# Patient Record
Sex: MALE | Race: WHITE | Employment: OTHER | URBAN - METROPOLITAN AREA
[De-identification: names, ages, dates, MRNs, and addresses within clinical notes are randomized per-mention and may not be internally consistent; named-entity substitution may affect disease eponyms.]

---

## 2017-01-06 ENCOUNTER — LAB ENCOUNTER (OUTPATIENT)
Dept: LAB | Age: 72
End: 2017-01-06
Attending: FAMILY MEDICINE
Payer: MEDICARE

## 2017-01-06 DIAGNOSIS — E78.00 PURE HYPERCHOLESTEROLEMIA: ICD-10-CM

## 2017-01-06 DIAGNOSIS — Z96.651 STATUS POST RIGHT KNEE REPLACEMENT: ICD-10-CM

## 2017-01-06 DIAGNOSIS — Z12.5 SCREENING FOR PROSTATE CANCER: ICD-10-CM

## 2017-01-06 DIAGNOSIS — I10 ESSENTIAL HYPERTENSION WITH GOAL BLOOD PRESSURE LESS THAN 130/85: ICD-10-CM

## 2017-01-06 LAB
ALBUMIN SERPL-MCNC: 4.1 G/DL (ref 3.5–4.8)
ALP LIVER SERPL-CCNC: 51 U/L (ref 45–117)
ALT SERPL-CCNC: 27 U/L (ref 17–63)
AST SERPL-CCNC: 13 U/L (ref 15–41)
BASOPHILS # BLD AUTO: 0.09 X10(3) UL (ref 0–0.1)
BASOPHILS NFR BLD AUTO: 0.9 %
BILIRUB SERPL-MCNC: 0.8 MG/DL (ref 0.1–2)
BUN BLD-MCNC: 28 MG/DL (ref 8–20)
CALCIUM BLD-MCNC: 9.5 MG/DL (ref 8.3–10.3)
CHLORIDE: 109 MMOL/L (ref 101–111)
CHOLEST SMN-MCNC: 139 MG/DL (ref ?–200)
CO2: 24 MMOL/L (ref 22–32)
CREAT BLD-MCNC: 1.08 MG/DL (ref 0.7–1.3)
EOSINOPHIL # BLD AUTO: 0.12 X10(3) UL (ref 0–0.3)
EOSINOPHIL NFR BLD AUTO: 1.2 %
ERYTHROCYTE [DISTWIDTH] IN BLOOD BY AUTOMATED COUNT: 14.5 % (ref 11.5–16)
GLUCOSE BLD-MCNC: 86 MG/DL (ref 70–99)
HCT VFR BLD AUTO: 49.9 % (ref 37–53)
HDLC SERPL-MCNC: 68 MG/DL (ref 45–?)
HDLC SERPL: 2.04 {RATIO} (ref ?–4.97)
HGB BLD-MCNC: 16.5 G/DL (ref 13–17)
IMMATURE GRANULOCYTE COUNT: 0.06 X10(3) UL (ref 0–1)
IMMATURE GRANULOCYTE RATIO %: 0.6 %
LDLC SERPL CALC-MCNC: 39 MG/DL (ref ?–130)
LYMPHOCYTES # BLD AUTO: 3.14 X10(3) UL (ref 0.9–4)
LYMPHOCYTES NFR BLD AUTO: 31.8 %
M PROTEIN MFR SERPL ELPH: 6.9 G/DL (ref 6.1–8.3)
MCH RBC QN AUTO: 33.6 PG (ref 27–33.2)
MCHC RBC AUTO-ENTMCNC: 33.1 G/DL (ref 31–37)
MCV RBC AUTO: 101.6 FL (ref 80–99)
MONOCYTES # BLD AUTO: 0.96 X10(3) UL (ref 0.1–0.6)
MONOCYTES NFR BLD AUTO: 9.7 %
NEUTROPHIL ABS PRELIM: 5.5 X10 (3) UL (ref 1.3–6.7)
NEUTROPHILS # BLD AUTO: 5.5 X10(3) UL (ref 1.3–6.7)
NEUTROPHILS NFR BLD AUTO: 55.8 %
NONHDLC SERPL-MCNC: 71 MG/DL (ref ?–130)
PLATELET # BLD AUTO: 292 10(3)UL (ref 150–450)
POTASSIUM SERPL-SCNC: 4 MMOL/L (ref 3.6–5.1)
PSA SERPL-MCNC: 1.65 NG/ML (ref 0.01–4)
RBC # BLD AUTO: 4.91 X10(6)UL (ref 3.8–5.8)
RED CELL DISTRIBUTION WIDTH-SD: 53.9 FL (ref 35.1–46.3)
SODIUM SERPL-SCNC: 142 MMOL/L (ref 136–144)
TRIGLYCERIDES: 160 MG/DL (ref ?–150)
VLDL: 32 MG/DL (ref 5–40)
WBC # BLD AUTO: 9.9 X10(3) UL (ref 4–13)

## 2017-01-06 PROCEDURE — 85025 COMPLETE CBC W/AUTO DIFF WBC: CPT

## 2017-01-06 PROCEDURE — 80053 COMPREHEN METABOLIC PANEL: CPT

## 2017-01-06 PROCEDURE — 84153 ASSAY OF PSA TOTAL: CPT

## 2017-01-06 PROCEDURE — 36415 COLL VENOUS BLD VENIPUNCTURE: CPT

## 2017-01-06 PROCEDURE — 80061 LIPID PANEL: CPT

## 2017-01-07 ENCOUNTER — PRIOR ORIGINAL RECORDS (OUTPATIENT)
Dept: OTHER | Age: 72
End: 2017-01-07

## 2017-01-07 ENCOUNTER — APPOINTMENT (OUTPATIENT)
Dept: CT IMAGING | Facility: HOSPITAL | Age: 72
End: 2017-01-07
Payer: MEDICARE

## 2017-01-07 ENCOUNTER — HOSPITAL ENCOUNTER (EMERGENCY)
Facility: HOSPITAL | Age: 72
Discharge: HOME | End: 2017-01-07
Attending: EMERGENCY MEDICINE
Payer: MEDICARE

## 2017-01-07 VITALS
HEIGHT: 69 IN | TEMPERATURE: 99 F | HEART RATE: 81 BPM | SYSTOLIC BLOOD PRESSURE: 142 MMHG | RESPIRATION RATE: 20 BRPM | OXYGEN SATURATION: 98 % | WEIGHT: 220 LBS | BODY MASS INDEX: 32.58 KG/M2 | DIASTOLIC BLOOD PRESSURE: 74 MMHG

## 2017-01-07 DIAGNOSIS — N23 RENAL COLIC: ICD-10-CM

## 2017-01-07 DIAGNOSIS — N20.1 URETEROLITHIASIS: Primary | ICD-10-CM

## 2017-01-07 LAB
ALBUMIN SERPL-MCNC: 4.3 G/DL (ref 3.5–4.8)
ALP LIVER SERPL-CCNC: 50 U/L (ref 45–117)
ALT SERPL-CCNC: 27 U/L (ref 17–63)
AST SERPL-CCNC: 19 U/L (ref 15–41)
BASOPHILS # BLD AUTO: 0.09 X10(3) UL (ref 0–0.1)
BASOPHILS NFR BLD AUTO: 0.7 %
BILIRUB SERPL-MCNC: 0.9 MG/DL (ref 0.1–2)
BUN BLD-MCNC: 27 MG/DL (ref 8–20)
CALCIUM BLD-MCNC: 9.9 MG/DL (ref 8.3–10.3)
CHLORIDE: 106 MMOL/L (ref 101–111)
CO2: 20 MMOL/L (ref 22–32)
CREAT BLD-MCNC: 1.29 MG/DL (ref 0.7–1.3)
EOSINOPHIL # BLD AUTO: 0.11 X10(3) UL (ref 0–0.3)
EOSINOPHIL NFR BLD AUTO: 0.8 %
ERYTHROCYTE [DISTWIDTH] IN BLOOD BY AUTOMATED COUNT: 13.9 % (ref 11.5–16)
GLUCOSE BLD-MCNC: 134 MG/DL (ref 70–99)
GLUCOSE UR STRIP.AUTO-MCNC: NEGATIVE MG/DL
HCT VFR BLD AUTO: 49.7 % (ref 37–53)
HGB BLD-MCNC: 17.1 G/DL (ref 13–17)
IMMATURE GRANULOCYTE COUNT: 0.08 X10(3) UL (ref 0–1)
IMMATURE GRANULOCYTE RATIO %: 0.6 %
KETONES UR STRIP.AUTO-MCNC: 15 MG/DL
LEUKOCYTE ESTERASE UR QL STRIP.AUTO: NEGATIVE
LIPASE: 136 U/L (ref 73–393)
LYMPHOCYTES # BLD AUTO: 3.08 X10(3) UL (ref 0.9–4)
LYMPHOCYTES NFR BLD AUTO: 22.6 %
M PROTEIN MFR SERPL ELPH: 7.1 G/DL (ref 6.1–8.3)
MCH RBC QN AUTO: 33.6 PG (ref 27–33.2)
MCHC RBC AUTO-ENTMCNC: 34.4 G/DL (ref 31–37)
MCV RBC AUTO: 97.6 FL (ref 80–99)
MONOCYTES # BLD AUTO: 1.51 X10(3) UL (ref 0.1–0.6)
MONOCYTES NFR BLD AUTO: 11.1 %
NEUTROPHIL ABS PRELIM: 8.75 X10 (3) UL (ref 1.3–6.7)
NEUTROPHILS # BLD AUTO: 8.75 X10(3) UL (ref 1.3–6.7)
NEUTROPHILS NFR BLD AUTO: 64.2 %
NITRITE UR QL STRIP.AUTO: POSITIVE
PH UR STRIP.AUTO: 6 [PH] (ref 4.5–8)
PLATELET # BLD AUTO: 284 10(3)UL (ref 150–450)
POTASSIUM SERPL-SCNC: 3.8 MMOL/L (ref 3.6–5.1)
PROT UR STRIP.AUTO-MCNC: >=300 MG/DL
RBC # BLD AUTO: 5.09 X10(6)UL (ref 3.8–5.8)
RBC #/AREA URNS AUTO: >10 /HPF
RED CELL DISTRIBUTION WIDTH-SD: 50.2 FL (ref 35.1–46.3)
SODIUM SERPL-SCNC: 137 MMOL/L (ref 136–144)
SP GR UR STRIP.AUTO: >=1.03 (ref 1–1.03)
UROBILINOGEN UR STRIP.AUTO-MCNC: 1 MG/DL
WBC # BLD AUTO: 13.6 X10(3) UL (ref 4–13)

## 2017-01-07 PROCEDURE — 74176 CT ABD & PELVIS W/O CONTRAST: CPT

## 2017-01-07 PROCEDURE — 87086 URINE CULTURE/COLONY COUNT: CPT

## 2017-01-07 PROCEDURE — 80053 COMPREHEN METABOLIC PANEL: CPT

## 2017-01-07 PROCEDURE — 96374 THER/PROPH/DIAG INJ IV PUSH: CPT

## 2017-01-07 PROCEDURE — 99285 EMERGENCY DEPT VISIT HI MDM: CPT

## 2017-01-07 PROCEDURE — 83690 ASSAY OF LIPASE: CPT

## 2017-01-07 PROCEDURE — 96375 TX/PRO/DX INJ NEW DRUG ADDON: CPT

## 2017-01-07 PROCEDURE — 81001 URINALYSIS AUTO W/SCOPE: CPT

## 2017-01-07 PROCEDURE — 85025 COMPLETE CBC W/AUTO DIFF WBC: CPT

## 2017-01-07 RX ORDER — ONDANSETRON 2 MG/ML
INJECTION INTRAMUSCULAR; INTRAVENOUS
Status: COMPLETED
Start: 2017-01-07 | End: 2017-01-07

## 2017-01-07 RX ORDER — ONDANSETRON 2 MG/ML
4 INJECTION INTRAMUSCULAR; INTRAVENOUS ONCE
Status: COMPLETED | OUTPATIENT
Start: 2017-01-07 | End: 2017-01-07

## 2017-01-07 RX ORDER — HYDROMORPHONE HYDROCHLORIDE 1 MG/ML
1 INJECTION, SOLUTION INTRAMUSCULAR; INTRAVENOUS; SUBCUTANEOUS ONCE
Status: COMPLETED | OUTPATIENT
Start: 2017-01-07 | End: 2017-01-07

## 2017-01-07 RX ORDER — SULFAMETHOXAZOLE AND TRIMETHOPRIM 800; 160 MG/1; MG/1
1 TABLET ORAL 2 TIMES DAILY
Qty: 6 TABLET | Refills: 0 | Status: SHIPPED | OUTPATIENT
Start: 2017-01-07 | End: 2017-01-10

## 2017-01-07 RX ORDER — KETOROLAC TROMETHAMINE 10 MG/1
10 TABLET, FILM COATED ORAL EVERY 6 HOURS PRN
Qty: 12 TABLET | Refills: 0 | Status: SHIPPED | OUTPATIENT
Start: 2017-01-07 | End: 2017-01-10

## 2017-01-07 RX ORDER — HYDROCODONE BITARTRATE AND ACETAMINOPHEN 5; 325 MG/1; MG/1
1 TABLET ORAL EVERY 4 HOURS PRN
Qty: 20 TABLET | Refills: 0 | Status: SHIPPED | OUTPATIENT
Start: 2017-01-07 | End: 2017-01-17

## 2017-01-07 RX ORDER — KETOROLAC TROMETHAMINE 30 MG/ML
15 INJECTION, SOLUTION INTRAMUSCULAR; INTRAVENOUS ONCE
Status: COMPLETED | OUTPATIENT
Start: 2017-01-07 | End: 2017-01-07

## 2017-01-07 RX ORDER — HYDROMORPHONE HYDROCHLORIDE 1 MG/ML
INJECTION, SOLUTION INTRAMUSCULAR; INTRAVENOUS; SUBCUTANEOUS
Status: COMPLETED
Start: 2017-01-07 | End: 2017-01-07

## 2017-01-07 RX ORDER — ONDANSETRON 4 MG/1
4 TABLET, ORALLY DISINTEGRATING ORAL EVERY 4 HOURS PRN
Qty: 10 TABLET | Refills: 0 | Status: SHIPPED | OUTPATIENT
Start: 2017-01-07 | End: 2017-01-14

## 2017-01-07 RX ORDER — TAMSULOSIN HYDROCHLORIDE 0.4 MG/1
0.4 CAPSULE ORAL EVERY EVENING
Qty: 5 CAPSULE | Refills: 0 | Status: SHIPPED | OUTPATIENT
Start: 2017-01-07 | End: 2017-01-14

## 2017-01-07 NOTE — ED PROVIDER NOTES
Patient Seen in: [facility] Emergency Department    History   Patient presents with:  Abdomen/Flank Pain (GI/)    Stated Complaint: kidney stone    HPI    Patient is a pleasant 62-year-old male, presenting for evaluation of right flank pain.   Disc Take 1 tablet by mouth 2 (two) times daily. valsartan (DIOVAN) 320 MG Oral Tab,  Take 320 mg by mouth daily.    amoxicillin 500 MG Oral Cap,  TAKE 4 CAPSULES BY MOUTH 1 HOUR BEFORE DENTIST APPOINTMENT   HYDROcodone-acetaminophen (NORCO) 5-325 MG Oral Tab, 9\")  Wt 99.791 kg  BMI 32.47 kg/m2  SpO2 98%    Physical Exam    Vital signs are reviewed noted as documented in the nursing chart. GENERAL: Patient is awake and alert, moving about on the cart in obvious discomfort.    HEENT: Head is without evidence of Normal   CBC WITH DIFFERENTIAL WITH PLATELET    Narrative: The following orders were created for panel order CBC WITH DIFFERENTIAL WITH PLATELET.   Procedure                               Abnormality         Status                     --------- Urinary bladder is decompressed. LUNG BASES:    Atelectasis      1/7/2017  CONCLUSION:  2 mm obstructing calculus distal right ureter just proximal to the right UVJ with mild right hydronephrosis and perinephric stranding about the right kidney.   Addition incident.     Disposition and Plan     Clinical Impression:  Ureterolithiasis  (primary encounter diagnosis)  Renal colic    Disposition:  Discharge    Follow-up:  Jose Woodall MD  [address] [place] [id number] [phone]    Schedule

## 2017-01-09 DIAGNOSIS — N20.0 KIDNEY STONE: Primary | ICD-10-CM

## 2017-01-09 DIAGNOSIS — E78.00 PURE HYPERCHOLESTEROLEMIA: Primary | ICD-10-CM

## 2017-01-09 DIAGNOSIS — I10 ESSENTIAL HYPERTENSION: ICD-10-CM

## 2017-01-13 ENCOUNTER — APPOINTMENT (OUTPATIENT)
Dept: GENERAL RADIOLOGY | Facility: HOSPITAL | Age: 72
End: 2017-01-13
Attending: EMERGENCY MEDICINE
Payer: MEDICARE

## 2017-01-13 ENCOUNTER — HOSPITAL ENCOUNTER (EMERGENCY)
Facility: HOSPITAL | Age: 72
Discharge: HOME | End: 2017-01-13
Attending: EMERGENCY MEDICINE
Payer: MEDICARE

## 2017-01-13 VITALS
DIASTOLIC BLOOD PRESSURE: 81 MMHG | SYSTOLIC BLOOD PRESSURE: 134 MMHG | TEMPERATURE: 98 F | RESPIRATION RATE: 16 BRPM | HEART RATE: 93 BPM | OXYGEN SATURATION: 95 %

## 2017-01-13 DIAGNOSIS — N23 RENAL COLIC: Primary | ICD-10-CM

## 2017-01-13 LAB
BILIRUB UR QL STRIP.AUTO: NEGATIVE
CLARITY UR REFRACT.AUTO: CLEAR
COLOR UR AUTO: YELLOW
GLUCOSE UR STRIP.AUTO-MCNC: NEGATIVE MG/DL
HYALINE CASTS #/AREA URNS AUTO: PRESENT /LPF
KETONES UR STRIP.AUTO-MCNC: NEGATIVE MG/DL
LEUKOCYTE ESTERASE UR QL STRIP.AUTO: NEGATIVE
NITRITE UR QL STRIP.AUTO: NEGATIVE
PH UR STRIP.AUTO: 5 [PH] (ref 4.5–8)
PROT UR STRIP.AUTO-MCNC: NEGATIVE MG/DL
RBC #/AREA URNS AUTO: >10 /HPF
SP GR UR STRIP.AUTO: 1.02 (ref 1–1.03)
UROBILINOGEN UR STRIP.AUTO-MCNC: <2 MG/DL

## 2017-01-13 PROCEDURE — 99284 EMERGENCY DEPT VISIT MOD MDM: CPT

## 2017-01-13 PROCEDURE — 74000 XR ABDOMEN (KUB) (1 AP VIEW)  (CPT=74000): CPT

## 2017-01-13 PROCEDURE — 81001 URINALYSIS AUTO W/SCOPE: CPT | Performed by: EMERGENCY MEDICINE

## 2017-01-13 PROCEDURE — 96372 THER/PROPH/DIAG INJ SC/IM: CPT

## 2017-01-13 RX ORDER — KETOROLAC TROMETHAMINE 10 MG/1
10 TABLET, FILM COATED ORAL EVERY 6 HOURS PRN
Qty: 30 TABLET | Refills: 0 | Status: SHIPPED | OUTPATIENT
Start: 2017-01-13 | End: 2017-01-20

## 2017-01-13 RX ORDER — HYDROMORPHONE HYDROCHLORIDE 1 MG/ML
1 INJECTION, SOLUTION INTRAMUSCULAR; INTRAVENOUS; SUBCUTANEOUS ONCE
Status: DISCONTINUED | OUTPATIENT
Start: 2017-01-13 | End: 2017-01-13

## 2017-01-13 RX ORDER — HYDROMORPHONE HYDROCHLORIDE 1 MG/ML
0.5 INJECTION, SOLUTION INTRAMUSCULAR; INTRAVENOUS; SUBCUTANEOUS ONCE
Status: COMPLETED | OUTPATIENT
Start: 2017-01-13 | End: 2017-01-13

## 2017-01-13 RX ORDER — HYDROCODONE BITARTRATE AND ACETAMINOPHEN 5; 325 MG/1; MG/1
1-2 TABLET ORAL EVERY 4 HOURS PRN
Qty: 20 TABLET | Refills: 0 | Status: SHIPPED | OUTPATIENT
Start: 2017-01-13 | End: 2017-01-20

## 2017-01-13 RX ORDER — TAMSULOSIN HYDROCHLORIDE 0.4 MG/1
0.4 CAPSULE ORAL DAILY
Qty: 7 CAPSULE | Refills: 0 | Status: SHIPPED | OUTPATIENT
Start: 2017-01-13 | End: 2017-01-20

## 2017-01-13 RX ORDER — ONDANSETRON 4 MG/1
4 TABLET, ORALLY DISINTEGRATING ORAL ONCE
Status: COMPLETED | OUTPATIENT
Start: 2017-01-13 | End: 2017-01-13

## 2017-01-13 RX ORDER — ONDANSETRON 2 MG/ML
4 INJECTION INTRAMUSCULAR; INTRAVENOUS ONCE
Status: DISCONTINUED | OUTPATIENT
Start: 2017-01-13 | End: 2017-01-13

## 2017-01-13 RX ORDER — SODIUM CHLORIDE 9 MG/ML
INJECTION, SOLUTION INTRAVENOUS CONTINUOUS
Status: DISCONTINUED | OUTPATIENT
Start: 2017-01-13 | End: 2017-01-13

## 2017-01-13 RX ORDER — KETOROLAC TROMETHAMINE 30 MG/ML
60 INJECTION, SOLUTION INTRAMUSCULAR; INTRAVENOUS ONCE
Status: COMPLETED | OUTPATIENT
Start: 2017-01-13 | End: 2017-01-13

## 2017-01-13 RX ORDER — ONDANSETRON 4 MG/1
4 TABLET, ORALLY DISINTEGRATING ORAL EVERY 4 HOURS PRN
Qty: 10 TABLET | Refills: 0 | Status: SHIPPED | OUTPATIENT
Start: 2017-01-13 | End: 2017-01-20

## 2017-01-13 NOTE — ED INITIAL ASSESSMENT (HPI)
Was diagnosed with kidney stones this past Saturday. Said he passed the stone. Last night patient started to develop severe right flank pain radiating to the right lower abdomen. Denies fevers. Complaint of mild nausea.

## 2017-01-13 NOTE — ED PROVIDER NOTES
Patient Seen in: [facility] Emergency Department    History   Patient presents with:  Abdomen/Flank Pain (GI/)    Stated Complaint: right flank pain since last night- recent kidney stone    HPI    72-year-old male with a history of kidney stones, 1 capsule (0.4 mg total) by mouth daily. Ketorolac Tromethamine 10 MG Oral Tab,  Take 1 tablet (10 mg total) by mouth every 6 (six) hours as needed for Pain.    ondansetron 4 MG Oral Tablet Dispersible,  Take 1 tablet (4 mg total) by mouth every 4 (four) as otherwise stated in HPI.     Physical Exam       ED Triage Vitals   BP 01/13/17 1617 159/94 mmHg   Pulse 01/13/17 1617 98   Resp 01/13/17 1617 16   Temp 01/13/17 1617 98.4 °F (36.9 °C)   Temp src 01/13/17 1617 Temporal   SpO2 01/13/17 1617 94 %   O2 [place] CT scanner workstation to localize potential stones in the cranio-caudal plane. Dose reduction techniques were used.  Dose information is transmitted to the ACR ([address] [place] [address] of Radiology) Marissa Isbell 35 ([address]) which includes the Dose stone typically would be around 90%. However the fact that it is 5 mm there is a decrease chance compared to his prior kidney stone which was only 2 mm.     Ct Abdomen+pelvis Kidneystone 2d Rndr(no Iv,no Oral)(cpt=74176)    1/7/2017  PROCEDURE:  CT ABDOMEN nephrolithiasis or hydronephrosis. Colonic diverticulosis.     Dictated by: Augustina Fleischer, MD on 1/07/2017 at 15:20     Approved by: Augustina Fleischer, MD            Xr Abdomen (kub) (1 Ap View)  (cpt=74000)    1/13/2017  PROCEDURE:  XR ABDOMEN (KUB) (1 AP VIEW) Refills: 0  Comments: Ketorolac was not administered in hospital.     !! - Potential duplicate medications found. Please discuss with provider.

## 2017-01-14 NOTE — ED NOTES
Asked for an IM shot of Toradol. Erika Me it has worked well in the past for him. Dr. Beba Lal made aware.

## 2017-01-19 ENCOUNTER — TELEPHONE (OUTPATIENT)
Dept: FAMILY MEDICINE CLINIC | Facility: CLINIC | Age: 72
End: 2017-01-19

## 2017-01-19 DIAGNOSIS — N20.0 KIDNEY STONE: Primary | ICD-10-CM

## 2017-01-21 ENCOUNTER — PATIENT MESSAGE (OUTPATIENT)
Dept: FAMILY MEDICINE CLINIC | Facility: CLINIC | Age: 72
End: 2017-01-21

## 2017-01-23 PROBLEM — N20.0 URIC ACID KIDNEY STONE: Status: ACTIVE | Noted: 2017-01-23

## 2017-01-23 PROBLEM — N40.1 BENIGN NON-NODULAR PROSTATIC HYPERPLASIA WITH LOWER URINARY TRACT SYMPTOMS: Status: ACTIVE | Noted: 2017-01-23

## 2017-01-23 NOTE — TELEPHONE ENCOUNTER
From: Ira Roblero  To: Blake Acosta MD  Sent: 1/21/2017 1:39 PM CST  Subject: Other    I realize you have a message about the results of the renal calculi analysis , assuming diet and such .  If you want to just email me here that would be fine

## 2017-01-31 ENCOUNTER — PRIOR ORIGINAL RECORDS (OUTPATIENT)
Dept: OTHER | Age: 72
End: 2017-01-31

## 2017-02-01 ENCOUNTER — APPOINTMENT (OUTPATIENT)
Dept: LAB | Age: 72
End: 2017-02-01
Attending: FAMILY MEDICINE
Payer: MEDICARE

## 2017-02-01 DIAGNOSIS — N20.0 KIDNEY STONE: ICD-10-CM

## 2017-02-01 LAB
ALBUMIN SERPL-MCNC: 3.9 G/DL (ref 3.5–4.8)
ALP LIVER SERPL-CCNC: 55 U/L (ref 45–117)
ALT SERPL-CCNC: 27 U/L (ref 17–63)
AST SERPL-CCNC: 14 U/L (ref 15–41)
BILIRUB SERPL-MCNC: 0.9 MG/DL (ref 0.1–2)
BUN BLD-MCNC: 30 MG/DL (ref 8–20)
CALCIUM BLD-MCNC: 9.6 MG/DL (ref 8.3–10.3)
CHLORIDE: 104 MMOL/L (ref 101–111)
CO2: 31 MMOL/L (ref 22–32)
CREAT BLD-MCNC: 1.28 MG/DL (ref 0.7–1.3)
GLUCOSE BLD-MCNC: 95 MG/DL (ref 70–99)
M PROTEIN MFR SERPL ELPH: 7.2 G/DL (ref 6.1–8.3)
POTASSIUM SERPL-SCNC: 4.1 MMOL/L (ref 3.6–5.1)
SODIUM SERPL-SCNC: 141 MMOL/L (ref 136–144)
URIC ACID: 6.7 MG/DL (ref 2.4–8.7)

## 2017-02-01 PROCEDURE — 36415 COLL VENOUS BLD VENIPUNCTURE: CPT

## 2017-02-01 PROCEDURE — 84550 ASSAY OF BLOOD/URIC ACID: CPT

## 2017-02-01 PROCEDURE — 80053 COMPREHEN METABOLIC PANEL: CPT

## 2017-02-02 DIAGNOSIS — N20.0 KIDNEY STONES: Primary | ICD-10-CM

## 2017-02-02 RX ORDER — ALLOPURINOL 100 MG/1
100 TABLET ORAL DAILY
Qty: 30 TABLET | Refills: 1 | Status: SHIPPED | OUTPATIENT
Start: 2017-02-02 | End: 2017-03-30

## 2017-02-03 LAB
ALBUMIN: 4.3 G/DL
ALKALINE PHOSPHATATE(ALK PHOS): 50 IU/L
BILIRUBIN TOTAL: 0.9 MG/DL
BUN: 27 MG/DL
CALCIUM: 9.9 MG/DL
CHLORIDE: 106 MEQ/L
CREATININE, SERUM: 1.29 MG/DL
GLUCOSE: 134 MG/DL
HEMATOCRIT: 49.7 %
HEMOGLOBIN: 17.1 G/DL
PLATELETS: 284 K/UL
POTASSIUM, SERUM: 3.8 MEQ/L
PROTEIN, TOTAL: 7.1 G/DL
RED BLOOD COUNT: 5.09 X 10-6/U
SGOT (AST): 19 IU/L
SGPT (ALT): 27 IU/L
SODIUM: 137 MEQ/L
WHITE BLOOD COUNT: 13.6 X 10-3/U

## 2017-03-08 ENCOUNTER — TELEPHONE (OUTPATIENT)
Dept: FAMILY MEDICINE CLINIC | Facility: CLINIC | Age: 72
End: 2017-03-08

## 2017-03-30 RX ORDER — ALLOPURINOL 100 MG/1
TABLET ORAL
Qty: 30 TABLET | Refills: 1 | Status: SHIPPED | OUTPATIENT
Start: 2017-03-30 | End: 2017-06-03

## 2017-06-05 RX ORDER — ALLOPURINOL 100 MG/1
TABLET ORAL
Qty: 90 TABLET | Refills: 0 | Status: SHIPPED | OUTPATIENT
Start: 2017-06-05 | End: 2017-09-01

## 2017-07-24 PROBLEM — R35.1 NOCTURIA: Status: ACTIVE | Noted: 2017-07-24

## 2017-08-24 ENCOUNTER — OFFICE VISIT (OUTPATIENT)
Dept: FAMILY MEDICINE CLINIC | Facility: CLINIC | Age: 72
End: 2017-08-24

## 2017-08-24 VITALS
RESPIRATION RATE: 16 BRPM | HEART RATE: 88 BPM | BODY MASS INDEX: 36.22 KG/M2 | DIASTOLIC BLOOD PRESSURE: 78 MMHG | HEIGHT: 68 IN | SYSTOLIC BLOOD PRESSURE: 132 MMHG | WEIGHT: 239 LBS | TEMPERATURE: 98 F

## 2017-08-24 DIAGNOSIS — E04.2 MULTINODULAR GOITER: ICD-10-CM

## 2017-08-24 DIAGNOSIS — E78.00 PURE HYPERCHOLESTEROLEMIA: ICD-10-CM

## 2017-08-24 DIAGNOSIS — G89.29 CHRONIC PAIN OF LEFT KNEE: ICD-10-CM

## 2017-08-24 DIAGNOSIS — Z00.00 ENCOUNTER FOR ANNUAL HEALTH EXAMINATION: Primary | ICD-10-CM

## 2017-08-24 DIAGNOSIS — M25.562 CHRONIC PAIN OF LEFT KNEE: ICD-10-CM

## 2017-08-24 DIAGNOSIS — K21.9 GASTROESOPHAGEAL REFLUX DISEASE WITHOUT ESOPHAGITIS: ICD-10-CM

## 2017-08-24 DIAGNOSIS — I10 ESSENTIAL HYPERTENSION: ICD-10-CM

## 2017-08-24 PROCEDURE — 99214 OFFICE O/P EST MOD 30 MIN: CPT | Performed by: FAMILY MEDICINE

## 2017-08-24 PROCEDURE — G0439 PPPS, SUBSEQ VISIT: HCPCS | Performed by: FAMILY MEDICINE

## 2017-08-24 RX ORDER — ACETAMINOPHEN 325 MG/1
325 TABLET ORAL EVERY 6 HOURS PRN
COMMUNITY
End: 2019-10-02

## 2017-08-24 NOTE — PATIENT INSTRUCTIONS
Steve Ellis's SCREENING SCHEDULE   Tests on this list are recommended by your physician but may not be covered, or covered at this frequency, by your insurer. Please check with your insurance carrier before scheduling to verify coverage.     PREVENT Abdominal aortic aneurysm screening (once between ages 73-68)  No results found for this or any previous visit.  Limited to patients who meet one of the following criteria:   • Men who are 73-68 years old and have smoked more than 100 cigarettes in their received Factor VIII or IX concentrates   Clients of institutions for the mentally retarded   Persons who live in the same house as a HepB virus carrier   Homosexual men   Illicit injectable drug abusers     Tetanus Toxoid- Only covered with a cut with met

## 2017-08-24 NOTE — PROGRESS NOTES
HPI:   Mtathew Albrecht is a 81year old male who presents for a Medicare Subsequent Annual Wellness visit (Pt already had Initial Annual Wellness). Patient is here for his Medicare annual wellness visit. We discussed his weight.  Discussed his left 1-2 tablets by mouth every 4 (four) hours as needed. Rosuvastatin Calcium 20 MG Oral Tab Take 20 mg by mouth daily. Psyllium 0.52 G Oral Cap Take 1 capsule by mouth daily. ezetimibe 10 MG Oral Tab Take 10 mg by mouth daily.    Esomeprazole Magnesium ( heartburn  : 2 per night nocturia, no complaint of urinary incontinence  MUSCULOSKELETAL: denies back pain  NEURO: denies headaches  PSYCHE: denies depression or anxiety  HEMATOLOGIC: denies hx of anemia  ENDOCRINE: denies thyroid history  ALL/ASTHMA: de METABOLIC PANEL (14); Future  - LIPID PANEL; Future  - OFFICE/OUTPT VISIT,EST,LEVL IV    3. Essential hypertension  Continue valsartan 320 mg daily  Continue hydrochlorothiazide 12.5 mg daily  - CBC WITH DIFFERENTIAL WITH PLATELET;  Future  - OFFICE/OUTPT V immunizations at another office such as Influenza, Hepatitis B, Tetanus, or Pneumococcal?: No     Functional Ability     Bathing or Showering: Able without help    Toileting: Able without help    Dressing: Able without help    Eating: Able without help it?: Correct    Recall \"Ball\": Correct    Recall \"Flag\": Correct    Recall \"Tree\": Correct       This section provided for quick review of chart, separate sheet to patient  [address] Internal Lab or Procedure Ext DISEASE MONITORING Internal Lab or Procedure External Lab or Procedure   Annual Monitoring of Persistent     Medications (ACE/ARB, digoxin diuretics, anticonvulsants.)    Potassium  Annually Potassium (mmol/L)   Date Value   02/01/2017 4.1     POTASSIUM (m

## 2017-08-27 PROBLEM — M25.562 CHRONIC PAIN OF LEFT KNEE: Status: ACTIVE | Noted: 2017-08-27

## 2017-08-27 PROBLEM — R35.1 NOCTURIA: Status: RESOLVED | Noted: 2017-07-24 | Resolved: 2017-08-27

## 2017-08-27 PROBLEM — N20.0 URIC ACID KIDNEY STONE: Status: RESOLVED | Noted: 2017-01-23 | Resolved: 2017-08-27

## 2017-08-27 PROBLEM — G89.29 CHRONIC PAIN OF LEFT KNEE: Status: ACTIVE | Noted: 2017-08-27

## 2017-09-01 RX ORDER — ALLOPURINOL 100 MG/1
TABLET ORAL
Qty: 90 TABLET | Refills: 0 | Status: SHIPPED | OUTPATIENT
Start: 2017-09-01 | End: 2017-12-01

## 2017-09-06 ENCOUNTER — LAB ENCOUNTER (OUTPATIENT)
Dept: LAB | Age: 72
End: 2017-09-06
Attending: FAMILY MEDICINE
Payer: MEDICARE

## 2017-09-06 DIAGNOSIS — I10 ESSENTIAL HYPERTENSION: ICD-10-CM

## 2017-09-06 DIAGNOSIS — E04.2 MULTINODULAR GOITER: Primary | ICD-10-CM

## 2017-09-06 DIAGNOSIS — E04.2 MULTINODULAR GOITER: ICD-10-CM

## 2017-09-06 DIAGNOSIS — E78.00 PURE HYPERCHOLESTEROLEMIA: ICD-10-CM

## 2017-09-06 LAB
ALBUMIN SERPL-MCNC: 4.1 G/DL (ref 3.5–4.8)
ALP LIVER SERPL-CCNC: 53 U/L (ref 45–117)
ALT SERPL-CCNC: 26 U/L (ref 17–63)
AST SERPL-CCNC: 14 U/L (ref 15–41)
BASOPHILS # BLD AUTO: 0.08 X10(3) UL (ref 0–0.1)
BASOPHILS NFR BLD AUTO: 0.8 %
BILIRUB SERPL-MCNC: 0.7 MG/DL (ref 0.1–2)
BUN BLD-MCNC: 26 MG/DL (ref 8–20)
CALCIUM BLD-MCNC: 9.5 MG/DL (ref 8.3–10.3)
CHLORIDE: 105 MMOL/L (ref 101–111)
CHOLEST SMN-MCNC: 144 MG/DL (ref ?–200)
CO2: 26 MMOL/L (ref 22–32)
CREAT BLD-MCNC: 1.39 MG/DL (ref 0.7–1.3)
EOSINOPHIL # BLD AUTO: 0.08 X10(3) UL (ref 0–0.3)
EOSINOPHIL NFR BLD AUTO: 0.8 %
ERYTHROCYTE [DISTWIDTH] IN BLOOD BY AUTOMATED COUNT: 13.2 % (ref 11.5–16)
FREE T4: 0.7 NG/DL (ref 0.9–1.8)
GLUCOSE BLD-MCNC: 92 MG/DL (ref 70–99)
HCT VFR BLD AUTO: 49.5 % (ref 37–53)
HDLC SERPL-MCNC: 59 MG/DL (ref 45–?)
HDLC SERPL: 2.44 {RATIO} (ref ?–4.97)
HGB BLD-MCNC: 16.3 G/DL (ref 13–17)
IMMATURE GRANULOCYTE COUNT: 0.11 X10(3) UL (ref 0–1)
IMMATURE GRANULOCYTE RATIO %: 1 %
LDLC SERPL CALC-MCNC: 42 MG/DL (ref ?–130)
LDLC SERPL-MCNC: 43 MG/DL (ref 5–40)
LYMPHOCYTES # BLD AUTO: 1.99 X10(3) UL (ref 0.9–4)
LYMPHOCYTES NFR BLD AUTO: 18.7 %
M PROTEIN MFR SERPL ELPH: 7.2 G/DL (ref 6.1–8.3)
MCH RBC QN AUTO: 33.2 PG (ref 27–33.2)
MCHC RBC AUTO-ENTMCNC: 32.9 G/DL (ref 31–37)
MCV RBC AUTO: 100.8 FL (ref 80–99)
MONOCYTES # BLD AUTO: 0.93 X10(3) UL (ref 0.1–0.6)
MONOCYTES NFR BLD AUTO: 8.7 %
NEUTROPHIL ABS PRELIM: 7.44 X10 (3) UL (ref 1.3–6.7)
NEUTROPHILS # BLD AUTO: 7.44 X10(3) UL (ref 1.3–6.7)
NEUTROPHILS NFR BLD AUTO: 70 %
NONHDLC SERPL-MCNC: 85 MG/DL (ref ?–130)
PLATELET # BLD AUTO: 312 10(3)UL (ref 150–450)
POTASSIUM SERPL-SCNC: 4.1 MMOL/L (ref 3.6–5.1)
RBC # BLD AUTO: 4.91 X10(6)UL (ref 3.8–5.8)
RED CELL DISTRIBUTION WIDTH-SD: 49.1 FL (ref 35.1–46.3)
SODIUM SERPL-SCNC: 141 MMOL/L (ref 136–144)
T3 SERPL-MCNC: 62 NG/DL (ref 60–181)
TRIGLYCERIDES: 215 MG/DL (ref ?–150)
TSI SER-ACNC: 2.25 MIU/ML (ref 0.35–5.5)
WBC # BLD AUTO: 10.6 X10(3) UL (ref 4–13)

## 2017-09-06 PROCEDURE — 80053 COMPREHEN METABOLIC PANEL: CPT

## 2017-09-06 PROCEDURE — 80061 LIPID PANEL: CPT

## 2017-09-06 PROCEDURE — 84480 ASSAY TRIIODOTHYRONINE (T3): CPT

## 2017-09-06 PROCEDURE — 36415 COLL VENOUS BLD VENIPUNCTURE: CPT

## 2017-09-06 PROCEDURE — 84443 ASSAY THYROID STIM HORMONE: CPT

## 2017-09-06 PROCEDURE — 85025 COMPLETE CBC W/AUTO DIFF WBC: CPT

## 2017-09-06 PROCEDURE — 84439 ASSAY OF FREE THYROXINE: CPT

## 2017-09-07 ENCOUNTER — TELEPHONE (OUTPATIENT)
Dept: FAMILY MEDICINE CLINIC | Facility: CLINIC | Age: 72
End: 2017-09-07

## 2017-09-07 DIAGNOSIS — N40.1 BENIGN NON-NODULAR PROSTATIC HYPERPLASIA WITH LOWER URINARY TRACT SYMPTOMS: ICD-10-CM

## 2017-09-07 DIAGNOSIS — R79.89 ELEVATED SERUM CREATININE: Primary | ICD-10-CM

## 2017-09-07 DIAGNOSIS — I10 ESSENTIAL HYPERTENSION: ICD-10-CM

## 2017-09-07 DIAGNOSIS — E78.00 PURE HYPERCHOLESTEROLEMIA: ICD-10-CM

## 2017-09-08 ENCOUNTER — HOSPITAL ENCOUNTER (OUTPATIENT)
Dept: ULTRASOUND IMAGING | Facility: HOSPITAL | Age: 72
Discharge: HOME | End: 2017-09-08
Attending: FAMILY MEDICINE
Payer: MEDICARE

## 2017-09-08 DIAGNOSIS — E04.2 MULTINODULAR GOITER: ICD-10-CM

## 2017-09-08 PROCEDURE — 76536 US EXAM OF HEAD AND NECK: CPT | Performed by: FAMILY MEDICINE

## 2017-12-01 RX ORDER — ALLOPURINOL 100 MG/1
TABLET ORAL
Qty: 90 TABLET | Refills: 0 | Status: SHIPPED | OUTPATIENT
Start: 2017-12-01 | End: 2018-02-25

## 2018-01-16 ENCOUNTER — PRIOR ORIGINAL RECORDS (OUTPATIENT)
Dept: OTHER | Age: 73
End: 2018-01-16

## 2018-02-27 RX ORDER — ALLOPURINOL 100 MG/1
TABLET ORAL
Qty: 90 TABLET | Refills: 0 | Status: SHIPPED | OUTPATIENT
Start: 2018-02-27 | End: 2018-05-26

## 2018-02-27 NOTE — TELEPHONE ENCOUNTER
Please call pt. He was due for a follow up appt with Dr. Linda Lo this month for HTN, Hypercholesterolemia. 30 minutes.

## 2018-03-05 ENCOUNTER — HOSPITAL ENCOUNTER (OUTPATIENT)
Age: 73
Discharge: HOME | End: 2018-03-05
Attending: FAMILY MEDICINE
Payer: MEDICARE

## 2018-03-05 ENCOUNTER — TELEPHONE (OUTPATIENT)
Dept: FAMILY MEDICINE CLINIC | Facility: CLINIC | Age: 73
End: 2018-03-05

## 2018-03-05 VITALS
OXYGEN SATURATION: 96 % | HEIGHT: 69 IN | SYSTOLIC BLOOD PRESSURE: 128 MMHG | BODY MASS INDEX: 35.55 KG/M2 | HEART RATE: 102 BPM | RESPIRATION RATE: 18 BRPM | DIASTOLIC BLOOD PRESSURE: 80 MMHG | WEIGHT: 240 LBS | TEMPERATURE: 98 F

## 2018-03-05 DIAGNOSIS — R10.32 ABDOMINAL PAIN, CHRONIC, LEFT LOWER QUADRANT: Primary | ICD-10-CM

## 2018-03-05 DIAGNOSIS — G89.29 ABDOMINAL PAIN, CHRONIC, LEFT LOWER QUADRANT: Primary | ICD-10-CM

## 2018-03-05 LAB
#LYMPHOCYTE IC: 1.6 X10ˆ3/UL (ref 0.9–3.2)
#MXD IC: 1.3 X10ˆ3/UL (ref 0.1–1)
#NEUTROPHIL IC: 6 X10ˆ3/UL (ref 1.3–6.7)
CREAT SERPL-MCNC: 1.1 MG/DL (ref 0.7–1.3)
GLUCOSE BLD-MCNC: 94 MG/DL (ref 70–99)
HCT IC: 48.9 % (ref 37–54)
HGB IC: 16.9 G/DL (ref 12.6–17.4)
ISTAT BLOOD GAS TCO2: 23 MMOL/L (ref 22–32)
ISTAT BUN: 20 MG/DL (ref 8–20)
ISTAT CHLORIDE: 107 MMOL/L (ref 101–111)
ISTAT HEMATOCRIT: 49 % (ref 37–53)
ISTAT IONIZED CALCIUM: 1.3 MMOL/L (ref 1.12–1.32)
ISTAT POTASSIUM: 4.4 MMOL/L (ref 3.6–5.1)
ISTAT SODIUM: 142 MMOL/L (ref 136–144)
LYMPHOCYTES NFR BLD AUTO: 18.2 %
MCH IC: 33.3 PG (ref 27–33.2)
MCHC IC: 34.6 G/DL (ref 31–37)
MCV IC: 96.3 FL (ref 80–99)
MIXED CELL %: 14.6 %
NEUTROPHILS NFR BLD AUTO: 67.2 %
PLT IC: 325 X10ˆ3/UL (ref 150–450)
POCT BLOOD URINE: NEGATIVE
POCT GLUCOSE URINE: NEGATIVE MG/DL
POCT LEUKOCYTE ESTERASE URINE: NEGATIVE
POCT NITRITE URINE: NEGATIVE
POCT PH URINE: 5 (ref 5–8)
POCT PROTEIN URINE: 30 MG/DL
POCT SPECIFIC GRAVITY URINE: 1.02
POCT URINE CLARITY: CLEAR
POCT UROBILINOGEN URINE: 0.2 MG/DL
RBC IC: 5.08 X10ˆ6/UL (ref 3.8–5.8)
WBC IC: 8.9 X10ˆ3/UL (ref 4–13)

## 2018-03-05 PROCEDURE — 80047 BASIC METABLC PNL IONIZED CA: CPT

## 2018-03-05 PROCEDURE — 96360 HYDRATION IV INFUSION INIT: CPT

## 2018-03-05 PROCEDURE — 99203 OFFICE O/P NEW LOW 30 MIN: CPT

## 2018-03-05 PROCEDURE — 85025 COMPLETE CBC W/AUTO DIFF WBC: CPT | Performed by: FAMILY MEDICINE

## 2018-03-05 PROCEDURE — 81002 URINALYSIS NONAUTO W/O SCOPE: CPT | Performed by: FAMILY MEDICINE

## 2018-03-05 PROCEDURE — 99214 OFFICE O/P EST MOD 30 MIN: CPT

## 2018-03-05 NOTE — ED INITIAL ASSESSMENT (HPI)
Pt presents to the immediate care due to intermittent abdominal pain for the last couple of years worse since January. Pt states in the past he has had intermittent abdominal pain related to NSAIDs.  Over the last couple of years he has been having the pain

## 2018-03-05 NOTE — TELEPHONE ENCOUNTER
Pt calling thinking possible diverticulitis- symptoms are lower lt ab pain sharp at time, cramping and tender to touch, not about to eat and drink well this is over a few months. In [place] saw the [place] doc they did labs and white count elevated.  He had a knee r

## 2018-03-05 NOTE — TELEPHONE ENCOUNTER
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 But this has gradually been getting worse until last few days.  Now occurringn with no NSAID use and LLQ abd pain was more sharp and consta

## 2018-03-08 ENCOUNTER — APPOINTMENT (OUTPATIENT)
Dept: LAB | Age: 73
End: 2018-03-08
Attending: FAMILY MEDICINE
Payer: MEDICARE

## 2018-03-08 DIAGNOSIS — R79.89 ELEVATED SERUM CREATININE: ICD-10-CM

## 2018-03-08 DIAGNOSIS — I10 ESSENTIAL HYPERTENSION: ICD-10-CM

## 2018-03-08 DIAGNOSIS — E78.00 PURE HYPERCHOLESTEROLEMIA: ICD-10-CM

## 2018-03-08 DIAGNOSIS — N40.1 BENIGN NON-NODULAR PROSTATIC HYPERPLASIA WITH LOWER URINARY TRACT SYMPTOMS: ICD-10-CM

## 2018-03-08 LAB
ALBUMIN SERPL-MCNC: 3.9 G/DL (ref 3.5–4.8)
ALP LIVER SERPL-CCNC: 54 U/L (ref 45–117)
ALT SERPL-CCNC: 22 U/L (ref 17–63)
AST SERPL-CCNC: 19 U/L (ref 15–41)
BILIRUB SERPL-MCNC: 1 MG/DL (ref 0.1–2)
BUN BLD-MCNC: 17 MG/DL (ref 8–20)
CALCIUM BLD-MCNC: 9.8 MG/DL (ref 8.3–10.3)
CHLORIDE: 107 MMOL/L (ref 101–111)
CHOLEST SMN-MCNC: 113 MG/DL (ref ?–200)
CO2: 23 MMOL/L (ref 22–32)
COMPLEXED PSA SERPL-MCNC: 6.92 NG/ML (ref 0.01–4)
CREAT BLD-MCNC: 1.26 MG/DL (ref 0.7–1.3)
GLUCOSE BLD-MCNC: 90 MG/DL (ref 70–99)
HDLC SERPL-MCNC: 47 MG/DL (ref 45–?)
HDLC SERPL: 2.4 {RATIO} (ref ?–4.97)
LDLC SERPL CALC-MCNC: 14 MG/DL (ref ?–130)
M PROTEIN MFR SERPL ELPH: 6.8 G/DL (ref 6.1–8.3)
NONHDLC SERPL-MCNC: 66 MG/DL (ref ?–130)
POTASSIUM SERPL-SCNC: 4.3 MMOL/L (ref 3.6–5.1)
SODIUM SERPL-SCNC: 139 MMOL/L (ref 136–144)
TRIGL SERPL-MCNC: 258 MG/DL (ref ?–150)
VLDLC SERPL CALC-MCNC: 52 MG/DL (ref 5–40)

## 2018-03-08 PROCEDURE — 80053 COMPREHEN METABOLIC PANEL: CPT

## 2018-03-08 PROCEDURE — 80061 LIPID PANEL: CPT

## 2018-03-08 PROCEDURE — 36415 COLL VENOUS BLD VENIPUNCTURE: CPT

## 2018-03-13 ENCOUNTER — OFFICE VISIT (OUTPATIENT)
Dept: FAMILY MEDICINE CLINIC | Facility: CLINIC | Age: 73
End: 2018-03-13

## 2018-03-13 ENCOUNTER — LAB ENCOUNTER (OUTPATIENT)
Dept: LAB | Age: 73
End: 2018-03-13
Attending: FAMILY MEDICINE
Payer: MEDICARE

## 2018-03-13 VITALS
SYSTOLIC BLOOD PRESSURE: 118 MMHG | BODY MASS INDEX: 35 KG/M2 | RESPIRATION RATE: 16 BRPM | TEMPERATURE: 98 F | DIASTOLIC BLOOD PRESSURE: 76 MMHG | WEIGHT: 235 LBS | HEART RATE: 80 BPM

## 2018-03-13 DIAGNOSIS — R10.32 LEFT LOWER QUADRANT PAIN: ICD-10-CM

## 2018-03-13 DIAGNOSIS — I10 ESSENTIAL HYPERTENSION: Primary | ICD-10-CM

## 2018-03-13 DIAGNOSIS — E78.00 PURE HYPERCHOLESTEROLEMIA: ICD-10-CM

## 2018-03-13 DIAGNOSIS — R97.20 ELEVATED PSA: ICD-10-CM

## 2018-03-13 LAB
BASOPHILS # BLD AUTO: 0.08 X10(3) UL (ref 0–0.1)
BASOPHILS NFR BLD AUTO: 0.6 %
BILIRUB UR QL STRIP.AUTO: NEGATIVE
CLARITY UR REFRACT.AUTO: CLEAR
COLOR UR AUTO: YELLOW
EOSINOPHIL # BLD AUTO: 0.05 X10(3) UL (ref 0–0.3)
EOSINOPHIL NFR BLD AUTO: 0.4 %
ERYTHROCYTE [DISTWIDTH] IN BLOOD BY AUTOMATED COUNT: 13.8 % (ref 11.5–16)
GLUCOSE UR STRIP.AUTO-MCNC: NEGATIVE MG/DL
HCT VFR BLD AUTO: 51.8 % (ref 37–53)
HGB BLD-MCNC: 16.7 G/DL (ref 13–17)
HYALINE CASTS #/AREA URNS AUTO: PRESENT /LPF
IMMATURE GRANULOCYTE COUNT: 0.1 X10(3) UL (ref 0–1)
IMMATURE GRANULOCYTE RATIO %: 0.8 %
KETONES UR STRIP.AUTO-MCNC: NEGATIVE MG/DL
LYMPHOCYTES # BLD AUTO: 1.72 X10(3) UL (ref 0.9–4)
LYMPHOCYTES NFR BLD AUTO: 13.2 %
MCH RBC QN AUTO: 32.4 PG (ref 27–33.2)
MCHC RBC AUTO-ENTMCNC: 32.2 G/DL (ref 31–37)
MCV RBC AUTO: 100.4 FL (ref 80–99)
MONOCYTES # BLD AUTO: 1.02 X10(3) UL (ref 0.1–1)
MONOCYTES NFR BLD AUTO: 7.8 %
NEUTROPHIL ABS PRELIM: 10.09 X10 (3) UL (ref 1.3–6.7)
NEUTROPHILS # BLD AUTO: 10.09 X10(3) UL (ref 1.3–6.7)
NEUTROPHILS NFR BLD AUTO: 77.2 %
NITRITE UR QL STRIP.AUTO: NEGATIVE
PH UR STRIP.AUTO: 5 [PH] (ref 4.5–8)
PLATELET # BLD AUTO: 363 10(3)UL (ref 150–450)
PROT UR STRIP.AUTO-MCNC: NEGATIVE MG/DL
RBC # BLD AUTO: 5.16 X10(6)UL (ref 3.8–5.8)
RBC UR QL AUTO: NEGATIVE
RED CELL DISTRIBUTION WIDTH-SD: 51.2 FL (ref 35.1–46.3)
SP GR UR STRIP.AUTO: 1.05 (ref 1–1.03)
UROBILINOGEN UR STRIP.AUTO-MCNC: <2 MG/DL
WBC # BLD AUTO: 13.1 X10(3) UL (ref 4–13)

## 2018-03-13 PROCEDURE — 85025 COMPLETE CBC W/AUTO DIFF WBC: CPT

## 2018-03-13 PROCEDURE — 81001 URINALYSIS AUTO W/SCOPE: CPT

## 2018-03-13 PROCEDURE — 99214 OFFICE O/P EST MOD 30 MIN: CPT | Performed by: FAMILY MEDICINE

## 2018-03-13 PROCEDURE — 87086 URINE CULTURE/COLONY COUNT: CPT

## 2018-03-13 NOTE — PROGRESS NOTES
Sergio Cook is a 81year old male. HPI:   Patient presents for recheck of his hypertension. Pt has been taking medications as instructed, no medication side effects, home BP monitoring in the range of 949'H systolic and 47-41'M diastolic.  The pat 12/10/2011 19   ----------  ALT (U/L)   Date Value   07/05/2014 43   06/24/2014 44   08/06/2013 34   11/17/2012 17   12/10/2011 17   ----------  Alt (U/L)   Date Value   03/08/2018 22   09/06/2017 26   02/01/2017 27   ----------      Current Outpatient P SURGICAL HISTORY      Comment: cardiac cath   07/24/2017: OTHER SURGICAL HISTORY      Comment: flow us Dr Hermilo Carranza: TONSILLECTOMY      Comment: w/adenoidectomy   Social History:    Smoking status: Former Smoker

## 2018-03-14 DIAGNOSIS — N41.9 PROSTATITIS, UNSPECIFIED PROSTATITIS TYPE: Primary | ICD-10-CM

## 2018-03-14 RX ORDER — SULFAMETHOXAZOLE AND TRIMETHOPRIM 800; 160 MG/1; MG/1
1 TABLET ORAL 2 TIMES DAILY
Qty: 28 TABLET | Refills: 0 | Status: SHIPPED | OUTPATIENT
Start: 2018-03-14 | End: 2018-08-30 | Stop reason: ALTCHOICE

## 2018-04-04 ENCOUNTER — LAB ENCOUNTER (OUTPATIENT)
Dept: LAB | Age: 73
End: 2018-04-04
Attending: Other
Payer: MEDICARE

## 2018-04-04 DIAGNOSIS — R79.9 ABNORMAL BLOOD FINDINGS: Primary | ICD-10-CM

## 2018-04-04 DIAGNOSIS — R79.9 ABNORMAL BLOOD FINDINGS: ICD-10-CM

## 2018-04-04 DIAGNOSIS — N41.9 PROSTATITIS, UNSPECIFIED PROSTATITIS TYPE: ICD-10-CM

## 2018-04-04 PROCEDURE — 81003 URINALYSIS AUTO W/O SCOPE: CPT

## 2018-04-04 PROCEDURE — 85025 COMPLETE CBC W/AUTO DIFF WBC: CPT

## 2018-04-04 NOTE — PROGRESS NOTES
Received call from [facility] stating that pt is currently there and stated that he was to get a CBC done but there is no order. Looking at ov notes from 3/13/18 cbc was supposed to be entered to be drawn. Order entered.   Kassy voiced unders

## 2018-04-30 ENCOUNTER — APPOINTMENT (OUTPATIENT)
Dept: LAB | Age: 73
End: 2018-04-30
Attending: FAMILY MEDICINE
Payer: MEDICARE

## 2018-04-30 DIAGNOSIS — N41.9 PROSTATITIS, UNSPECIFIED PROSTATITIS TYPE: ICD-10-CM

## 2018-04-30 PROCEDURE — 84153 ASSAY OF PSA TOTAL: CPT

## 2018-05-29 RX ORDER — ALLOPURINOL 100 MG/1
TABLET ORAL
Qty: 90 TABLET | Refills: 0 | Status: SHIPPED | OUTPATIENT
Start: 2018-05-29 | End: 2018-08-28

## 2018-08-28 RX ORDER — ALLOPURINOL 100 MG/1
TABLET ORAL
Qty: 90 TABLET | Refills: 0 | Status: SHIPPED | OUTPATIENT
Start: 2018-08-28 | End: 2018-10-10

## 2018-08-30 ENCOUNTER — OFFICE VISIT (OUTPATIENT)
Dept: FAMILY MEDICINE CLINIC | Facility: CLINIC | Age: 73
End: 2018-08-30
Payer: MEDICARE

## 2018-08-30 VITALS
RESPIRATION RATE: 16 BRPM | TEMPERATURE: 97 F | HEIGHT: 68 IN | HEART RATE: 72 BPM | WEIGHT: 240 LBS | BODY MASS INDEX: 36.37 KG/M2 | DIASTOLIC BLOOD PRESSURE: 78 MMHG | SYSTOLIC BLOOD PRESSURE: 138 MMHG

## 2018-08-30 DIAGNOSIS — N52.8 OTHER MALE ERECTILE DYSFUNCTION: ICD-10-CM

## 2018-08-30 DIAGNOSIS — I10 ESSENTIAL HYPERTENSION: ICD-10-CM

## 2018-08-30 DIAGNOSIS — Z00.00 ENCOUNTER FOR ANNUAL HEALTH EXAMINATION: Primary | ICD-10-CM

## 2018-08-30 DIAGNOSIS — Z11.59 NEED FOR HEPATITIS C SCREENING TEST: ICD-10-CM

## 2018-08-30 DIAGNOSIS — E78.00 PURE HYPERCHOLESTEROLEMIA: ICD-10-CM

## 2018-08-30 DIAGNOSIS — K21.9 GASTROESOPHAGEAL REFLUX DISEASE WITHOUT ESOPHAGITIS: ICD-10-CM

## 2018-08-30 PROCEDURE — G0439 PPPS, SUBSEQ VISIT: HCPCS | Performed by: FAMILY MEDICINE

## 2018-08-30 PROCEDURE — 99213 OFFICE O/P EST LOW 20 MIN: CPT | Performed by: FAMILY MEDICINE

## 2018-08-30 RX ORDER — TADALAFIL 10 MG/1
10 TABLET ORAL
Qty: 12 TABLET | Refills: 5 | Status: SHIPPED | OUTPATIENT
Start: 2018-08-30 | End: 2019-07-09

## 2018-08-30 RX ORDER — LATANOPROST 50 UG/ML
1 SOLUTION/ DROPS OPHTHALMIC NIGHTLY
Refills: 10 | COMMUNITY
Start: 2018-08-17 | End: 2020-03-09

## 2018-09-05 NOTE — PROGRESS NOTES
HPI:   Marchelle Eisenmenger is a 72year old male who presents for a Medicare Subsequent Annual Wellness visit (Pt already had Initial Annual Wellness). Patient is here for his subsequent Medicare annual wellness visit. We discussed his weight.   Still w mg total) by mouth daily as needed for Erectile Dysfunction. ALLOPURINOL 100 MG Oral Tab TAKE 1 TABLET BY MOUTH DAILY. LATANOPROST OP Apply to eye.   acetaminophen 325 MG Oral Tab Take 325 mg by mouth every 6 (six) hours as needed for Pain.    HYDROcodo GENERAL: feels well otherwise  SKIN: denies any unusual skin lesions  EYES: denies blurred vision or double vision  HEENT: denies nasal congestion, sinus pain or ST  LUNGS: denies shortness of breath with exertion  CARDIOVASCULAR: denies chest pain on ex presents for a Medicare Assessment. PLAN SUMMARY:   Diagnoses and all orders for this visit:    1. Encounter for annual health examination  See above    2.  Essential hypertension  Continue valsartan 320 mg 1 tablet daily  Continue hydrochlorothiazide 1 describe your current health state?: Fair    How do you maintain positive mental well-being?: Social Interaction    If you are a male age 38-65 or a female age 47-67, do you take aspirin?: No    Have you had any immunizations at another office such as Infl month is it?: Correct    What year is it?: Correct    Recall \"Ball\": Correct    Recall \"Flag\": Correct    Recall \"Tree\": Correct       This section provided for quick review of chart, separate sheet to patient  PREVENTATIVE SERVICES  INDICATIONS AND visit.         [facility] Internal Lab or Procedure External Lab or Procedure   Annual Monitoring of Persistent     Medications (ACE/ARB, digoxin diuretics, anticonvulsants.)    Potassium  Annually Potassium (mmol/L)   Date Value   03/08/2

## 2018-09-05 NOTE — PATIENT INSTRUCTIONS
Steve Ellis's SCREENING SCHEDULE   Tests on this list are recommended by your physician but may not be covered, or covered at this frequency, by your insurer. Please check with your insurance carrier before scheduling to verify coverage.     PREVENT Abdominal aortic aneurysm screening (once between ages 73-68)  No results found for this or any previous visit.  Limited to patients who meet one of the following criteria:   • Men who are 73-68 years old and have smoked more than 100 cigarettes in their Factor VIII or IX concentrates   Clients of institutions for the mentally retarded   Persons who live in the same house as a HepB virus carrier   Homosexual men   Illicit injectable drug abusers     Tetanus Toxoid- Only covered with a cut with metal- TD an

## 2018-10-10 ENCOUNTER — PATIENT MESSAGE (OUTPATIENT)
Dept: FAMILY MEDICINE CLINIC | Facility: CLINIC | Age: 73
End: 2018-10-10

## 2018-10-11 RX ORDER — ALLOPURINOL 100 MG/1
100 TABLET ORAL
Qty: 90 TABLET | Refills: 0 | Status: SHIPPED | OUTPATIENT
Start: 2018-10-11 | End: 2019-11-04

## 2018-10-11 NOTE — TELEPHONE ENCOUNTER
From: Sierra Gonzalez  To: Don Burton MD  Sent: 10/10/2018 11:57 AM CDT  Subject: Other    I missed my  for a lab Dr. Pilo Ruiz had requested . Could you renew that lab for me ? Just email me back here if it's possible .  I also had two vacc

## 2018-11-05 ENCOUNTER — APPOINTMENT (OUTPATIENT)
Dept: LAB | Age: 73
End: 2018-11-05
Attending: FAMILY MEDICINE
Payer: MEDICARE

## 2018-11-05 ENCOUNTER — PRIOR ORIGINAL RECORDS (OUTPATIENT)
Dept: OTHER | Age: 73
End: 2018-11-05

## 2018-11-05 DIAGNOSIS — I10 ESSENTIAL HYPERTENSION: ICD-10-CM

## 2018-11-05 DIAGNOSIS — Z11.59 NEED FOR HEPATITIS C SCREENING TEST: ICD-10-CM

## 2018-11-05 DIAGNOSIS — E78.00 PURE HYPERCHOLESTEROLEMIA: ICD-10-CM

## 2018-11-05 PROCEDURE — 80053 COMPREHEN METABOLIC PANEL: CPT

## 2018-11-05 PROCEDURE — 86803 HEPATITIS C AB TEST: CPT

## 2018-11-05 PROCEDURE — 80061 LIPID PANEL: CPT

## 2018-11-20 ENCOUNTER — HOSPITAL ENCOUNTER (OUTPATIENT)
Age: 73
Discharge: HOME | DRG: 203 | End: 2018-11-20
Attending: FAMILY MEDICINE
Payer: MEDICARE

## 2018-11-20 ENCOUNTER — APPOINTMENT (OUTPATIENT)
Dept: GENERAL RADIOLOGY | Age: 73
DRG: 203 | End: 2018-11-20
Attending: FAMILY MEDICINE
Payer: MEDICARE

## 2018-11-20 VITALS
DIASTOLIC BLOOD PRESSURE: 84 MMHG | SYSTOLIC BLOOD PRESSURE: 139 MMHG | TEMPERATURE: 98 F | WEIGHT: 240 LBS | OXYGEN SATURATION: 96 % | RESPIRATION RATE: 20 BRPM | BODY MASS INDEX: 35.55 KG/M2 | HEIGHT: 69 IN | HEART RATE: 111 BPM

## 2018-11-20 DIAGNOSIS — J20.9 ACUTE BRONCHITIS, UNSPECIFIED ORGANISM: Primary | ICD-10-CM

## 2018-11-20 PROCEDURE — 94640 AIRWAY INHALATION TREATMENT: CPT

## 2018-11-20 PROCEDURE — 99214 OFFICE O/P EST MOD 30 MIN: CPT

## 2018-11-20 PROCEDURE — 71046 X-RAY EXAM CHEST 2 VIEWS: CPT | Performed by: FAMILY MEDICINE

## 2018-11-20 RX ORDER — AZITHROMYCIN 250 MG/1
TABLET, FILM COATED ORAL
Qty: 1 PACKAGE | Refills: 0 | Status: SHIPPED | OUTPATIENT
Start: 2018-11-20 | End: 2018-11-29 | Stop reason: ALTCHOICE

## 2018-11-20 RX ORDER — CODEINE PHOSPHATE AND GUAIFENESIN 10; 100 MG/5ML; MG/5ML
5 SOLUTION ORAL EVERY 6 HOURS PRN
Qty: 100 ML | Refills: 0 | Status: SHIPPED | OUTPATIENT
Start: 2018-11-20 | End: 2018-11-29 | Stop reason: ALTCHOICE

## 2018-11-20 RX ORDER — IPRATROPIUM BROMIDE AND ALBUTEROL SULFATE 2.5; .5 MG/3ML; MG/3ML
3 SOLUTION RESPIRATORY (INHALATION) ONCE
Status: COMPLETED | OUTPATIENT
Start: 2018-11-20 | End: 2018-11-20

## 2018-11-20 RX ORDER — ALBUTEROL SULFATE 2.5 MG/3ML
5 SOLUTION RESPIRATORY (INHALATION) ONCE
Status: COMPLETED | OUTPATIENT
Start: 2018-11-20 | End: 2018-11-20

## 2018-11-20 NOTE — ED INITIAL ASSESSMENT (HPI)
Pt c/o cough and wheezing that started on Wednesday, Pt went to clinic on Friday and was prescribed prednisone and an inhaler. Pt states he as been using the inhaler quite frequently almost every hour on Saturday.  Pt states he feels like he is getting wor

## 2018-11-20 NOTE — ED PROVIDER NOTES
Patient Seen in: [facility]    History   Patient presents with:  Cough/URI    Stated Complaint: uri x1 week    HPI    81-year-old with a history of hypertension, hyperlipidemia, and EtOH dependence presents to the immediate Years:  2.00        Pack years: 1        Types: Cigarettes        Quit date: 1969        Years since quittin.9      Smokeless tobacco: Never Used    Alcohol use: No      Comment: \"recovering alcoholic since \"    Drug use: No      Review of Patient was seen 3 days after symptoms started and diagnosed with viral bronchitis. Symptoms did not improve after treatment with albuterol inhaler and steroids.   Secondary to his age, duration of symptoms, and exam/vitals; will start patient on azithrom

## 2018-11-21 ENCOUNTER — PATIENT MESSAGE (OUTPATIENT)
Dept: FAMILY MEDICINE CLINIC | Facility: CLINIC | Age: 73
End: 2018-11-21

## 2018-11-22 ENCOUNTER — APPOINTMENT (OUTPATIENT)
Dept: GENERAL RADIOLOGY | Facility: HOSPITAL | Age: 73
DRG: 203 | End: 2018-11-22
Attending: STUDENT IN AN ORGANIZED HEALTH CARE EDUCATION/TRAINING PROGRAM
Payer: MEDICARE

## 2018-11-22 ENCOUNTER — HOSPITAL ENCOUNTER (INPATIENT)
Facility: HOSPITAL | Age: 73
LOS: 1 days | Discharge: HOME | DRG: 203 | End: 2018-11-23
Attending: STUDENT IN AN ORGANIZED HEALTH CARE EDUCATION/TRAINING PROGRAM | Admitting: HOSPITALIST
Payer: MEDICARE

## 2018-11-22 DIAGNOSIS — J20.9 ACUTE BRONCHITIS, UNSPECIFIED ORGANISM: Primary | ICD-10-CM

## 2018-11-22 DIAGNOSIS — I10 HYPERTENSION, UNSPECIFIED TYPE: ICD-10-CM

## 2018-11-22 DIAGNOSIS — D72.829 LEUKOCYTOSIS, UNSPECIFIED TYPE: ICD-10-CM

## 2018-11-22 PROCEDURE — 71045 X-RAY EXAM CHEST 1 VIEW: CPT | Performed by: STUDENT IN AN ORGANIZED HEALTH CARE EDUCATION/TRAINING PROGRAM

## 2018-11-22 RX ORDER — IPRATROPIUM BROMIDE AND ALBUTEROL SULFATE 2.5; .5 MG/3ML; MG/3ML
3 SOLUTION RESPIRATORY (INHALATION)
Status: DISCONTINUED | OUTPATIENT
Start: 2018-11-22 | End: 2018-11-22

## 2018-11-22 RX ORDER — HEPARIN SODIUM 5000 [USP'U]/ML
5000 INJECTION, SOLUTION INTRAVENOUS; SUBCUTANEOUS EVERY 8 HOURS SCHEDULED
Status: DISCONTINUED | OUTPATIENT
Start: 2018-11-22 | End: 2018-11-23

## 2018-11-22 RX ORDER — ALBUTEROL SULFATE 2.5 MG/3ML
2.5 SOLUTION RESPIRATORY (INHALATION) EVERY 2 HOUR PRN
Status: DISCONTINUED | OUTPATIENT
Start: 2018-11-22 | End: 2018-11-23

## 2018-11-22 RX ORDER — MORPHINE SULFATE 4 MG/ML
INJECTION, SOLUTION INTRAMUSCULAR; INTRAVENOUS
Status: COMPLETED
Start: 2018-11-22 | End: 2018-11-22

## 2018-11-22 RX ORDER — PANTOPRAZOLE SODIUM 20 MG/1
20 TABLET, DELAYED RELEASE ORAL
Status: DISCONTINUED | OUTPATIENT
Start: 2018-11-22 | End: 2018-11-23

## 2018-11-22 RX ORDER — METHYLPREDNISOLONE SODIUM SUCCINATE 125 MG/2ML
60 INJECTION, POWDER, LYOPHILIZED, FOR SOLUTION INTRAMUSCULAR; INTRAVENOUS EVERY 8 HOURS
Status: DISCONTINUED | OUTPATIENT
Start: 2018-11-22 | End: 2018-11-23

## 2018-11-22 RX ORDER — CODEINE PHOSPHATE AND GUAIFENESIN 10; 100 MG/5ML; MG/5ML
5 SOLUTION ORAL EVERY 6 HOURS PRN
Status: DISCONTINUED | OUTPATIENT
Start: 2018-11-22 | End: 2018-11-23

## 2018-11-22 RX ORDER — SODIUM CHLORIDE 9 MG/ML
INJECTION, SOLUTION INTRAVENOUS CONTINUOUS
Status: DISCONTINUED | OUTPATIENT
Start: 2018-11-22 | End: 2018-11-22

## 2018-11-22 RX ORDER — ONDANSETRON 2 MG/ML
4 INJECTION INTRAMUSCULAR; INTRAVENOUS EVERY 6 HOURS PRN
Status: DISCONTINUED | OUTPATIENT
Start: 2018-11-22 | End: 2018-11-23

## 2018-11-22 RX ORDER — ONDANSETRON 2 MG/ML
INJECTION INTRAMUSCULAR; INTRAVENOUS
Status: COMPLETED
Start: 2018-11-22 | End: 2018-11-22

## 2018-11-22 RX ORDER — IPRATROPIUM BROMIDE AND ALBUTEROL SULFATE 2.5; .5 MG/3ML; MG/3ML
3 SOLUTION RESPIRATORY (INHALATION) ONCE
Status: COMPLETED | OUTPATIENT
Start: 2018-11-22 | End: 2018-11-22

## 2018-11-22 RX ORDER — HYDROCHLOROTHIAZIDE 12.5 MG/1
12.5 CAPSULE, GELATIN COATED ORAL DAILY
Status: DISCONTINUED | OUTPATIENT
Start: 2018-11-22 | End: 2018-11-23

## 2018-11-22 RX ORDER — METOCLOPRAMIDE HYDROCHLORIDE 5 MG/ML
10 INJECTION INTRAMUSCULAR; INTRAVENOUS EVERY 8 HOURS PRN
Status: DISCONTINUED | OUTPATIENT
Start: 2018-11-22 | End: 2018-11-23

## 2018-11-22 RX ORDER — AZITHROMYCIN 250 MG/1
250 TABLET, FILM COATED ORAL DAILY
Status: COMPLETED | OUTPATIENT
Start: 2018-11-22 | End: 2018-11-23

## 2018-11-22 RX ORDER — ACETAMINOPHEN 325 MG/1
325 TABLET ORAL EVERY 6 HOURS PRN
Status: DISCONTINUED | OUTPATIENT
Start: 2018-11-22 | End: 2018-11-23

## 2018-11-22 RX ORDER — LATANOPROST 50 UG/ML
1 SOLUTION/ DROPS OPHTHALMIC NIGHTLY
Status: DISCONTINUED | OUTPATIENT
Start: 2018-11-22 | End: 2018-11-23

## 2018-11-22 RX ORDER — HYDRALAZINE HYDROCHLORIDE 20 MG/ML
5 INJECTION INTRAMUSCULAR; INTRAVENOUS EVERY 6 HOURS PRN
Status: DISCONTINUED | OUTPATIENT
Start: 2018-11-22 | End: 2018-11-23

## 2018-11-22 RX ORDER — HYDROCODONE BITARTRATE AND ACETAMINOPHEN 5; 325 MG/1; MG/1
1-2 TABLET ORAL EVERY 4 HOURS PRN
Status: DISCONTINUED | OUTPATIENT
Start: 2018-11-22 | End: 2018-11-23

## 2018-11-22 RX ORDER — ALLOPURINOL 100 MG/1
100 TABLET ORAL
Status: DISCONTINUED | OUTPATIENT
Start: 2018-11-22 | End: 2018-11-23

## 2018-11-22 RX ORDER — VALSARTAN 320 MG/1
320 TABLET ORAL DAILY
Status: DISCONTINUED | OUTPATIENT
Start: 2018-11-22 | End: 2018-11-23

## 2018-11-22 RX ORDER — ACETAMINOPHEN 325 MG/1
650 TABLET ORAL EVERY 6 HOURS PRN
Status: DISCONTINUED | OUTPATIENT
Start: 2018-11-22 | End: 2018-11-23

## 2018-11-22 RX ORDER — METHYLPREDNISOLONE SODIUM SUCCINATE 125 MG/2ML
125 INJECTION, POWDER, LYOPHILIZED, FOR SOLUTION INTRAMUSCULAR; INTRAVENOUS ONCE
Status: COMPLETED | OUTPATIENT
Start: 2018-11-22 | End: 2018-11-22

## 2018-11-22 RX ORDER — IPRATROPIUM BROMIDE AND ALBUTEROL SULFATE 2.5; .5 MG/3ML; MG/3ML
3 SOLUTION RESPIRATORY (INHALATION)
Status: DISCONTINUED | OUTPATIENT
Start: 2018-11-22 | End: 2018-11-23

## 2018-11-22 NOTE — PLAN OF CARE
Patient/Family Goals    • Patient/Family Long Term Goal Progressing    • Patient/Family Short Term Goal Progressing        RESPIRATORY - ADULT    • Achieves optimal ventilation and oxygenation Progressing        Pt is alert and oriented.   O2 is Currently W

## 2018-11-22 NOTE — ED NOTES
Received smart ER from pt. Q: How are you feeling today as compared to when you were seen in th ?  A: Worse, 1. Was admitted. Checked pts chart and pt is still in the hospital in Rm [facility]A.

## 2018-11-22 NOTE — PROGRESS NOTES
Patient seen and examined     Please see Dr. Strong Apo and P from this morning    S_ NAD  Chest- diffuse wheezing  CVS- RRR    Imp  metapneumovirus  Bronchitis    Plan  continue supportive care  Nebs  AXEL Hayden M.D.   [facility]

## 2018-11-22 NOTE — ED PROVIDER NOTES
Patient Seen in: [facility] Emergency Department    History   Patient presents with:  Dyspnea LORENA SOB (respiratory)    Stated Complaint: LORENA    HPI    Vision is a 81-year-old gentleman who presents emergency department complaining of gradually worse 2.00        Pack years: 1        Types: Cigarettes        Quit date: 1969        Years since quittin.9      Smokeless tobacco: Never Used    Alcohol use: No      Comment: \"recovering alcoholic since \"    Drug use: No      Review of Systems 18.0 (*)     BUN 42 (*)     Creatinine 1.41 (*)     BUN/CREA Ratio 29.8 (*)     GFR, Non- 50 (*)     GFR, -American 58 (*)     A/G Ratio 0.8 (*)     All other components within normal limits   PRO BETA NATRIURETIC PEPTIDE - Abnormal; dose of steroids. He was monitored closely while in the ER. Is no signs of pneumonia on chest x-ray. Patient admitted the hospital for further evaluation and treatment.     Case discussed in detail with the admitting physician, who agreed with the emerge

## 2018-11-22 NOTE — H&P
AIME HOSPITALIST  History and Physical     Ira Roblero Patient Status:  Inpatient    3/31/1945 MRN [id number]   [facility] 5NW-A Attending Kina Fischer MD   Hosp Day # 0 PCP Candace Corrigan MD     Chief Complaint: SOB    His Psychiatric Mother 79        Alzheimers   • Other (CVA[Other]) Mother 80   • Other (leukemia [Other]) Paternal Grandfather    • Cancer Maternal Grandfather         Prostate?    • Thyroid disease Daughter        Allergies:   Augmentin [Amoclan]         Comme of systems was completed. Pertinent positives and negatives noted in the HPI.     Physical Exam:    BP (!) 176/94 (BP Location: Left arm)   Pulse 113   Temp 97.7 °F (36.5 °C) (Oral)   Resp 20   Ht 5' 9\" (1.753 m)   Wt 237 lb 9.6 oz (107.8 kg)   SpO2 95% MD  43/53/1930          **Certification      PHYSICIAN Certification of Need for Inpatient Hospitalization - Initial Certification    Patient will require inpatient services that will reasonably be expected to span two midnight's based on the clinical docu

## 2018-11-22 NOTE — ED INITIAL ASSESSMENT (HPI)
Pt arrives with shortness of breath over the last few days, worse tonight. Patient reported he was at the immediate care on Monday and prescribed a z pack.

## 2018-11-23 VITALS
RESPIRATION RATE: 18 BRPM | SYSTOLIC BLOOD PRESSURE: 145 MMHG | HEART RATE: 108 BPM | WEIGHT: 235.5 LBS | DIASTOLIC BLOOD PRESSURE: 76 MMHG | OXYGEN SATURATION: 95 % | BODY MASS INDEX: 34.88 KG/M2 | TEMPERATURE: 98 F | HEIGHT: 69 IN

## 2018-11-23 PROCEDURE — 99239 HOSP IP/OBS DSCHRG MGMT >30: CPT | Performed by: HOSPITALIST

## 2018-11-23 RX ORDER — PREDNISONE 10 MG/1
TABLET ORAL
Qty: 15 TABLET | Refills: 0 | Status: SHIPPED | OUTPATIENT
Start: 2018-11-23 | End: 2018-11-29 | Stop reason: ALTCHOICE

## 2018-11-23 RX ORDER — HYDROCODONE BITARTRATE AND ACETAMINOPHEN 5; 325 MG/1; MG/1
1-2 TABLET ORAL EVERY 4 HOURS PRN
Qty: 20 TABLET | Refills: 0 | Status: SHIPPED | OUTPATIENT
Start: 2018-11-23 | End: 2018-11-29

## 2018-11-23 NOTE — TELEPHONE ENCOUNTER
From: Juan A Failing  To: Sanjay Caputo MD  Sent: 11/21/2018 10:52 AM CST  Subject: Visit Follow-up Question    Hi , out there . ..  I have had a terrible bout of viral bronchitis and had two visits to [facility] , just started on antibiotics yest

## 2018-11-23 NOTE — PROGRESS NOTES
Patient seen and examined    S- no complaints    General- NAD  Chest- CTAB  CVS- RRR  Abdo- soft, NT, bS+    Plan  Ok to [employer] home    Timmy Simpson M.D.   [facility]

## 2018-11-23 NOTE — PROGRESS NOTES
NURSING DISCHARGE NOTE    Discharged Home via Wheelchair. Accompanied by Family member  Belongings Taken by patient/family. Pt given discharge information and follow-up care , pt verbalized understanding.  Pt states he has no questions or concerns a

## 2018-11-23 NOTE — CM/SW NOTE
11/23/18 1400   CM/SW Screening   Referral Source    Information Source Chart review;Nursing rounds   Patient's Mental Status Alert;Oriented   Patient's [address]   Patient lives with Spouse   Patient Status Prior to Admission

## 2018-11-23 NOTE — TELEPHONE ENCOUNTER
Called to pt to schedule appointment. Pt stated that he was admitted yesterday. Pt voiced concern about wife who is immuno compromised. Advised that she could call to schedule and we could get her in on Monday if needed.   Pt voiced understanding and gabbi

## 2018-11-23 NOTE — CM/SW NOTE
Care Management/BPCI:    Met with patient at bedside to explain the BPCI/Medicare program. Patient agreed with phone f/u for 3 months from 0 [facility] after discharge from [facility]. Patient was enrolled under .  BPCI/Medicare Letter and [place]

## 2018-11-24 NOTE — DISCHARGE SUMMARY
[facility] HOSPITALIST  DISCHARGE SUMMARY     Raul Gonzales Patient Status:  Inpatient    3/31/1945 MRN [id number]   [facility] 5NW-A Attending No att. providers found   Hosp Day # 1 PCP Jean-Paul Moreno MD     Date of Admission: 20 Instructions Prescription details   predniSONE 10 MG Tabs  Commonly known as:  DELTASONE      Take 60 mg for D1, 40 mg D2, 20 mg D3 and 10 mg D4 then stop   Quantity:  15 tablet  Refills:  0        CHANGE how you take these medications      Instructio 0     LATANOPROST OP      Apply to eye. Refills:  0     latanoprost 0.005 % Soln  Commonly known as:  XALATAN       Refills:  10     Psyllium 0.52 g Caps      Take 1 capsule by mouth daily.    Refills:  0     Rosuvastatin Calcium 20 MG Tabs  Commonly know

## 2018-11-26 ENCOUNTER — PATIENT OUTREACH (OUTPATIENT)
Dept: CASE MANAGEMENT | Age: 73
End: 2018-11-26

## 2018-11-26 DIAGNOSIS — Z02.9 ENCOUNTERS FOR ADMINISTRATIVE PURPOSE: ICD-10-CM

## 2018-11-26 NOTE — PROGRESS NOTES
Initial Post Discharge Follow Up   Discharge Date: 11/23/18  Contact Date: 11/26/2018    Consent Verification:  Assessment Completed With: Patient  HIPAA Verified?   Yes    Discharge Dx:     Acute bronchitis  Lazaro pneumo viral infection  Dehydration cough. Disp: 100 mL Rfl: 0   allopurinol 100 MG Oral Tab Take 1 tablet (100 mg total) by mouth once daily.  Disp: 90 tablet Rfl: 0   latanoprost 0.005 % Ophthalmic Solution  Disp:  Rfl: 10   Tadalafil (CIALIS) 10 MG Oral Tab Take 1 tablet (10 mg total) by m that you are home, are there any needs or concerns you need addressed before your next visit with your PCP?  (DME, meds, disease concerns, Etc): No     Follow up appointments:           PCP TCM/HFU appointment: scheduled at D/C within 7-14 days  yes     [place]

## 2018-11-27 NOTE — ED NOTES
+blood culture gram+ cocci in clusters preliminary. Per Dr Montaño Blank may call pt for follow up in morning and wait for sensitivity.

## 2018-11-29 ENCOUNTER — LAB ENCOUNTER (OUTPATIENT)
Dept: LAB | Facility: HOSPITAL | Age: 73
End: 2018-11-29
Attending: FAMILY MEDICINE
Payer: MEDICARE

## 2018-11-29 ENCOUNTER — OFFICE VISIT (OUTPATIENT)
Dept: FAMILY MEDICINE CLINIC | Facility: CLINIC | Age: 73
End: 2018-11-29
Payer: MEDICARE

## 2018-11-29 ENCOUNTER — HOSPITAL ENCOUNTER (OUTPATIENT)
Dept: CT IMAGING | Facility: HOSPITAL | Age: 73
Discharge: HOME | End: 2018-11-29
Attending: FAMILY MEDICINE
Payer: MEDICARE

## 2018-11-29 VITALS
SYSTOLIC BLOOD PRESSURE: 84 MMHG | BODY MASS INDEX: 34.61 KG/M2 | RESPIRATION RATE: 18 BRPM | DIASTOLIC BLOOD PRESSURE: 52 MMHG | OXYGEN SATURATION: 94 % | WEIGHT: 231 LBS | HEART RATE: 111 BPM | HEIGHT: 68.5 IN | TEMPERATURE: 98 F

## 2018-11-29 DIAGNOSIS — S30.1XXA CONTUSION OF ABDOMINAL WALL, INITIAL ENCOUNTER: ICD-10-CM

## 2018-11-29 DIAGNOSIS — R93.89 ABNORMAL CT SCAN: ICD-10-CM

## 2018-11-29 DIAGNOSIS — R00.0 TACHYCARDIA: Primary | ICD-10-CM

## 2018-11-29 DIAGNOSIS — J06.9 VIRAL URI: ICD-10-CM

## 2018-11-29 DIAGNOSIS — R00.0 TACHYCARDIA: ICD-10-CM

## 2018-11-29 DIAGNOSIS — R06.00 EXERTIONAL DYSPNEA: ICD-10-CM

## 2018-11-29 PROCEDURE — 80053 COMPREHEN METABOLIC PANEL: CPT

## 2018-11-29 PROCEDURE — 71275 CT ANGIOGRAPHY CHEST: CPT | Performed by: FAMILY MEDICINE

## 2018-11-29 PROCEDURE — 36415 COLL VENOUS BLD VENIPUNCTURE: CPT

## 2018-11-29 PROCEDURE — 85610 PROTHROMBIN TIME: CPT

## 2018-11-29 PROCEDURE — 85027 COMPLETE CBC AUTOMATED: CPT

## 2018-11-29 PROCEDURE — 83690 ASSAY OF LIPASE: CPT

## 2018-11-29 PROCEDURE — 85007 BL SMEAR W/DIFF WBC COUNT: CPT

## 2018-11-29 PROCEDURE — 93000 ELECTROCARDIOGRAM COMPLETE: CPT | Performed by: FAMILY MEDICINE

## 2018-11-29 PROCEDURE — 99214 OFFICE O/P EST MOD 30 MIN: CPT | Performed by: FAMILY MEDICINE

## 2018-11-29 PROCEDURE — 85730 THROMBOPLASTIN TIME PARTIAL: CPT

## 2018-11-29 RX ORDER — METHYLPREDNISOLONE 4 MG/1
TABLET ORAL
Qty: 1 PACKAGE | Refills: 0 | Status: SHIPPED | OUTPATIENT
Start: 2018-11-29 | End: 2019-07-09 | Stop reason: ALTCHOICE

## 2018-11-29 NOTE — PROGRESS NOTES
Niya Gilmore is a 72year old male. HPI:   Patient is a 77-year-old male originally here for a TCM follow-up after his recent hospitalization. However patient stated that he has been very dyspneic and lightheaded.   He states he believes he has not b History:  Social History    Tobacco Use      Smoking status: Former Smoker        Packs/day: 0.50        Years: 2.00        Pack years: 1        Types: Cigarettes        Quit date: 1969        Years since quittin.9      Smokeless tobacco: Never Us

## 2018-11-30 ENCOUNTER — TELEPHONE (OUTPATIENT)
Dept: FAMILY MEDICINE CLINIC | Facility: CLINIC | Age: 73
End: 2018-11-30

## 2018-11-30 DIAGNOSIS — R05.8 RECURRENT PRODUCTIVE COUGH: Primary | ICD-10-CM

## 2018-11-30 NOTE — TELEPHONE ENCOUNTER
Call to pt-sts went home after chest CTA yesterday w same symptoms reported at appt yesterday-\"drank a quart of pedialyte and a lot of water and felt significantly better.   Today no lightheadedness or dyspnea, mentions same ecchymotic flank area noticed y

## 2018-11-30 NOTE — TELEPHONE ENCOUNTER
Call from radiology with stat CTA chest.  No PE but some signs of inflammatory response involving the pancreas. Called patient with results - no symptoms of pancreatitis. Patient is former nurse anesthetist.  Discussed with Dr. Jacqueline Irwin.   He would like

## 2018-12-03 ENCOUNTER — OFFICE VISIT (OUTPATIENT)
Dept: FAMILY MEDICINE CLINIC | Facility: CLINIC | Age: 73
End: 2018-12-03
Payer: MEDICARE

## 2018-12-03 VITALS
RESPIRATION RATE: 12 BRPM | WEIGHT: 231 LBS | BODY MASS INDEX: 34.61 KG/M2 | HEART RATE: 104 BPM | DIASTOLIC BLOOD PRESSURE: 88 MMHG | TEMPERATURE: 97 F | SYSTOLIC BLOOD PRESSURE: 158 MMHG | HEIGHT: 68.5 IN

## 2018-12-03 DIAGNOSIS — Z09 FOLLOW-UP FOR RESOLVED CONDITION: ICD-10-CM

## 2018-12-03 DIAGNOSIS — J20.8 ACUTE BRONCHITIS DUE TO OTHER SPECIFIED ORGANISMS: Primary | ICD-10-CM

## 2018-12-03 PROCEDURE — 99213 OFFICE O/P EST LOW 20 MIN: CPT | Performed by: FAMILY MEDICINE

## 2018-12-03 NOTE — PROGRESS NOTES
Abran Hashimoto is a 72year old male. HPI:   Patient is a 53-year-old male originally here for follow-up of his recent URI. He states he still feels tired but overall is much better.   This weekend he was able to get fluids in which she believes made a alcohol   • Unspecified essential hypertension    • Visual impairment     glasses      Social History:  Social History    Tobacco Use      Smoking status: Former Smoker        Packs/day: 0.50        Years: 2.00        Pack years: 1        Types: Cigarettes

## 2018-12-12 ENCOUNTER — TELEPHONE (OUTPATIENT)
Dept: FAMILY MEDICINE CLINIC | Facility: CLINIC | Age: 73
End: 2018-12-12

## 2018-12-12 DIAGNOSIS — I10 HYPERTENSION, UNSPECIFIED TYPE: Primary | ICD-10-CM

## 2018-12-12 DIAGNOSIS — R93.89 ABNORMAL FINDING ON CT SCAN: Primary | ICD-10-CM

## 2018-12-12 DIAGNOSIS — D72.829 LEUKOCYTOSIS, UNSPECIFIED TYPE: ICD-10-CM

## 2018-12-12 NOTE — TELEPHONE ENCOUNTER
Okay to order ultrasound of the abdomen with attention to the pancreas. CTA of the chest showed some questionable pancreatic inflammation.

## 2018-12-12 NOTE — TELEPHONE ENCOUNTER
Order placed. Pt aware. He then said there were not labs in system and he thought you had wanted labs on him too? He apologizes for not mentioning in original call. He is OK with this waiting for you to be back in ofc.

## 2018-12-12 NOTE — TELEPHONE ENCOUNTER
Pt calling stating that  had wanted an ultrasound of the pancreas and pt called to schedule and there was no order put in. Please advise.

## 2018-12-14 ENCOUNTER — LAB ENCOUNTER (OUTPATIENT)
Dept: LAB | Age: 73
End: 2018-12-14
Attending: FAMILY MEDICINE
Payer: MEDICARE

## 2018-12-14 DIAGNOSIS — I10 HYPERTENSION, UNSPECIFIED TYPE: ICD-10-CM

## 2018-12-14 DIAGNOSIS — D72.829 LEUKOCYTOSIS, UNSPECIFIED TYPE: ICD-10-CM

## 2018-12-14 PROCEDURE — 85025 COMPLETE CBC W/AUTO DIFF WBC: CPT

## 2018-12-14 PROCEDURE — 80053 COMPREHEN METABOLIC PANEL: CPT

## 2018-12-26 ENCOUNTER — HOSPITAL ENCOUNTER (OUTPATIENT)
Dept: ULTRASOUND IMAGING | Facility: HOSPITAL | Age: 73
Discharge: HOME | End: 2018-12-26
Attending: FAMILY MEDICINE
Payer: MEDICARE

## 2018-12-26 DIAGNOSIS — R93.89 ABNORMAL FINDING ON CT SCAN: ICD-10-CM

## 2018-12-26 PROCEDURE — 76700 US EXAM ABDOM COMPLETE: CPT | Performed by: FAMILY MEDICINE

## 2018-12-27 DIAGNOSIS — R93.89 ABNORMAL FINDINGS ON DIAGNOSTIC IMAGING OF OTHER SPECIFIED BODY STRUCTURES: Primary | ICD-10-CM

## 2019-01-15 ENCOUNTER — MYAURORA ACCOUNT LINK (OUTPATIENT)
Dept: OTHER | Age: 74
End: 2019-01-15

## 2019-01-15 ENCOUNTER — PRIOR ORIGINAL RECORDS (OUTPATIENT)
Dept: OTHER | Age: 74
End: 2019-01-15

## 2019-01-22 LAB
ALBUMIN: 4.1 G/DL
ALKALINE PHOSPHATATE(ALK PHOS): 50 IU/L
BILIRUBIN TOTAL: 1 MG/DL
BUN: 29 MG/DL
CALCIUM: 9.7 MG/DL
CHLORIDE: 105 MEQ/L
CHOLESTEROL, TOTAL: 162 MG/DL
CREATININE, SERUM: 1.25 MG/DL
GLOBULIN: 3.3 G/DL
GLUCOSE: 95 MG/DL
HDL CHOLESTEROL: 61 MG/DL
LDL CHOLESTEROL: 38 MG/DL
POTASSIUM, SERUM: 4.3 MEQ/L
PROTEIN, TOTAL: 7.4 G/DL
SGOT (AST): 21 IU/L
SGPT (ALT): 33 IU/L
SODIUM: 139 MEQ/L
TRIGLYCERIDES: 317 MG/DL

## 2019-01-28 ENCOUNTER — HOSPITAL ENCOUNTER (OUTPATIENT)
Dept: CT IMAGING | Facility: HOSPITAL | Age: 74
Discharge: HOME | End: 2019-01-28
Attending: FAMILY MEDICINE
Payer: MEDICARE

## 2019-01-28 ENCOUNTER — TELEPHONE (OUTPATIENT)
Dept: FAMILY MEDICINE CLINIC | Facility: CLINIC | Age: 74
End: 2019-01-28

## 2019-01-28 DIAGNOSIS — R93.89 ABNORMAL FINDINGS ON DIAGNOSTIC IMAGING OF OTHER SPECIFIED BODY STRUCTURES: ICD-10-CM

## 2019-01-28 LAB — CREAT SERPL-MCNC: 1.1 MG/DL (ref 0.7–1.3)

## 2019-01-28 PROCEDURE — 82565 ASSAY OF CREATININE: CPT

## 2019-01-28 PROCEDURE — 74178 CT ABD&PLV WO CNTR FLWD CNTR: CPT | Performed by: FAMILY MEDICINE

## 2019-01-28 NOTE — TELEPHONE ENCOUNTER
Ethan Breath from [facility]  calling regarding pt's CT order. Per dx and reason for exam, radiologist recommends to do the CT as pancreatic protocol.  I provided verbal OK if this is what radiology recommends as pt is there now for the exam.

## 2019-02-28 VITALS
HEIGHT: 69 IN | HEART RATE: 96 BPM | DIASTOLIC BLOOD PRESSURE: 84 MMHG | SYSTOLIC BLOOD PRESSURE: 116 MMHG | WEIGHT: 240 LBS | BODY MASS INDEX: 35.55 KG/M2

## 2019-02-28 VITALS
SYSTOLIC BLOOD PRESSURE: 120 MMHG | HEIGHT: 69 IN | HEART RATE: 96 BPM | DIASTOLIC BLOOD PRESSURE: 70 MMHG | BODY MASS INDEX: 34.36 KG/M2 | WEIGHT: 232 LBS

## 2019-03-01 VITALS
DIASTOLIC BLOOD PRESSURE: 74 MMHG | WEIGHT: 225 LBS | HEART RATE: 108 BPM | HEIGHT: 69 IN | BODY MASS INDEX: 33.33 KG/M2 | SYSTOLIC BLOOD PRESSURE: 118 MMHG

## 2019-04-02 ENCOUNTER — PATIENT MESSAGE (OUTPATIENT)
Dept: FAMILY MEDICINE CLINIC | Facility: CLINIC | Age: 74
End: 2019-04-02

## 2019-04-03 NOTE — TELEPHONE ENCOUNTER
I spoke with patient, his Blood pressure was elevated ranging 180/100-160/90, in the past it has been around 120/80, he was prescribed Diovan 320 mg and hydrochlorothiazide 12.5 mg daily, however he increased his dose of Hydrochlorothiazide to 25 mg daily,

## 2019-04-03 NOTE — TELEPHONE ENCOUNTER
Have him stop by our office for a nurse blood pressure check. Have him bring his machine with him so we can check it against our readings.

## 2019-04-04 ENCOUNTER — NURSE ONLY (OUTPATIENT)
Dept: FAMILY MEDICINE CLINIC | Facility: CLINIC | Age: 74
End: 2019-04-04
Payer: MEDICARE

## 2019-04-04 VITALS — DIASTOLIC BLOOD PRESSURE: 75 MMHG | SYSTOLIC BLOOD PRESSURE: 120 MMHG | HEART RATE: 100 BPM

## 2019-04-04 DIAGNOSIS — I10 HYPERTENSION, UNSPECIFIED TYPE: Primary | ICD-10-CM

## 2019-04-04 PROCEDURE — 99211 OFF/OP EST MAY X REQ PHY/QHP: CPT | Performed by: FAMILY MEDICINE

## 2019-04-04 NOTE — PROGRESS NOTES
Patient here for Blood Pressure check, his home monitor has high readings  Taking Diovan 320 mg daily  Hydrochlorothiazide 12.5 mg daily   BP-136/80, P-100  Repeat BP-120/74  Patient BP monitor-117/74  medicat  Patient states he replaced the batteries in h

## 2019-04-22 RX ORDER — EZETIMIBE 10 MG/1
TABLET ORAL
COMMUNITY
Start: 2019-02-14 | End: 2019-11-15 | Stop reason: SDUPTHER

## 2019-04-22 RX ORDER — ROSUVASTATIN CALCIUM 20 MG/1
TABLET, COATED ORAL
COMMUNITY
Start: 2016-02-24

## 2019-04-22 RX ORDER — HYDROCHLOROTHIAZIDE 12.5 MG/1
CAPSULE, GELATIN COATED ORAL
COMMUNITY

## 2019-04-22 RX ORDER — VALSARTAN 320 MG/1
TABLET ORAL
COMMUNITY

## 2019-04-22 RX ORDER — HYDROCODONE BITARTRATE AND ACETAMINOPHEN 5; 325 MG/1; MG/1
TABLET ORAL
COMMUNITY

## 2019-05-02 NOTE — TELEPHONE ENCOUNTER
Pt needs appt before any refills. Please have them call to schedule. Thanks          Return in about 6 months (around 2/28/2019) for Hypertension, Hypercholesterolemia. Gout.

## 2019-05-06 RX ORDER — ALLOPURINOL 100 MG/1
TABLET ORAL
Qty: 90 TABLET | Refills: 0 | Status: SHIPPED | OUTPATIENT
Start: 2019-05-06 | End: 2019-07-09

## 2019-07-09 ENCOUNTER — OFFICE VISIT (OUTPATIENT)
Dept: FAMILY MEDICINE CLINIC | Facility: CLINIC | Age: 74
End: 2019-07-09
Payer: MEDICARE

## 2019-07-09 VITALS
HEIGHT: 68.5 IN | TEMPERATURE: 97 F | WEIGHT: 233 LBS | RESPIRATION RATE: 18 BRPM | DIASTOLIC BLOOD PRESSURE: 84 MMHG | SYSTOLIC BLOOD PRESSURE: 138 MMHG | HEART RATE: 100 BPM | BODY MASS INDEX: 34.91 KG/M2

## 2019-07-09 DIAGNOSIS — F10.11 HISTORY OF ALCOHOL ABUSE: ICD-10-CM

## 2019-07-09 DIAGNOSIS — R06.00 EXERTIONAL DYSPNEA: ICD-10-CM

## 2019-07-09 DIAGNOSIS — I10 BENIGN ESSENTIAL HYPERTENSION: Primary | ICD-10-CM

## 2019-07-09 DIAGNOSIS — E78.00 PURE HYPERCHOLESTEROLEMIA: ICD-10-CM

## 2019-07-09 DIAGNOSIS — K21.9 GASTROESOPHAGEAL REFLUX DISEASE WITHOUT ESOPHAGITIS: ICD-10-CM

## 2019-07-09 DIAGNOSIS — M10.9 GOUTY ARTHROPATHY: ICD-10-CM

## 2019-07-09 PROCEDURE — 99214 OFFICE O/P EST MOD 30 MIN: CPT | Performed by: FAMILY MEDICINE

## 2019-07-09 RX ORDER — PREDNISONE 1 MG/1
7.5 TABLET ORAL
COMMUNITY
End: 2019-11-25

## 2019-07-09 NOTE — PROGRESS NOTES
Ivette Weiss is a 69year old male. HPI:   Patient presents for recheck of his hypertension. Pt has been taking medications as instructed, no medication side effects, home BP monitoring in the range of 109'V systolic and 12-91'O diastolic.  The pat 12/10/2011 17         Current Outpatient Medications:  predniSONE 5 MG Oral Tab Take 7.5 mg by mouth. Disp:  Rfl:    allopurinol 100 MG Oral Tab Take 1 tablet (100 mg total) by mouth once daily.  Disp: 90 tablet Rfl: 0   latanoprost 0.005 % Ophthalmic Sol History:    Social History    Tobacco Use      Smoking status: Former Smoker        Packs/day: 0.50        Years: 2.00        Pack years: 1        Types: Cigarettes        Quit date: 1969        Years since quittin.5      Smokeless tobacco: Never stop all alcohol intake.   He was encouraged to go to [place] [address] meetings    Patient will also obtain previously ordered CMP, lipids and PSA in the near future    See in 6 months

## 2019-07-25 ENCOUNTER — TELEPHONE (OUTPATIENT)
Dept: FAMILY MEDICINE CLINIC | Facility: CLINIC | Age: 74
End: 2019-07-25

## 2019-08-02 ENCOUNTER — HOSPITAL ENCOUNTER (OUTPATIENT)
Dept: CV DIAGNOSTICS | Facility: HOSPITAL | Age: 74
Discharge: HOME | End: 2019-08-02
Attending: FAMILY MEDICINE
Payer: MEDICARE

## 2019-08-02 DIAGNOSIS — R06.00 EXERTIONAL DYSPNEA: ICD-10-CM

## 2019-08-02 PROCEDURE — 93017 CV STRESS TEST TRACING ONLY: CPT | Performed by: FAMILY MEDICINE

## 2019-08-02 PROCEDURE — 93350 STRESS TTE ONLY: CPT | Performed by: FAMILY MEDICINE

## 2019-08-02 PROCEDURE — 93018 CV STRESS TEST I&R ONLY: CPT | Performed by: FAMILY MEDICINE

## 2019-08-05 RX ORDER — ALLOPURINOL 100 MG/1
TABLET ORAL
Qty: 90 TABLET | Refills: 0 | Status: SHIPPED | OUTPATIENT
Start: 2019-08-05 | End: 2019-09-03

## 2019-08-06 ENCOUNTER — LAB ENCOUNTER (OUTPATIENT)
Dept: LAB | Age: 74
End: 2019-08-06
Attending: FAMILY MEDICINE
Payer: MEDICARE

## 2019-08-06 DIAGNOSIS — M10.9 GOUTY ARTHROPATHY: ICD-10-CM

## 2019-08-06 DIAGNOSIS — N41.9 PROSTATITIS, UNSPECIFIED PROSTATITIS TYPE: ICD-10-CM

## 2019-08-06 DIAGNOSIS — I10 BENIGN ESSENTIAL HYPERTENSION: ICD-10-CM

## 2019-08-06 DIAGNOSIS — D75.89 MACROCYTOSIS: ICD-10-CM

## 2019-08-06 DIAGNOSIS — K21.9 GASTROESOPHAGEAL REFLUX DISEASE WITHOUT ESOPHAGITIS: ICD-10-CM

## 2019-08-06 DIAGNOSIS — D75.89 MACROCYTOSIS: Primary | ICD-10-CM

## 2019-08-06 DIAGNOSIS — R06.00 EXERTIONAL DYSPNEA: ICD-10-CM

## 2019-08-06 DIAGNOSIS — E78.00 PURE HYPERCHOLESTEROLEMIA: ICD-10-CM

## 2019-08-06 DIAGNOSIS — I10 HYPERTENSION, UNSPECIFIED TYPE: ICD-10-CM

## 2019-08-06 LAB
ALBUMIN SERPL-MCNC: 3.9 G/DL (ref 3.4–5)
ALBUMIN/GLOB SERPL: 1.4 {RATIO} (ref 1–2)
ALP LIVER SERPL-CCNC: 46 U/L (ref 45–117)
ALT SERPL-CCNC: 25 U/L (ref 16–61)
ANION GAP SERPL CALC-SCNC: 9 MMOL/L (ref 0–18)
AST SERPL-CCNC: 24 U/L (ref 15–37)
BASOPHILS # BLD AUTO: 0.05 X10(3) UL (ref 0–0.2)
BASOPHILS NFR BLD AUTO: 0.5 %
BILIRUB SERPL-MCNC: 1.2 MG/DL (ref 0.1–2)
BUN BLD-MCNC: 25 MG/DL (ref 7–18)
BUN/CREAT SERPL: 21.6 (ref 10–20)
CALCIUM BLD-MCNC: 9.1 MG/DL (ref 8.5–10.1)
CHLORIDE SERPL-SCNC: 109 MMOL/L (ref 98–112)
CHOLEST SMN-MCNC: 157 MG/DL (ref ?–200)
CO2 SERPL-SCNC: 22 MMOL/L (ref 21–32)
COMPLEXED PSA SERPL-MCNC: 2.05 NG/ML (ref ?–4)
CREAT BLD-MCNC: 1.16 MG/DL (ref 0.7–1.3)
DEPRECATED RDW RBC AUTO: 55 FL (ref 35.1–46.3)
EOSINOPHIL # BLD AUTO: 0.01 X10(3) UL (ref 0–0.7)
EOSINOPHIL NFR BLD AUTO: 0.1 %
ERYTHROCYTE [DISTWIDTH] IN BLOOD BY AUTOMATED COUNT: 13.4 % (ref 11–15)
GLOBULIN PLAS-MCNC: 2.7 G/DL (ref 2.8–4.4)
GLUCOSE BLD-MCNC: 102 MG/DL (ref 70–99)
HCT VFR BLD AUTO: 48.5 % (ref 39–53)
HDLC SERPL-MCNC: 73 MG/DL (ref 40–59)
HGB BLD-MCNC: 16.3 G/DL (ref 13–17.5)
IMM GRANULOCYTES # BLD AUTO: 0.08 X10(3) UL (ref 0–1)
IMM GRANULOCYTES NFR BLD: 0.8 %
LDLC SERPL CALC-MCNC: 31 MG/DL (ref ?–100)
LYMPHOCYTES # BLD AUTO: 0.9 X10(3) UL (ref 1–4)
LYMPHOCYTES NFR BLD AUTO: 9.1 %
M PROTEIN MFR SERPL ELPH: 6.6 G/DL (ref 6.4–8.2)
MCH RBC QN AUTO: 36.8 PG (ref 26–34)
MCHC RBC AUTO-ENTMCNC: 33.6 G/DL (ref 31–37)
MCV RBC AUTO: 109.5 FL (ref 80–100)
MONOCYTES # BLD AUTO: 0.79 X10(3) UL (ref 0.1–1)
MONOCYTES NFR BLD AUTO: 8 %
NEUTROPHILS # BLD AUTO: 8.04 X10 (3) UL (ref 1.5–7.7)
NEUTROPHILS # BLD AUTO: 8.04 X10(3) UL (ref 1.5–7.7)
NEUTROPHILS NFR BLD AUTO: 81.5 %
NONHDLC SERPL-MCNC: 84 MG/DL (ref ?–130)
OSMOLALITY SERPL CALC.SUM OF ELEC: 295 MOSM/KG (ref 275–295)
PLATELET # BLD AUTO: 257 10(3)UL (ref 150–450)
POTASSIUM SERPL-SCNC: 4.6 MMOL/L (ref 3.5–5.1)
RBC # BLD AUTO: 4.43 X10(6)UL (ref 3.8–5.8)
SODIUM SERPL-SCNC: 140 MMOL/L (ref 136–145)
TRIGL SERPL-MCNC: 267 MG/DL (ref 30–149)
VIT B12 SERPL-MCNC: 238 PG/ML (ref 193–986)
VLDLC SERPL CALC-MCNC: 53 MG/DL (ref 0–30)
WBC # BLD AUTO: 9.9 X10(3) UL (ref 4–11)

## 2019-08-06 PROCEDURE — 80053 COMPREHEN METABOLIC PANEL: CPT

## 2019-08-06 PROCEDURE — 36415 COLL VENOUS BLD VENIPUNCTURE: CPT

## 2019-08-06 PROCEDURE — 85025 COMPLETE CBC W/AUTO DIFF WBC: CPT

## 2019-08-06 PROCEDURE — 80061 LIPID PANEL: CPT

## 2019-08-06 PROCEDURE — 82607 VITAMIN B-12: CPT

## 2019-09-03 ENCOUNTER — OFFICE VISIT (OUTPATIENT)
Dept: FAMILY MEDICINE CLINIC | Facility: CLINIC | Age: 74
End: 2019-09-03
Payer: MEDICARE

## 2019-09-03 VITALS
BODY MASS INDEX: 35.06 KG/M2 | SYSTOLIC BLOOD PRESSURE: 146 MMHG | WEIGHT: 234 LBS | HEART RATE: 100 BPM | HEIGHT: 68.5 IN | RESPIRATION RATE: 18 BRPM | TEMPERATURE: 96 F | DIASTOLIC BLOOD PRESSURE: 80 MMHG

## 2019-09-03 DIAGNOSIS — Z00.00 ENCOUNTER FOR ANNUAL HEALTH EXAMINATION: Primary | ICD-10-CM

## 2019-09-03 DIAGNOSIS — E78.00 PURE HYPERCHOLESTEROLEMIA: ICD-10-CM

## 2019-09-03 DIAGNOSIS — K21.9 GASTROESOPHAGEAL REFLUX DISEASE WITHOUT ESOPHAGITIS: ICD-10-CM

## 2019-09-03 DIAGNOSIS — M10.9 GOUTY ARTHROPATHY: ICD-10-CM

## 2019-09-03 DIAGNOSIS — I10 BENIGN ESSENTIAL HYPERTENSION: ICD-10-CM

## 2019-09-03 PROCEDURE — 99213 OFFICE O/P EST LOW 20 MIN: CPT | Performed by: FAMILY MEDICINE

## 2019-09-03 PROCEDURE — G0439 PPPS, SUBSEQ VISIT: HCPCS | Performed by: FAMILY MEDICINE

## 2019-09-03 NOTE — PATIENT INSTRUCTIONS
Steve Ellis's SCREENING SCHEDULE   Tests on this list are recommended by your physician but may not be covered, or covered at this frequency, by your insurer. Please check with your insurance carrier before scheduling to verify coverage.     PREVENT aortic aneurysm screening (once between ages 73-68)  No results found for this or any previous visit.  Limited to patients who meet one of the following criteria:   • Men who are 73-68 years old and have smoked more than 100 cigarettes in their lifetime   • Hemophiliacs who received Factor VIII or IX concentrates   Clients of institutions for the mentally retarded   Persons who live in the same house as a HepB virus carrier   Homosexual men   Illicit injectable drug abusers     Tetanus Toxoid- Only covered

## 2019-09-03 NOTE — PROGRESS NOTES
HPI:   Fe Herrera is a 69year old male who presents for a Medicare Subsequent Annual Wellness visit (Pt already had Initial Annual Wellness). Patient is here for his subsequent Medicare annual wellness visit. We again discussed his weight.   Gio Garcia Outpatient Medications Marked as Taking for the 9/3/19 encounter (Office Visit) with Blake Acosta MD:  predniSONE 5 MG Oral Tab Take 7.5 mg by mouth. allopurinol 100 MG Oral Tab Take 1 tablet (100 mg total) by mouth once daily.    latanoprost 0 smoking history. He has never used smokeless tobacco. He reports that he drinks alcohol. He reports that he does not use drugs.      REVIEW OF SYSTEMS:   GENERAL: feels well otherwise  SKIN: denies any unusual skin lesions  EYES: denies blurred vision or do 08/25/2014, 01/04/2016, 04/04/2016   • Gel-one, Intra-articular 04/02/2015   • Influenza 12/09/2011       ASSESSMENT AND OTHER RELEVANT CHRONIC CONDITIONS:   Tony Arizmendi is a 69year old male who presents for a Medicare Assessment.      PLAN SUMMARY: maintain positive mental well-being?: Social Interaction;Puzzles; Visiting Friends    If you are a male age 38-65 or a female age 47-67, do you take aspirin?: No          Functional Ability     Bathing or Showering: Able without help    Toileting: Able with HbgA1C   Annually No results found for: A1C    No flowsheet data found.     Fasting Blood Sugar (FSB)Annually Glucose (mg/dL)   Date Value   08/06/2019 102 (H)     GLUCOSE (mg/dL)   Date Value   07/05/2014 100 (H)   11/17/2012 101 (H)          Cardiovascula found.    Creatinine  Annually CREATININE (mg/dL)   Date Value   07/05/2014 1.21 (H)   11/17/2012 1.05     Creatinine (mg/dL)   Date Value   08/06/2019 1.16    No flowsheet data found.     Drug Serum Conc  Annually No results found for: DIGOXIN, DIG, VALP N

## 2019-09-17 ENCOUNTER — HOSPITAL ENCOUNTER (EMERGENCY)
Facility: HOSPITAL | Age: 74
Discharge: HOME | End: 2019-09-17
Attending: EMERGENCY MEDICINE
Payer: MEDICARE

## 2019-09-17 ENCOUNTER — APPOINTMENT (OUTPATIENT)
Dept: GENERAL RADIOLOGY | Facility: HOSPITAL | Age: 74
End: 2019-09-17
Attending: EMERGENCY MEDICINE
Payer: MEDICARE

## 2019-09-17 ENCOUNTER — APPOINTMENT (OUTPATIENT)
Dept: CT IMAGING | Facility: HOSPITAL | Age: 74
End: 2019-09-17
Attending: EMERGENCY MEDICINE
Payer: MEDICARE

## 2019-09-17 VITALS
OXYGEN SATURATION: 95 % | RESPIRATION RATE: 18 BRPM | WEIGHT: 233.94 LBS | HEART RATE: 94 BPM | TEMPERATURE: 97 F | DIASTOLIC BLOOD PRESSURE: 78 MMHG | HEIGHT: 69 IN | BODY MASS INDEX: 34.65 KG/M2 | SYSTOLIC BLOOD PRESSURE: 122 MMHG

## 2019-09-17 DIAGNOSIS — M54.9 BACK PAIN WITHOUT RADIATION: ICD-10-CM

## 2019-09-17 DIAGNOSIS — K86.2 PANCREAS CYST: ICD-10-CM

## 2019-09-17 DIAGNOSIS — R74.8 ELEVATED LIPASE: Primary | ICD-10-CM

## 2019-09-17 LAB
ALBUMIN SERPL-MCNC: 3.8 G/DL (ref 3.4–5)
ALBUMIN/GLOB SERPL: 1.3 {RATIO} (ref 1–2)
ALP LIVER SERPL-CCNC: 47 U/L (ref 45–117)
ALT SERPL-CCNC: 37 U/L (ref 16–61)
ANION GAP SERPL CALC-SCNC: 11 MMOL/L (ref 0–18)
AST SERPL-CCNC: 31 U/L (ref 15–37)
ATRIAL RATE: 100 BPM
BASOPHILS # BLD AUTO: 0.08 X10(3) UL (ref 0–0.2)
BASOPHILS NFR BLD AUTO: 0.6 %
BILIRUB SERPL-MCNC: 0.9 MG/DL (ref 0.1–2)
BILIRUB UR QL STRIP.AUTO: NEGATIVE
BUN BLD-MCNC: 31 MG/DL (ref 7–18)
BUN/CREAT SERPL: 25.4 (ref 10–20)
CALCIUM BLD-MCNC: 9.5 MG/DL (ref 8.5–10.1)
CHLORIDE SERPL-SCNC: 108 MMOL/L (ref 98–112)
CLARITY UR REFRACT.AUTO: CLEAR
CO2 SERPL-SCNC: 19 MMOL/L (ref 21–32)
COLOR UR AUTO: YELLOW
CREAT BLD-MCNC: 1.22 MG/DL (ref 0.7–1.3)
DEPRECATED RDW RBC AUTO: 58.4 FL (ref 35.1–46.3)
EOSINOPHIL # BLD AUTO: 0.07 X10(3) UL (ref 0–0.7)
EOSINOPHIL NFR BLD AUTO: 0.5 %
ERYTHROCYTE [DISTWIDTH] IN BLOOD BY AUTOMATED COUNT: 14.2 % (ref 11–15)
GLOBULIN PLAS-MCNC: 2.9 G/DL (ref 2.8–4.4)
GLUCOSE BLD-MCNC: 91 MG/DL (ref 70–99)
GLUCOSE UR STRIP.AUTO-MCNC: NEGATIVE MG/DL
HCT VFR BLD AUTO: 48.4 % (ref 39–53)
HGB BLD-MCNC: 16.2 G/DL (ref 13–17.5)
IMM GRANULOCYTES # BLD AUTO: 0.16 X10(3) UL (ref 0–1)
IMM GRANULOCYTES NFR BLD: 1.1 %
LEUKOCYTE ESTERASE UR QL STRIP.AUTO: NEGATIVE
LIPASE SERPL-CCNC: 676 U/L (ref 73–393)
LYMPHOCYTES # BLD AUTO: 1.71 X10(3) UL (ref 1–4)
LYMPHOCYTES NFR BLD AUTO: 12.2 %
M PROTEIN MFR SERPL ELPH: 6.7 G/DL (ref 6.4–8.2)
MCH RBC QN AUTO: 36.8 PG (ref 26–34)
MCHC RBC AUTO-ENTMCNC: 33.5 G/DL (ref 31–37)
MCV RBC AUTO: 110 FL (ref 80–100)
MONOCYTES # BLD AUTO: 1.09 X10(3) UL (ref 0.1–1)
MONOCYTES NFR BLD AUTO: 7.8 %
NEUTROPHILS # BLD AUTO: 10.92 X10 (3) UL (ref 1.5–7.7)
NEUTROPHILS # BLD AUTO: 10.92 X10(3) UL (ref 1.5–7.7)
NEUTROPHILS NFR BLD AUTO: 77.8 %
NITRITE UR QL STRIP.AUTO: NEGATIVE
OSMOLALITY SERPL CALC.SUM OF ELEC: 292 MOSM/KG (ref 275–295)
P AXIS: 58 DEGREES
P-R INTERVAL: 150 MS
PH UR STRIP.AUTO: 5 [PH] (ref 4.5–8)
PLATELET # BLD AUTO: 236 10(3)UL (ref 150–450)
POTASSIUM SERPL-SCNC: 3.9 MMOL/L (ref 3.5–5.1)
PROT UR STRIP.AUTO-MCNC: NEGATIVE MG/DL
Q-T INTERVAL: 346 MS
QRS DURATION: 72 MS
QTC CALCULATION (BEZET): 446 MS
R AXIS: 32 DEGREES
RBC # BLD AUTO: 4.4 X10(6)UL (ref 3.8–5.8)
RBC UR QL AUTO: NEGATIVE
SODIUM SERPL-SCNC: 138 MMOL/L (ref 136–145)
SP GR UR STRIP.AUTO: 1.02 (ref 1–1.03)
T AXIS: 26 DEGREES
UROBILINOGEN UR STRIP.AUTO-MCNC: <2 MG/DL
VENTRICULAR RATE: 100 BPM
WBC # BLD AUTO: 14 X10(3) UL (ref 4–11)

## 2019-09-17 PROCEDURE — 99285 EMERGENCY DEPT VISIT HI MDM: CPT | Performed by: EMERGENCY MEDICINE

## 2019-09-17 PROCEDURE — 93005 ELECTROCARDIOGRAM TRACING: CPT

## 2019-09-17 PROCEDURE — 93010 ELECTROCARDIOGRAM REPORT: CPT | Performed by: EMERGENCY MEDICINE

## 2019-09-17 PROCEDURE — 96376 TX/PRO/DX INJ SAME DRUG ADON: CPT | Performed by: EMERGENCY MEDICINE

## 2019-09-17 PROCEDURE — 85025 COMPLETE CBC W/AUTO DIFF WBC: CPT | Performed by: EMERGENCY MEDICINE

## 2019-09-17 PROCEDURE — 83690 ASSAY OF LIPASE: CPT | Performed by: EMERGENCY MEDICINE

## 2019-09-17 PROCEDURE — 74177 CT ABD & PELVIS W/CONTRAST: CPT | Performed by: EMERGENCY MEDICINE

## 2019-09-17 PROCEDURE — 96375 TX/PRO/DX INJ NEW DRUG ADDON: CPT | Performed by: EMERGENCY MEDICINE

## 2019-09-17 PROCEDURE — 71046 X-RAY EXAM CHEST 2 VIEWS: CPT | Performed by: EMERGENCY MEDICINE

## 2019-09-17 PROCEDURE — 96361 HYDRATE IV INFUSION ADD-ON: CPT | Performed by: EMERGENCY MEDICINE

## 2019-09-17 PROCEDURE — 80053 COMPREHEN METABOLIC PANEL: CPT | Performed by: EMERGENCY MEDICINE

## 2019-09-17 PROCEDURE — 96374 THER/PROPH/DIAG INJ IV PUSH: CPT | Performed by: EMERGENCY MEDICINE

## 2019-09-17 RX ORDER — HYDROMORPHONE HYDROCHLORIDE 1 MG/ML
0.5 INJECTION, SOLUTION INTRAMUSCULAR; INTRAVENOUS; SUBCUTANEOUS ONCE
Status: COMPLETED | OUTPATIENT
Start: 2019-09-17 | End: 2019-09-17

## 2019-09-17 RX ORDER — SODIUM CHLORIDE 9 MG/ML
INJECTION, SOLUTION INTRAVENOUS CONTINUOUS
Status: DISCONTINUED | OUTPATIENT
Start: 2019-09-17 | End: 2019-09-17

## 2019-09-17 RX ORDER — ONDANSETRON 2 MG/ML
4 INJECTION INTRAMUSCULAR; INTRAVENOUS ONCE
Status: COMPLETED | OUTPATIENT
Start: 2019-09-17 | End: 2019-09-17

## 2019-09-17 RX ORDER — KETOROLAC TROMETHAMINE 30 MG/ML
15 INJECTION, SOLUTION INTRAMUSCULAR; INTRAVENOUS ONCE
Status: COMPLETED | OUTPATIENT
Start: 2019-09-17 | End: 2019-09-17

## 2019-09-17 RX ORDER — ONDANSETRON 2 MG/ML
4 INJECTION INTRAMUSCULAR; INTRAVENOUS ONCE
Status: DISCONTINUED | OUTPATIENT
Start: 2019-09-17 | End: 2019-09-17

## 2019-09-17 NOTE — ED PROVIDER NOTES
Patient Seen in: [facility] Emergency Department    History   Patient presents with:  Abdomen/Flank Pain (GI/)    Stated Complaint: Abd and shoulder pain    HPI    72-year-old male presents emergency room with multiple complaints.   Patient states Past Surgical History:   Procedure Laterality Date   • ADENOIDECTOMY     • BIOPSY OF THYROID,KARMENUT  08/20/10    NO CYTOLOGIC EVIDENCE OF PAPILLARY CARCINOMA   • COLONOSCOPY     • KNEE REPLACEMENT SURGERY     • KNEE TOTAL REPLACEMENT Right 5/13/2016 is the same pain he has been experiencing  HEART: Regular rate and rhythm, no murmurs. LUNGS: Clear to auscultation bilaterally. No Rales, no rhonchi, no wheezing, no stridor.   ABDOMEN: Soft, nondistended, mild suprapubic and left lower quadrant tender, Rhythm  Reading: Normal sinus rhythm, no acute ST or T wave ab normality                  MDM   Patient IV established, blood work obtained. Patient did receive pain medication, and reevaluation states his upper back pain has resolved.   CT was performed f diagnosis)  Pancreas cyst  Back pain without radiation    Disposition:  Discharge  9/17/2019 12:49 pm    Follow-up:  Alejandra Chapin MD  [facility] [phone]    In 2 days      Becky Nogueira

## 2019-09-17 NOTE — ED INITIAL ASSESSMENT (HPI)
Pt aox4. Pt presents to ed from home accompanied by wife. Pt c/o left shoulder pain x 3 weeks. Pt c/o sagrario since thanksgiving 2018. Pt c/o left abd cuello that started last noc.  Pt had recent stress test.

## 2019-09-20 ENCOUNTER — OFFICE VISIT (OUTPATIENT)
Dept: FAMILY MEDICINE CLINIC | Facility: CLINIC | Age: 74
End: 2019-09-20
Payer: MEDICARE

## 2019-09-20 VITALS
RESPIRATION RATE: 20 BRPM | TEMPERATURE: 97 F | SYSTOLIC BLOOD PRESSURE: 118 MMHG | WEIGHT: 227 LBS | HEIGHT: 68.5 IN | BODY MASS INDEX: 34.01 KG/M2 | DIASTOLIC BLOOD PRESSURE: 72 MMHG | HEART RATE: 84 BPM

## 2019-09-20 DIAGNOSIS — M54.12 LEFT CERVICAL RADICULOPATHY: Primary | ICD-10-CM

## 2019-09-20 PROCEDURE — 99213 OFFICE O/P EST LOW 20 MIN: CPT | Performed by: FAMILY MEDICINE

## 2019-09-20 RX ORDER — TRAMADOL HYDROCHLORIDE 50 MG/1
50 TABLET ORAL EVERY 6 HOURS PRN
Qty: 20 TABLET | Refills: 0 | Status: SHIPPED | OUTPATIENT
Start: 2019-09-20 | End: 2019-11-25

## 2019-09-20 RX ORDER — PREDNISONE 10 MG/1
TABLET ORAL
Qty: 22 TABLET | Refills: 1 | Status: ON HOLD | OUTPATIENT
Start: 2019-09-20 | End: 2020-01-03

## 2019-09-20 RX ORDER — ORPHENADRINE CITRATE 100 MG/1
100 TABLET, EXTENDED RELEASE ORAL 2 TIMES DAILY
Qty: 10 TABLET | Refills: 1 | Status: SHIPPED | OUTPATIENT
Start: 2019-09-20 | End: 2019-11-25

## 2019-09-20 NOTE — PROGRESS NOTES
Kendra Blankenship is a 69year old male. HPI:   Patient is a 60-year-old male who was seen in the emergency department on 9/17/2019 with a history of approximately 10 days of left shoulder pain.   He describes the pain as being in the middle of his left tr Other and unspecified hyperlipidemia    • Unspecified drug dependence, unspecified 01/01/1987    addicted to drugs and alcohol   • Unspecified essential hypertension    • Visual impairment     glasses      Social History:  Social History    Tobacco Use

## 2019-09-30 ENCOUNTER — PATIENT MESSAGE (OUTPATIENT)
Dept: FAMILY MEDICINE CLINIC | Facility: CLINIC | Age: 74
End: 2019-09-30

## 2019-09-30 DIAGNOSIS — M54.12 LEFT CERVICAL RADICULOPATHY: Primary | ICD-10-CM

## 2019-10-02 RX ORDER — HYDROCODONE BITARTRATE AND ACETAMINOPHEN 5; 325 MG/1; MG/1
1-2 TABLET ORAL EVERY 6 HOURS PRN
Qty: 20 TABLET | Refills: 0 | Status: ON HOLD | OUTPATIENT
Start: 2019-10-02 | End: 2020-01-03

## 2019-10-02 NOTE — PROGRESS NOTES
Unfortunately opiate dependence like alcoholism is not a problem that disappears after maintaining sobriety. We always have to be aware of it as a potential problem.   Why do not we try a small amount of Norco.  The patient should pursue physical therapy a

## 2019-10-02 NOTE — TELEPHONE ENCOUNTER
Cydney Staples, LPN 28/9/0522 5:55 AM CDT        ----- Message -----  From: Sergio Cook  Sent: 9/30/2019 2:48 PM CDT  To: [facility] Clinical Staff  Subject: Prescription Question     I am just finishing the second series of prednisone ;  Although it ha

## 2019-10-02 NOTE — TELEPHONE ENCOUNTER
Outgoing call to patient, information and recommendations discussed with patient, patient voiced understanidng.   Patient reports, he started feeling better on Sunday, and doesn't want to have Surgery on his back, patient advised to have MRI of spine as ord

## 2019-10-09 ENCOUNTER — HOSPITAL ENCOUNTER (OUTPATIENT)
Dept: MRI IMAGING | Facility: HOSPITAL | Age: 74
Discharge: HOME | End: 2019-10-09
Attending: FAMILY MEDICINE
Payer: MEDICARE

## 2019-10-09 DIAGNOSIS — M54.12 LEFT CERVICAL RADICULOPATHY: ICD-10-CM

## 2019-10-09 PROCEDURE — 72141 MRI NECK SPINE W/O DYE: CPT | Performed by: FAMILY MEDICINE

## 2019-11-04 RX ORDER — ALLOPURINOL 100 MG/1
TABLET ORAL
Qty: 90 TABLET | Refills: 0 | Status: SHIPPED | OUTPATIENT
Start: 2019-11-04 | End: 2019-11-25

## 2019-11-15 RX ORDER — EZETIMIBE 10 MG/1
TABLET ORAL
Qty: 90 TABLET | Refills: 0 | Status: SHIPPED | OUTPATIENT
Start: 2019-11-15

## 2019-11-25 ENCOUNTER — APPOINTMENT (OUTPATIENT)
Dept: GENERAL RADIOLOGY | Facility: HOSPITAL | Age: 74
DRG: 438 | End: 2019-11-25
Payer: MEDICARE

## 2019-11-25 ENCOUNTER — HOSPITAL ENCOUNTER (INPATIENT)
Facility: HOSPITAL | Age: 74
LOS: 40 days | Discharge: NURSING, REHAB OR LONG-TERM CARE | DRG: 438 | End: 2020-01-04
Attending: EMERGENCY MEDICINE | Admitting: HOSPITALIST
Payer: MEDICARE

## 2019-11-25 ENCOUNTER — APPOINTMENT (OUTPATIENT)
Dept: CT IMAGING | Facility: HOSPITAL | Age: 74
DRG: 438 | End: 2019-11-25
Attending: EMERGENCY MEDICINE
Payer: MEDICARE

## 2019-11-25 DIAGNOSIS — D72.829 LEUKOCYTOSIS, UNSPECIFIED TYPE: ICD-10-CM

## 2019-11-25 DIAGNOSIS — I47.1 SVT (SUPRAVENTRICULAR TACHYCARDIA) (HCC): ICD-10-CM

## 2019-11-25 DIAGNOSIS — K85.21 ALCOHOL-INDUCED ACUTE PANCREATITIS WITH UNINFECTED NECROSIS: ICD-10-CM

## 2019-11-25 DIAGNOSIS — K85.90 ACUTE PANCREATITIS, UNSPECIFIED COMPLICATION STATUS, UNSPECIFIED PANCREATITIS TYPE: Primary | ICD-10-CM

## 2019-11-25 DIAGNOSIS — R63.30 FEEDING DIFFICULTIES: ICD-10-CM

## 2019-11-25 PROBLEM — I47.10 SVT (SUPRAVENTRICULAR TACHYCARDIA) (HCC): Status: ACTIVE | Noted: 2019-11-25

## 2019-11-25 PROBLEM — R79.89 AZOTEMIA: Status: ACTIVE | Noted: 2019-11-25

## 2019-11-25 PROBLEM — I47.10 SVT (SUPRAVENTRICULAR TACHYCARDIA): Status: ACTIVE | Noted: 2019-11-25

## 2019-11-25 PROBLEM — R73.9 HYPERGLYCEMIA: Status: ACTIVE | Noted: 2019-11-25

## 2019-11-25 PROBLEM — E87.6 HYPOKALEMIA: Status: ACTIVE | Noted: 2019-11-25

## 2019-11-25 PROBLEM — E87.20 METABOLIC ACIDOSIS: Status: ACTIVE | Noted: 2019-11-25

## 2019-11-25 PROBLEM — E87.2 METABOLIC ACIDOSIS: Status: ACTIVE | Noted: 2019-11-25

## 2019-11-25 LAB
ALBUMIN SERPL-MCNC: 3.3 G/DL (ref 3.4–5)
ALBUMIN/GLOB SERPL: 0.9 {RATIO} (ref 1–2)
ALP LIVER SERPL-CCNC: 63 U/L (ref 45–117)
ALT SERPL-CCNC: 23 U/L (ref 16–61)
ANION GAP SERPL CALC-SCNC: 16 MMOL/L (ref 0–18)
AST SERPL-CCNC: 13 U/L (ref 15–37)
ATRIAL RATE: 104 BPM
ATRIAL RATE: 166 BPM
BASOPHILS # BLD AUTO: 0.07 X10(3) UL (ref 0–0.2)
BASOPHILS NFR BLD AUTO: 0.3 %
BILIRUB SERPL-MCNC: 0.6 MG/DL (ref 0.1–2)
BUN BLD-MCNC: 32 MG/DL (ref 7–18)
BUN/CREAT SERPL: 28.3 (ref 10–20)
CALCIUM BLD-MCNC: 9.9 MG/DL (ref 8.5–10.1)
CHLORIDE SERPL-SCNC: 110 MMOL/L (ref 98–112)
CO2 SERPL-SCNC: 14 MMOL/L (ref 21–32)
CREAT BLD-MCNC: 1.13 MG/DL (ref 0.7–1.3)
D-DIMER: 0.9 UG/ML FEU (ref ?–0.72)
DEPRECATED RDW RBC AUTO: 60.3 FL (ref 35.1–46.3)
EOSINOPHIL # BLD AUTO: 0 X10(3) UL (ref 0–0.7)
EOSINOPHIL NFR BLD AUTO: 0 %
ERYTHROCYTE [DISTWIDTH] IN BLOOD BY AUTOMATED COUNT: 15.2 % (ref 11–15)
ETHANOL SERPL-MCNC: <3 MG/DL (ref ?–3)
GLOBULIN PLAS-MCNC: 3.6 G/DL (ref 2.8–4.4)
GLUCOSE BLD-MCNC: 120 MG/DL (ref 70–99)
HCT VFR BLD AUTO: 45.5 % (ref 39–53)
HGB BLD-MCNC: 15.8 G/DL (ref 13–17.5)
IMM GRANULOCYTES # BLD AUTO: 0.5 X10(3) UL (ref 0–1)
IMM GRANULOCYTES NFR BLD: 1.8 %
LACTATE SERPL-SCNC: 2.3 MMOL/L (ref 0.4–2)
LACTATE SERPL-SCNC: 2.8 MMOL/L (ref 0.4–2)
LACTATE SERPL-SCNC: 3.6 MMOL/L (ref 0.4–2)
LIPASE SERPL-CCNC: 4151 U/L (ref 73–393)
LYMPHOCYTES # BLD AUTO: 0.77 X10(3) UL (ref 1–4)
LYMPHOCYTES NFR BLD AUTO: 2.8 %
M PROTEIN MFR SERPL ELPH: 6.9 G/DL (ref 6.4–8.2)
MCH RBC QN AUTO: 36.7 PG (ref 26–34)
MCHC RBC AUTO-ENTMCNC: 34.7 G/DL (ref 31–37)
MCV RBC AUTO: 105.6 FL (ref 80–100)
MONOCYTES # BLD AUTO: 1.99 X10(3) UL (ref 0.1–1)
MONOCYTES NFR BLD AUTO: 7.1 %
NEUTROPHILS # BLD AUTO: 24.53 X10 (3) UL (ref 1.5–7.7)
NEUTROPHILS # BLD AUTO: 24.53 X10(3) UL (ref 1.5–7.7)
NEUTROPHILS NFR BLD AUTO: 88 %
OSMOLALITY SERPL CALC.SUM OF ELEC: 298 MOSM/KG (ref 275–295)
P AXIS: 44 DEGREES
P-R INTERVAL: 144 MS
PLATELET # BLD AUTO: 426 10(3)UL (ref 150–450)
POTASSIUM SERPL-SCNC: 2.9 MMOL/L (ref 3.5–5.1)
Q-T INTERVAL: 282 MS
Q-T INTERVAL: 334 MS
QRS DURATION: 70 MS
QRS DURATION: 72 MS
QTC CALCULATION (BEZET): 439 MS
QTC CALCULATION (BEZET): 467 MS
R AXIS: 22 DEGREES
R AXIS: 31 DEGREES
RBC # BLD AUTO: 4.31 X10(6)UL (ref 3.8–5.8)
SODIUM SERPL-SCNC: 140 MMOL/L (ref 136–145)
T AXIS: 220 DEGREES
T AXIS: 55 DEGREES
TRIGL SERPL-MCNC: 213 MG/DL (ref 30–149)
TROPONIN I SERPL-MCNC: <0.045 NG/ML (ref ?–0.04)
TSI SER-ACNC: 1.85 MIU/ML (ref 0.36–3.74)
VENTRICULAR RATE: 104 BPM
VENTRICULAR RATE: 165 BPM
WBC # BLD AUTO: 27.9 X10(3) UL (ref 4–11)

## 2019-11-25 PROCEDURE — 99223 1ST HOSP IP/OBS HIGH 75: CPT | Performed by: SURGERY

## 2019-11-25 PROCEDURE — 99223 1ST HOSP IP/OBS HIGH 75: CPT | Performed by: HOSPITALIST

## 2019-11-25 PROCEDURE — 99222 1ST HOSP IP/OBS MODERATE 55: CPT | Performed by: INTERNAL MEDICINE

## 2019-11-25 PROCEDURE — 71275 CT ANGIOGRAPHY CHEST: CPT | Performed by: EMERGENCY MEDICINE

## 2019-11-25 PROCEDURE — 74177 CT ABD & PELVIS W/CONTRAST: CPT | Performed by: EMERGENCY MEDICINE

## 2019-11-25 PROCEDURE — 71045 X-RAY EXAM CHEST 1 VIEW: CPT | Performed by: EMERGENCY MEDICINE

## 2019-11-25 RX ORDER — METOCLOPRAMIDE HYDROCHLORIDE 5 MG/ML
10 INJECTION INTRAMUSCULAR; INTRAVENOUS EVERY 8 HOURS PRN
Status: DISCONTINUED | OUTPATIENT
Start: 2019-11-25 | End: 2019-11-26

## 2019-11-25 RX ORDER — HYDROMORPHONE HYDROCHLORIDE 1 MG/ML
0.4 INJECTION, SOLUTION INTRAMUSCULAR; INTRAVENOUS; SUBCUTANEOUS EVERY 2 HOUR PRN
Status: DISCONTINUED | OUTPATIENT
Start: 2019-11-25 | End: 2019-12-02

## 2019-11-25 RX ORDER — ALLOPURINOL 100 MG/1
100 TABLET ORAL DAILY
Status: ON HOLD | COMMUNITY
End: 2020-01-03

## 2019-11-25 RX ORDER — HYDROCODONE BITARTRATE AND ACETAMINOPHEN 5; 325 MG/1; MG/1
1-2 TABLET ORAL EVERY 6 HOURS PRN
Status: DISCONTINUED | OUTPATIENT
Start: 2019-11-25 | End: 2019-11-25 | Stop reason: SDUPTHER

## 2019-11-25 RX ORDER — ONDANSETRON 2 MG/ML
4 INJECTION INTRAMUSCULAR; INTRAVENOUS EVERY 6 HOURS PRN
Status: DISCONTINUED | OUTPATIENT
Start: 2019-11-25 | End: 2020-01-04

## 2019-11-25 RX ORDER — HYDROMORPHONE HYDROCHLORIDE 1 MG/ML
1 INJECTION, SOLUTION INTRAMUSCULAR; INTRAVENOUS; SUBCUTANEOUS ONCE
Status: COMPLETED | OUTPATIENT
Start: 2019-11-25 | End: 2019-11-25

## 2019-11-25 RX ORDER — ALLOPURINOL 100 MG/1
100 TABLET ORAL DAILY
Status: DISCONTINUED | OUTPATIENT
Start: 2019-11-25 | End: 2019-11-26

## 2019-11-25 RX ORDER — HYDROMORPHONE HYDROCHLORIDE 1 MG/ML
1.2 INJECTION, SOLUTION INTRAMUSCULAR; INTRAVENOUS; SUBCUTANEOUS EVERY 2 HOUR PRN
Status: DISCONTINUED | OUTPATIENT
Start: 2019-11-25 | End: 2019-12-02

## 2019-11-25 RX ORDER — LIDOCAINE HYDROCHLORIDE 20 MG/ML
10 SOLUTION OROPHARYNGEAL ONCE
Status: COMPLETED | OUTPATIENT
Start: 2019-11-25 | End: 2019-11-25

## 2019-11-25 RX ORDER — HYDROCODONE BITARTRATE AND ACETAMINOPHEN 5; 325 MG/1; MG/1
1 TABLET ORAL EVERY 6 HOURS PRN
Status: DISCONTINUED | OUTPATIENT
Start: 2019-11-25 | End: 2019-12-02

## 2019-11-25 RX ORDER — HYDROCHLOROTHIAZIDE 12.5 MG/1
12.5 CAPSULE, GELATIN COATED ORAL DAILY
Status: DISCONTINUED | OUTPATIENT
Start: 2019-11-25 | End: 2019-11-26

## 2019-11-25 RX ORDER — HYDROCODONE BITARTRATE AND ACETAMINOPHEN 5; 325 MG/1; MG/1
2 TABLET ORAL EVERY 6 HOURS PRN
Status: DISCONTINUED | OUTPATIENT
Start: 2019-11-25 | End: 2019-11-30

## 2019-11-25 RX ORDER — ENOXAPARIN SODIUM 100 MG/ML
40 INJECTION SUBCUTANEOUS EVERY 24 HOURS
Status: DISCONTINUED | OUTPATIENT
Start: 2019-11-25 | End: 2019-12-04

## 2019-11-25 RX ORDER — METOPROLOL TARTRATE 5 MG/5ML
5 INJECTION INTRAVENOUS ONCE
Status: COMPLETED | OUTPATIENT
Start: 2019-11-25 | End: 2019-11-25

## 2019-11-25 RX ORDER — HYDROMORPHONE HYDROCHLORIDE 1 MG/ML
0.8 INJECTION, SOLUTION INTRAMUSCULAR; INTRAVENOUS; SUBCUTANEOUS EVERY 2 HOUR PRN
Status: DISCONTINUED | OUTPATIENT
Start: 2019-11-25 | End: 2019-12-02

## 2019-11-25 RX ORDER — SODIUM CHLORIDE 9 MG/ML
INJECTION, SOLUTION INTRAVENOUS CONTINUOUS
Status: DISCONTINUED | OUTPATIENT
Start: 2019-11-25 | End: 2019-11-25

## 2019-11-25 RX ORDER — HYDROMORPHONE HYDROCHLORIDE 1 MG/ML
0.5 INJECTION, SOLUTION INTRAMUSCULAR; INTRAVENOUS; SUBCUTANEOUS EVERY 30 MIN PRN
Status: DISPENSED | OUTPATIENT
Start: 2019-11-25 | End: 2019-11-25

## 2019-11-25 RX ORDER — POTASSIUM CHLORIDE 14.9 MG/ML
20 INJECTION INTRAVENOUS ONCE
Status: DISCONTINUED | OUTPATIENT
Start: 2019-11-25 | End: 2019-11-25

## 2019-11-25 RX ORDER — ONDANSETRON 2 MG/ML
4 INJECTION INTRAMUSCULAR; INTRAVENOUS EVERY 4 HOURS PRN
Status: DISCONTINUED | OUTPATIENT
Start: 2019-11-25 | End: 2019-11-25

## 2019-11-25 RX ORDER — PANTOPRAZOLE SODIUM 20 MG/1
20 TABLET, DELAYED RELEASE ORAL
Status: DISCONTINUED | OUTPATIENT
Start: 2019-11-26 | End: 2019-11-25

## 2019-11-25 RX ORDER — HYDROMORPHONE HYDROCHLORIDE 1 MG/ML
INJECTION, SOLUTION INTRAMUSCULAR; INTRAVENOUS; SUBCUTANEOUS
Status: DISCONTINUED
Start: 2019-11-25 | End: 2019-11-26

## 2019-11-25 RX ORDER — ONDANSETRON 2 MG/ML
4 INJECTION INTRAMUSCULAR; INTRAVENOUS EVERY 6 HOURS PRN
Status: DISCONTINUED | OUTPATIENT
Start: 2019-11-25 | End: 2019-11-25

## 2019-11-25 RX ORDER — ONDANSETRON 2 MG/ML
INJECTION INTRAMUSCULAR; INTRAVENOUS
Status: COMPLETED
Start: 2019-11-25 | End: 2019-11-25

## 2019-11-25 RX ORDER — EZETIMIBE 10 MG/1
10 TABLET ORAL DAILY
Status: DISCONTINUED | OUTPATIENT
Start: 2019-11-25 | End: 2019-12-07

## 2019-11-25 RX ORDER — HYDROMORPHONE HYDROCHLORIDE 1 MG/ML
0.5 INJECTION, SOLUTION INTRAMUSCULAR; INTRAVENOUS; SUBCUTANEOUS ONCE
Status: COMPLETED | OUTPATIENT
Start: 2019-11-25 | End: 2019-11-25

## 2019-11-25 RX ORDER — VALSARTAN 320 MG/1
320 TABLET ORAL DAILY
Status: DISCONTINUED | OUTPATIENT
Start: 2019-11-25 | End: 2019-11-27

## 2019-11-25 RX ORDER — ONDANSETRON 2 MG/ML
4 INJECTION INTRAMUSCULAR; INTRAVENOUS ONCE
Status: COMPLETED | OUTPATIENT
Start: 2019-11-25 | End: 2019-11-25

## 2019-11-25 RX ORDER — MAGNESIUM HYDROXIDE/ALUMINUM HYDROXICE/SIMETHICONE 120; 1200; 1200 MG/30ML; MG/30ML; MG/30ML
30 SUSPENSION ORAL ONCE
Status: COMPLETED | OUTPATIENT
Start: 2019-11-25 | End: 2019-11-25

## 2019-11-25 RX ORDER — SODIUM CHLORIDE, SODIUM LACTATE, POTASSIUM CHLORIDE, CALCIUM CHLORIDE 600; 310; 30; 20 MG/100ML; MG/100ML; MG/100ML; MG/100ML
INJECTION, SOLUTION INTRAVENOUS CONTINUOUS
Status: DISCONTINUED | OUTPATIENT
Start: 2019-11-25 | End: 2019-11-27

## 2019-11-25 RX ORDER — POTASSIUM CHLORIDE 20 MEQ/1
40 TABLET, EXTENDED RELEASE ORAL ONCE
Status: DISCONTINUED | OUTPATIENT
Start: 2019-11-25 | End: 2019-11-29

## 2019-11-25 RX ORDER — LATANOPROST 50 UG/ML
1 SOLUTION/ DROPS OPHTHALMIC NIGHTLY
Status: DISCONTINUED | OUTPATIENT
Start: 2019-11-25 | End: 2020-01-04

## 2019-11-25 NOTE — ED PROVIDER NOTES
CT abdomen and pelvis: No PE. Cholelithiasis and a distended gallbladder. Peripancreatic inflammatory changes consistent with acute pancreatitis. No significant intrahepatic or common bile duct distention.     CONCLUSION:  No evidence of pulmonary emboli

## 2019-11-25 NOTE — CONSULTS
[facility]  Report of Consultation    Sierrahuma Gonzalez Patient Status:  Emergency    3/31/1945 MRN [id number]   Location 6[address] [facility] Attending Lida Springer MD   Hosp Day # 0 PCP Megan Ng MD     Reason for Cons • Unspecified essential hypertension    • Visual impairment     glasses     Past Surgical History:   Procedure Laterality Date   • ADENOIDECTOMY     • BIOPSY OF THYROID,PERCUT  08/20/10    NO CYTOLOGIC EVIDENCE OF PAPILLARY CARCINOMA   • COLONOSCOPY Negative for abnormal sleep patterns, increased activity, polydipsia and polyphagia  ENMT:  Negative for ear drainage, hearing loss and nasal drainage  Eyes:  Negative for eye discharge and vision loss  Gastrointestinal: Positive for abdominal pain, nausea Recent Labs   Lab 11/25/19  1154   *   BUN 32*   CREATSERUM 1.13   GFRAA 75   GFRNAA 65   CA 9.9   ALB 3.3*      K 2.9*      CO2 14.0*   ALKPHO 63   AST 13*   ALT 23   BILT 0.6   TP 6.9     Lipase:  4151    No results for input(s): the pancreatic head and uncinate process and trace free fluid inferior to the distal pancreatic body and tail. Stable low-attenuation   structure arising from versus abutting the body of the pancreas measuring 3.8 x 2.1 cm, previously 3.8 x 2.2 cm.    SPLE disease)     Status post total right knee replacement     Other male erectile dysfunction     Multinodular goiter     Benign non-nodular prostatic hyperplasia with lower urinary tract symptoms     Chronic pain of left knee     Acute bronchitis     Acute br counseling/coordination of care:  45 Minutes    Total time spent with patient:  [facility] 11/25/2019  4:39 PM    ADDENDUM:     Patient was seen and examined. He has been having epigastric and left upper quadrant abdominal pain for 2 days.

## 2019-11-25 NOTE — CONSULTS
[facility]                       Gastroenterology Consultation-[facility] Gastroenterology    Sami Car Patient Status:  Emergency    3/31/1945 MRN [id number]   Location 6[address] [facility] Attending Rubina Ruiz Unspecified essential hypertension    • Visual impairment     glasses       PSHx:   Past Surgical History:   Procedure Laterality Date   • ADENOIDECTOMY     • BIOPSY OF THYROID,PERCUT  08/20/10    NO CYTOLOGIC EVIDENCE OF PAPILLARY CARCINOMA   • COLONOSCOP history of ulcer disease, or inflammatory bowel disease    ROS:  In addition to the pertinent positives described above:             Infectious Disease:  No chronic infections or recent fevers prior to the acute illness            Cardiovascular: + HTN, dys Date    WBC 27.9 11/25/2019    HGB 15.8 11/25/2019    HCT 45.5 11/25/2019    .0 11/25/2019    CREATSERUM 1.13 11/25/2019    BUN 32 11/25/2019     11/25/2019    K 2.9 11/25/2019     11/25/2019    CO2 14.0 11/25/2019     11/25/2019 historian and endorses intermittent NSAID use, but unable to quatify how much and how frequently. Pt prior etoh abuser, but states no etoh in the past 1 month. Lipase 4151; etoh level < 3; CT abd pending; LFTs unremarkable. TTP in epigastrium.  No new medic

## 2019-11-25 NOTE — CONSULTS
[facility] Pharmacy Note: Antimicrobial Weight Based Dose Adjustment for: piperacillin/tazobactam (Luellen Comp)    Pau Islas is a 69year old male who has been prescribed piperacillin/tazobactam (ZOSYN) 3750 mg once.     Estimated Creatinine Clearance: 59.1 mL/m

## 2019-11-25 NOTE — ED PROVIDER NOTES
Patient Seen in: [facility] Emergency Department      History   Patient presents with:  Chest Pain Angina (cardiovascular)    Stated Complaint: chest pain    HPI    79-year-old male complaining of abdominal pain the patient describes he has had inte 2.00        Pack years: 1        Types: Cigarettes        Quit date: 1969        Years since quittin.9      Smokeless tobacco: Never Used    Alcohol use: Yes      Comment: 2-3 drinks at night     Drug use:  No             Review of Systems    Posit All other components within normal limits    Narrative:     FEU = Fibrinogen Equivalent Units.     D-Dimer results of less than 0.5 ug/mL (FEU) have been shown to contribute to the exclusion of venous thromboembolism with a negative predictive value of appr Ella(cpt=71275/31521)    Result Date: 11/25/2019  CONCLUSION:  No evidence of pulmonary embolism. Peripancreatic inflammatory changes with adjacent free fluid consistent with acute pancreatitis.   Stable peripancreatic low-attenuation mass arising from vers

## 2019-11-25 NOTE — ED INITIAL ASSESSMENT (HPI)
Patient to ED c/o epigastric / chest pain that started yesterday.  + nausea, SOB and dizziness as well.

## 2019-11-25 NOTE — H&P
[facility]  History and Physical     Kuldeep Foxana Patient Status:  Emergency    3/31/1945 MRN [id number]   Location 656 [facility] Attending Al Tinsley MD   Hosp Day # 0 PCP Garrett Bernal MD     Chief Compl reports that he does not use drugs.     Family History:   Family History   Problem Relation Age of Onset   • Other (asthma[Other]) Father    • Lipids Mother    • Psychiatric Mother 79        Alzheimers   • Other (CVA[Other]) Mother 80   • Other (leukemia [O by mouth daily. , Disp: , Rfl:         Review of Systems:   A comprehensive 14 point review of systems was completed. Pertinent positives and negatives noted in the HPI.     Physical Exam:    /79   Pulse 81   Temp 98 °F (36.7 °C)   Resp 21   Ht 5' 9 be 28  -Cholelithiasis and distended gallbladder  -sx eval and abx        Quality:  · DVT Prophylaxis: scd  · CODE status: full  · Rangel: no    Plan of care discussed with patient and ED physician    Sindhu Bruno MD  11/25/2019

## 2019-11-25 NOTE — CONSULTS
Cardiology Consult Note     PRIMARY CARDIOLOGIST: TRAE/SANDRA      CONSULT FOR: SVT, HTN EPIGASTRIC PAIN. HISTORY: 75 Y/O MALE WITH KNOWN HTN AND SVT/PALP HX . HAS HAD STRESS ECHO THAT WAS NORMAL ABOUT 6 WEEKS AGO. NOW ADMITTED WITH PANCREATITIS.   Alyssia Gordon

## 2019-11-26 ENCOUNTER — APPOINTMENT (OUTPATIENT)
Dept: GENERAL RADIOLOGY | Facility: HOSPITAL | Age: 74
DRG: 438 | End: 2019-11-26
Attending: HOSPITALIST
Payer: MEDICARE

## 2019-11-26 LAB
ALBUMIN SERPL-MCNC: 2.3 G/DL (ref 3.4–5)
ALBUMIN SERPL-MCNC: 2.4 G/DL (ref 3.4–5)
ALBUMIN/GLOB SERPL: 0.7 {RATIO} (ref 1–2)
ALBUMIN/GLOB SERPL: 0.8 {RATIO} (ref 1–2)
ALLENS TEST: POSITIVE
ALP LIVER SERPL-CCNC: 56 U/L (ref 45–117)
ALP LIVER SERPL-CCNC: 59 U/L (ref 45–117)
ALT SERPL-CCNC: 19 U/L (ref 16–61)
ALT SERPL-CCNC: 19 U/L (ref 16–61)
AMMONIA PLAS-MCNC: 52 UMOL/L (ref 11–32)
AMPHET UR QL SCN: NEGATIVE
AMYLASE SERPL-CCNC: 189 U/L (ref 25–115)
ANION GAP SERPL CALC-SCNC: 11 MMOL/L (ref 0–18)
ANION GAP SERPL CALC-SCNC: 8 MMOL/L (ref 0–18)
APTT PPP: 37.7 SECONDS (ref 25.4–36.1)
ARTERIAL BLD GAS O2 SATURATION: 94 % (ref 92–100)
ARTERIAL BLOOD GAS BASE EXCESS: -7.1 MMOL/L (ref ?–2)
ARTERIAL BLOOD GAS HCO3: 16 MEQ/L (ref 22–26)
ARTERIAL BLOOD GAS PCO2: 27 MM HG (ref 35–45)
ARTERIAL BLOOD GAS PH: 7.39 (ref 7.35–7.45)
ARTERIAL BLOOD GAS PO2: 77 MM HG (ref 80–105)
AST SERPL-CCNC: 24 U/L (ref 15–37)
AST SERPL-CCNC: 36 U/L (ref 15–37)
BARBITURATES UR QL SCN: NEGATIVE
BASOPHILS # BLD: 0 X10(3) UL (ref 0–0.2)
BASOPHILS # BLD: 0 X10(3) UL (ref 0–0.2)
BASOPHILS NFR BLD: 0 %
BASOPHILS NFR BLD: 0 %
BENZODIAZ UR QL SCN: NEGATIVE
BILIRUB SERPL-MCNC: 0.7 MG/DL (ref 0.1–2)
BILIRUB SERPL-MCNC: 0.9 MG/DL (ref 0.1–2)
BUN BLD-MCNC: 19 MG/DL (ref 7–18)
BUN BLD-MCNC: 23 MG/DL (ref 7–18)
BUN/CREAT SERPL: 18.4 (ref 10–20)
BUN/CREAT SERPL: 20.9 (ref 10–20)
CALCIUM BLD-MCNC: 8.7 MG/DL (ref 8.5–10.1)
CALCIUM BLD-MCNC: 8.7 MG/DL (ref 8.5–10.1)
CALCULATED O2 SATURATION: 94 % (ref 92–100)
CANNABINOIDS UR QL SCN: NEGATIVE
CARBOXYHEMOGLOBIN: 1.5 % SAT (ref 0–3)
CHLORIDE SERPL-SCNC: 113 MMOL/L (ref 98–112)
CHLORIDE SERPL-SCNC: 113 MMOL/L (ref 98–112)
CO2 SERPL-SCNC: 19 MMOL/L (ref 21–32)
CO2 SERPL-SCNC: 21 MMOL/L (ref 21–32)
COCAINE UR QL: NEGATIVE
CREAT BLD-MCNC: 1.03 MG/DL (ref 0.7–1.3)
CREAT BLD-MCNC: 1.1 MG/DL (ref 0.7–1.3)
CREAT UR-SCNC: 248 MG/DL
DEPRECATED RDW RBC AUTO: 62.9 FL (ref 35.1–46.3)
DEPRECATED RDW RBC AUTO: 63.9 FL (ref 35.1–46.3)
EOSINOPHIL # BLD: 0 X10(3) UL (ref 0–0.7)
EOSINOPHIL # BLD: 0 X10(3) UL (ref 0–0.7)
EOSINOPHIL NFR BLD: 0 %
EOSINOPHIL NFR BLD: 0 %
ERYTHROCYTE [DISTWIDTH] IN BLOOD BY AUTOMATED COUNT: 15.9 % (ref 11–15)
ERYTHROCYTE [DISTWIDTH] IN BLOOD BY AUTOMATED COUNT: 16.1 % (ref 11–15)
ETHANOL UR-MCNC: NEGATIVE MG/DL
GLOBULIN PLAS-MCNC: 3.2 G/DL (ref 2.8–4.4)
GLOBULIN PLAS-MCNC: 3.2 G/DL (ref 2.8–4.4)
GLUCOSE BLD-MCNC: 80 MG/DL (ref 70–99)
GLUCOSE BLD-MCNC: 84 MG/DL (ref 70–99)
GLUCOSE BLD-MCNC: 98 MG/DL (ref 70–99)
HAV IGM SER QL: 2 MG/DL (ref 1.6–2.6)
HAV IGM SER QL: 2.1 MG/DL (ref 1.6–2.6)
HCT VFR BLD AUTO: 38 % (ref 39–53)
HCT VFR BLD AUTO: 40.1 % (ref 39–53)
HGB BLD-MCNC: 13.2 G/DL (ref 13–17.5)
HGB BLD-MCNC: 13.5 G/DL (ref 13–17.5)
INR BLD: 1.12 (ref 0.9–1.1)
LACTATE SERPL-SCNC: 1.7 MMOL/L (ref 0.4–2)
LACTATE SERPL-SCNC: 2.2 MMOL/L (ref 0.4–2)
LIPASE SERPL-CCNC: 474 U/L (ref 73–393)
LIPASE SERPL-CCNC: 648 U/L (ref 73–393)
LYMPHOCYTES NFR BLD: 0.34 X10(3) UL (ref 1–4)
LYMPHOCYTES NFR BLD: 1 %
LYMPHOCYTES NFR BLD: 1.36 X10(3) UL (ref 1–4)
LYMPHOCYTES NFR BLD: 4 %
M PROTEIN MFR SERPL ELPH: 5.5 G/DL (ref 6.4–8.2)
M PROTEIN MFR SERPL ELPH: 5.6 G/DL (ref 6.4–8.2)
MCH RBC QN AUTO: 36.3 PG (ref 26–34)
MCH RBC QN AUTO: 36.8 PG (ref 26–34)
MCHC RBC AUTO-ENTMCNC: 33.7 G/DL (ref 31–37)
MCHC RBC AUTO-ENTMCNC: 34.7 G/DL (ref 31–37)
MCV RBC AUTO: 105.8 FL (ref 80–100)
MCV RBC AUTO: 107.8 FL (ref 80–100)
METAMYELOCYTES # BLD: 0.34 X10(3) UL
METAMYELOCYTES NFR BLD: 1 %
METHEMOGLOBIN: 0.5 % SAT (ref 0.4–1.5)
MONOCYTES # BLD: 0.68 X10(3) UL (ref 0.1–1)
MONOCYTES # BLD: 1.38 X10(3) UL (ref 0.1–1)
MONOCYTES NFR BLD: 2 %
MONOCYTES NFR BLD: 4 %
MORPHOLOGY: NORMAL
NEUTROPHILS # BLD AUTO: 30.27 X10 (3) UL (ref 1.5–7.7)
NEUTROPHILS # BLD AUTO: 31.18 X10 (3) UL (ref 1.5–7.7)
NEUTROPHILS NFR BLD: 91 %
NEUTROPHILS NFR BLD: 92 %
NEUTS BAND NFR BLD: 2 %
NEUTS BAND NFR BLD: 3 %
NEUTS HYPERSEG # BLD: 31.96 X10(3) UL (ref 1.5–7.7)
NEUTS HYPERSEG # BLD: 32.34 X10(3) UL (ref 1.5–7.7)
OSMOLALITY SERPL CALC.SUM OF ELEC: 297 MOSM/KG (ref 275–295)
OSMOLALITY SERPL CALC.SUM OF ELEC: 297 MOSM/KG (ref 275–295)
PATIENT TEMPERATURE: 100.4 F
PCP UR QL SCN: NEGATIVE
PHOSPHATE SERPL-MCNC: 1.7 MG/DL (ref 2.5–4.9)
PLATELET # BLD AUTO: 297 10(3)UL (ref 150–450)
PLATELET # BLD AUTO: 315 10(3)UL (ref 150–450)
PLATELET MORPHOLOGY: NORMAL
POTASSIUM SERPL-SCNC: 4 MMOL/L (ref 3.5–5.1)
POTASSIUM SERPL-SCNC: 4.1 MMOL/L (ref 3.5–5.1)
PSA SERPL DL<=0.01 NG/ML-MCNC: 14.9 SECONDS (ref 12.5–14.7)
RBC # BLD AUTO: 3.59 X10(6)UL (ref 3.8–5.8)
RBC # BLD AUTO: 3.72 X10(6)UL (ref 3.8–5.8)
SODIUM SERPL-SCNC: 142 MMOL/L (ref 136–145)
SODIUM SERPL-SCNC: 143 MMOL/L (ref 136–145)
TOTAL CELLS COUNTED: 100
TOTAL CELLS COUNTED: 100
TOTAL HEMOGLOBIN: 13.2 G/DL (ref 12.6–17.4)
WBC # BLD AUTO: 34 X10(3) UL (ref 4–11)
WBC # BLD AUTO: 34.4 X10(3) UL (ref 4–11)

## 2019-11-26 PROCEDURE — 90792 PSYCH DIAG EVAL W/MED SRVCS: CPT | Performed by: OTHER

## 2019-11-26 PROCEDURE — 71045 X-RAY EXAM CHEST 1 VIEW: CPT | Performed by: HOSPITALIST

## 2019-11-26 PROCEDURE — 99233 SBSQ HOSP IP/OBS HIGH 50: CPT | Performed by: HOSPITALIST

## 2019-11-26 PROCEDURE — 99232 SBSQ HOSP IP/OBS MODERATE 35: CPT | Performed by: INTERNAL MEDICINE

## 2019-11-26 PROCEDURE — 99291 CRITICAL CARE FIRST HOUR: CPT | Performed by: NURSE PRACTITIONER

## 2019-11-26 PROCEDURE — 99233 SBSQ HOSP IP/OBS HIGH 50: CPT | Performed by: SURGERY

## 2019-11-26 RX ORDER — HALOPERIDOL 5 MG/ML
1 INJECTION INTRAMUSCULAR ONCE
Status: COMPLETED | OUTPATIENT
Start: 2019-11-26 | End: 2019-11-26

## 2019-11-26 RX ORDER — LORAZEPAM 2 MG/ML
2 INJECTION INTRAMUSCULAR
Status: DISCONTINUED | OUTPATIENT
Start: 2019-11-26 | End: 2019-11-26

## 2019-11-26 RX ORDER — LORAZEPAM 2 MG/ML
1 INJECTION INTRAMUSCULAR EVERY 6 HOURS PRN
Status: DISCONTINUED | OUTPATIENT
Start: 2019-11-26 | End: 2019-11-26

## 2019-11-26 RX ORDER — DEXTROSE MONOHYDRATE 25 G/50ML
50 INJECTION, SOLUTION INTRAVENOUS
Status: DISCONTINUED | OUTPATIENT
Start: 2019-11-26 | End: 2019-12-06

## 2019-11-26 RX ORDER — LORAZEPAM 2 MG/ML
INJECTION INTRAMUSCULAR
Status: COMPLETED
Start: 2019-11-26 | End: 2019-11-26

## 2019-11-26 RX ORDER — HALOPERIDOL 5 MG/ML
2 INJECTION INTRAMUSCULAR EVERY 4 HOURS PRN
Status: DISCONTINUED | OUTPATIENT
Start: 2019-11-26 | End: 2019-12-02

## 2019-11-26 RX ORDER — DEXMEDETOMIDINE HYDROCHLORIDE 4 UG/ML
INJECTION, SOLUTION INTRAVENOUS CONTINUOUS
Status: DISCONTINUED | OUTPATIENT
Start: 2019-11-26 | End: 2019-11-30

## 2019-11-26 RX ORDER — LORAZEPAM 2 MG/ML
1 INJECTION INTRAMUSCULAR EVERY 2 HOUR PRN
Status: DISCONTINUED | OUTPATIENT
Start: 2019-11-26 | End: 2019-11-29

## 2019-11-26 RX ORDER — LORAZEPAM 2 MG/ML
1 INJECTION INTRAMUSCULAR
Status: DISCONTINUED | OUTPATIENT
Start: 2019-11-26 | End: 2019-11-26

## 2019-11-26 RX ORDER — LORAZEPAM 1 MG/1
2 TABLET ORAL
Status: DISCONTINUED | OUTPATIENT
Start: 2019-11-26 | End: 2019-11-26

## 2019-11-26 RX ORDER — LORAZEPAM 1 MG/1
1 TABLET ORAL
Status: DISCONTINUED | OUTPATIENT
Start: 2019-11-26 | End: 2019-11-26

## 2019-11-26 RX ORDER — HALOPERIDOL 5 MG/ML
INJECTION INTRAMUSCULAR
Status: DISCONTINUED
Start: 2019-11-26 | End: 2019-11-27

## 2019-11-26 NOTE — PROGRESS NOTES
[facility]    Electrophysiology Follow Up Note      Kuldeep Justice Patient Status:  Inpatient    3/31/1945 MRN [id number]   [facility] 0SW-A Attending Saira Johnston MD   Hosp Day # 1 PCP Garrett Bernal MD     Reas HYDROcodone-acetaminophen (NORCO) 5-325 MG per tab 2 tablet, 2 tablet, Oral, Q6H PRN  •  HYDROmorphone HCl (DILAUDID) 1 MG/ML injection 0.4 mg, 0.4 mg, Intravenous, Q2H PRN **OR** HYDROmorphone HCl (DILAUDID) 1 MG/ML injection 0.8 mg, 0.8 mg, Intravenous, sustained) on telemetry.     1) atrial tachycardia  - short non sustained bursts of AT in setting of acute pancreatitis  - overall management will be to manage the pancreatitis  - will monitor for now; if burden increases can consider metoprolol IV however

## 2019-11-26 NOTE — PROGRESS NOTES
[facility]  Progress Note    Shirley Six Patient Status:  Inpatient    3/31/1945 MRN [id number]   [facility] 0SW-A Attending Nadia [facility] Day # 1 PCP Blanca Almazan MD     Subjective:  Pt with continued ab

## 2019-11-26 NOTE — PROGRESS NOTES
[facility] Gastroenterology Progress Note    S: Pt still with some epigastric pain. Pt with tachycardia overnight. O: /70   Pulse 108   Temp 97.7 °F (36.5 °C) (Oral)   Resp (!) 27   Ht 69\"   Wt 211 lb 12.8 oz (96.1 kg)   SpO2 97%   BMI 31. 2

## 2019-11-26 NOTE — PLAN OF CARE
Patient still with HR up to 170's sustained while in the BR. Then back at 130 ST while at rest. /70. paged Sissy FRANCOIS Cardiology for further orders to treat HR

## 2019-11-26 NOTE — CONSULTS
[facility]    PATIENT'S NAME: Argenis Cooper   ATTENDING PHYSICIAN: Humera Crowder D.O.   CONSULTING PHYSICIAN: Horacio Das M.D.    PATIENT ACCOUNT#:   [de-identified]    LOCATION:  [address] [place] [address]  MEDICAL RECORD #:   [id number]       DATE OF 10 mg per day. ALLERGIES:  Augmentin. SOCIAL HISTORY:  Former smoker, remote smoking. Alcohol, 2 to 3 drinks per night. FAMILY HISTORY:  Insignificant for early coronary artery disease. REVIEW OF SYSTEMS:  Epigastric pain.   Feels palpitations,

## 2019-11-26 NOTE — PLAN OF CARE
Patient to room 3618 in CTU 3.  at time of transfer. A/ox4. IVF as ordered. Report given to Josette CASTLE. Wife at bedside.

## 2019-11-26 NOTE — PLAN OF CARE
Patient asked if her drinks at home. Patient first states No. Then states maybe he has drank lately martinis and wine. Then, states NO again and \"Everyone thinks alcoholics are Liars. \" Difficult to assess whether or not patient is detox ing.  Patient has

## 2019-11-26 NOTE — DIETARY NOTE
Lolly     Admitting diagnosis:  SVT (supraventricular tachycardia) ([facility] [address] [I47.1]  Acute pancreatitis, unspecified complication status, unspecified pancreatitis type [K85.90]    Ht: 175.3 cm (5' 9\")  Wt:

## 2019-11-26 NOTE — PLAN OF CARE
PT A/O, 97% ON RA, C/O OF SEVERE ABDOMINAL PAIN, NAUSEA, GIVEN DILAUDID 1.2MG Q 2 HOURS, ZOFRAN AND REGLAN, LR INFUSING AT 150CC/HR, VOIDING FREQUENTLY IN BATHROOM, 3RD LACTIC ACID - 3.6, NOTIFIED DR. GARCIA, ORDERS RECEIVED TO GIVE 1 L .9NS BOLUS, LACTI and evaluate response  - Consider cultural and social influences on pain and pain management  - Manage/alleviate anxiety  - Utilize distraction and/or relaxation techniques  - Monitor for opioid side effects  - Notify MD/LIP if interventions unsuccessful o

## 2019-11-26 NOTE — PROGRESS NOTES
[facility]  Progress Note     Sierrahuma Gonzalez Patient Status:  Inpatient    3/31/1945 MRN [id number]   [facility] 3NE-A Attending Alberta Mccormack MD   Hosp Day # 1 PCP Megan Ng MD     Chief Complaint: abd pain and sob    S: reviewed in Epic.     Medications:   • piperacillin-tazobactam  3.375 g Intravenous Q8H   • Potassium Chloride ER  40 mEq Oral Once   • ezetimibe  10 mg Oral Daily   • latanoprost  1 drop Both Eyes Nightly   • valsartan  320 mg Oral Daily   • enoxaparin  40

## 2019-11-26 NOTE — CONSULTS
[facility]  Report of Psychiatric Consultation    Obdulio Buckley Patient Status:  Inpatient    3/31/1945 MRN [id number]   [facility] 3NE-A Attending Janine Brown MD   [address] Day # 1 PCP Tara Green MD     Date of Admission:  possibility of substance withdrawal. He has a hx of Fentanyl use disorder for 2 yrs. He went to chem dependency rehab and has been clean for 26 yrs. 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  He was OFF all opioids for many years, but dysfunction    • Esophageal reflux    • High blood pressure    • High cholesterol    • Osteoarthrosis, unspecified whether generalized or localized, unspecified site    • Other and unspecified hyperlipidemia    • Unspecified drug dependence, unspecified 01 Intravenous, Q1H PRN  •  metoprolol Tartrate (LOPRESSOR) tab 25 mg, 25 mg, Oral, BID PRN  •  Potassium Chloride ER (K-DUR M20) CR tab 40 mEq, 40 mEq, Oral, Once  •  allopurinol (ZYLOPRIM) tab 100 mg, 100 mg, Oral, Daily  •  ezetimibe (ZETIA) tab 10 mg, 10 Intact    Orientation: Oriented person, place, time, situation  Attention and Concentration:   fair  Memory:  intact immediate, recent, remote  Language: Intact naming and repetition  Fund of Knowledge: Able to recite name of [place] president    Insight: fair thyroid gland. No enlarged mediastinal or hilar adenopathy. Burlene Burke CARDIAC:  Trace pericardial effusion. PLEURA:  No pneumothorax or effusion. AORTA:  No aneurysm. VASCULATURE:  No visible pulmonary arterial thrombus or attenuation.        ABDOMEN/PELV gallbladder. Soft tissue stranding and thickening involving the 2nd and 3rd portions of the duodenum likely reactive from the pancreatitis. Colonic diverticulosis. Atelectasis in the lungs.      Stable peripherally calcified probable left inferi

## 2019-11-27 ENCOUNTER — APPOINTMENT (OUTPATIENT)
Dept: MRI IMAGING | Facility: HOSPITAL | Age: 74
DRG: 438 | End: 2019-11-27
Attending: INTERNAL MEDICINE
Payer: MEDICARE

## 2019-11-27 LAB
ALLENS TEST: POSITIVE
AMMONIA PLAS-MCNC: 59 UMOL/L (ref 11–32)
ANION GAP SERPL CALC-SCNC: 9 MMOL/L (ref 0–18)
APTT PPP: 42.7 SECONDS (ref 25.4–36.1)
ARTERIAL BLD GAS O2 SATURATION: 96 % (ref 92–100)
ARTERIAL BLOOD GAS BASE EXCESS: -5.9 MMOL/L (ref ?–2)
ARTERIAL BLOOD GAS HCO3: 19 MEQ/L (ref 22–26)
ARTERIAL BLOOD GAS PCO2: 35 MM HG (ref 35–45)
ARTERIAL BLOOD GAS PH: 7.35 (ref 7.35–7.45)
ARTERIAL BLOOD GAS PO2: 103 MM HG (ref 80–105)
BASOPHILS # BLD: 0 X10(3) UL (ref 0–0.2)
BASOPHILS NFR BLD: 0 %
BUN BLD-MCNC: 23 MG/DL (ref 7–18)
BUN/CREAT SERPL: 21.9 (ref 10–20)
CALCIUM BLD-MCNC: 8.8 MG/DL (ref 8.5–10.1)
CALCULATED O2 SATURATION: 97 % (ref 92–100)
CARBOXYHEMOGLOBIN: 1.5 % SAT (ref 0–3)
CHLORIDE SERPL-SCNC: 114 MMOL/L (ref 98–112)
CO2 SERPL-SCNC: 21 MMOL/L (ref 21–32)
CREAT BLD-MCNC: 1.05 MG/DL (ref 0.7–1.3)
DEPRECATED RDW RBC AUTO: 64.6 FL (ref 35.1–46.3)
EOSINOPHIL # BLD: 0 X10(3) UL (ref 0–0.7)
EOSINOPHIL NFR BLD: 0 %
ERYTHROCYTE [DISTWIDTH] IN BLOOD BY AUTOMATED COUNT: 15.9 % (ref 11–15)
GLUCOSE BLD-MCNC: 108 MG/DL (ref 70–99)
GLUCOSE BLD-MCNC: 112 MG/DL (ref 70–99)
GLUCOSE BLD-MCNC: 74 MG/DL (ref 70–99)
GLUCOSE BLD-MCNC: 86 MG/DL (ref 70–99)
GLUCOSE BLD-MCNC: 88 MG/DL (ref 70–99)
HAV IGM SER QL: 2.3 MG/DL (ref 1.6–2.6)
HCT VFR BLD AUTO: 34.4 % (ref 39–53)
HGB BLD-MCNC: 11.4 G/DL (ref 13–17.5)
INR BLD: 1.22 (ref 0.9–1.1)
IONIZED CALCIUM: 1.32 MMOL/L (ref 1.12–1.32)
L/M: 2 L/MIN
LACTIC ACID ARTERIAL: <1.6 MMOL/L (ref 0.5–2)
LIPASE SERPL-CCNC: 270 U/L (ref 73–393)
LYMPHOCYTES NFR BLD: 0.51 X10(3) UL (ref 1–4)
LYMPHOCYTES NFR BLD: 2 %
MCH RBC QN AUTO: 36 PG (ref 26–34)
MCHC RBC AUTO-ENTMCNC: 33.1 G/DL (ref 31–37)
MCV RBC AUTO: 108.5 FL (ref 80–100)
METHEMOGLOBIN: 0.5 % SAT (ref 0.4–1.5)
MONOCYTES # BLD: 1.28 X10(3) UL (ref 0.1–1)
MONOCYTES NFR BLD: 5 %
NEUTROPHILS # BLD AUTO: 22.36 X10 (3) UL (ref 1.5–7.7)
NEUTROPHILS NFR BLD: 93 %
NEUTS HYPERSEG # BLD: 23.81 X10(3) UL (ref 1.5–7.7)
OSMOLALITY SERPL CALC.SUM OF ELEC: 301 MOSM/KG (ref 275–295)
PATIENT TEMPERATURE: 98.6 F
PHOSPHATE SERPL-MCNC: 2.8 MG/DL (ref 2.5–4.9)
PLATELET # BLD AUTO: 223 10(3)UL (ref 150–450)
PLATELET MORPHOLOGY: NORMAL
POTASSIUM BLOOD GAS: 4.1 MMOL/L (ref 3.6–5.1)
POTASSIUM SERPL-SCNC: 3.8 MMOL/L (ref 3.5–5.1)
PSA SERPL DL<=0.01 NG/ML-MCNC: 16 SECONDS (ref 12.5–14.7)
RBC # BLD AUTO: 3.17 X10(6)UL (ref 3.8–5.8)
SODIUM BLOOD GAS: 139 MMOL/L (ref 136–144)
SODIUM SERPL-SCNC: 144 MMOL/L (ref 136–145)
TOTAL CELLS COUNTED: 100
TOTAL HEMOGLOBIN: 11.9 G/DL (ref 12.6–17.4)
WBC # BLD AUTO: 25.6 X10(3) UL (ref 4–11)

## 2019-11-27 PROCEDURE — 76376 3D RENDER W/INTRP POSTPROCES: CPT | Performed by: INTERNAL MEDICINE

## 2019-11-27 PROCEDURE — 99232 SBSQ HOSP IP/OBS MODERATE 35: CPT | Performed by: HOSPITALIST

## 2019-11-27 PROCEDURE — 74181 MRI ABDOMEN W/O CONTRAST: CPT | Performed by: INTERNAL MEDICINE

## 2019-11-27 PROCEDURE — 99232 SBSQ HOSP IP/OBS MODERATE 35: CPT | Performed by: INTERNAL MEDICINE

## 2019-11-27 PROCEDURE — 99232 SBSQ HOSP IP/OBS MODERATE 35: CPT | Performed by: PHYSICIAN ASSISTANT

## 2019-11-27 RX ORDER — DEXTROSE MONOHYDRATE 25 G/50ML
25 INJECTION, SOLUTION INTRAVENOUS ONCE
Status: DISCONTINUED | OUTPATIENT
Start: 2019-11-27 | End: 2020-01-04

## 2019-11-27 RX ORDER — DEXTROSE AND SODIUM CHLORIDE 5; .45 G/100ML; G/100ML
INJECTION, SOLUTION INTRAVENOUS CONTINUOUS
Status: DISCONTINUED | OUTPATIENT
Start: 2019-11-27 | End: 2019-11-29

## 2019-11-27 RX ORDER — SODIUM CHLORIDE, SODIUM LACTATE, POTASSIUM CHLORIDE, CALCIUM CHLORIDE 600; 310; 30; 20 MG/100ML; MG/100ML; MG/100ML; MG/100ML
INJECTION, SOLUTION INTRAVENOUS CONTINUOUS
Status: DISCONTINUED | OUTPATIENT
Start: 2019-11-27 | End: 2019-11-27

## 2019-11-27 RX ORDER — FUROSEMIDE 10 MG/ML
20 INJECTION INTRAMUSCULAR; INTRAVENOUS ONCE
Status: DISCONTINUED | OUTPATIENT
Start: 2019-11-27 | End: 2019-12-04

## 2019-11-27 RX ORDER — POTASSIUM CHLORIDE 14.9 MG/ML
20 INJECTION INTRAVENOUS ONCE
Status: COMPLETED | OUTPATIENT
Start: 2019-11-27 | End: 2019-11-27

## 2019-11-27 NOTE — PROGRESS NOTES
ICU  Critical Care APRN Progress Note    NAME: Tony Arizmendi - ROOM: [address] [place] [address] - MRN: [id number] - Age: 69year old - :3/31/1945    History Of Present Illness:  Tony Arizmendi is a 69year old male with PMHx significant for hypertension, hyperlipi • Thyroid disease Daughter          Review of Systems:   A comprehensive 10 point review of systems was completed. Pertinent positives and negatives noted in the HPI.     OBJECTIVE  Vitals:  /72 (BP Location: Right arm)   Pulse 113   Temp 97.5 °F (36 CONCLUSION:  No evidence of pulmonary embolism. Peripancreatic inflammatory changes with adjacent free fluid consistent with acute pancreatitis.   Stable peripancreatic low-attenuation mass arising from versus abutting the pancreatic body most suggestive A total of 35 minutes of critical care time (exclusive of billable procedures) was administered.  This involved direct patient intervention, complex decision making, and/or extensive discussions (>50% face to face time) with the patient, family, and clinica

## 2019-11-27 NOTE — PROGRESS NOTES
[facility]    Electrophysiology Follow Up Note      Ivette Weiss Patient Status:  Inpatient    3/31/1945 MRN [id number]   [facility] 4SW-A Attending Caro South MD   Hosp Day # 2 PCP Brenda Berger MD     Reas Glucose-Vitamin C (DEX-4) chewable tab 8 tablet, 8 tablet, Oral, Q15 Min PRN  •  Potassium Chloride ER (K-DUR M20) CR tab 40 mEq, 40 mEq, Oral, Once  •  ezetimibe (ZETIA) tab 10 mg, 10 mg, Oral, Daily  •  latanoprost (XALATAN) 0.005 % ophthalmic solution 1 144   K 4.0 4.1 3.8   * 113* 114*   CO2 19.0* 21.0 21.0       Recent Labs   Lab 11/26/19  0707 11/26/19 2016 11/27/19  0422   RBC 3.72* 3.59* 3.17*   HGB 13.5 13.2 11.4*   HCT 40.1 38.0* 34.4*   .8* 105.8* 108.5*   MCH 36.3* 36.8* 36.0*   MCH

## 2019-11-27 NOTE — PLAN OF CARE
Received patient at 1915 last evening. Patient agitated, restless, shouting and disoriented. Precedex gtt started. Dr. Francisco Mcclendon notified of transfer and patient condition. Bilateral wrist restraints and vest in place. PRN haldol and dilaudid given overnight.  P

## 2019-11-27 NOTE — PROGRESS NOTES
[facility] Gastroenterology Progress Note    S: Pt moved to ICU overnight. Pt with tachycardia. Pt with AMS now.      O: /80   Pulse 107   Temp 97.3 °F (36.3 °C) (Temporal)   Resp 24   Ht 69\"   Wt 221 lb 5.5 oz (100.4 kg)   SpO2 97%   BMI 32.69 leukocytosis, but improving, fever last nigh; no signs of cholangitis at this time  - will obtain mrcp to eval pancreatic lesion seen on CT, likely pseudocyst  - on zosyn; reasonable to continue this given hypotension and fever along with leukocytosis

## 2019-11-27 NOTE — PLAN OF CARE
Assumed care of patient at [facility] 54 sedated on precedex; BP dropped to 79' dr Cynthia Davis at bedside  LR 1000cc bolus; decreased precedex gtt     Patient is alert to self and person; becomes more oriented as day progressed, does have paranoia \" who is dianelys

## 2019-11-27 NOTE — CM/SW NOTE
11/27/19 1300   CM/SW Screening   Referral Source    Information Source Chart review   Patient's Mental Status Confused   Patient lives with Spouse   Patient Status Prior to Admission   Independent with ADLs and Mobility Yes   Discharge Need

## 2019-11-27 NOTE — PLAN OF CARE
Pt received from [facility]. Alert & oriented x 4. Calm, cooperative upon initial assessment. Forgetful and impulsive at times. Bed/chair alarm. Psyche following. CIWAS d/c'ed per psyche. PRN ATIVAN. On room air. COOPER. MD notified. CXR done. ST on telemetry.

## 2019-11-27 NOTE — PROGRESS NOTES
[facility]  Progress Note     Jovanna Flynn Patient Status:  Inpatient    3/31/1945 MRN [id number]   [facility] 4SW-A Attending Valentina Garcia MD   Hosp Day # 2 PCP Erika Ortega MD     Chief Complaint: sob abd pain    S: Hanny Angles BILT 0.6 0.7 0.9  --    TP 6.9 5.6* 5.5*  --        Estimated Creatinine Clearance: 63.6 mL/min (based on SCr of 1.05 mg/dL).     Recent Labs   Lab 11/26/19  1007 11/27/19  0422   PTP 14.9* 16.0*   INR 1.12* 1.22*       Recent Labs   Lab 11/25/19  0494 with patient and RN  Adina Jung MD

## 2019-11-27 NOTE — PLAN OF CARE
Pt received at 0230 sedated with precedex drip, awakens spontaneously and with verbal stimulation. When pt awake, pt FOREMAN, following commands with all extremitites, confused and very agitated and restless. Pt denies any pain.  Abdomen rounded and distended,

## 2019-11-28 LAB
AMMONIA PLAS-MCNC: 25 UMOL/L (ref 11–32)
ANION GAP SERPL CALC-SCNC: 5 MMOL/L (ref 0–18)
APTT PPP: 46.4 SECONDS (ref 25.4–36.1)
BASOPHILS # BLD AUTO: 0.06 X10(3) UL (ref 0–0.2)
BASOPHILS NFR BLD AUTO: 0.3 %
BUN BLD-MCNC: 16 MG/DL (ref 7–18)
BUN/CREAT SERPL: 17.8 (ref 10–20)
CALCIUM BLD-MCNC: 8.4 MG/DL (ref 8.5–10.1)
CHLORIDE SERPL-SCNC: 110 MMOL/L (ref 98–112)
CO2 SERPL-SCNC: 24 MMOL/L (ref 21–32)
CREAT BLD-MCNC: 0.9 MG/DL (ref 0.7–1.3)
DEPRECATED RDW RBC AUTO: 60.3 FL (ref 35.1–46.3)
EOSINOPHIL # BLD AUTO: 0.05 X10(3) UL (ref 0–0.7)
EOSINOPHIL NFR BLD AUTO: 0.2 %
ERYTHROCYTE [DISTWIDTH] IN BLOOD BY AUTOMATED COUNT: 14.9 % (ref 11–15)
GLUCOSE BLD-MCNC: 126 MG/DL (ref 70–99)
GLUCOSE BLD-MCNC: 128 MG/DL (ref 70–99)
GLUCOSE BLD-MCNC: 140 MG/DL (ref 70–99)
GLUCOSE BLD-MCNC: 156 MG/DL (ref 70–99)
HAV IGM SER QL: 2 MG/DL (ref 1.6–2.6)
HCT VFR BLD AUTO: 34.3 % (ref 39–53)
HGB BLD-MCNC: 11.5 G/DL (ref 13–17.5)
IMM GRANULOCYTES # BLD AUTO: 0.35 X10(3) UL (ref 0–1)
IMM GRANULOCYTES NFR BLD: 1.7 %
INR BLD: 1.2 (ref 0.9–1.1)
LYMPHOCYTES # BLD AUTO: 0.8 X10(3) UL (ref 1–4)
LYMPHOCYTES NFR BLD AUTO: 3.9 %
MCH RBC QN AUTO: 36.3 PG (ref 26–34)
MCHC RBC AUTO-ENTMCNC: 33.5 G/DL (ref 31–37)
MCV RBC AUTO: 108.2 FL (ref 80–100)
MONOCYTES # BLD AUTO: 1.26 X10(3) UL (ref 0.1–1)
MONOCYTES NFR BLD AUTO: 6.2 %
NEUTROPHILS # BLD AUTO: 17.78 X10 (3) UL (ref 1.5–7.7)
NEUTROPHILS # BLD AUTO: 17.78 X10(3) UL (ref 1.5–7.7)
NEUTROPHILS NFR BLD AUTO: 87.7 %
OSMOLALITY SERPL CALC.SUM OF ELEC: 291 MOSM/KG (ref 275–295)
PHOSPHATE SERPL-MCNC: 1.4 MG/DL (ref 2.5–4.9)
PHOSPHATE SERPL-MCNC: 3 MG/DL (ref 2.5–4.9)
PLATELET # BLD AUTO: 227 10(3)UL (ref 150–450)
POTASSIUM SERPL-SCNC: 3.9 MMOL/L (ref 3.5–5.1)
PSA SERPL DL<=0.01 NG/ML-MCNC: 15.8 SECONDS (ref 12.5–14.7)
RBC # BLD AUTO: 3.17 X10(6)UL (ref 3.8–5.8)
SODIUM SERPL-SCNC: 139 MMOL/L (ref 136–145)
WBC # BLD AUTO: 20.3 X10(3) UL (ref 4–11)

## 2019-11-28 PROCEDURE — 99233 SBSQ HOSP IP/OBS HIGH 50: CPT | Performed by: HOSPITALIST

## 2019-11-28 PROCEDURE — 99232 SBSQ HOSP IP/OBS MODERATE 35: CPT | Performed by: PHYSICIAN ASSISTANT

## 2019-11-28 NOTE — PROGRESS NOTES
[facility] PULMONARY/CRITICAL CARE    S: Patient is agitated and confused. Wants to leave AMA for Thanksgiving.     Meds:  • sodium phosphate IVPB  30 mmol Intravenous Once   • dextrose  25 mL Intravenous Once   • furosemide  20 mg Intravenous Once   • piperacill .8* 108.5* 108.2*   MCH 36.8* 36.0* 36.3*   MCHC 34.7 33.1 33.5   RDW 16.1* 15.9* 14.9   NEPRELIM 30.27* 22.36* 17.78*   WBC 34.0* 25.6* 20.3*   .0 223.0 227.0     Recent Labs   Lab 11/25/19  1154 11/26/19  0705 11/26/19  2016 11/27/19  042 with dilaudid prn  -CIWA  -precedex - hope to wean off  -patient wants to leave AMA for Thanksgiving. 4. Anemia - no need to transfuse  -stable  -MRCP with poss small hemorrhage.  -monitor  5. Coagulopathy - mild. No signs of active bleeding  6.  Proph -

## 2019-11-28 NOTE — PROGRESS NOTES
[facility] Gastroenterology Progress Note    S: Pt still with AMS and agitation. Pt with no abd pain at this time. O: /76   Pulse 105   Temp 98.7 °F (37.1 °C) (Temporal)   Resp 21   Ht 69\"   Wt 221 lb 5.5 oz (100.4 kg)   SpO2 96%   BMI 32. pseudocyst.  This is seen in the setting of mild pancreatitis which is slightly improved compared to the CT of   11/25/2019. A follow-up CT or MRI of the abdomen in 3 months could be performed to ensure resolution of this complex fluid collection.

## 2019-11-28 NOTE — PLAN OF CARE
Received pt. A/ox2-3. To self, , location. Disoriented to situation. 2L NC, clear diminished. COOPER, needed to sit at edge of bed to \"blow off Co2\"  ST 100s-140s. Stable BP, afebrile. NPO. Lactulose enema.   Voiding via urinal.  Precedex for restle

## 2019-11-28 NOTE — PROGRESS NOTES
[facility]  Progress Note    Isaac Ramachandran Patient Status:  Inpatient    3/31/1945 MRN [id number]   [facility] 4SW-A Attending Zenobia Thomson MD   [facility] Day # 3 PCP Orlin Cheema MD     Subjective:  Patient sitting up in bed in r mm without stones or stricture. The gallbladder is mildly distended with possible tiny stones or sludge within the gallbladder.      Within the pancreas there is a nodular density measuring 34 x 25 mm which is hypo to isointense on T2 weighted imaging sugg Zosyn  4. Continue supportive care. 5. GI prophylaxis  6. DVT prophylaxis    My total face time with this patient was 22 minutes. Greater than half of our visit was spent in counseling the patient on the above listed diagnoses and treatment options.

## 2019-11-28 NOTE — PROGRESS NOTES
[facility]  Progress Note     Arichouston Mayer Patient Status:  Inpatient    3/31/1945 MRN [id number]   [facility] 4SW-A Attending Negin Cochran MD   [facility] Day # 3 PCP Markell Marsh MD     Chief Complaint: abd pain    S: Patient 14.0* 19.0* 21.0 21.0 24.0   ALKPHO 63 56 59  --   --    AST 13* 24 36  --   --    ALT 23 19 19  --   --    BILT 0.6 0.7 0.9  --   --    TP 6.9 5.6* 5.5*  --   --        Estimated Creatinine Clearance: 74.2 mL/min (based on SCr of 0.9 mg/dL).     Recent Lab expected to be discharge to: home    Plan of care discussed with patient    Claire Rivera MD

## 2019-11-28 NOTE — PROGRESS NOTES
Assumed pt care around 1600. Pt alert, oriented x2. Pt having periods of severe anxiety, with impulsiveness, increased confusion, and increased tachypnea, 2 L nc in place for comfort. Pt impulsive, bed alarm on. Wife at bedside.  Prn medication given as nee

## 2019-11-29 LAB
ALBUMIN SERPL-MCNC: 1.9 G/DL (ref 3.4–5)
ALBUMIN/GLOB SERPL: 0.5 {RATIO} (ref 1–2)
ALP LIVER SERPL-CCNC: 95 U/L (ref 45–117)
ALT SERPL-CCNC: 28 U/L (ref 16–61)
AMMONIA PLAS-MCNC: 25 UMOL/L (ref 11–32)
ANION GAP SERPL CALC-SCNC: 6 MMOL/L (ref 0–18)
AST SERPL-CCNC: 26 U/L (ref 15–37)
ATRIAL RATE: 121 BPM
BASOPHILS # BLD AUTO: 0.07 X10(3) UL (ref 0–0.2)
BASOPHILS NFR BLD AUTO: 0.4 %
BILIRUB SERPL-MCNC: 1.2 MG/DL (ref 0.1–2)
BUN BLD-MCNC: 11 MG/DL (ref 7–18)
BUN/CREAT SERPL: 13.3 (ref 10–20)
CALCIUM BLD-MCNC: 8.4 MG/DL (ref 8.5–10.1)
CHLORIDE SERPL-SCNC: 110 MMOL/L (ref 98–112)
CO2 SERPL-SCNC: 23 MMOL/L (ref 21–32)
CREAT BLD-MCNC: 0.83 MG/DL (ref 0.7–1.3)
DEPRECATED RDW RBC AUTO: 59 FL (ref 35.1–46.3)
EOSINOPHIL # BLD AUTO: 0.08 X10(3) UL (ref 0–0.7)
EOSINOPHIL NFR BLD AUTO: 0.5 %
ERYTHROCYTE [DISTWIDTH] IN BLOOD BY AUTOMATED COUNT: 14.6 % (ref 11–15)
GLOBULIN PLAS-MCNC: 3.5 G/DL (ref 2.8–4.4)
GLUCOSE BLD-MCNC: 104 MG/DL (ref 70–99)
GLUCOSE BLD-MCNC: 111 MG/DL (ref 70–99)
GLUCOSE BLD-MCNC: 120 MG/DL (ref 70–99)
GLUCOSE BLD-MCNC: 130 MG/DL (ref 70–99)
GLUCOSE BLD-MCNC: 135 MG/DL (ref 70–99)
HCT VFR BLD AUTO: 35.5 % (ref 39–53)
HGB BLD-MCNC: 11.5 G/DL (ref 13–17.5)
IMM GRANULOCYTES # BLD AUTO: 0.32 X10(3) UL (ref 0–1)
IMM GRANULOCYTES NFR BLD: 1.9 %
LYMPHOCYTES # BLD AUTO: 1.05 X10(3) UL (ref 1–4)
LYMPHOCYTES NFR BLD AUTO: 6.2 %
M PROTEIN MFR SERPL ELPH: 5.4 G/DL (ref 6.4–8.2)
MCH RBC QN AUTO: 35.1 PG (ref 26–34)
MCHC RBC AUTO-ENTMCNC: 32.4 G/DL (ref 31–37)
MCV RBC AUTO: 108.2 FL (ref 80–100)
MONOCYTES # BLD AUTO: 1.3 X10(3) UL (ref 0.1–1)
MONOCYTES NFR BLD AUTO: 7.7 %
NEUTROPHILS # BLD AUTO: 14.16 X10 (3) UL (ref 1.5–7.7)
NEUTROPHILS # BLD AUTO: 14.16 X10(3) UL (ref 1.5–7.7)
NEUTROPHILS NFR BLD AUTO: 83.3 %
OPIATES, UR, 6-ACETYLMORPHINE: <10 NG/ML
OPIATES, URINE, CODEINE: <20 NG/ML
OPIATES, URINE, HYDROCODONE: 648 NG/ML
OPIATES, URINE, HYDROMORPHONE: 449 NG/ML
OPIATES, URINE, MORPHINE: <20 NG/ML
OPIATES, URINE, NORHYDROCODONE: 543 NG/ML
OPIATES, URINE, NOROXYCODONE: <20 NG/ML
OPIATES, URINE, NOROXYMORPHONE: <20 NG/ML
OPIATES, URINE, OXYCODONE: <20 NG/ML
OPIATES, URINE, OXYMORPHONE: <20 NG/ML
OSMOLALITY SERPL CALC.SUM OF ELEC: 289 MOSM/KG (ref 275–295)
P AXIS: 54 DEGREES
P-R INTERVAL: 150 MS
PLATELET # BLD AUTO: 234 10(3)UL (ref 150–450)
POTASSIUM SERPL-SCNC: 3.2 MMOL/L (ref 3.5–5.1)
POTASSIUM SERPL-SCNC: 3.7 MMOL/L (ref 3.5–5.1)
Q-T INTERVAL: 304 MS
QRS DURATION: 66 MS
QTC CALCULATION (BEZET): 431 MS
R AXIS: 26 DEGREES
RBC # BLD AUTO: 3.28 X10(6)UL (ref 3.8–5.8)
SODIUM SERPL-SCNC: 139 MMOL/L (ref 136–145)
T AXIS: 52 DEGREES
VENTRICULAR RATE: 121 BPM
WBC # BLD AUTO: 17 X10(3) UL (ref 4–11)

## 2019-11-29 PROCEDURE — 99233 SBSQ HOSP IP/OBS HIGH 50: CPT | Performed by: HOSPITALIST

## 2019-11-29 PROCEDURE — 99232 SBSQ HOSP IP/OBS MODERATE 35: CPT | Performed by: PHYSICIAN ASSISTANT

## 2019-11-29 RX ORDER — FUROSEMIDE 10 MG/ML
20 INJECTION INTRAMUSCULAR; INTRAVENOUS
Status: DISCONTINUED | OUTPATIENT
Start: 2019-11-29 | End: 2019-12-01

## 2019-11-29 RX ORDER — PANTOPRAZOLE SODIUM 40 MG/1
40 TABLET, DELAYED RELEASE ORAL
Status: DISCONTINUED | OUTPATIENT
Start: 2019-11-30 | End: 2019-12-05

## 2019-11-29 RX ORDER — DULOXETIN HYDROCHLORIDE 30 MG/1
30 CAPSULE, DELAYED RELEASE ORAL DAILY
Status: DISCONTINUED | OUTPATIENT
Start: 2019-11-29 | End: 2019-12-11

## 2019-11-29 RX ORDER — POTASSIUM CHLORIDE 20 MEQ/1
40 TABLET, EXTENDED RELEASE ORAL ONCE
Status: COMPLETED | OUTPATIENT
Start: 2019-11-29 | End: 2019-11-29

## 2019-11-29 RX ORDER — POTASSIUM CHLORIDE 20 MEQ/1
40 TABLET, EXTENDED RELEASE ORAL EVERY 4 HOURS
Status: COMPLETED | OUTPATIENT
Start: 2019-11-29 | End: 2019-11-29

## 2019-11-29 RX ORDER — LORAZEPAM 1 MG/1
1 TABLET ORAL
Status: DISCONTINUED | OUTPATIENT
Start: 2019-11-29 | End: 2019-12-02

## 2019-11-29 RX ORDER — METOPROLOL TARTRATE 5 MG/5ML
5 INJECTION INTRAVENOUS ONCE
Status: COMPLETED | OUTPATIENT
Start: 2019-11-29 | End: 2019-11-29

## 2019-11-29 NOTE — PLAN OF CARE
Assumed care of pt for the night shift. Pt alert and oriented x4. Anxious and agitated throughout the night, PRN ativan given per orders. Precedex infusing see MAR for titrations. Ronn vest restraint discontinued.  Maintaining adequate O2 saturations on NC

## 2019-11-29 NOTE — PLAN OF CARE
Pt agitated, attempting to remove equipment and leave AMA this morning. Precedex maxed, restraints placed. Spouse at bedside,  aware and agreeable that patient cannot leave AMA and is not medically ready for discharge.   Pt c/o mild abdominal pain, only r

## 2019-11-29 NOTE — PROGRESS NOTES
[facility]  Progress Note    Kendra Blankenship Patient Status:  Inpatient    3/31/1945 MRN [id number]   [facility] 4SW-A Attending Mary Varghese MD   [facility] Day # 4 PCP Jessica Obrien MD     Subjective:  Patient is feeling better.  Frances Chi non-nodular prostatic hyperplasia with lower urinary tract symptoms     Chronic pain of left knee     Acute bronchitis     Acute bronchitis, unspecified organism     Leukocytosis, unspecified type     Hypertension, unspecified type     Hypokalemia     Leuk

## 2019-11-29 NOTE — PLAN OF CARE
Patient normotensive. Afebrile. Up in the chair majority of the shift. IV lasix initiated-declined evening dose due to fatigue. Voiding per urinal. Lactulose enemas discontinued. Stools soft. Advanced to soft diet-low fat. Good appetite.  Transitioned to or

## 2019-11-29 NOTE — PROGRESS NOTES
[facility] Gastroenterology Progress Note    S: Pt with improved mental status today. Pt with no abd pain at this time. Tolerated clears today.      O: /63   Pulse 108   Temp 98 °F (36.7 °C) (Temporal)   Resp (!) 27   Ht 69\"   Wt 233 lb 6.4 oz represent proteinaceous fluid or hemorrhagic fluid within a pseudocyst.  This is seen in the setting of mild pancreatitis which is slightly improved compared to the CT of   11/25/2019.   A follow-up CT or MRI of the abdomen in 3 months could be performed to

## 2019-11-29 NOTE — PROGRESS NOTES
Critical Care Progress Note        NAME: Negra Oliva - ROOM: [facility]-H - MRN: [id number] - Age: 69year old - : 3/31/1945  Date of Admission: 2019 11:40 AM  Admission Diagnosis: SVT [facility]supraventricular tachy[facility] [I47.1]  Acute pancreat Glucose-Vitamin C **OR** dextrose **OR** glucose **OR** Glucose-Vitamin C, ondansetron HCl, HYDROcodone-acetaminophen **OR** HYDROcodone-acetaminophen, HYDROmorphone HCl **OR** HYDROmorphone HCl **OR** HYDROmorphone HCl     Lungs: clear to auscultation carina supplementation to stop biotin consumption at least 72 hours prior to collection of a new sample.    09/06/2017 08:56 AM 2.250 0.350 - 5.500 mIU/mL Final     Albumin   Date/Time Value Ref Range Status   11/29/2019 04:29 AM 1.9 (L) 3.4 - 5.0 g/dL Final     A

## 2019-11-30 ENCOUNTER — APPOINTMENT (OUTPATIENT)
Dept: CT IMAGING | Facility: HOSPITAL | Age: 74
DRG: 438 | End: 2019-11-30
Attending: HOSPITALIST
Payer: MEDICARE

## 2019-11-30 ENCOUNTER — APPOINTMENT (OUTPATIENT)
Dept: GENERAL RADIOLOGY | Facility: HOSPITAL | Age: 74
DRG: 438 | End: 2019-11-30
Attending: INTERNAL MEDICINE
Payer: MEDICARE

## 2019-11-30 LAB
ALLENS TEST: POSITIVE
AMMONIA PLAS-MCNC: 40 UMOL/L (ref 11–32)
AMMONIA PLAS-MCNC: 41 UMOL/L (ref 11–32)
ANION GAP SERPL CALC-SCNC: 10 MMOL/L (ref 0–18)
ARTERIAL BLD GAS O2 SATURATION: 94 % (ref 92–100)
ARTERIAL BLOOD GAS BASE EXCESS: -6.2 MMOL/L (ref ?–2)
ARTERIAL BLOOD GAS HCO3: 18.3 MEQ/L (ref 22–26)
ARTERIAL BLOOD GAS PCO2: 33 MM HG (ref 35–45)
ARTERIAL BLOOD GAS PH: 7.37 (ref 7.35–7.45)
ARTERIAL BLOOD GAS PO2: 74 MM HG (ref 80–105)
BASOPHILS # BLD: 0 X10(3) UL (ref 0–0.2)
BASOPHILS NFR BLD: 0 %
BUN BLD-MCNC: 6 MG/DL (ref 7–18)
BUN/CREAT SERPL: 5 (ref 10–20)
CALCIUM BLD-MCNC: 8.8 MG/DL (ref 8.5–10.1)
CALCULATED O2 SATURATION: 94 % (ref 92–100)
CARBOXYHEMOGLOBIN: 1.3 % SAT (ref 0–3)
CHLORIDE SERPL-SCNC: 112 MMOL/L (ref 98–112)
CO2 SERPL-SCNC: 20 MMOL/L (ref 21–32)
CREAT BLD-MCNC: 1.19 MG/DL (ref 0.7–1.3)
DEPRECATED RDW RBC AUTO: 59.6 FL (ref 35.1–46.3)
EOSINOPHIL # BLD: 0 X10(3) UL (ref 0–0.7)
EOSINOPHIL NFR BLD: 0 %
ERYTHROCYTE [DISTWIDTH] IN BLOOD BY AUTOMATED COUNT: 14.8 % (ref 11–15)
GLUCOSE BLD-MCNC: 95 MG/DL (ref 70–99)
HAV IGM SER QL: 1.8 MG/DL (ref 1.6–2.6)
HCT VFR BLD AUTO: 34.2 % (ref 39–53)
HGB BLD-MCNC: 11.2 G/DL (ref 13–17.5)
IONIZED CALCIUM: 1.25 MMOL/L (ref 1.12–1.32)
LACTIC ACID ARTERIAL: <1.6 MMOL/L (ref 0.5–2)
LYMPHOCYTES NFR BLD: 0.99 X10(3) UL (ref 1–4)
LYMPHOCYTES NFR BLD: 5 %
MCH RBC QN AUTO: 35.4 PG (ref 26–34)
MCHC RBC AUTO-ENTMCNC: 32.7 G/DL (ref 31–37)
MCV RBC AUTO: 108.2 FL (ref 80–100)
METHEMOGLOBIN: 0.4 % SAT (ref 0.4–1.5)
MONOCYTES # BLD: 2.76 X10(3) UL (ref 0.1–1)
MONOCYTES NFR BLD: 14 %
NEUTROPHILS # BLD AUTO: 15.4 X10 (3) UL (ref 1.5–7.7)
NEUTROPHILS NFR BLD: 81 %
NEUTS HYPERSEG # BLD: 15.96 X10(3) UL (ref 1.5–7.7)
OSMOLALITY SERPL CALC.SUM OF ELEC: 291 MOSM/KG (ref 275–295)
P/F RATIO: 352.2 MMHG
PATIENT TEMPERATURE: 98.6 F
PHOSPHATE SERPL-MCNC: 1.8 MG/DL (ref 2.5–4.9)
PHOSPHATE SERPL-MCNC: 2.2 MG/DL (ref 2.5–4.9)
PLATELET # BLD AUTO: 303 10(3)UL (ref 150–450)
PLATELET MORPHOLOGY: NORMAL
POTASSIUM BLOOD GAS: 3.8 MMOL/L (ref 3.6–5.1)
POTASSIUM SERPL-SCNC: 3.8 MMOL/L (ref 3.5–5.1)
RBC # BLD AUTO: 3.16 X10(6)UL (ref 3.8–5.8)
SODIUM BLOOD GAS: 139 MMOL/L (ref 136–144)
SODIUM SERPL-SCNC: 142 MMOL/L (ref 136–145)
TOTAL CELLS COUNTED: 100
TOTAL HEMOGLOBIN: 11.1 G/DL (ref 12.6–17.4)
WBC # BLD AUTO: 19.7 X10(3) UL (ref 4–11)

## 2019-11-30 PROCEDURE — 99232 SBSQ HOSP IP/OBS MODERATE 35: CPT | Performed by: HOSPITALIST

## 2019-11-30 PROCEDURE — 71045 X-RAY EXAM CHEST 1 VIEW: CPT | Performed by: NURSE PRACTITIONER

## 2019-11-30 PROCEDURE — 70450 CT HEAD/BRAIN W/O DYE: CPT | Performed by: HOSPITALIST

## 2019-11-30 PROCEDURE — 99232 SBSQ HOSP IP/OBS MODERATE 35: CPT | Performed by: COLON & RECTAL SURGERY

## 2019-11-30 RX ORDER — METOPROLOL TARTRATE 5 MG/5ML
5 INJECTION INTRAVENOUS EVERY 4 HOURS PRN
Status: DISCONTINUED | OUTPATIENT
Start: 2019-11-30 | End: 2020-01-04

## 2019-11-30 RX ORDER — ACETAMINOPHEN 650 MG/1
650 SUPPOSITORY RECTAL EVERY 6 HOURS PRN
Status: DISCONTINUED | OUTPATIENT
Start: 2019-11-30 | End: 2020-01-04

## 2019-11-30 RX ORDER — MAGNESIUM OXIDE 400 MG (241.3 MG MAGNESIUM) TABLET
400 TABLET ONCE
Status: COMPLETED | OUTPATIENT
Start: 2019-11-30 | End: 2019-11-30

## 2019-11-30 RX ORDER — ACETAMINOPHEN 160 MG/5ML
650 SOLUTION ORAL EVERY 6 HOURS PRN
Status: DISCONTINUED | OUTPATIENT
Start: 2019-11-30 | End: 2020-01-04

## 2019-11-30 RX ORDER — ALBUMIN, HUMAN INJ 5% 5 %
250 SOLUTION INTRAVENOUS ONCE
Status: COMPLETED | OUTPATIENT
Start: 2019-11-30 | End: 2019-11-30

## 2019-11-30 RX ORDER — ACETAMINOPHEN 325 MG/1
650 TABLET ORAL EVERY 6 HOURS PRN
Status: DISCONTINUED | OUTPATIENT
Start: 2019-11-30 | End: 2019-12-17

## 2019-11-30 RX ORDER — POTASSIUM CHLORIDE 20 MEQ/1
40 TABLET, EXTENDED RELEASE ORAL ONCE
Status: COMPLETED | OUTPATIENT
Start: 2019-11-30 | End: 2019-11-30

## 2019-11-30 NOTE — PROGRESS NOTES
[facility]  Progress Note     Sergio Cook Patient Status:  Inpatient    3/31/1945 MRN [id number]   [facility] 4SW-A Attending Silver Noguera MD   [facility] Day # 4 PCP Horacio Benavides MD     Chief Complaint: abd pain    S: Patient 139 139  --    K 4.0 4.1 3.8 3.9 3.2* 3.7   * 113* 114* 110 110  --    CO2 19.0* 21.0 21.0 24.0 23.0  --    ALKPHO 56 59  --   --  95  --    AST 24 36  --   --  26  --    ALT 19 19  --   --  28  --    BILT 0.7 0.9  --   --  1.2  --    TP 5.6* 5.5*  - discharge: tbd  Discharge is dependent on: progress  At this point Mr. Dunia Irwin is expected to be discharge to: home     Plan of care discussed with patient  Claire Rivera MD

## 2019-11-30 NOTE — PROGRESS NOTES
[facility]  Progress Note    Kendra Blankenship Patient Status:  Inpatient    3/31/1945 MRN [id number]   [facility] 3NE-A Attending Mary Varghese MD   [facility] Day # 5 PCP Jessica Obrien MD     Subjective:  States minimal abdominal pain. of left knee     Acute bronchitis     Acute bronchitis, unspecified organism     Leukocytosis, unspecified type     Hypertension, unspecified type     Hypokalemia     Leukocytosis     Azotemia     Acute pancreatitis     Metabolic acidosis     Hyperglycemia

## 2019-11-30 NOTE — PROGRESS NOTES
Blood cultures and CXR for complaints of dyspnea with increasing fever--[facility] on-call updated. Patient currently on Zosyn--continue for now.   HR continues to fluctuate between 120-160--increases with movement--Metoprolol 5mg IV q4h prn for HR sustained >120

## 2019-11-30 NOTE — PROGRESS NOTES
Took over pt care this am. Pt up in chair. Cooperative. Sleeping mostly. Easily arousable. OK to floor. Awaiting bed. Pt doesn't appear anxious at this time. Groggy. Pt impulsive when trying to get up to urinate. Chair alarm in place. Wife came to bs.  Horacio Due

## 2019-11-30 NOTE — PROGRESS NOTES
[facility] Patient Status:  Inpatient    3/31/1945 MRN [id number]   [facility] 4SW-A Attending Kinga Romero MD   [facility] Day # 5 PCP Shayan Kang MD     Pulm / Critical Care Progress Note     S: no significant i rate and rhythm, normal S1S2, no murmur. Abdomen: soft, non-tender, non-distended, positive BS.    Extremity: + edema       Recent Labs   Lab 11/28/19  0451 11/29/19  0429 11/30/19  0428   WBC 20.3* 17.0* 19.7*   HGB 11.5* 11.5* 11.2*   HCT 34.3* 35.5* 34 care  11/30/2019  5:30 AM

## 2019-11-30 NOTE — PLAN OF CARE
Assumed care of pt for the night shift. Pt alert and oriented x4. Pt anxious throughout the night PRN ativan given per orders, see MAR. Maintaining adequate O2 saturations on RA. Tmax 100.9 S. Sawyer WIGGINSN aware. PRN tylenol given per orders.  Pt tachypnic and

## 2019-11-30 NOTE — PLAN OF CARE
Took over pt care after transferred from ICU. Pt assisted to chair. Cooperative. Still drowsy. Easily arousable. Pt has not needed prn ativan. He did have c/o stomach cramping and took 1 dose of prn norco this shift.   ST on tele, hr got up to 130's when s

## 2019-12-01 ENCOUNTER — APPOINTMENT (OUTPATIENT)
Dept: CT IMAGING | Facility: HOSPITAL | Age: 74
DRG: 438 | End: 2019-12-01
Attending: STUDENT IN AN ORGANIZED HEALTH CARE EDUCATION/TRAINING PROGRAM
Payer: MEDICARE

## 2019-12-01 LAB
ALBUMIN SERPL-MCNC: 2 G/DL (ref 3.4–5)
ALP LIVER SERPL-CCNC: 103 U/L (ref 45–117)
ALT SERPL-CCNC: 44 U/L (ref 16–61)
ANION GAP SERPL CALC-SCNC: 12 MMOL/L (ref 0–18)
AST SERPL-CCNC: 32 U/L (ref 15–37)
BASOPHILS # BLD: 0 X10(3) UL (ref 0–0.2)
BASOPHILS NFR BLD: 0 %
BILIRUB DIRECT SERPL-MCNC: 0.9 MG/DL (ref 0–0.2)
BILIRUB SERPL-MCNC: 1.4 MG/DL (ref 0.1–2)
BILIRUB UR QL STRIP.AUTO: NEGATIVE
BUN BLD-MCNC: 4 MG/DL (ref 7–18)
BUN/CREAT SERPL: 2.8 (ref 10–20)
CALCIUM BLD-MCNC: 8.4 MG/DL (ref 8.5–10.1)
CHLORIDE SERPL-SCNC: 109 MMOL/L (ref 98–112)
CO2 SERPL-SCNC: 20 MMOL/L (ref 21–32)
COLOR UR AUTO: YELLOW
CREAT BLD-MCNC: 1.44 MG/DL (ref 0.7–1.3)
DEPRECATED RDW RBC AUTO: 61.9 FL (ref 35.1–46.3)
EOSINOPHIL # BLD: 0.22 X10(3) UL (ref 0–0.7)
EOSINOPHIL NFR BLD: 1 %
ERYTHROCYTE [DISTWIDTH] IN BLOOD BY AUTOMATED COUNT: 15.4 % (ref 11–15)
GLUCOSE BLD-MCNC: 111 MG/DL (ref 70–99)
GLUCOSE BLD-MCNC: 114 MG/DL (ref 70–99)
GLUCOSE BLD-MCNC: 118 MG/DL (ref 70–99)
GLUCOSE BLD-MCNC: 123 MG/DL (ref 70–99)
GLUCOSE UR STRIP.AUTO-MCNC: NEGATIVE MG/DL
HAV IGM SER QL: 1.8 MG/DL (ref 1.6–2.6)
HCT VFR BLD AUTO: 32.5 % (ref 39–53)
HGB BLD-MCNC: 10.8 G/DL (ref 13–17.5)
HYALINE CASTS #/AREA URNS AUTO: PRESENT /LPF
INR BLD: 1.31 (ref 0.9–1.1)
LEUKOCYTE ESTERASE UR QL STRIP.AUTO: NEGATIVE
LYMPHOCYTES NFR BLD: 1.56 X10(3) UL (ref 1–4)
LYMPHOCYTES NFR BLD: 7 %
M PROTEIN MFR SERPL ELPH: 5.4 G/DL (ref 6.4–8.2)
MCH RBC QN AUTO: 36.4 PG (ref 26–34)
MCHC RBC AUTO-ENTMCNC: 33.2 G/DL (ref 31–37)
MCV RBC AUTO: 109.4 FL (ref 80–100)
METAMYELOCYTES # BLD: 0.67 X10(3) UL
METAMYELOCYTES NFR BLD: 3 %
MONOCYTES # BLD: 1.56 X10(3) UL (ref 0.1–1)
MONOCYTES NFR BLD: 7 %
NEUTROPHILS # BLD AUTO: 17.39 X10 (3) UL (ref 1.5–7.7)
NEUTROPHILS NFR BLD: 80 %
NEUTS BAND NFR BLD: 2 %
NEUTS HYPERSEG # BLD: 18.29 X10(3) UL (ref 1.5–7.7)
NITRITE UR QL STRIP.AUTO: NEGATIVE
NRBC BLD MANUAL-RTO: 1 %
OSMOLALITY SERPL CALC.SUM OF ELEC: 290 MOSM/KG (ref 275–295)
PH UR STRIP.AUTO: 5 [PH] (ref 4.5–8)
PHOSPHATE SERPL-MCNC: 2 MG/DL (ref 2.5–4.9)
PLATELET # BLD AUTO: 292 10(3)UL (ref 150–450)
PLATELET MORPHOLOGY: NORMAL
POTASSIUM SERPL-SCNC: 3.6 MMOL/L (ref 3.5–5.1)
PROT UR STRIP.AUTO-MCNC: 30 MG/DL
PSA SERPL DL<=0.01 NG/ML-MCNC: 16.9 SECONDS (ref 12.5–14.7)
RBC # BLD AUTO: 2.97 X10(6)UL (ref 3.8–5.8)
SODIUM SERPL-SCNC: 141 MMOL/L (ref 136–145)
SP GR UR STRIP.AUTO: 1.02 (ref 1–1.03)
TOTAL CELLS COUNTED: 100
UROBILINOGEN UR STRIP.AUTO-MCNC: <2 MG/DL
WBC # BLD AUTO: 22.3 X10(3) UL (ref 4–11)

## 2019-12-01 PROCEDURE — 74177 CT ABD & PELVIS W/CONTRAST: CPT | Performed by: STUDENT IN AN ORGANIZED HEALTH CARE EDUCATION/TRAINING PROGRAM

## 2019-12-01 PROCEDURE — 99232 SBSQ HOSP IP/OBS MODERATE 35: CPT | Performed by: PHYSICIAN ASSISTANT

## 2019-12-01 PROCEDURE — 71260 CT THORAX DX C+: CPT | Performed by: STUDENT IN AN ORGANIZED HEALTH CARE EDUCATION/TRAINING PROGRAM

## 2019-12-01 PROCEDURE — 99233 SBSQ HOSP IP/OBS HIGH 50: CPT | Performed by: STUDENT IN AN ORGANIZED HEALTH CARE EDUCATION/TRAINING PROGRAM

## 2019-12-01 RX ORDER — SODIUM CHLORIDE, SODIUM LACTATE, POTASSIUM CHLORIDE, CALCIUM CHLORIDE 600; 310; 30; 20 MG/100ML; MG/100ML; MG/100ML; MG/100ML
INJECTION, SOLUTION INTRAVENOUS CONTINUOUS
Status: DISCONTINUED | OUTPATIENT
Start: 2019-12-01 | End: 2019-12-04

## 2019-12-01 RX ORDER — MAGNESIUM OXIDE 400 MG (241.3 MG MAGNESIUM) TABLET
400 TABLET ONCE
Status: COMPLETED | OUTPATIENT
Start: 2019-12-01 | End: 2019-12-01

## 2019-12-01 RX ORDER — POTASSIUM CHLORIDE 20 MEQ/1
40 TABLET, EXTENDED RELEASE ORAL EVERY 4 HOURS
Status: COMPLETED | OUTPATIENT
Start: 2019-12-01 | End: 2019-12-01

## 2019-12-01 NOTE — PLAN OF CARE
A&Ox4. VSS. ST on tele, -120. Room air, 93%. Complaining of SOB, mainly on exertion. Admits to feeling anxious, PRN ativan given. Impulsive, getting out of bed frequently to urinate. Abdominal pain controlled with norco prn.  Sepsis BPA and abnormal A METABOLIC/FLUID AND ELECTROLYTES - ADULT  Goal: Electrolytes maintained within normal limits  Description  INTERVENTIONS:  - Monitor labs and rhythm and assess patient for signs and symptoms of electrolyte imbalances  - Administer electrolyte replacement a

## 2019-12-01 NOTE — PROGRESS NOTES
[facility]  Progress Note     Tony North Webster Patient Status:  Inpatient    3/31/1945 MRN [id number]   [facility] 3NE-A Attending Carlos Kamara MD   [facility] Day # 5 PCP Lazarus Benavides MD     Chief Complaint: abd pain    S: Patient 8.4* 8.4*  --  8.8   ALB 2.4* 2.3*  --   --  1.9*  --   --     142   < > 139 139  --  142   K 4.0 4.1   < > 3.9 3.2* 3.7 3.8   * 113*   < > 110 110  --  112   CO2 19.0* 21.0   < > 24.0 23.0  --  20.0*   ALKPHO 56 59  --   --  95  --   --    AST ammonia level 40        Quality:  · DVT Prophylaxis: Lovenox  · CODE status: full  · Rangel: no  · Central line: no     Will the patient be referred to [facility] on discharge?: no  Estimated date of discharge: tbd  Discharge is dependent on: progress  At this poi

## 2019-12-01 NOTE — PROGRESS NOTES
[facility]  Progress Note    Nic Riddle Patient Status:  Inpatient    3/31/1945 MRN [id number]   [facility] 3NE-A Attending Eric Narayan MD   [facility] Day # 6 PCP Jerrod Banuelos MD     Subjective:  Pt denies abdominal pain, ha hyperplasia with lower urinary tract symptoms     Chronic pain of left knee     Acute bronchitis     Acute bronchitis, unspecified organism     Leukocytosis, unspecified type     Hypertension, unspecified type     Hypokalemia     Leukocytosis     Azotemia

## 2019-12-01 NOTE — PROGRESS NOTES
[facility]  Progress Note     Ira Roblero Patient Status:  Inpatient    3/31/1945 MRN [id number]   [facility] 3NE-A Attending Marilou Tierney MD   [facility] Day # 6 PCP Candace Corrigan MD     Chief Complaint: Pancreatitis     S: 102  --  70 56*   GFRNAA 72 67   < > 88  --  61 48*   CA 8.7 8.7   < > 8.4*  --  8.8 8.4*   ALB 2.4* 2.3*  --  1.9*  --   --   --     142   < > 139  --  142 141   K 4.0 4.1   < > 3.2* 3.7 3.8 3.6   * 113*   < > 110  --  112 109   CO2 19.0* 21.0 wife, TIN Chester MD

## 2019-12-02 LAB
ALBUMIN SERPL-MCNC: 1.9 G/DL (ref 3.4–5)
ALBUMIN/GLOB SERPL: 0.5 {RATIO} (ref 1–2)
ALP LIVER SERPL-CCNC: 106 U/L (ref 45–117)
ALT SERPL-CCNC: 45 U/L (ref 16–61)
ANION GAP SERPL CALC-SCNC: 11 MMOL/L (ref 0–18)
ANION GAP SERPL CALC-SCNC: 11 MMOL/L (ref 0–18)
AST SERPL-CCNC: 32 U/L (ref 15–37)
BASOPHILS # BLD: 0.22 X10(3) UL (ref 0–0.2)
BASOPHILS NFR BLD: 1 %
BILIRUB SERPL-MCNC: 1.3 MG/DL (ref 0.1–2)
BUN BLD-MCNC: 4 MG/DL (ref 7–18)
BUN BLD-MCNC: 4 MG/DL (ref 7–18)
BUN/CREAT SERPL: 3 (ref 10–20)
BUN/CREAT SERPL: 3 (ref 10–20)
CALCIUM BLD-MCNC: 7.9 MG/DL (ref 8.5–10.1)
CALCIUM BLD-MCNC: 7.9 MG/DL (ref 8.5–10.1)
CHLORIDE SERPL-SCNC: 106 MMOL/L (ref 98–112)
CHLORIDE SERPL-SCNC: 106 MMOL/L (ref 98–112)
CO2 SERPL-SCNC: 21 MMOL/L (ref 21–32)
CO2 SERPL-SCNC: 21 MMOL/L (ref 21–32)
CREAT BLD-MCNC: 1.34 MG/DL (ref 0.7–1.3)
CREAT BLD-MCNC: 1.34 MG/DL (ref 0.7–1.3)
DEPRECATED RDW RBC AUTO: 62.4 FL (ref 35.1–46.3)
EOSINOPHIL # BLD: 0 X10(3) UL (ref 0–0.7)
EOSINOPHIL NFR BLD: 0 %
ERYTHROCYTE [DISTWIDTH] IN BLOOD BY AUTOMATED COUNT: 15.3 % (ref 11–15)
GLOBULIN PLAS-MCNC: 3.6 G/DL (ref 2.8–4.4)
GLUCOSE BLD-MCNC: 104 MG/DL (ref 70–99)
GLUCOSE BLD-MCNC: 104 MG/DL (ref 70–99)
HAV IGM SER QL: 1.8 MG/DL (ref 1.6–2.6)
HCT VFR BLD AUTO: 34.2 % (ref 39–53)
HGB BLD-MCNC: 11.1 G/DL (ref 13–17.5)
LYMPHOCYTES NFR BLD: 0.89 X10(3) UL (ref 1–4)
LYMPHOCYTES NFR BLD: 4 %
M PROTEIN MFR SERPL ELPH: 5.5 G/DL (ref 6.4–8.2)
MCH RBC QN AUTO: 35.7 PG (ref 26–34)
MCHC RBC AUTO-ENTMCNC: 32.5 G/DL (ref 31–37)
MCV RBC AUTO: 110 FL (ref 80–100)
METAMYELOCYTES # BLD: 0.44 X10(3) UL
METAMYELOCYTES NFR BLD: 2 %
MONOCYTES # BLD: 2.22 X10(3) UL (ref 0.1–1)
MONOCYTES NFR BLD: 10 %
MYELOCYTES # BLD: 0.22 X10(3) UL
MYELOCYTES NFR BLD: 1 %
NEUTROPHILS # BLD AUTO: 17.18 X10 (3) UL (ref 1.5–7.7)
NEUTROPHILS NFR BLD: 80 %
NEUTS BAND NFR BLD: 2 %
NEUTS HYPERSEG # BLD: 18.2 X10(3) UL (ref 1.5–7.7)
NRBC BLD MANUAL-RTO: 1 %
OSMOLALITY SERPL CALC.SUM OF ELEC: 283 MOSM/KG (ref 275–295)
OSMOLALITY SERPL CALC.SUM OF ELEC: 283 MOSM/KG (ref 275–295)
PHOSPHATE SERPL-MCNC: 1.7 MG/DL (ref 2.5–4.9)
PLATELET # BLD AUTO: 285 10(3)UL (ref 150–450)
PLATELET MORPHOLOGY: NORMAL
POTASSIUM SERPL-SCNC: 3.9 MMOL/L (ref 3.5–5.1)
POTASSIUM SERPL-SCNC: 3.9 MMOL/L (ref 3.5–5.1)
RBC # BLD AUTO: 3.11 X10(6)UL (ref 3.8–5.8)
SODIUM SERPL-SCNC: 138 MMOL/L (ref 136–145)
SODIUM SERPL-SCNC: 138 MMOL/L (ref 136–145)
TOTAL CELLS COUNTED: 100
WBC # BLD AUTO: 22.2 X10(3) UL (ref 4–11)

## 2019-12-02 PROCEDURE — 99233 SBSQ HOSP IP/OBS HIGH 50: CPT | Performed by: STUDENT IN AN ORGANIZED HEALTH CARE EDUCATION/TRAINING PROGRAM

## 2019-12-02 PROCEDURE — 99232 SBSQ HOSP IP/OBS MODERATE 35: CPT | Performed by: OTHER

## 2019-12-02 PROCEDURE — 99232 SBSQ HOSP IP/OBS MODERATE 35: CPT | Performed by: SURGERY

## 2019-12-02 RX ORDER — MAGNESIUM OXIDE 400 MG (241.3 MG MAGNESIUM) TABLET
400 TABLET ONCE
Status: COMPLETED | OUTPATIENT
Start: 2019-12-02 | End: 2019-12-02

## 2019-12-02 RX ORDER — LORAZEPAM 0.5 MG/1
0.5 TABLET ORAL EVERY 6 HOURS PRN
Status: DISCONTINUED | OUTPATIENT
Start: 2019-12-02 | End: 2019-12-02

## 2019-12-02 RX ORDER — BUPRENORPHINE 2 MG/1
2 TABLET SUBLINGUAL ONCE
Status: COMPLETED | OUTPATIENT
Start: 2019-12-02 | End: 2019-12-02

## 2019-12-02 RX ORDER — LORAZEPAM 1 MG/1
1 TABLET ORAL EVERY 6 HOURS PRN
Status: DISCONTINUED | OUTPATIENT
Start: 2019-12-02 | End: 2019-12-03

## 2019-12-02 RX ORDER — HALOPERIDOL 5 MG/ML
1 INJECTION INTRAMUSCULAR EVERY 4 HOURS PRN
Status: DISCONTINUED | OUTPATIENT
Start: 2019-12-02 | End: 2019-12-05

## 2019-12-02 NOTE — PROGRESS NOTES
[facility]  Progress Note    Nelson Riley Patient Status:  Inpatient    3/31/1945 MRN [id number]   [facility] 3NE-A Attending Carin Aden MD   [facility] Day # 7 PCP Cris Montilla MD     Subjective:  Pt with no abdominal pain.

## 2019-12-02 NOTE — DIETARY NOTE
[facility]    NUTRITION ASSESSMENT    Pt does not meet malnutrition criteria.     NUTRITION DIAGNOSIS/PROBLEM:    Inadequate oral intake related to inability to consume sufficient intake as evidenced by estimated intake less than estimated needs; Clear (231 lb)  11/23/18 : 106.8 kg (235 lb 8 oz)  11/20/18 : 108.9 kg (240 lb)  08/30/18 : 108.9 kg (240 lb)  03/13/18 : 106.6 kg (235 lb)      NUTRITION:  Diet: Clear Liquids  Oral Supplements: Ensure Clear BID    FOOD/NUTRITION RELATED HISTORY:  Appetite: [employer]

## 2019-12-02 NOTE — PLAN OF CARE
Patient is a&ox2-3.  and RA. Tele and NSR/ST. IV fluids per MAR. IV zosyn. Placed back on CLD. Dietician on case. Denies any pain. Wife at bedside. Staff will continue to monitor patient.     Problem: Patient/Family Goals  Goal: Patient/Family Long Term or PO as ordered and tolerated  - Nasogastric tube to low intermittent suction as ordered  - Evaluate effectiveness of ordered antiemetic medications  - Provide nonpharmacologic comfort measures as appropriate  - Advance diet as tolerated, if ordered  - Ob Instruct pt to call for assistance with activity based on assessment  - Modify environment to reduce risk of injury  - Provide assistive devices as appropriate  - Consider OT/PT consult to assist with strengthening/mobility  - Encourage toileting schedule

## 2019-12-02 NOTE — PLAN OF CARE
Patient alert/oriented to baseline and resting in bed with call light within reach. Wife at bedside. Patient denies pain at this time, will con't to  monitor. Ct chest/abd/pelvis ordered.  Pt NPO prior to test.  Education provided to patient and wife, verb reports new pain  - Anticipate increased pain with activity and pre-medicate as appropriate  Outcome: Progressing     Problem: SAFETY ADULT - FALL  Goal: Free from fall injury  Description  INTERVENTIONS:  - Assess pt frequently for physical needs  - Ident

## 2019-12-02 NOTE — PHYSICAL THERAPY NOTE
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 Sit to stand to RW with min assist, and then amb of 10 ft to b/s chair, and min assist and vc's for gait and sitting safety. Pt on room air throughout and presents with quick shallow at times at rest and with movement, but not consistently. below baseline and would benefit from skilled inpatient PT to address the above deficits to assist patient in returning to prior level of function. Subacute rehab is recommended for 9-11 days.   After this period of rehabilitation patient should achieve

## 2019-12-02 NOTE — PROGRESS NOTES
[facility]  Progress Note     Niya Gilmore Patient Status:  Inpatient    3/31/1945 MRN [id number]   [facility] 3NE-A Attending Jules Torres MD   [address] Day # 7 PCP Shayan Kang MD     Chief Complaint: Pancreatitis     S: 4*   CREATSERUM 0.83  --  1.19 1.44* 1.34*  1.34*   GFRAA 102  --  70 56* 61  61   GFRNAA 88  --  61 48* 53*  53*   CA 8.4*  --  8.8 8.4* 7.9*  7.9*   ALB 1.9*  --   --  2.0* 1.9*     --  142 141 138  138   K 3.2*   < > 3.8 3.6 3.9  3.9     -- dependent on: clinical   At this point Mr. Dariusz Anderson is expected to be discharge to: tbd     Plan of care discussed with pt, pt wife, RN    Belinda Edouard MD

## 2019-12-02 NOTE — PROGRESS NOTES
[facility]  Report of Psychiatric Progress Note    Jules Cui Patient Status:  Inpatient    3/31/1945 MRN [id number]   [facility] 3NE-A Attending Laurie Decker MD   [facility] Day # 7 PCP Stephen Carney MD     Date of Admission: 6 use disorder for 2 yrs. He went to chem dependency rehab and has been clean for 26 yrs. 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  He was OFF all opioids for many years, but has required some short term opioids to treat knee/back/shou yrs.     Psych Family History: Mother with dementia    Social and Developmental History:  for 48 yrs. Has 3 children.  He is a retired nurse anesthetist.     Past Medical History:   Diagnosis Date   • Erectile dysfunction    • Esophageal reflux    • Piperacillin Sod-Tazobactam So (ZOSYN) 3.375 g in dextrose 5 % 100 mL ADD-vantage, 3.375 g, Intravenous, Q8H  •  metoprolol Tartrate (LOPRESSOR) 5 MG/5ML injection 5 mg, 5 mg, Intravenous, Q4H PRN  •  acetaminophen (TYLENOL) tab 650 mg, 650 mg, Oral, Q6H P injection 1.2 mg, 1.2 mg, Intravenous, Q2H PRN    Review of Systems   Constitutional: Positive for malaise/fatigue. Psychiatric/Behavioral: Positive for depression. The patient is nervous/anxious.       Mental Status Exam:     Objective       12/02/19  16 CTA imaging), abdomen, and pelvis were obtained post- injection of non-ionic intravenous contrast material. Multi-planar reformatted/3-D images were created to optimize visualization of vascular anatomy. Dose reduction   techniques were used.  Dose informa curvature of the stomach likely an extension from the peripancreatic inflammation. ABDOMINAL WALL:  Small fat containing umbilical hernia. Cameron Cliche PELVIC ORGANS:  Prostatic hypertrophy. Cameron Cliche   BONES:  Disc space narrowing with degenerative disc disease L2-3 throug

## 2019-12-02 NOTE — PLAN OF CARE
A/O x 3. Forgetful at times. Impulsive. Anxious at times- Ativan PO PRN  Low grade fevers  Trouble with urination- urgency, frequency- using urinal at bedside.  Small amounts at a time, pt frustrated  IVF per [facility]   Repeat CT ABD shows slight worsening of pa measures as appropriate  - Advance diet as tolerated, if ordered  - Obtain nutritional consult as needed  - Evaluate fluid balance  Outcome: Progressing     Problem: PAIN - ADULT  Goal: Verbalizes/displays adequate comfort level or patient's stated pain go

## 2019-12-03 ENCOUNTER — APPOINTMENT (OUTPATIENT)
Dept: GENERAL RADIOLOGY | Facility: HOSPITAL | Age: 74
DRG: 438 | End: 2019-12-03
Attending: NURSE PRACTITIONER
Payer: MEDICARE

## 2019-12-03 LAB
ALBUMIN SERPL-MCNC: 1.6 G/DL (ref 3.4–5)
ALBUMIN SERPL-MCNC: 1.7 G/DL (ref 3.4–5)
ALBUMIN/GLOB SERPL: 0.5 {RATIO} (ref 1–2)
ALBUMIN/GLOB SERPL: 0.5 {RATIO} (ref 1–2)
ALLENS TEST: POSITIVE
ALP LIVER SERPL-CCNC: 94 U/L (ref 45–117)
ALP LIVER SERPL-CCNC: 98 U/L (ref 45–117)
ALT SERPL-CCNC: 46 U/L (ref 16–61)
ALT SERPL-CCNC: 48 U/L (ref 16–61)
AMMONIA PLAS-MCNC: 62 UMOL/L (ref 11–32)
ANION GAP SERPL CALC-SCNC: 8 MMOL/L (ref 0–18)
ANION GAP SERPL CALC-SCNC: 9 MMOL/L (ref 0–18)
ARTERIAL BLD GAS O2 SATURATION: 96 % (ref 92–100)
ARTERIAL BLOOD GAS BASE EXCESS: -6 MMOL/L (ref ?–2)
ARTERIAL BLOOD GAS HCO3: 20.7 MEQ/L (ref 22–26)
ARTERIAL BLOOD GAS PCO2: 47 MM HG (ref 35–45)
ARTERIAL BLOOD GAS PH: 7.27 (ref 7.35–7.45)
ARTERIAL BLOOD GAS PO2: 116 MM HG (ref 80–105)
AST SERPL-CCNC: 22 U/L (ref 15–37)
AST SERPL-CCNC: 22 U/L (ref 15–37)
BASOPHILS # BLD: 0 X10(3) UL (ref 0–0.2)
BASOPHILS # BLD: 0 X10(3) UL (ref 0–0.2)
BASOPHILS NFR BLD: 0 %
BASOPHILS NFR BLD: 0 %
BILIRUB SERPL-MCNC: 1.1 MG/DL (ref 0.1–2)
BILIRUB SERPL-MCNC: 1.2 MG/DL (ref 0.1–2)
BUN BLD-MCNC: 4 MG/DL (ref 7–18)
BUN BLD-MCNC: 4 MG/DL (ref 7–18)
BUN/CREAT SERPL: 1.5 (ref 10–20)
BUN/CREAT SERPL: 2.1 (ref 10–20)
CALCIUM BLD-MCNC: 7.4 MG/DL (ref 8.5–10.1)
CALCIUM BLD-MCNC: 7.7 MG/DL (ref 8.5–10.1)
CALCULATED O2 SATURATION: 98 % (ref 92–100)
CARBOXYHEMOGLOBIN: 1.3 % SAT (ref 0–3)
CHLORIDE SERPL-SCNC: 105 MMOL/L (ref 98–112)
CHLORIDE SERPL-SCNC: 107 MMOL/L (ref 98–112)
CO2 SERPL-SCNC: 24 MMOL/L (ref 21–32)
CO2 SERPL-SCNC: 24 MMOL/L (ref 21–32)
CORTIS SERPL-MCNC: 14.6 UG/DL
CREAT BLD-MCNC: 1.88 MG/DL (ref 0.7–1.3)
CREAT BLD-MCNC: 2.61 MG/DL (ref 0.7–1.3)
DEPRECATED RDW RBC AUTO: 63.8 FL (ref 35.1–46.3)
DEPRECATED RDW RBC AUTO: 64.7 FL (ref 35.1–46.3)
EOSINOPHIL # BLD: 0 X10(3) UL (ref 0–0.7)
EOSINOPHIL # BLD: 0 X10(3) UL (ref 0–0.7)
EOSINOPHIL NFR BLD: 0 %
EOSINOPHIL NFR BLD: 0 %
ERYTHROCYTE [DISTWIDTH] IN BLOOD BY AUTOMATED COUNT: 15.7 % (ref 11–15)
ERYTHROCYTE [DISTWIDTH] IN BLOOD BY AUTOMATED COUNT: 15.7 % (ref 11–15)
GLOBULIN PLAS-MCNC: 3.2 G/DL (ref 2.8–4.4)
GLOBULIN PLAS-MCNC: 3.4 G/DL (ref 2.8–4.4)
GLUCOSE BLD-MCNC: 119 MG/DL (ref 70–99)
GLUCOSE BLD-MCNC: 122 MG/DL (ref 70–99)
GLUCOSE BLD-MCNC: 135 MG/DL (ref 70–99)
HAV IGM SER QL: 1.8 MG/DL (ref 1.6–2.6)
HCT VFR BLD AUTO: 33.1 % (ref 39–53)
HCT VFR BLD AUTO: 33.9 % (ref 39–53)
HGB BLD-MCNC: 10.4 G/DL (ref 13–17.5)
HGB BLD-MCNC: 10.9 G/DL (ref 13–17.5)
IONIZED CALCIUM: 1.07 MMOL/L (ref 1.12–1.32)
L/M: 2 L/MIN
LACTATE SERPL-SCNC: 1.5 MMOL/L (ref 0.4–2)
LACTIC ACID ARTERIAL: <1.6 MMOL/L (ref 0.5–2)
LIPASE SERPL-CCNC: 136 U/L (ref 73–393)
LYMPHOCYTES NFR BLD: 0.87 X10(3) UL (ref 1–4)
LYMPHOCYTES NFR BLD: 1.05 X10(3) UL (ref 1–4)
LYMPHOCYTES NFR BLD: 4 %
LYMPHOCYTES NFR BLD: 5 %
M PROTEIN MFR SERPL ELPH: 4.8 G/DL (ref 6.4–8.2)
M PROTEIN MFR SERPL ELPH: 5.1 G/DL (ref 6.4–8.2)
MCH RBC QN AUTO: 35.1 PG (ref 26–34)
MCH RBC QN AUTO: 35.6 PG (ref 26–34)
MCHC RBC AUTO-ENTMCNC: 31.4 G/DL (ref 31–37)
MCHC RBC AUTO-ENTMCNC: 32.2 G/DL (ref 31–37)
MCV RBC AUTO: 110.8 FL (ref 80–100)
MCV RBC AUTO: 111.8 FL (ref 80–100)
METAMYELOCYTES # BLD: 0.21 X10(3) UL
METAMYELOCYTES # BLD: 0.44 X10(3) UL
METAMYELOCYTES NFR BLD: 1 %
METAMYELOCYTES NFR BLD: 2 %
METHEMOGLOBIN: 0.5 % SAT (ref 0.4–1.5)
MONOCYTES # BLD: 0.84 X10(3) UL (ref 0.1–1)
MONOCYTES # BLD: 1.31 X10(3) UL (ref 0.1–1)
MONOCYTES NFR BLD: 4 %
MONOCYTES NFR BLD: 6 %
MORPHOLOGY: NORMAL
MORPHOLOGY: NORMAL
MYELOCYTES # BLD: 0.21 X10(3) UL
MYELOCYTES # BLD: 0.87 X10(3) UL
MYELOCYTES NFR BLD: 1 %
MYELOCYTES NFR BLD: 4 %
NEUTROPHILS # BLD AUTO: 16.52 X10 (3) UL (ref 1.5–7.7)
NEUTROPHILS # BLD AUTO: 17.66 X10 (3) UL (ref 1.5–7.7)
NEUTROPHILS NFR BLD: 80 %
NEUTROPHILS NFR BLD: 82 %
NEUTS BAND NFR BLD: 2 %
NEUTS BAND NFR BLD: 9 %
NEUTS HYPERSEG # BLD: 18.31 X10(3) UL (ref 1.5–7.7)
NEUTS HYPERSEG # BLD: 18.69 X10(3) UL (ref 1.5–7.7)
OSMOLALITY SERPL CALC.SUM OF ELEC: 285 MOSM/KG (ref 275–295)
OSMOLALITY SERPL CALC.SUM OF ELEC: 286 MOSM/KG (ref 275–295)
PATIENT TEMPERATURE: 99 F
PHOSPHATE SERPL-MCNC: 4.2 MG/DL (ref 2.5–4.9)
PLATELET # BLD AUTO: 243 10(3)UL (ref 150–450)
PLATELET # BLD AUTO: 249 10(3)UL (ref 150–450)
PLATELET MORPHOLOGY: NORMAL
POTASSIUM BLOOD GAS: 3.8 MMOL/L (ref 3.6–5.1)
POTASSIUM SERPL-SCNC: 4 MMOL/L (ref 3.5–5.1)
POTASSIUM SERPL-SCNC: 4 MMOL/L (ref 3.5–5.1)
PROCALCITONIN SERPL-MCNC: 2.48 NG/ML
RBC # BLD AUTO: 2.96 X10(6)UL (ref 3.8–5.8)
RBC # BLD AUTO: 3.06 X10(6)UL (ref 3.8–5.8)
SODIUM BLOOD GAS: 135 MMOL/L (ref 136–144)
SODIUM SERPL-SCNC: 138 MMOL/L (ref 136–145)
SODIUM SERPL-SCNC: 139 MMOL/L (ref 136–145)
TOTAL CELLS COUNTED: 100
TOTAL CELLS COUNTED: 100
TOTAL HEMOGLOBIN: 10.4 G/DL (ref 12.6–17.4)
WBC # BLD AUTO: 21 X10(3) UL (ref 4–11)
WBC # BLD AUTO: 21.8 X10(3) UL (ref 4–11)

## 2019-12-03 PROCEDURE — 71045 X-RAY EXAM CHEST 1 VIEW: CPT | Performed by: NURSE PRACTITIONER

## 2019-12-03 PROCEDURE — 99233 SBSQ HOSP IP/OBS HIGH 50: CPT | Performed by: STUDENT IN AN ORGANIZED HEALTH CARE EDUCATION/TRAINING PROGRAM

## 2019-12-03 PROCEDURE — 99232 SBSQ HOSP IP/OBS MODERATE 35: CPT | Performed by: OTHER

## 2019-12-03 PROCEDURE — 99232 SBSQ HOSP IP/OBS MODERATE 35: CPT | Performed by: PHYSICIAN ASSISTANT

## 2019-12-03 PROCEDURE — 99291 CRITICAL CARE FIRST HOUR: CPT | Performed by: NURSE PRACTITIONER

## 2019-12-03 RX ORDER — LORAZEPAM 0.5 MG/1
0.5 TABLET ORAL EVERY 6 HOURS PRN
Status: DISCONTINUED | OUTPATIENT
Start: 2019-12-03 | End: 2019-12-05

## 2019-12-03 RX ORDER — MAGNESIUM OXIDE 400 MG (241.3 MG MAGNESIUM) TABLET
400 TABLET ONCE
Status: COMPLETED | OUTPATIENT
Start: 2019-12-03 | End: 2019-12-03

## 2019-12-03 RX ORDER — LACTULOSE 10 G/15ML
30 SOLUTION ORAL 2 TIMES DAILY
Status: DISCONTINUED | OUTPATIENT
Start: 2019-12-03 | End: 2019-12-24

## 2019-12-03 RX ORDER — ALBUMIN (HUMAN) 12.5 G/50ML
25 SOLUTION INTRAVENOUS ONCE
Status: COMPLETED | OUTPATIENT
Start: 2019-12-03 | End: 2019-12-03

## 2019-12-03 NOTE — PROGRESS NOTES
[facility]  Progress Note    Townsend Six Patient Status:  Inpatient    3/31/1945 MRN [id number]   [facility] 3NE-A Attending Eleuterio Rey MD   [facility] Day # 8 PCP Blanca Almazan MD     Subjective:  Pt resting in bed, somewhat 7.  DVT prophylaxis  8. GI prophylaxis    Zaira Hernandez PA-C  12/3/2019  9:16 AM

## 2019-12-03 NOTE — PLAN OF CARE
A&Ox4, forgetful. Lungs clear and SpO2 sats 98% on room air. Increased work of breathing noted, wife states due to anxiety. 2L of oxygen placed for comfort and patient appears more comfortable, work of breathing is easier. NSR, ST on tele.  Abdomen soft and measures for life threatening arrhythmias  - Monitor electrolytes and administer replacement therapy as ordered  Outcome: Progressing     Problem: GASTROINTESTINAL - ADULT  Goal: Minimal or absence of nausea and vomiting  Description  INTERVENTIONS:  Theo Kearns injury  Description  INTERVENTIONS:  - Assess pt frequently for physical needs  - Identify cognitive and physical deficits and behaviors that affect risk of falls.   - [place] fall precautions as indicated by assessment.  - Educate pt/family on patient sa

## 2019-12-03 NOTE — PLAN OF CARE
A/O x 4. Lethargic. 2 L O2. Daily standing weight. NPO, sips of clears ok. Low BP Bolus x3 administered. PT rec JANNA. Ativan for anxiety. Psych following. Spouse at bedside.     Problem: Patient/Family Goals  Goal: Patient/Family Long Term Goal  Desc ordered and tolerated  - Nasogastric tube to low intermittent suction as ordered  - Evaluate effectiveness of ordered antiemetic medications  - Provide nonpharmacologic comfort measures as appropriate  - Advance diet as tolerated, if ordered  - Obtain nutr pt to call for assistance with activity based on assessment  - Modify environment to reduce risk of injury  - Provide assistive devices as appropriate  - Consider OT/PT consult to assist with strengthening/mobility  - Encourage toileting schedule  Outcome:

## 2019-12-03 NOTE — OCCUPATIONAL THERAPY NOTE
OCCUPATIONAL THERAPY EVALUATION - INPATIENT     Room Number: 0608/7580-J  Evaluation Date: 12/3/2019  Type of Evaluation: Initial  Presenting Problem: substance withdrawal, delirium, pancreatitis    Physician Order: IP Consult to Occupational Therapy  Reas PROFILE    HOME SITUATION  Type of Home: House  Home Layout: Other (Comment)(per pt has [place] and is up/down the steps all time)  Lives With: Spouse    Toilet and Equipment: Standard height toilet  Shower/Tub and Equipment: Walk-in shower       Occupati None    AM-PAC Score:  Score: 16  Approx Degree of Impairment: 53.32%  Standardized Score (AM-PAC Scale): 35.96  CMS Modifier (G-Code): CK    FUNCTIONAL TRANSFER ASSESSMENT  Supine to Sit : Minimum assistance  Sit to Stand: Minimum assistance    Skilled Th ADL/IADL HIGH  5+ performance deficits    Client Assessment/Performance Deficits MODERATE - Comorbidities and min to mod modifications of tasks    Clinical Decision Making LOW - Analysis of occupational profile, problem-focused assessments, limited treatme

## 2019-12-03 NOTE — PHYSICAL THERAPY NOTE
PHYSICAL THERAPY TREATMENT NOTE - INPATIENT    Room Number: 4484/7731-J     Session: 1   Number of Visits to Meet Established Goals: 6    Presenting Problem: Acute pancreatitis with SIRS;  Ileus; suspected TME per pulmonary     History related to current ad OR   • OTHER SURGICAL HISTORY  01/01/2005    cardiac cath    • OTHER SURGICAL HISTORY  07/24/2017    flow us Dr Annia Springer    w/adenoidectomy       SUBJECTIVE  \" I am not sure where I am.\"    Patient’s self-stated goal is to gain indep generation. Pt needed min assist. Pt remained with eyes closed, max cues to open eyes. Pt sat on edge of bed, used urinal, pt had difficulty coordinating movements, assist provided.  Pt performed sit to stand from bed, cues for hand placement, proper techni independent      Goal #3 Patient is able to ambulate 150 feet with assist device: walker - rolling at assistance level: supervision      Goal #4 Asc/macrina 5 steps with railing and min assist.    Goal #5     Goal #6     Goal Comments: Goals established on 12/

## 2019-12-03 NOTE — PROGRESS NOTES
[facility]  Report of Psychiatric Progress Note    Deja South Patient Status:  Inpatient    3/31/1945 MRN [id number]   [facility] 3NE-A Attending Riot Sainz MD   [facility] Day # 8 PCP Chayito Herrera MD     Date of Admission: 6 2 yrs. He went to chem dependency rehab and has been clean for 26 yrs. 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 He was OFF all opioids for many years, but has required some short term opioids to treat knee/back/shoulder pain.  He was patient    Substance Use History: Ex-cigarette smoker. Severe opioid use disorder, in remission 26 yrs. Psych Family History: Mother with dementia    Social and Developmental History:  for 48 yrs. Has 3 children.  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 MEDIASTINUM:  Stable 1.8 x 2.1 cm left paratracheal low-density nodule with calcification likely arising from the inferior aspect of the thyroid gland. No enlarged mediastinal or hilar adenopathy. Barnetta Netter     CARDIAC Stable peripancreatic low-attenuation mass arising from versus abutting the pancreatic body most suggestive of a pseudo cyst.  No significant change. Cholelithiasis in a distended gallbladder.      Soft tissue stranding and thickening involving the 2n

## 2019-12-03 NOTE — PROGRESS NOTES
A[facility] HOSPITALIST  Progress Note     Niya Gilmore Patient Status:  Inpatient    3/31/1945 MRN [id number]   [facility] 3NE-A Attending Jules Torres MD   [facility] Day # 8 PCP Shayan Kang MD     Chief Complaint: Pancreatitis     S: CO2 20.0* 21.0  21.0 24.0   ALKPHO 103 106 98   AST 32 32 22   ALT 44 45 46   BILT 1.4 1.3 1.2   TP 5.4* 5.5* 5.1*       Estimated Creatinine Clearance: 35.5 mL/min (A) (based on SCr of 1.88 mg/dL (H)).     Recent Labs   Lab 11/27/19  0422 11/28/19  6842

## 2019-12-03 NOTE — CM/SW NOTE
Care Coordination Note    Progression of Care: Hospital Day # 8  Saw pt at bedside earlier today to see his progress and confirm discharge planning needs.  Pt resting in bed, lethargic, answered few simple questions approximately, Orientated to person/place

## 2019-12-04 ENCOUNTER — APPOINTMENT (OUTPATIENT)
Dept: ULTRASOUND IMAGING | Facility: HOSPITAL | Age: 74
DRG: 438 | End: 2019-12-04
Attending: NURSE PRACTITIONER
Payer: MEDICARE

## 2019-12-04 LAB
ALBUMIN SERPL-MCNC: 2 G/DL (ref 3.4–5)
ALBUMIN/GLOB SERPL: 0.7 {RATIO} (ref 1–2)
ALP LIVER SERPL-CCNC: 80 U/L (ref 45–117)
ALT SERPL-CCNC: 50 U/L (ref 16–61)
AMMONIA PLAS-MCNC: 59 UMOL/L (ref 11–32)
ANION GAP SERPL CALC-SCNC: 11 MMOL/L (ref 0–18)
AST SERPL-CCNC: 19 U/L (ref 15–37)
BASOPHILS # BLD: 0 X10(3) UL (ref 0–0.2)
BASOPHILS NFR BLD: 0 %
BILIRUB SERPL-MCNC: 1.3 MG/DL (ref 0.1–2)
BUN BLD-MCNC: 4 MG/DL (ref 7–18)
BUN/CREAT SERPL: 1.3 (ref 10–20)
CALCIUM BLD-MCNC: 8 MG/DL (ref 8.5–10.1)
CHLORIDE SERPL-SCNC: 107 MMOL/L (ref 98–112)
CO2 SERPL-SCNC: 21 MMOL/L (ref 21–32)
CREAT BLD-MCNC: 3.06 MG/DL (ref 0.7–1.3)
CREAT UR-SCNC: 165 MG/DL
DEPRECATED RDW RBC AUTO: 63.8 FL (ref 35.1–46.3)
EOSINOPHIL # BLD: 0.19 X10(3) UL (ref 0–0.7)
EOSINOPHIL NFR BLD: 1 %
ERYTHROCYTE [DISTWIDTH] IN BLOOD BY AUTOMATED COUNT: 15.3 % (ref 11–15)
GLOBULIN PLAS-MCNC: 2.9 G/DL (ref 2.8–4.4)
GLUCOSE BLD-MCNC: 111 MG/DL (ref 70–99)
GLUCOSE BLD-MCNC: 126 MG/DL (ref 70–99)
GLUCOSE BLD-MCNC: 129 MG/DL (ref 70–99)
HAV IGM SER QL: 1.6 MG/DL (ref 1.6–2.6)
HCT VFR BLD AUTO: 30.1 % (ref 39–53)
HGB BLD-MCNC: 9.5 G/DL (ref 13–17.5)
LYMPHOCYTES NFR BLD: 0.94 X10(3) UL (ref 1–4)
LYMPHOCYTES NFR BLD: 5 %
M PROTEIN MFR SERPL ELPH: 4.9 G/DL (ref 6.4–8.2)
MCH RBC QN AUTO: 35.3 PG (ref 26–34)
MCHC RBC AUTO-ENTMCNC: 31.6 G/DL (ref 31–37)
MCV RBC AUTO: 111.9 FL (ref 80–100)
MONOCYTES # BLD: 0.94 X10(3) UL (ref 0.1–1)
MONOCYTES NFR BLD: 5 %
MYELOCYTES # BLD: 0.38 X10(3) UL
MYELOCYTES NFR BLD: 2 %
NEUTROPHILS # BLD AUTO: 15.16 X10 (3) UL (ref 1.5–7.7)
NEUTROPHILS NFR BLD: 83 %
NEUTS BAND NFR BLD: 4 %
NEUTS HYPERSEG # BLD: 16.36 X10(3) UL (ref 1.5–7.7)
OSMOLALITY SERPL CALC.SUM OF ELEC: 287 MOSM/KG (ref 275–295)
PLATELET # BLD AUTO: 192 10(3)UL (ref 150–450)
PLATELET MORPHOLOGY: NORMAL
POTASSIUM SERPL-SCNC: 3.9 MMOL/L (ref 3.5–5.1)
RBC # BLD AUTO: 2.69 X10(6)UL (ref 3.8–5.8)
SODIUM SERPL-SCNC: 139 MMOL/L (ref 136–145)
SODIUM SERPL-SCNC: 33 MMOL/L
TOTAL CELLS COUNTED: 100
TRIGL SERPL-MCNC: 137 MG/DL (ref 30–149)
UUN UR-MCNC: 45 MG/DL
WBC # BLD AUTO: 18.8 X10(3) UL (ref 4–11)

## 2019-12-04 PROCEDURE — 02HV33Z INSERTION OF INFUSION DEVICE INTO SUPERIOR VENA CAVA, PERCUTANEOUS APPROACH: ICD-10-PCS | Performed by: HOSPITALIST

## 2019-12-04 PROCEDURE — 76775 US EXAM ABDO BACK WALL LIM: CPT | Performed by: NURSE PRACTITIONER

## 2019-12-04 PROCEDURE — 99232 SBSQ HOSP IP/OBS MODERATE 35: CPT | Performed by: SURGERY

## 2019-12-04 PROCEDURE — 99232 SBSQ HOSP IP/OBS MODERATE 35: CPT | Performed by: HOSPITALIST

## 2019-12-04 PROCEDURE — 99223 1ST HOSP IP/OBS HIGH 75: CPT | Performed by: INTERNAL MEDICINE

## 2019-12-04 RX ORDER — MAGNESIUM SULFATE HEPTAHYDRATE 40 MG/ML
2 INJECTION, SOLUTION INTRAVENOUS ONCE
Status: COMPLETED | OUTPATIENT
Start: 2019-12-04 | End: 2019-12-04

## 2019-12-04 RX ORDER — BUMETANIDE 0.25 MG/ML
2 INJECTION, SOLUTION INTRAMUSCULAR; INTRAVENOUS EVERY 8 HOURS
Status: DISCONTINUED | OUTPATIENT
Start: 2019-12-04 | End: 2019-12-05

## 2019-12-04 RX ORDER — ALBUMIN (HUMAN) 12.5 G/50ML
25 SOLUTION INTRAVENOUS EVERY 8 HOURS
Status: DISCONTINUED | OUTPATIENT
Start: 2019-12-04 | End: 2019-12-05

## 2019-12-04 RX ORDER — SODIUM CHLORIDE 0.9 % (FLUSH) 0.9 %
10 SYRINGE (ML) INJECTION AS NEEDED
Status: DISCONTINUED | OUTPATIENT
Start: 2019-12-04 | End: 2020-01-04

## 2019-12-04 RX ORDER — HEPARIN SODIUM 5000 [USP'U]/ML
5000 INJECTION, SOLUTION INTRAVENOUS; SUBCUTANEOUS EVERY 12 HOURS SCHEDULED
Status: DISCONTINUED | OUTPATIENT
Start: 2019-12-04 | End: 2019-12-27

## 2019-12-04 NOTE — CM/SW NOTE
MSW met with the patient and spouse at bedside to discuss choice of JANNA. This has not yet been been decided upon or discussed. The patient's wife specified that they are taking things \"day by day\" currently.   The patient's spouse expressed that she was

## 2019-12-04 NOTE — CONSULTS
[facility]  Report of Consultation    Niya Gilmore Patient Status:  Inpatient    3/31/1945 MRN [id number]   [facility] 4SW-A Attending Malik Thomason [facility] Day # 9 PCP Shayan Kang MD     Reason for Consultation: Unspecified essential hypertension    • Visual impairment     glasses     Past Surgical History:   Procedure Laterality Date   • ADENOIDECTOMY     • BIOPSY OF THYROID,PERCUT  08/20/10    NO CYTOLOGIC EVIDENCE OF PAPILLARY CARCINOMA   • COLONOSCOPY     • KN mg, Intravenous, Q4H PRN  •  lactated ringers infusion, , Intravenous, Continuous  •  metoprolol Tartrate (LOPRESSOR) 5 MG/5ML injection 5 mg, 5 mg, Intravenous, Q4H PRN  •  acetaminophen (TYLENOL) tab 650 mg, 650 mg, Oral, Q6H PRN **OR** acetaminophen (TY 300 ml   Output 150 ml   Net 150 ml     Last 3 Weights  12/03/19 1835 : 239 lb 3.2 oz (108.5 kg)  12/02/19 0425 : 218 lb 8 oz (99.1 kg)  12/01/19 0800 : 214 lb (97.1 kg)  11/29/19 1200 : 233 lb 6.4 oz (105.9 kg)  11/29/19 0609 : 233 lb 7.5 oz (105.9 kg)  1 11/28/19  0451  12/01/19  0738 12/02/19  0645 12/03/19  0651 12/03/19  1914 12/04/19  0446   WBC 20.3*   < > 22.3* 22.2* 21.0* 21.8* 18.8*   HGB 11.5*   < > 10.8* 11.1* 10.9* 10.4* 9.5*   .2*   < > 109.4* 110.0* 110.8* 111.8* 111.9*   .0   < anasarca and worsening abdominal distention and increased weight. Urine studies have been ordered. Renal ultrasound does not demonstrate obstructive uropathy. IV albumin and Bumex have been ordered.   The renal function and electro lites will be monitore

## 2019-12-04 NOTE — PLAN OF CARE
Received report and admitted pt to ICU around [address]. Pt drowsy, easily arousable, orientedx3. Pt c/o generalized abdominal pain. SBP in 80s; Marye Romberg APN notified, see orders. Pt on 2L NC. CXR, labs ordered and completed. Wife Adali Zapata at bedside.  Plan of

## 2019-12-04 NOTE — PLAN OF CARE
Received report and assumed care of pt at 0730. Pt drowsy, easily arousable, orientedx3. VSS, on room air. Pt denies pain although discomfort throughout distended abdomen.  Pt intermittently anxious, tachypnic, requesting ativan although relaxes with relaxa

## 2019-12-04 NOTE — PROGRESS NOTES
AIME HOSPITALIST  Progress Note     Alton Six Patient Status:  Inpatient    3/31/1945 MRN [id number]   [facility] 4SW-A Attending Nadia [facility] Day # 10 PCP Blanca Almazan MD     Chief Complaint: Pancreatiti mL/min (A) (based on SCr of 4.43 mg/dL (H)). Recent Labs   Lab 12/01/19  0738   PTP 16.9*   INR 1.31*       No results for input(s): TROP, CK in the last 168 hours. Imaging: Imaging data reviewed in Epic.     Medications:   • heparin sodium  1.5

## 2019-12-04 NOTE — DIETARY NOTE
[facility] ASSESSMENT    Pt does not meet malnutrition criteria.     NUTRITION DIAGNOSIS/PROBLEM:  Inadequate oral intake related to inability to consume sufficient intake as evidenced by estimated intake less than estimated need IBW  BMI: Body mass index is 35.32 kg/m².   IBW: 72.7  kg    WEIGHT HISTORY:   12/03/19 : 108.5 kg (239 lb 3.2 oz)  11/26/19 : 96.1 kg (211 lb 13.8 oz)  09/17/19 : 106.1 kg (233 lb 14.5 oz)  11/20/18 : 108.9 kg (240 lb)    NUTRITION:  Diet: NPO  Oral Supple

## 2019-12-04 NOTE — PROGRESS NOTES
Unable to maintain Blood pressure within normal limits after 4, 500 ml boluses. Patient also lethargic and sleeping throughout shift. Per MD, patient being transferred to ICU.

## 2019-12-04 NOTE — PROGRESS NOTES
AIME HOSPITALIST  Progress Note     Avi Peak Patient Status:  Inpatient    3/31/1945 MRN [id number]   [facility] 4SW-A [facility] Day # 9 PCP Weston Salmeron MD     Chief Complaint: Abdominal pa 129*   BUN 4* 4* 4*   CREATSERUM 1.88* 2.61* 3.06*   GFRAA 40* 27* 22*   GFRNAA 35* 23* 19*   CA 7.7* 7.4* 8.0*   ALB 1.7* 1.6* 2.0*    139 139   K 4.0 4.0 3.9    107 107   CO2 24.0 24.0 21.0   ALKPHO 98 94 80   AST 22 22 19   ALT 46 48 50   BI TBD    Plan of care discussed with pt and spouse at bedside    Chanelle Espitia DO

## 2019-12-04 NOTE — PROGRESS NOTES
[facility]  Progress Note    Mary Zuñiga Patient Status:  Inpatient    3/31/1945 MRN [id number]   [facility] 4SW-A Attending Sheryl Kerns [facility] Day # 9 PCP Iline Felty, MD     Subjective:  Pt transferred to [address] [place] [address]

## 2019-12-04 NOTE — PROGRESS NOTES
[facility] Patient Status:  Inpatient    3/31/1945 MRN [id number]   [facility]SW[facility]A [facility] Day # 9 PCP Omid Wright MD     Pulm / Critical Care Progress Note     S: transf obvious abnormality, atraumatic. Lungs: ctab   Chest wall: No tenderness or deformity. Heart: Regular rate and rhythm, normal S1S2, no murmur.    Abdomen: soft, mod distention, dec bs   Extremity: +edema         Recent Labs   Lab 12/03/19  0651 12/03/19 will ask for assistance in fluid management  5. Anemia -   -stable  -monitor and transfuse for Hgb <7  6. Coagulopathy - mild.  No signs of active bleeding  7. Proph - LMWH, PPI  8.  Dispo - Full code.    -cont icu monitoring       Demetris Amaya M.D.  [facility]

## 2019-12-04 NOTE — PROGRESS NOTES
ICU  Critical Care APRN Progress Note    NAME: Marsha Weiss - ROOM: 703/222-J - MRN: [id number] - Age: 69year old - :3/31/1945    History Of Present Illness:  Marsha Weiss is a 69year old who came back to the ICU today with hypotension not re BP (!) 83/42   Pulse 103   Temp 99.1 °F (37.3 °C) (Temporal)   Resp 18   Ht 175.3 cm (5' 9\")   Wt 239 lb 3.2 oz (108.5 kg)   SpO2 96%   BMI 35.32 kg/m²                  Physical Exam:    General Appearance: Alert, cooperative, no distress, appears stated Mri Abdomen&mrcp W/3d (APX=[id number])    Result Date: 11/27/2019  CONCLUSION:  Complex collection adjacent to the body of the pancreas could represent proteinaceous fluid or hemorrhagic fluid within a pseudocyst.  This is seen in the setting of mild panc CONCLUSION:  No evidence of pulmonary embolism. Peripancreatic inflammatory changes with adjacent free fluid consistent with acute pancreatitis.   Stable peripancreatic low-attenuation mass arising from versus abutting the pancreatic body most suggestive A total of 35 minutes of critical care time (exclusive of billable procedures) was administered.  This involved direct patient intervention, complex decision making, and/or extensive discussions (>50% face to face time) with the patient, family, and clinica

## 2019-12-05 ENCOUNTER — APPOINTMENT (OUTPATIENT)
Dept: INTERVENTIONAL RADIOLOGY/VASCULAR | Facility: HOSPITAL | Age: 74
DRG: 438 | End: 2019-12-05
Attending: INTERNAL MEDICINE
Payer: MEDICARE

## 2019-12-05 ENCOUNTER — APPOINTMENT (OUTPATIENT)
Dept: GENERAL RADIOLOGY | Facility: HOSPITAL | Age: 74
DRG: 438 | End: 2019-12-05
Attending: NURSE PRACTITIONER
Payer: MEDICARE

## 2019-12-05 ENCOUNTER — APPOINTMENT (OUTPATIENT)
Dept: GENERAL RADIOLOGY | Facility: HOSPITAL | Age: 74
DRG: 438 | End: 2019-12-05
Attending: HOSPITALIST
Payer: MEDICARE

## 2019-12-05 LAB
ALBUMIN SERPL-MCNC: 2.4 G/DL (ref 3.4–5)
ALBUMIN/GLOB SERPL: 0.9 {RATIO} (ref 1–2)
ALP LIVER SERPL-CCNC: 74 U/L (ref 45–117)
ALT SERPL-CCNC: 65 U/L (ref 16–61)
AMMONIA PLAS-MCNC: 44 UMOL/L (ref 11–32)
ANION GAP SERPL CALC-SCNC: 12 MMOL/L (ref 0–18)
AST SERPL-CCNC: 24 U/L (ref 15–37)
BASOPHILS # BLD: 0.23 X10(3) UL (ref 0–0.2)
BASOPHILS NFR BLD: 1 %
BILIRUB SERPL-MCNC: 1.3 MG/DL (ref 0.1–2)
BUN BLD-MCNC: 4 MG/DL (ref 7–18)
BUN/CREAT SERPL: 0.9 (ref 10–20)
CALCIUM BLD-MCNC: 8.6 MG/DL (ref 8.5–10.1)
CHLORIDE SERPL-SCNC: 106 MMOL/L (ref 98–112)
CO2 SERPL-SCNC: 21 MMOL/L (ref 21–32)
CREAT BLD-MCNC: 4.43 MG/DL (ref 0.7–1.3)
DEPRECATED HBV CORE AB SER IA-ACNC: 893.7 NG/ML (ref 30–530)
DEPRECATED RDW RBC AUTO: 63.9 FL (ref 35.1–46.3)
EOSINOPHIL # BLD: 0 X10(3) UL (ref 0–0.7)
EOSINOPHIL NFR BLD: 0 %
ERYTHROCYTE [DISTWIDTH] IN BLOOD BY AUTOMATED COUNT: 15.2 % (ref 11–15)
GLOBULIN PLAS-MCNC: 2.7 G/DL (ref 2.8–4.4)
GLUCOSE BLD-MCNC: 145 MG/DL (ref 70–99)
GLUCOSE BLD-MCNC: 163 MG/DL (ref 70–99)
GLUCOSE BLD-MCNC: 179 MG/DL (ref 70–99)
GLUCOSE BLD-MCNC: 181 MG/DL (ref 70–99)
HAV IGM SER QL: 2.1 MG/DL (ref 1.6–2.6)
HBV SURFACE AG SER-ACNC: <0.1 [IU]/L
HBV SURFACE AG SERPL QL IA: NONREACTIVE
HCT VFR BLD AUTO: 31.2 % (ref 39–53)
HGB BLD-MCNC: 10 G/DL (ref 13–17.5)
IRON SATURATION: 59 % (ref 20–50)
IRON SERPL-MCNC: 50 UG/DL (ref 65–175)
LYMPHOCYTES NFR BLD: 0.68 X10(3) UL (ref 1–4)
LYMPHOCYTES NFR BLD: 3 %
M PROTEIN MFR SERPL ELPH: 5.1 G/DL (ref 6.4–8.2)
MCH RBC QN AUTO: 36.2 PG (ref 26–34)
MCHC RBC AUTO-ENTMCNC: 32.1 G/DL (ref 31–37)
MCV RBC AUTO: 113 FL (ref 80–100)
METAMYELOCYTES # BLD: 0.46 X10(3) UL
METAMYELOCYTES NFR BLD: 2 %
MONOCYTES # BLD: 0.23 X10(3) UL (ref 0.1–1)
MONOCYTES NFR BLD: 1 %
MYELOCYTES # BLD: 0.23 X10(3) UL
MYELOCYTES NFR BLD: 1 %
NEUTROPHILS # BLD AUTO: 18.89 X10 (3) UL (ref 1.5–7.7)
NEUTROPHILS NFR BLD: 83 %
NEUTS BAND NFR BLD: 9 %
NEUTS HYPERSEG # BLD: 20.98 X10(3) UL (ref 1.5–7.7)
OSMOLALITY SERPL CALC.SUM OF ELEC: 288 MOSM/KG (ref 275–295)
PHOSPHATE SERPL-MCNC: 3.5 MG/DL (ref 2.5–4.9)
PLATELET # BLD AUTO: 242 10(3)UL (ref 150–450)
PLATELET MORPHOLOGY: NORMAL
POTASSIUM SERPL-SCNC: 3.8 MMOL/L (ref 3.5–5.1)
RBC # BLD AUTO: 2.76 X10(6)UL (ref 3.8–5.8)
SODIUM SERPL-SCNC: 139 MMOL/L (ref 136–145)
TOTAL CELLS COUNTED: 100
TOTAL IRON BINDING CAPACITY: 85 UG/DL (ref 240–450)
TRANSFERRIN SERPL-MCNC: 57 MG/DL (ref 200–360)
WBC # BLD AUTO: 22.8 X10(3) UL (ref 4–11)

## 2019-12-05 PROCEDURE — 5A1D70Z PERFORMANCE OF URINARY FILTRATION, INTERMITTENT, LESS THAN 6 HOURS PER DAY: ICD-10-PCS | Performed by: INTERNAL MEDICINE

## 2019-12-05 PROCEDURE — 99233 SBSQ HOSP IP/OBS HIGH 50: CPT | Performed by: INTERNAL MEDICINE

## 2019-12-05 PROCEDURE — 99232 SBSQ HOSP IP/OBS MODERATE 35: CPT | Performed by: PHYSICIAN ASSISTANT

## 2019-12-05 PROCEDURE — 99232 SBSQ HOSP IP/OBS MODERATE 35: CPT | Performed by: OTHER

## 2019-12-05 PROCEDURE — 71045 X-RAY EXAM CHEST 1 VIEW: CPT | Performed by: HOSPITALIST

## 2019-12-05 PROCEDURE — 74018 RADEX ABDOMEN 1 VIEW: CPT | Performed by: NURSE PRACTITIONER

## 2019-12-05 PROCEDURE — 05HM33Z INSERTION OF INFUSION DEVICE INTO RIGHT INTERNAL JUGULAR VEIN, PERCUTANEOUS APPROACH: ICD-10-PCS | Performed by: RADIOLOGY

## 2019-12-05 PROCEDURE — 99233 SBSQ HOSP IP/OBS HIGH 50: CPT | Performed by: HOSPITALIST

## 2019-12-05 RX ORDER — ALBUMIN (HUMAN) 12.5 G/50ML
100 SOLUTION INTRAVENOUS AS NEEDED
Status: DISCONTINUED | OUTPATIENT
Start: 2019-12-05 | End: 2019-12-05

## 2019-12-05 RX ORDER — LIDOCAINE HYDROCHLORIDE 10 MG/ML
INJECTION, SOLUTION INFILTRATION; PERINEURAL
Status: COMPLETED
Start: 2019-12-05 | End: 2019-12-05

## 2019-12-05 RX ORDER — ALPRAZOLAM 0.5 MG/1
0.5 TABLET ORAL ONCE
Status: COMPLETED | OUTPATIENT
Start: 2019-12-05 | End: 2019-12-05

## 2019-12-05 RX ORDER — ALBUMIN (HUMAN) 12.5 G/50ML
100 SOLUTION INTRAVENOUS AS NEEDED
Status: DISCONTINUED | OUTPATIENT
Start: 2019-12-05 | End: 2019-12-06

## 2019-12-05 RX ORDER — LABETALOL HYDROCHLORIDE 5 MG/ML
INJECTION, SOLUTION INTRAVENOUS
Status: DISPENSED
Start: 2019-12-05 | End: 2019-12-06

## 2019-12-05 RX ORDER — BUPRENORPHINE 2 MG/1
4 TABLET SUBLINGUAL ONCE AS NEEDED
Status: ACTIVE | OUTPATIENT
Start: 2019-12-05 | End: 2019-12-05

## 2019-12-05 RX ORDER — METOCLOPRAMIDE HYDROCHLORIDE 5 MG/ML
10 INJECTION INTRAMUSCULAR; INTRAVENOUS ONCE
Status: COMPLETED | OUTPATIENT
Start: 2019-12-05 | End: 2019-12-05

## 2019-12-05 RX ORDER — HALOPERIDOL 5 MG/ML
1 INJECTION INTRAMUSCULAR 2 TIMES DAILY PRN
Status: DISCONTINUED | OUTPATIENT
Start: 2019-12-05 | End: 2020-01-04

## 2019-12-05 RX ORDER — SIMETHICONE 80 MG
80 TABLET,CHEWABLE ORAL 4 TIMES DAILY PRN
Status: DISCONTINUED | OUTPATIENT
Start: 2019-12-05 | End: 2020-01-04

## 2019-12-05 RX ORDER — PANTOPRAZOLE SODIUM 40 MG/1
40 TABLET, DELAYED RELEASE ORAL
Status: DISCONTINUED | OUTPATIENT
Start: 2019-12-05 | End: 2019-12-07

## 2019-12-05 RX ORDER — MAGNESIUM OXIDE 400 MG (241.3 MG MAGNESIUM) TABLET
3 TABLET NIGHTLY
Status: DISCONTINUED | OUTPATIENT
Start: 2019-12-05 | End: 2020-01-04

## 2019-12-05 RX ORDER — HEPARIN SODIUM 1000 [USP'U]/ML
1.5 INJECTION, SOLUTION INTRAVENOUS; SUBCUTANEOUS ONCE
Status: COMPLETED | OUTPATIENT
Start: 2019-12-05 | End: 2019-12-06

## 2019-12-05 RX ORDER — HEPARIN SODIUM 5000 [USP'U]/ML
INJECTION, SOLUTION INTRAVENOUS; SUBCUTANEOUS
Status: COMPLETED
Start: 2019-12-05 | End: 2019-12-05

## 2019-12-05 NOTE — OCCUPATIONAL THERAPY NOTE
Order received for OT re-eval after ICU T/F. Attempted to see Pt this afternoon, however, Pt with severe abdominal pain and lethargy at this time. Will follow up as schedule permits.       Thank you for allowing me to care for this patient,   Lisethtracey Macedo

## 2019-12-05 NOTE — PROGRESS NOTES
[facility]  Report of Psychiatric Progress Note    Tony Lepanto Patient Status:  Inpatient    3/31/1945 MRN [id number]   [facility] 3NE-A Attending Carlos Kamara MD   [facility] Day # 10 PCP Lazarus Benavides MD     Date of Admission: 1 (in remission 26 yrs) was admitted on 11/25/19 for treatment of acute pancreatitis. Lipase was 4151 and CT showed \"peripancreatic inflammatory changes with adjacent free fluid consistent with acute pancreatitis\".      Psych consulted to courtney for possibili ideation. No voices/visions. 12/3/19- He feels tired and depressed. Some feelings of helplessness, but no hopelessness or suicidal ideation. 12/5/19- Last night, he was anxious and restless. He did not sleep.  He received 0.5mg Ativan, didn't help, Maternal Grandfather         Prostate? • Thyroid disease Daughter       reports that he quit smoking about 50 years ago. His smoking use included cigarettes. He has a 1.00 pack-year smoking history.  He has never used smokeless tobacco. He reports previou injection 5 mg, 5 mg, Intravenous, Q4H PRN  •  acetaminophen (TYLENOL) tab 650 mg, 650 mg, Oral, Q6H PRN **OR** acetaminophen (TYLENOL) 160 MG/5ML oral liquid 650 mg, 650 mg, Oral, Q6H PRN **OR** acetaminophen (TYLENOL) 650 MG rectal suppository 650 mg, 65  12/05/2019    K 3.8 12/05/2019     12/05/2019    CO2 21.0 12/05/2019     12/05/2019    CA 8.6 12/05/2019    ALB 2.4 12/05/2019    ALKPHO 74 12/05/2019    BILT 1.3 12/05/2019    TP 5.1 12/05/2019    AST 24 12/05/2019    ALT 65 12/05/2 abutting the pancreatic head and uncinate process and trace free fluid inferior to the distal pancreatic body and tail.   Stable low-attenuation   structure arising from versus abutting the body of the pancreas measuring 3.8 x 2.1 cm, previously 3.8 x 2.2 c

## 2019-12-05 NOTE — PHYSICAL THERAPY NOTE
Order received for PT re-eval. Attempted to see Pt this afternoon, however, Pt with severe abdominal pain and lethargy at this time. Will follow up as schedule permits.

## 2019-12-05 NOTE — PROGRESS NOTES
[facility]  Progress Note    Jovanna Flynn Patient Status:  Inpatient    3/31/1945 MRN [id number]   [facility] 4SW-A Attending [facility] Day # 10 PCP Erika Ortega MD     Subjective:  Patient continues t

## 2019-12-05 NOTE — PROGRESS NOTES
Critical Care Progress Note     Assessment / Plan:  1. Acute pancreatitis   - gallstone vs ETOH  - surgery following - no emergent indications for lap hao now  - continue supportive care  - pain management  - zosyn  2.  Altered mental state   - may have a

## 2019-12-05 NOTE — PLAN OF CARE
Received AO x 3. Off with date/time. On RA- got SOB on exertion; placed on 2-4L NC, maintaining sats. ST on the monitor. BP stable. Afebrile. [address] line patent and intact.  TPN started  Became anxious, asking this RN to call family, able to talk to the wife antiemetic medications  - Provide nonpharmacologic comfort measures as appropriate  - Advance diet as tolerated, if ordered  - Obtain nutritional consult as needed  - Evaluate fluid balance  12/5/2019 0605 by Paulina Marsh RN  Outcome: Progressing  12/5 for suctioning and perform as needed  - Assess and instruct to report SOB or any respiratory difficulty  - Respiratory Therapy support as indicated  - Manage/alleviate anxiety  - Monitor for signs/symptoms of CO2 retention  Outcome: Progressing     Problem Monitor swallowing and airway patency with patient fatigue and changes in neurological status  - Encourage and assist patient to increase activity and self care with guidance from PT/OT  - Encourage visually impaired, hearing impaired and aphasic patients

## 2019-12-05 NOTE — PROGRESS NOTES
[facility] Pharmacy Note:  Renal Adjustment for piperacillin/tazobactam (Lorrie Acosta)    Lubna Ugarte is a 69year old male who has been prescribed piperacillin/tazobactam (ZOSYN) 4.5 g every 8 hrs.   CrCl is estimated creatinine clearance is 15.1 mL/min (A) (based

## 2019-12-05 NOTE — PROGRESS NOTES
[facility]  Nephrology Progress Note    Marsha Abhishek Patient Status:  Inpatient    3/31/1945 MRN [id number]   [facility] 4SW-A Attending Roberta Corona MD   Hosp Day # 10 PCP Leslie Daniels MD       SUBJECTIVE:  Pt with incr 12/01/19  0738 12/02/19  0645 12/03/19  0651 12/03/19  1914 12/04/19  0446 12/05/19  0359   NA  --  141 138  138 138 139 139 139   K  --  3.6 3.9  3.9 4.0 4.0 3.9 3.8   CL  --  109 106  106 105 107 107 106   CO2  --  20.0* 21.0  21.0 24.0 24.0 21.0 21.0 Or  acetaminophen (TYLENOL) 160 MG/5ML oral liquid 650 mg, 650 mg, Oral, Q6H PRN    Or  acetaminophen (TYLENOL) 650 MG rectal suppository 650 mg, 650 mg, Rectal, Q6H PRN  DULoxetine HCl (CYMBALTA) DR particles cap 30 mg, 30 mg, Oral, Daily  Pantoprazole So related to pink otitis but also potentially withdrawal from alcohol and opiates.  ISRAEL      D/w pt  Left VM for pt's wife        Ciro Guillen  12/5/2019  7:52 AM

## 2019-12-05 NOTE — DIETARY NOTE
Clinical Nutrition  Pt tolerating initial bag of PPN without difficulty.  PICC line was placed, will infuse TPN centrally tonight which will provide 750 NPC (450 dextrose calories, 300 lipid calories), 30% lipids, 75 grams protein, in minimal fluid given re

## 2019-12-06 LAB
ALBUMIN SERPL-MCNC: 2.4 G/DL (ref 3.4–5)
ALBUMIN/GLOB SERPL: 0.9 {RATIO} (ref 1–2)
ALP LIVER SERPL-CCNC: 80 U/L (ref 45–117)
ALT SERPL-CCNC: 62 U/L (ref 16–61)
ANION GAP SERPL CALC-SCNC: 10 MMOL/L (ref 0–18)
AST SERPL-CCNC: 18 U/L (ref 15–37)
BASOPHILS # BLD: 0 X10(3) UL (ref 0–0.2)
BASOPHILS NFR BLD: 0 %
BILIRUB SERPL-MCNC: 1.4 MG/DL (ref 0.1–2)
BUN BLD-MCNC: 2 MG/DL (ref 7–18)
BUN/CREAT SERPL: 0.6 (ref 10–20)
CALCIUM BLD-MCNC: 8.5 MG/DL (ref 8.5–10.1)
CHLORIDE SERPL-SCNC: 104 MMOL/L (ref 98–112)
CO2 SERPL-SCNC: 24 MMOL/L (ref 21–32)
CREAT BLD-MCNC: 3.15 MG/DL (ref 0.7–1.3)
DEPRECATED RDW RBC AUTO: 61.5 FL (ref 35.1–46.3)
EOSINOPHIL # BLD: 0 X10(3) UL (ref 0–0.7)
EOSINOPHIL NFR BLD: 0 %
ERYTHROCYTE [DISTWIDTH] IN BLOOD BY AUTOMATED COUNT: 14.9 % (ref 11–15)
EST. AVERAGE GLUCOSE BLD GHB EST-MCNC: 126 MG/DL (ref 68–126)
GLOBULIN PLAS-MCNC: 2.7 G/DL (ref 2.8–4.4)
GLUCOSE BLD-MCNC: 141 MG/DL (ref 70–99)
GLUCOSE BLD-MCNC: 191 MG/DL (ref 70–99)
GLUCOSE BLD-MCNC: 197 MG/DL (ref 70–99)
GLUCOSE BLD-MCNC: 204 MG/DL (ref 70–99)
GLUCOSE BLD-MCNC: 222 MG/DL (ref 70–99)
HAV IGM SER QL: 2.1 MG/DL (ref 1.6–2.6)
HBA1C MFR BLD HPLC: 6 % (ref ?–5.7)
HCT VFR BLD AUTO: 30.5 % (ref 39–53)
HGB BLD-MCNC: 9.9 G/DL (ref 13–17.5)
LYMPHOCYTES NFR BLD: 1.23 X10(3) UL (ref 1–4)
LYMPHOCYTES NFR BLD: 5 %
M PROTEIN MFR SERPL ELPH: 5.1 G/DL (ref 6.4–8.2)
MCH RBC QN AUTO: 36.4 PG (ref 26–34)
MCHC RBC AUTO-ENTMCNC: 32.5 G/DL (ref 31–37)
MCV RBC AUTO: 112.1 FL (ref 80–100)
MONOCYTES # BLD: 1.23 X10(3) UL (ref 0.1–1)
MONOCYTES NFR BLD: 5 %
MORPHOLOGY: NORMAL
MYELOCYTES # BLD: 0.25 X10(3) UL
MYELOCYTES NFR BLD: 1 %
NEUTROPHILS # BLD AUTO: 20.45 X10 (3) UL (ref 1.5–7.7)
NEUTROPHILS NFR BLD: 87 %
NEUTS BAND NFR BLD: 2 %
NEUTS HYPERSEG # BLD: 21.89 X10(3) UL (ref 1.5–7.7)
OSMOLALITY SERPL CALC.SUM OF ELEC: 288 MOSM/KG (ref 275–295)
PHOSPHATE SERPL-MCNC: 1.9 MG/DL (ref 2.5–4.9)
PLATELET # BLD AUTO: 197 10(3)UL (ref 150–450)
PLATELET MORPHOLOGY: NORMAL
POTASSIUM SERPL-SCNC: 3.2 MMOL/L (ref 3.5–5.1)
RBC # BLD AUTO: 2.72 X10(6)UL (ref 3.8–5.8)
SODIUM SERPL-SCNC: 138 MMOL/L (ref 136–145)
TOTAL CELLS COUNTED: 100
WBC # BLD AUTO: 24.6 X10(3) UL (ref 4–11)

## 2019-12-06 PROCEDURE — 99233 SBSQ HOSP IP/OBS HIGH 50: CPT | Performed by: INTERNAL MEDICINE

## 2019-12-06 PROCEDURE — 99232 SBSQ HOSP IP/OBS MODERATE 35: CPT | Performed by: HOSPITALIST

## 2019-12-06 PROCEDURE — 99232 SBSQ HOSP IP/OBS MODERATE 35: CPT | Performed by: PHYSICIAN ASSISTANT

## 2019-12-06 RX ORDER — HEPARIN SODIUM 1000 [USP'U]/ML
1.5 INJECTION, SOLUTION INTRAVENOUS; SUBCUTANEOUS ONCE
Status: COMPLETED | OUTPATIENT
Start: 2019-12-06 | End: 2019-12-06

## 2019-12-06 RX ORDER — ALBUMIN (HUMAN) 12.5 G/50ML
100 SOLUTION INTRAVENOUS AS NEEDED
Status: DISCONTINUED | OUTPATIENT
Start: 2019-12-06 | End: 2019-12-07

## 2019-12-06 RX ORDER — DEXTROSE MONOHYDRATE 25 G/50ML
50 INJECTION, SOLUTION INTRAVENOUS
Status: DISCONTINUED | OUTPATIENT
Start: 2019-12-06 | End: 2020-01-04

## 2019-12-06 NOTE — PROGRESS NOTES
[facility]  Nephrology Progress Note    Aleksey Frankel Patient Status:  Inpatient    3/31/1945 MRN [id number]   [facility] 4SW-A Attending Ana Cristina Mendieta MD   [address] Day # 6 PCP Kalie Parmar MD       SUBJECTIVE:  Tolerated HD 1.31*  --   --   --   --   --   --     < > = values in this interval not displayed.        Recent Labs   Lab 12/01/19  0738 12/02/19  0645 12/03/19  0651 12/03/19  1914 12/04/19  0446 12/05/19  0359 12/06/19  0434    138  138 138 139 139 139 138   K 3 % injection 10 mL, 10 mL, Intravenous, PRN  norepinephrine (LEVOPHED) 4 mg/250 ml premix infusion, 0.5-30 mcg/min, Intravenous, Continuous PRN  vasopressin (PITRESSIN) 20 Units in sodium chloride 0.9% 50 mL infusion for shock, 0.04 Units/min, Intravenous, empiric antibiotics     #3. Hypotension-has been intermittent, on low dose levo this AM but weaning     #4. Altered mental status-thought to be multifactorial related to pancreatitis but also potentially withdrawal from alcohol and opiates.  ISRAEL Meraz

## 2019-12-06 NOTE — PROGRESS NOTES
[facility]  Progress Note    Ari Blazing Patient Status:  Inpatient    3/31/1945 MRN [id number]   [facility] 4SW-A Attending Marcia Blanchard MD   [facility] Day # 6 PCP Anupam Covarrubias MD     Subjective:  Dialysis completed this mo Multinodular goiter     Benign non-nodular prostatic hyperplasia with lower urinary tract symptoms     Chronic pain of left knee     Acute bronchitis     Acute bronchitis, unspecified organism     Leukocytosis, unspecified type     Hypertension, unspecifie

## 2019-12-06 NOTE — DIETARY NOTE
[facility] ASSESSMENT    Pt does not meet malnutrition criteria.     NUTRITION DIAGNOSIS/PROBLEM:  Inadequate oral intake related to inability to consume sufficient intake as evidenced by estimated intake less than estimated need conservative course, consider initiation of PN. ANTHROPOMETRICS:  Ht: 175.3 cm (5' 9\")  Wt: 110.3 kg (243 lb 2.7 oz). This is 136 % of IBW  BMI: Body mass index is 35.91 kg/m².   IBW: 72.7  kg    WEIGHT HISTORY:   12/03/19 : 108.5 kg (239 lb 3.2 oz)  11

## 2019-12-06 NOTE — PHYSICAL THERAPY NOTE
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 Pt ambulates with poor initiation and shuffle gait pattern. Pt given VC for sequencing and assist for RW. Pt returned to sitting up in bedside chair.  Pt requires continued VC for pursed lip breathing and RECOMMENDATIONS  PT Discharge Recommendations: Sub-acute rehabilitation(ELOS = 9-11 days)      PLAN  PT Treatment Plan: Bed mobility; Body mechanics; Endurance;Gait training;Patient education; Family education;Transfer training;Balance training;Neuromuscular

## 2019-12-06 NOTE — PROGRESS NOTES
Pulmonary Progress Note        NAME: Jovanna Flynn - ROOM: 520/941-H - MRN: [id number] - Age: 69year old - : 3/31/1945        Last 24hrs: No events overnight, anxious about his breathing, family concerned that his WBC has not come down yet    OBJECT 12/05/19  0359 12/06/19  0434   WBC 21.8* 18.8* 22.8* 24.6*   HGB 10.4* 9.5* 10.0* 9.9*   .8* 111.9* 113.0* 112.1*   .0 192.0 242.0 197.0   BAND 2 4 9 2       Recent Labs     12/03/19  1914 12/04/19  0446 12/05/19  0359 12/06/19  0434   NA 13 have agitation due to TME from severe pancreatitis but may also have some element of ETOH / opiate withdrawal. Overall improved though still waxes / wanes  - supportive care   4. Acute renal failure - secondary to above  - per renal  5.  Anemia   - monitor

## 2019-12-06 NOTE — PLAN OF CARE
Received AO, x 3, anxious and restless. On RA, COOPER   Encouraged slow DBE; Verbal comfort and assurance given  Melatonin and Seroquel given as ordered. HD started- tolerated fairly well. Low BP, Levo started- max of 8 mcg.  Titrated to maintain SBP 90;MAP 6 output, blood pressure (other measures as available)  - Encourage oral intake as appropriate  - Instruct patient on fluid and nutrition restrictions as appropriate  Outcome: Progressing     Problem: SAFETY ADULT - FALL  Goal: Free from fall injury  Descrip temperature, glucose, and sodium.  Initiate appropriate interventions as ordered  Outcome: Progressing

## 2019-12-06 NOTE — PROGRESS NOTES
AIME HOSPITALIST  Progress Note     Chico Vanessa Patient Status:  Inpatient    3/31/1945 MRN [id number]   [facility] 4SW-A [facility] Day [facility] PCP Lazarus Benavides MD     Chief Complaint: Pancreatiti mg/dL (H)). Recent Labs   Lab 12/01/19  0738   PTP 16.9*   INR 1.31*       No results for input(s): TROP, CK in the last 168 hours. Imaging: Imaging data reviewed in Epic.     Medications:   • heparin sodium  1.5 mL Intravenous Once   • Insulin A

## 2019-12-06 NOTE — PLAN OF CARE
Assumed care at shift change. Pt drowsy, anxious, oriented x4. Received xanax and slept for a few hours. When pt woke up he was more anxious, more confused. Dr. Junior Kwok aware. Pt calmed down with family present. Plan for seroquel tonight.  ST low 100's per tel

## 2019-12-07 ENCOUNTER — APPOINTMENT (OUTPATIENT)
Dept: CT IMAGING | Facility: HOSPITAL | Age: 74
DRG: 438 | End: 2019-12-07
Attending: INTERNAL MEDICINE
Payer: MEDICARE

## 2019-12-07 LAB
ALBUMIN SERPL-MCNC: 2 G/DL (ref 3.4–5)
ALBUMIN/GLOB SERPL: 0.7 {RATIO} (ref 1–2)
ALP LIVER SERPL-CCNC: 85 U/L (ref 45–117)
ALT SERPL-CCNC: 67 U/L (ref 16–61)
ANION GAP SERPL CALC-SCNC: 10 MMOL/L (ref 0–18)
AST SERPL-CCNC: 30 U/L (ref 15–37)
BASOPHILS # BLD: 0 X10(3) UL (ref 0–0.2)
BASOPHILS NFR BLD: 0 %
BILIRUB SERPL-MCNC: 1.9 MG/DL (ref 0.1–2)
BUN BLD-MCNC: 2 MG/DL (ref 7–18)
BUN/CREAT SERPL: 0.7 (ref 10–20)
CALCIUM BLD-MCNC: 8.2 MG/DL (ref 8.5–10.1)
CHLORIDE SERPL-SCNC: 101 MMOL/L (ref 98–112)
CO2 SERPL-SCNC: 27 MMOL/L (ref 21–32)
CREAT BLD-MCNC: 2.92 MG/DL (ref 0.7–1.3)
DEPRECATED RDW RBC AUTO: 58.6 FL (ref 35.1–46.3)
EOSINOPHIL # BLD: 0 X10(3) UL (ref 0–0.7)
EOSINOPHIL NFR BLD: 0 %
ERYTHROCYTE [DISTWIDTH] IN BLOOD BY AUTOMATED COUNT: 14.5 % (ref 11–15)
GLOBULIN PLAS-MCNC: 2.9 G/DL (ref 2.8–4.4)
GLUCOSE BLD-MCNC: 187 MG/DL (ref 70–99)
GLUCOSE BLD-MCNC: 222 MG/DL (ref 70–99)
GLUCOSE BLD-MCNC: 246 MG/DL (ref 70–99)
GLUCOSE BLD-MCNC: 250 MG/DL (ref 70–99)
GLUCOSE BLD-MCNC: 262 MG/DL (ref 70–99)
HAV IGM SER QL: 1.9 MG/DL (ref 1.6–2.6)
HCT VFR BLD AUTO: 30.9 % (ref 39–53)
HGB BLD-MCNC: 10.1 G/DL (ref 13–17.5)
LYMPHOCYTES NFR BLD: 1.32 X10(3) UL (ref 1–4)
LYMPHOCYTES NFR BLD: 6 %
M PROTEIN MFR SERPL ELPH: 4.9 G/DL (ref 6.4–8.2)
MCH RBC QN AUTO: 35.6 PG (ref 26–34)
MCHC RBC AUTO-ENTMCNC: 32.7 G/DL (ref 31–37)
MCV RBC AUTO: 108.8 FL (ref 80–100)
MONOCYTES # BLD: 0.66 X10(3) UL (ref 0.1–1)
MONOCYTES NFR BLD: 3 %
MORPHOLOGY: NORMAL
NEUTROPHILS # BLD AUTO: 17.24 X10 (3) UL (ref 1.5–7.7)
NEUTROPHILS NFR BLD: 88 %
NEUTS BAND NFR BLD: 3 %
NEUTS HYPERSEG # BLD: 20.02 X10(3) UL (ref 1.5–7.7)
OSMOLALITY SERPL CALC.SUM OF ELEC: 291 MOSM/KG (ref 275–295)
PHOSPHATE SERPL-MCNC: 0.6 MG/DL (ref 2.5–4.9)
PLATELET # BLD AUTO: 222 10(3)UL (ref 150–450)
PLATELET MORPHOLOGY: NORMAL
POTASSIUM SERPL-SCNC: 3.2 MMOL/L (ref 3.5–5.1)
RBC # BLD AUTO: 2.84 X10(6)UL (ref 3.8–5.8)
SODIUM SERPL-SCNC: 138 MMOL/L (ref 136–145)
TOTAL CELLS COUNTED: 100
WBC # BLD AUTO: 22 X10(3) UL (ref 4–11)

## 2019-12-07 PROCEDURE — 99233 SBSQ HOSP IP/OBS HIGH 50: CPT | Performed by: INTERNAL MEDICINE

## 2019-12-07 PROCEDURE — 99233 SBSQ HOSP IP/OBS HIGH 50: CPT | Performed by: HOSPITALIST

## 2019-12-07 PROCEDURE — 74177 CT ABD & PELVIS W/CONTRAST: CPT | Performed by: INTERNAL MEDICINE

## 2019-12-07 RX ORDER — VANCOMYCIN 2 GRAM/500 ML IN 0.9 % SODIUM CHLORIDE INTRAVENOUS
25 ONCE
Status: COMPLETED | OUTPATIENT
Start: 2019-12-07 | End: 2019-12-07

## 2019-12-07 RX ORDER — HEPARIN SODIUM 1000 [USP'U]/ML
1.5 INJECTION, SOLUTION INTRAVENOUS; SUBCUTANEOUS ONCE
Status: COMPLETED | OUTPATIENT
Start: 2019-12-07 | End: 2019-12-07

## 2019-12-07 RX ORDER — ALBUMIN (HUMAN) 12.5 G/50ML
100 SOLUTION INTRAVENOUS AS NEEDED
Status: DISCONTINUED | OUTPATIENT
Start: 2019-12-07 | End: 2019-12-08

## 2019-12-07 NOTE — PLAN OF CARE
Assumed care after RN report; pt resting in bed. A&Ox4; forgetful/confused at times. Afebrile. ST on monitor; BP stable. RA; tolerating. Pt gets very tachypneic at times; coached through breathing episodes.  Void very small amount in urinal. Incontinent of

## 2019-12-07 NOTE — CONSULTS
INFECTIOUS DISEASE CONSULT NOTE    Toby Quezada Patient Status:  Inpatient    3/31/1945 MRN [id number]   [facility] 4SW-A Attending Cheri Garcia MD   [address] Day # 12 PCP Efren Arzola Psychiatric Mother 79        Alzheimers   • Other (CVA[Other]) Mother 80   • Other (leukemia [Other]) Paternal Grandfather    • Cancer Maternal Grandfather         Prostate?    • Thyroid disease Daughter       reports that he quit smoking about 50 years ago MG/ML injection 1 mg, 1 mg, Intravenous, BID PRN  •  simethicone (MYLICON) chewable tab 80 mg, 80 mg, Oral, QID PRN  •  Heparin Sodium (Porcine) 5000 UNIT/ML injection 5,000 Units, 5,000 Units, Subcutaneous, 2 times per day  •  dextrose 10 % infusion, , In arms  Integument: HD cath CDI and RUE picc intact    Laboratory Data:    Recent Labs   Lab 12/02/19  0645  12/07/19  0416   RBC 3.11*   < > 2.84*   HGB 11.1*   < > 10.1*   HCT 34.2*   < > 30.9*   .0*   < > 108.8*   MCH 35.7*   < > 35.6*   MCHC 32.5 further recs to follow as the case evolves    D/w RN  Thank you for allowing me to participate in the care of this patient. Please do not hesitate to call if you have any questions.    I will continue to follow with you and will make further recommendations

## 2019-12-07 NOTE — PROGRESS NOTES
Gastroenterology Progress Note  Patient Name: Deja South  Chief Complaint: gallstone/Etoh pancreatitis  S: Pt denies abdominal pain. He denies vomiting. He is passing gas. Gi consulted for imaging showing pseudocyst and possible early necrosis.  He i and or consolidation, right greater than left, correlate clinically. 2. Development of peripancreatic focal fluid collections most likely represent pseudocysts as detailed above.   Pancreatic body and tail enhance more heterogeneously when compared to prio

## 2019-12-07 NOTE — DIETARY NOTE
Clinical Nutrition  Pt tolerating TPN without difficulty. PICC line was placed, will infuse TPN centrally tonight which will provide 700 dextrose calories, 520 lipid calories, 30% lipids, 130 grams protein, in minimal fluid given renal function.  This is me

## 2019-12-07 NOTE — PLAN OF CARE
Pt received in bed confused and rambling with his thoughts. As the shift progressed, pt became more aware and able to communicate more clearly. Levophed dc'd in a.m. HD completed with no need for albumin. One BM. No urine production. Family at bedside.  Pt

## 2019-12-07 NOTE — PROGRESS NOTES
AIME HOSPITALIST  Progress Note     Aliya Tucker Patient Status:  Inpatient    3/31/1945 MRN [id number]   [facility] 4SW-A Attending [facility] Day # 15 PCP Rich Mao MD     Chief Complaint: Pancreatiti Clearance: 22.9 mL/min (A) (based on SCr of 2.92 mg/dL (H)). Recent Labs   Lab 12/01/19  0738   PTP 16.9*   INR 1.31*       No results for input(s): TROP, CK in the last 168 hours. Imaging: Imaging data reviewed in Epic.     Medications:   • hepa point Mr. Phan Hagen is expected to be discharge to: [facility] of care discussed with patient and RN.     Abby Jeffries MD

## 2019-12-07 NOTE — PLAN OF CARE
Assumed care of patient at 0730, patient A+Ox3, confused and forgetful at times and can be impulsive. Patient does become anxious/tachypneic at times, patient talked/helped through and anxiety/ tachypnea resolves and family at bedside helping.   Medications electrolyte replacement as ordered  - Monitor response to electrolyte replacements, including rhythm and repeat lab results as appropriate  - Fluid restriction as ordered  - Instruct patient on fluid and nutrition restrictions as appropriate  Outcome: Prog

## 2019-12-07 NOTE — PROGRESS NOTES
[facility]  Nephrology Progress Note    Varsha Lombardi Patient Status:  Inpatient    3/31/1945 MRN [id number]   [facility] 4SW-A Attending Gianni Negron MD   [facility] Day # 15 PCP Giorgio Sumner MD       SUBJECTIVE:  Remains inter 192.0 242.0 197.0 222.0   BAND 2   < > 2 4 9 2 3   INR 1.31*  --   --   --   --   --   --     < > = values in this interval not displayed.        Recent Labs   Lab 12/02/19  0645 12/03/19  2351 12/03/19  1914 12/04/19  0446 12/05/19  0359 12/06/19  0434 12/ (SEROQUEL) partial tab 12.5 mg, 12.5 mg, Oral, Nightly  QUEtiapine Fumarate (SEROQUEL) partial tab 12.5 mg, 12.5 mg, Oral, Daily PRN  haloperidol lactate (HALDOL) 5 MG/ML injection 1 mg, 1 mg, Intravenous, BID PRN  simethicone (MYLICON) chewable tab 80 mg, GI are following. Currently no indication for lap scopic cholecystectomy. He remains on empiric antibiotics     #3. Hypotension-has been intermittent, on low dose levo this AM but weaning     #4.   Altered mental status-thought to be multifactorial relat

## 2019-12-07 NOTE — PROGRESS NOTES
Pulmonary Progress Note        NAME: Jules Cui - ROOM: 873/348-X - MRN: [id number] - Age: 69year old - : 3/31/1945        Last 24hrs: No events overnight, remains confused today, appears tachypnic    OBJECTIVE:   19  0400 19  0500 12 edema 2+ carina    Recent Labs     12/03/19  1914 12/04/19  0446 12/05/19  0359 12/06/19  0434   WBC 21.8* 18.8* 22.8* 24.6*   HGB 10.4* 9.5* 10.0* 9.9*   .8* 111.9* 113.0* 112.1*   .0 192.0 242.0 197.0   BAND 2 4 9 2       Recent Labs     12/03 today  2. Septic Shock  -due to above  -improved today  3.  Altered mental state   - may have agitation due to TME from severe pancreatitis but may also have some element of ETOH / opiate withdrawal. Overall improved though still waxes / wanes  - supportive

## 2019-12-08 ENCOUNTER — APPOINTMENT (OUTPATIENT)
Dept: GENERAL RADIOLOGY | Facility: HOSPITAL | Age: 74
DRG: 438 | End: 2019-12-08
Attending: NURSE PRACTITIONER
Payer: MEDICARE

## 2019-12-08 ENCOUNTER — APPOINTMENT (OUTPATIENT)
Dept: GENERAL RADIOLOGY | Facility: HOSPITAL | Age: 74
DRG: 438 | End: 2019-12-08
Attending: INTERNAL MEDICINE
Payer: MEDICARE

## 2019-12-08 ENCOUNTER — APPOINTMENT (OUTPATIENT)
Dept: ULTRASOUND IMAGING | Facility: HOSPITAL | Age: 74
DRG: 438 | End: 2019-12-08
Attending: INTERNAL MEDICINE
Payer: MEDICARE

## 2019-12-08 LAB
ALBUMIN SERPL-MCNC: 2.2 G/DL (ref 3.4–5)
ALBUMIN/GLOB SERPL: 0.7 {RATIO} (ref 1–2)
ALP LIVER SERPL-CCNC: 93 U/L (ref 45–117)
ALT SERPL-CCNC: 66 U/L (ref 16–61)
AMMONIA PLAS-MCNC: 24 UMOL/L (ref 11–32)
ANION GAP SERPL CALC-SCNC: 10 MMOL/L (ref 0–18)
AST SERPL-CCNC: 31 U/L (ref 15–37)
BASOPHIL PLEURAL FLUID: 0 %
BASOPHILS # BLD: 0.23 X10(3) UL (ref 0–0.2)
BASOPHILS NFR BLD: 1 %
BILIRUB SERPL-MCNC: 2.3 MG/DL (ref 0.1–2)
BUN BLD-MCNC: 3 MG/DL (ref 7–18)
BUN/CREAT SERPL: 1 (ref 10–20)
C DIFF TOX B STL QL: NEGATIVE
CALCIUM BLD-MCNC: 8.3 MG/DL (ref 8.5–10.1)
CHLORIDE SERPL-SCNC: 102 MMOL/L (ref 98–112)
CO2 SERPL-SCNC: 26 MMOL/L (ref 21–32)
CREAT BLD-MCNC: 2.91 MG/DL (ref 0.7–1.3)
DEPRECATED RDW RBC AUTO: 59.1 FL (ref 35.1–46.3)
EOSINOPHIL # BLD: 0.23 X10(3) UL (ref 0–0.7)
EOSINOPHIL NFR BLD: 1 %
EOSINOPHIL PLEURAL FLUID: 0 %
ERYTHROCYTE [DISTWIDTH] IN BLOOD BY AUTOMATED COUNT: 14.6 % (ref 11–15)
GLOBULIN PLAS-MCNC: 3.1 G/DL (ref 2.8–4.4)
GLUCOSE BLD-MCNC: 245 MG/DL (ref 70–99)
GLUCOSE BLD-MCNC: 248 MG/DL (ref 70–99)
GLUCOSE BLD-MCNC: 254 MG/DL (ref 70–99)
GLUCOSE BLD-MCNC: 254 MG/DL (ref 70–99)
GLUCOSE BLD-MCNC: 259 MG/DL (ref 70–99)
GLUCOSE PLR-MCNC: 254 MG/DL
HAV IGM SER QL: 2 MG/DL (ref 1.6–2.6)
HCT VFR BLD AUTO: 30.7 % (ref 39–53)
HGB BLD-MCNC: 10.1 G/DL (ref 13–17.5)
LDH FLD L TO P-CCNC: 359 U/L
LYMPHOCYTE PLEURAL FLUID: 38 %
LYMPHOCYTES NFR BLD: 1.35 X10(3) UL (ref 1–4)
LYMPHOCYTES NFR BLD: 6 %
M PROTEIN MFR SERPL ELPH: 5.3 G/DL (ref 6.4–8.2)
MCH RBC QN AUTO: 36.1 PG (ref 26–34)
MCHC RBC AUTO-ENTMCNC: 32.9 G/DL (ref 31–37)
MCV RBC AUTO: 109.6 FL (ref 80–100)
METAMYELOCYTES # BLD: 0.9 X10(3) UL
METAMYELOCYTES NFR BLD: 4 %
MONO/MAC/HISTIO PLEURAL FLUID: 29 %
MONOCYTES # BLD: 0.9 X10(3) UL (ref 0.1–1)
MONOCYTES NFR BLD: 4 %
MORPHOLOGY: NORMAL
MYELOCYTES # BLD: 0.9 X10(3) UL
MYELOCYTES NFR BLD: 4 %
NEUTROPHIL PLEURAL FLUID: 33 %
NEUTROPHILS # BLD AUTO: 16.07 X10 (3) UL (ref 1.5–7.7)
NEUTROPHILS NFR BLD: 68 %
NEUTS BAND NFR BLD: 12 %
NEUTS HYPERSEG # BLD: 18 X10(3) UL (ref 1.5–7.7)
OSMOLALITY SERPL CALC.SUM OF ELEC: 291 MOSM/KG (ref 275–295)
PHOSPHATE SERPL-MCNC: 1.5 MG/DL (ref 2.5–4.9)
PLATELET # BLD AUTO: 259 10(3)UL (ref 150–450)
PLATELET MORPHOLOGY: NORMAL
POTASSIUM SERPL-SCNC: 3.5 MMOL/L (ref 3.5–5.1)
PROT PLR-MCNC: 2.6 G/DL
RBC # BLD AUTO: 2.8 X10(6)UL (ref 3.8–5.8)
RBC PLEURAL FLUID: <3000 /MM3
SODIUM SERPL-SCNC: 138 MMOL/L (ref 136–145)
TOTAL CELLS COUNTED: 100
TOTAL CELLS COUNTED: 100
WBC # BLD AUTO: 22.5 X10(3) UL (ref 4–11)
WBC # FLD: 255 /MM3

## 2019-12-08 PROCEDURE — 0W993ZZ DRAINAGE OF RIGHT PLEURAL CAVITY, PERCUTANEOUS APPROACH: ICD-10-PCS | Performed by: INTERNAL MEDICINE

## 2019-12-08 PROCEDURE — 99233 SBSQ HOSP IP/OBS HIGH 50: CPT | Performed by: HOSPITALIST

## 2019-12-08 PROCEDURE — 99233 SBSQ HOSP IP/OBS HIGH 50: CPT | Performed by: INTERNAL MEDICINE

## 2019-12-08 PROCEDURE — 71045 X-RAY EXAM CHEST 1 VIEW: CPT | Performed by: NURSE PRACTITIONER

## 2019-12-08 PROCEDURE — 71045 X-RAY EXAM CHEST 1 VIEW: CPT | Performed by: INTERNAL MEDICINE

## 2019-12-08 PROCEDURE — 76604 US EXAM CHEST: CPT | Performed by: INTERNAL MEDICINE

## 2019-12-08 RX ORDER — HEPARIN SODIUM 1000 [USP'U]/ML
1.5 INJECTION, SOLUTION INTRAVENOUS; SUBCUTANEOUS ONCE
Status: COMPLETED | OUTPATIENT
Start: 2019-12-08 | End: 2019-12-08

## 2019-12-08 RX ORDER — ALBUMIN (HUMAN) 12.5 G/50ML
100 SOLUTION INTRAVENOUS AS NEEDED
Status: DISCONTINUED | OUTPATIENT
Start: 2019-12-08 | End: 2019-12-10 | Stop reason: SDUPTHER

## 2019-12-08 RX ORDER — IPRATROPIUM BROMIDE AND ALBUTEROL SULFATE 2.5; .5 MG/3ML; MG/3ML
3 SOLUTION RESPIRATORY (INHALATION) EVERY 4 HOURS PRN
Status: DISCONTINUED | OUTPATIENT
Start: 2019-12-08 | End: 2020-01-04

## 2019-12-08 RX ORDER — ALBUMIN (HUMAN) 12.5 G/50ML
25 SOLUTION INTRAVENOUS ONCE
Status: COMPLETED | OUTPATIENT
Start: 2019-12-08 | End: 2019-12-08

## 2019-12-08 NOTE — PROGRESS NOTES
Gastroenterology Progress Note  Patient Name: Jovanna Flynn  Chief Complaint: gallstone/Etoh pancreatitis, pseudocysts  S: Pt denies abdominal pain. He has some pain when he is on his side. He denies vomiting. He is passing gas.  He had a loose BM since represent pseudocysts as detailed above. Pancreatic body and tail enhance more heterogeneously when compared to prior study and early necrosis cannot be entirely excluded, correlate   Clinically. Assessment:   1.  Etoh/Gallstone pancreatitis now with ps

## 2019-12-08 NOTE — DIETARY NOTE
Clinical Nutrition  Pt tolerating TPN without difficulty. PICC line was placed, will infuse TPN centrally tonight which will provide 525 dextrose calories, 520 lipid calories, 30% lipids, 130 grams protein, in minimal fluid given renal function.  This is me

## 2019-12-08 NOTE — PROGRESS NOTES
AIME HOSPITALIST  Progress Note     Chico Vanessa Patient Status:  Inpatient    3/31/1945 MRN [id number]   [facility] PCP Lazarus Benavides MD     Chief Complaint: Pancreatiti hours.         Imaging: Imaging data reviewed in Epic.     Medications:   • potassium phosphate IVPB  20 mmol Intravenous Once   • heparin sodium  1.5 mL Intravenous Once   • meropenem  500 mg Intravenous Q12H   • vancomycin  10 mg/kg (Adjusted) Intravenous Dunia Irwin is expected to be discharge to: [facility] of care discussed with patient, GI team and RN.     Ramón Sumner MD

## 2019-12-08 NOTE — PROCEDURES
Procedure note - Thoracentesis    Procedure:  Thoracentesis  Indication:  Pleural effusion, dyspnea  Performed by:  myself    Description of procedure: After informed consent obtained, area prepped and draped in sterile fashion.   Local anesthetic with 1 %

## 2019-12-08 NOTE — CONSULTS
[address] [facility] Dosing Service    Initial Pharmacokinetic Consult for Vancomycin Dosing     Marsha Weiss is a 69year old male who is being treated for r/o line sepsis/possible pancreatic abscess/ HCAP? Rem[facility]   Pharmacy has been asked to dose Vancomycin by  function. Pharmacy will continue to follow him and adjust as necessary.     Rachel Mensah, PharmD  12/7/2019  7:39 PM  [address] [place] [address] Extension: [phone]

## 2019-12-08 NOTE — PROGRESS NOTES
Pulmonary Progress Note        NAME: Karol Cabral - ROOM: 207/892-B - MRN: [id number] - Age: 69year old - : 3/31/1945        Last 24hrs: Felt nauseated overnight, remains intermittently confused    OBJECTIVE:   19  0400 19  0500  12/06/19  0434 12/07/19  0416 12/08/19  0445   WBC 24.6* 22.0* 22.5*   HGB 9.9* 10.1* 10.1*   .1* 108.8* 109.6*   .0 222.0 259.0   BAND 2 3 12       Recent Labs     12/06/19  0434 12/07/19  0416 12/08/19  0445    138 138   K 3.2* 3.2* 3 but may also have some element of ETOH / opiate withdrawal. Overall improved though still waxes / wanes  - supportive care   4.  Dyspnea  -due to atelectasis and pleural effusion  -dialysis per renal  -trial of CPAP at night  -if effusion does not resolve w

## 2019-12-08 NOTE — PLAN OF CARE
Received patient after report. Patient resting in bed on room air. 2L NC applied for comfort per patient request. C. Diff sample sent per orders. Patient oriented to self and place, but not time or situation. Wheezing noted to auscultation. APN aware.  PRN

## 2019-12-08 NOTE — PROGRESS NOTES
[facility]  Nephrology Progress Note    Noel Mayer Patient Status:  Inpatient    3/31/1945 MRN [id number]   [facility] 4SW-A Attending Deloris Villagomez MD   [facility] Day # 15 PCP Markell Marsh MD       SUBJECTIVE:  incr work of 112.1* 108.8* 109.6*   .0   < > 192.0 242.0 197.0 222.0 259.0   BAND 2   < > 4 9 2 3 12   INR 1.31*  --   --   --   --   --   --     < > = values in this interval not displayed.        Recent Labs   Lab 12/03/19  0651  12/04/19  0446 12/05/19  0359 1 Sodium (PROTONIX) 40 mg in Sodium Chloride 0.9 % 10 mL IV push, 40 mg, Intravenous, QAM AC  melatonin tab TABS 3 mg, 3 mg, Oral, Nightly  QUEtiapine Fumarate (SEROQUEL) partial tab 12.5 mg, 12.5 mg, Oral, Nightly  QUEtiapine Fumarate (SEROQUEL) partial tab Acute pancreatitis-possibly multifactorial with a history of alcohol use as well as gallstones. Surgery and GI are following. CT noted. Ongoing supportive care     #4.   Altered mental status-thought to be multifactorial related to pancreatitis but also p

## 2019-12-09 LAB
ALBUMIN SERPL-MCNC: 2.6 G/DL (ref 3.4–5)
ALBUMIN/GLOB SERPL: 1 {RATIO} (ref 1–2)
ALP LIVER SERPL-CCNC: 83 U/L (ref 45–117)
ALT SERPL-CCNC: 53 U/L (ref 16–61)
ANION GAP SERPL CALC-SCNC: 10 MMOL/L (ref 0–18)
AST SERPL-CCNC: 40 U/L (ref 15–37)
BASOPHILS # BLD: 0 X10(3) UL (ref 0–0.2)
BASOPHILS NFR BLD: 0 %
BILIRUB SERPL-MCNC: 2.4 MG/DL (ref 0.1–2)
BUN BLD-MCNC: 7 MG/DL (ref 7–18)
BUN/CREAT SERPL: 1.7 (ref 10–20)
CALCIUM BLD-MCNC: 9.8 MG/DL (ref 8.5–10.1)
CHLORIDE SERPL-SCNC: 105 MMOL/L (ref 98–112)
CO2 SERPL-SCNC: 26 MMOL/L (ref 21–32)
CREAT BLD-MCNC: 4.07 MG/DL (ref 0.7–1.3)
DEPRECATED RDW RBC AUTO: 62.2 FL (ref 35.1–46.3)
EOSINOPHIL # BLD: 0 X10(3) UL (ref 0–0.7)
EOSINOPHIL NFR BLD: 0 %
ERYTHROCYTE [DISTWIDTH] IN BLOOD BY AUTOMATED COUNT: 15.1 % (ref 11–15)
GLOBULIN PLAS-MCNC: 2.5 G/DL (ref 2.8–4.4)
GLUCOSE BLD-MCNC: 167 MG/DL (ref 70–99)
GLUCOSE BLD-MCNC: 218 MG/DL (ref 70–99)
GLUCOSE BLD-MCNC: 236 MG/DL (ref 70–99)
GLUCOSE BLD-MCNC: 244 MG/DL (ref 70–99)
GLUCOSE BLD-MCNC: 260 MG/DL (ref 70–99)
HAV IGM SER QL: 1.9 MG/DL (ref 1.6–2.6)
HCT VFR BLD AUTO: 26.5 % (ref 39–53)
HGB BLD-MCNC: 8.5 G/DL (ref 13–17.5)
HGB BLD-MCNC: 9.2 G/DL (ref 13–17.5)
LYMPHOCYTES NFR BLD: 1.21 X10(3) UL (ref 1–4)
LYMPHOCYTES NFR BLD: 6 %
M PROTEIN MFR SERPL ELPH: 5.1 G/DL (ref 6.4–8.2)
MCH RBC QN AUTO: 36.2 PG (ref 26–34)
MCHC RBC AUTO-ENTMCNC: 32.1 G/DL (ref 31–37)
MCV RBC AUTO: 112.8 FL (ref 80–100)
METAMYELOCYTES # BLD: 0.2 X10(3) UL
METAMYELOCYTES NFR BLD: 1 %
MONOCYTES # BLD: 1.82 X10(3) UL (ref 0.1–1)
MONOCYTES NFR BLD: 9 %
MORPHOLOGY: NORMAL
MYELOCYTES # BLD: 0.81 X10(3) UL
MYELOCYTES NFR BLD: 4 %
NEUTROPHILS # BLD AUTO: 12.67 X10 (3) UL (ref 1.5–7.7)
NEUTROPHILS NFR BLD: 74 %
NEUTS BAND NFR BLD: 6 %
NEUTS HYPERSEG # BLD: 16.16 X10(3) UL (ref 1.5–7.7)
NRBC BLD MANUAL-RTO: 2 %
OSMOLALITY SERPL CALC.SUM OF ELEC: 298 MOSM/KG (ref 275–295)
PHOSPHATE SERPL-MCNC: 3.3 MG/DL (ref 2.5–4.9)
PLATELET # BLD AUTO: 220 10(3)UL (ref 150–450)
PLATELET MORPHOLOGY: NORMAL
POTASSIUM SERPL-SCNC: 3.8 MMOL/L (ref 3.5–5.1)
RBC # BLD AUTO: 2.35 X10(6)UL (ref 3.8–5.8)
SODIUM SERPL-SCNC: 141 MMOL/L (ref 136–145)
TOTAL CELLS COUNTED: 100
WBC # BLD AUTO: 20.2 X10(3) UL (ref 4–11)

## 2019-12-09 PROCEDURE — 99233 SBSQ HOSP IP/OBS HIGH 50: CPT | Performed by: INTERNAL MEDICINE

## 2019-12-09 PROCEDURE — 99232 SBSQ HOSP IP/OBS MODERATE 35: CPT | Performed by: SURGERY

## 2019-12-09 PROCEDURE — 99233 SBSQ HOSP IP/OBS HIGH 50: CPT | Performed by: HOSPITALIST

## 2019-12-09 RX ORDER — HEPARIN SODIUM 1000 [USP'U]/ML
3000 INJECTION, SOLUTION INTRAVENOUS; SUBCUTANEOUS
Status: DISCONTINUED | OUTPATIENT
Start: 2019-12-09 | End: 2020-01-04

## 2019-12-09 NOTE — OCCUPATIONAL THERAPY NOTE
OCCUPATIONAL THERAPY TREATMENT NOTE - INPATIENT     Room Number: 465/465-A  Session: 1  Number of Visits to Meet Established Goals: 5    Presenting Problem: substance withdrawal, delirium, pancreatitis    History related to current admission: Pt is 74 year REPLACEMENT Right 5/13/2016    Performed by Tomy Grissom MD at [facility] MAIN OR   • OTHER SURGICAL HISTORY  01/01/2005    cardiac cath    • OTHER SURGICAL HISTORY  07/24/2017    flow us Dr Ernestina Faulkner    w/adenoidectomy       SUBJECTIVE  \"I Chair to EOB with mod A of 2, use of walker and cueing for safety. Patient w/ rapid shallow breathing and requires constant cueing for breathing techniques. Patient's HR increases into 130's and RR to 40's.   Requires 2 min sitting break to return to HR screening

## 2019-12-09 NOTE — PLAN OF CARE
Patient was drowsy at 1900, more awake and alert by 2200. On 4 L NC sats 97%. Dyspnea with exertion present. Shallow rapid breathing at times. CPAP applied at 2300 by RT but patient refused to wear it at 0200, saying he can not fall asleep.  BM x3 , lactulo

## 2019-12-09 NOTE — PHYSICAL THERAPY NOTE
PHYSICAL THERAPY TREATMENT NOTE - INPATIENT    Room Number: 465/465-A     Session: 1   Number of Visits to Meet Established Goals: 6  History related to current admission: Pt is 69year old male admitted on 11/25/2019 from home with c/o epigastic and ches Performed by Jerad Fabian MD at [facility] MAIN OR   • OTHER SURGICAL HISTORY  01/01/2005    cardiac cath    • OTHER SURGICAL HISTORY  07/24/2017    flow us Dr Annia Springer    w/adenoidectomy       SUBJECTIVE  \"I need to get in bed\"  patient tested  Comment : above per FIM    Skilled Therapy Provided: Patient received up in chair with family present.   Attempted to perform seated leg exercises and patient with increased respirations and reports \"how do you expect me to cross the vast expanse t assistance      Goal #4     Goal #5     Goal #6     Goal Comments: Goals established on 12/6/2019, updated 12/9/19

## 2019-12-09 NOTE — PROGRESS NOTES
Pulmonary Progress Note        NAME: Fe Herrera - ROOM: [address] [place] [address] - MRN: [id number] - Age: 69year old - : 3/31/1945        Last 24hrs:  HD and thora yesterday, remains tachypnic with activity today but recovers with rest    OBJECTIVE:   19 12/08/19  0445   WBC 24.6* 22.0* 22.5*   HGB 9.9* 10.1* 10.1*   .1* 108.8* 109.6*   .0 222.0 259.0   BAND 2 3 12       Recent Labs     12/06/19  0434 12/07/19  0416 12/08/19 0445    138 138   K 3.2* 3.2* 3.5    101 102   CO2 24. 0 of ETOH / opiate withdrawal. Overall improved though still waxes / wanes  - supportive care   4. Dyspnea  -due to atelectasis and pleural effusion  -s/p thoracentesis  -dialysis per renal  -trial of CPAP at night  5.  Pleural Effusion  -exudative by light's

## 2019-12-09 NOTE — PLAN OF CARE
Patient anxious most of day, does not understand why he couldn't make his own decisions yesterday. HR labile, 110-130s with activity. RR 20-30s, increased again with activity. VD BLE ordered, HD today. Repeat Hgb 9.2.  Family here and updated on plan of car

## 2019-12-09 NOTE — PROGRESS NOTES
[facility]                INFECTIOUS DISEASE PROGRESS NOTE    Nic Riddle Patient Status:  Inpatient    3/31/1945 MRN [id number]   [facility] 4SW-A Attending Ary Yu MD   [facility] Day # 15 PCP Jerrod Banuelos MD     A 12/08/19 1812    Total Protein Pleural Fld 2.6 g/dL    Narrative:     A reference range has not been established for this body fluid type. Glucose, pleural fluid Once [[id number]] Collected: 12/08/19 1630   Specimen:  Body fluid, unspecified Updated: 12/ Multinodular goiter     Benign non-nodular prostatic hyperplasia with lower urinary tract symptoms     Chronic pain of left knee     Acute bronchitis     Acute bronchitis, unspecified organism     Leukocytosis, unspecified type     Hypertension, unspecifie

## 2019-12-09 NOTE — PLAN OF CARE
Pt alert/oriented x4, drowsy in bed. Increased work of breathing overnight, CPAP ordered for sleep. HD with 1600off. Albumin prn given during HD x3 doses due to hypotension during dialysis.   BP improved after HD, prn lopressor given x1 for bursts of kassi

## 2019-12-09 NOTE — DIETARY NOTE
Clinical Nutrition  Pt tolerating TPN without difficulty via PICC. Bag #6 will provide 525 dextrose calories, 520 lipid calories, 30% lipids, 130 grams protein, in minimal fluid given renal function.  This is meeting 90% of goal. Electrolytes adjusted based

## 2019-12-09 NOTE — PROGRESS NOTES
AIME HOSPITALIST  Progress Note     Nelson Riley Patient Status:  Inpatient    3/31/1945 MRN [id number]   [facility] PCP Cris Montilla MD     Chief Complaint: Pancreatiti input(s): TROP, CK in the last 168 hours. Imaging: Imaging data reviewed in Epic.     Medications:   • vancomycin  1,000 mg Intravenous Once   • meropenem  500 mg Intravenous Q12H   • Insulin Aspart Pen  1-5 Units Subcutaneous Q6H   • pantoprazole ( RN.    Domingo Espino MD

## 2019-12-09 NOTE — PROGRESS NOTES
[facility]  Progress Note    Obdulio Buckley Patient Status:  Inpatient    3/31/1945 MRN [id number]   [facility] 4SW-A Attending Prudencio Buerger, MD   [facility] Day # 15 PCP Tara Green MD     Subjective:  Pt c/o nonspecific abdomin enhance heterogeneously, developing necrosis cannot be excluded.  More focal low attenuation collections surround the pancreas.  For example,   superior and anterior to the mid to distal pancreatic body is a 3 x 3.3 x 1.6 cm, AP x T x cc dimension respecti MD on 12/07/2019        Assessment:  Pancreatitis, gallstones and EtOH    Plan:  1. Cont TPN  2. No acute intervention for his gallstones and pancreatitis with pseudocysts. Cont supportive care. 3. Cont meropenum, per ID.     Fátima Calvillo MD  12/9/

## 2019-12-09 NOTE — CM/SW NOTE
Interdisciplinary Rounds: 12/09/19  Admitted: 11/25/2019 LOS: 15  Disciplines in attendance: charge nurse, staff nurse, CM, ICU APN, RT, dietician, pharmacist, and infection control. Care plan and discharge plan reviewed.     Active issues needing resolu

## 2019-12-09 NOTE — PROGRESS NOTES
[facility] Pharmacy Note:  Renal Adjustment for meropenem [facility])    Fe Herrera is a 69year old male who has been prescribed meropenem (MERREM) 500 mg every 12 hrs.   CrCl is estimated creatinine clearance is 16.4 mL/min (A) (based on SCr of 4.07 mg/dL (H

## 2019-12-09 NOTE — PROGRESS NOTES
Gastroenterology Progress Note  Patient Name: Mary Zuñiga  Chief Complaint: gallstone/Etoh pancreatitis, pseudocysts  S: Pt denies abdominal pain. Pt with some mild confusion today. No pressors at this time.    O: /76   Pulse 113   Temp 98.9 °F James Che is a 42 y.o. female     Chief Complaint   Patient presents with   Jules 232     seen at [facility] for low blood pressure and dizziness discharged on 10/5/19-10/8/19. Visit Vitals  /85 (BP 1 Location: Left arm, BP Patient Position: Sitting)   Pulse 73   Temp 98 °F (36.7 °C) (Oral)   Resp 16   Ht 5' 3\" (1.6 m)   Wt 197 lb 12.8 oz (89.7 kg)   LMP 10/08/2019   SpO2 95%   BMI 35.04 kg/m²       There are no preventive care reminders to display for this patient. 1. Have you been to the ER, urgent care clinic since your last visit? Hospitalized since your last visit? No    2. Have you seen or consulted any other health care providers outside of the [address] [place] [address] since your last visit? Include any pap smears or colon screening. Yes seen at [facility] for low blood pressure and dizziness discharged on 10/8/19. prior study and early necrosis cannot be entirely excluded, correlate   Clinically. Assessment:   1. Etoh/Gallstone pancreatitis now with pseudocysts and ?necrosis  2. Fluid overload/SOB   Plan:   1. On Meropenem per ID recs  2.  Will consider drain if h

## 2019-12-09 NOTE — PROGRESS NOTES
[facility]  Nephrology Progress Note    Fritzwing Lombardi Patient Status:  Inpatient    3/31/1945 MRN [id number]   [facility] 4SW-A Attending Gianni Negron MD   [facility] Day # 15 PCP Giorgio Sumner MD       SUBJECTIVE:  S/p thora yes 112.8*   .0 197.0 222.0 259.0 220.0   BAND 9 2 3 12 6       Recent Labs   Lab 12/05/19  0359 12/06/19  0434 12/07/19  0416 12/08/19  0445 12/09/19  0452    138 138 138 141   K 3.8 3.2* 3.2* 3.5 3.8    104 101 102 105   CO2 21.0 24.0 27. 0 mg, Oral, Nightly  QUEtiapine Fumarate (SEROQUEL) partial tab 12.5 mg, 12.5 mg, Oral, Daily PRN  haloperidol lactate (HALDOL) 5 MG/ML injection 1 mg, 1 mg, Intravenous, BID PRN  simethicone (MYLICON) chewable tab 80 mg, 80 mg, Oral, QID PRN  Heparin Sodium Altered mental status-thought to be multifactorial related to pancreatitis but also potentially withdrawal from alcohol and opiates. Waxes and wanes somewhat    #4.  Nutrition- remains on TPN      Lucille Atwood MD  12/9/2019  8:38 AM

## 2019-12-10 ENCOUNTER — APPOINTMENT (OUTPATIENT)
Dept: ULTRASOUND IMAGING | Facility: HOSPITAL | Age: 74
DRG: 438 | End: 2019-12-10
Attending: INTERNAL MEDICINE
Payer: MEDICARE

## 2019-12-10 ENCOUNTER — APPOINTMENT (OUTPATIENT)
Dept: GENERAL RADIOLOGY | Facility: HOSPITAL | Age: 74
DRG: 438 | End: 2019-12-10
Attending: INTERNAL MEDICINE
Payer: MEDICARE

## 2019-12-10 LAB
ALBUMIN SERPL-MCNC: 2.9 G/DL (ref 3.4–5)
ALBUMIN/GLOB SERPL: 1 {RATIO} (ref 1–2)
ALP LIVER SERPL-CCNC: 99 U/L (ref 45–117)
ALT SERPL-CCNC: 54 U/L (ref 16–61)
ANION GAP SERPL CALC-SCNC: 9 MMOL/L (ref 0–18)
AST SERPL-CCNC: 61 U/L (ref 15–37)
ATRIAL RATE: 123 BPM
BASOPHILS # BLD: 0 X10(3) UL (ref 0–0.2)
BASOPHILS NFR BLD: 0 %
BILIRUB SERPL-MCNC: 3.5 MG/DL (ref 0.1–2)
BUN BLD-MCNC: 9 MG/DL (ref 7–18)
BUN/CREAT SERPL: 2.6 (ref 10–20)
CALCIUM BLD-MCNC: 9.3 MG/DL (ref 8.5–10.1)
CHLORIDE SERPL-SCNC: 102 MMOL/L (ref 98–112)
CO2 SERPL-SCNC: 27 MMOL/L (ref 21–32)
CREAT BLD-MCNC: 3.43 MG/DL (ref 0.7–1.3)
DEPRECATED RDW RBC AUTO: 63.6 FL (ref 35.1–46.3)
EOSINOPHIL # BLD: 0.65 X10(3) UL (ref 0–0.7)
EOSINOPHIL NFR BLD: 3 %
ERYTHROCYTE [DISTWIDTH] IN BLOOD BY AUTOMATED COUNT: 15.3 % (ref 11–15)
GLOBULIN PLAS-MCNC: 2.8 G/DL (ref 2.8–4.4)
GLUCOSE BLD-MCNC: 217 MG/DL (ref 70–99)
GLUCOSE BLD-MCNC: 225 MG/DL (ref 70–99)
GLUCOSE BLD-MCNC: 233 MG/DL (ref 70–99)
GLUCOSE BLD-MCNC: 248 MG/DL (ref 70–99)
GLUCOSE BLD-MCNC: 258 MG/DL (ref 70–99)
HAV IGM SER QL: 1.9 MG/DL (ref 1.6–2.6)
HCT VFR BLD AUTO: 26.5 % (ref 39–53)
HGB BLD-MCNC: 8.7 G/DL (ref 13–17.5)
LYMPHOCYTES NFR BLD: 1.72 X10(3) UL (ref 1–4)
LYMPHOCYTES NFR BLD: 8 %
M PROTEIN MFR SERPL ELPH: 5.7 G/DL (ref 6.4–8.2)
MCH RBC QN AUTO: 36.7 PG (ref 26–34)
MCHC RBC AUTO-ENTMCNC: 32.8 G/DL (ref 31–37)
MCV RBC AUTO: 111.8 FL (ref 80–100)
METAMYELOCYTES # BLD: 0.43 X10(3) UL
METAMYELOCYTES NFR BLD: 2 %
MONOCYTES # BLD: 1.29 X10(3) UL (ref 0.1–1)
MONOCYTES NFR BLD: 6 %
MORPHOLOGY: NORMAL
NEUTROPHILS # BLD AUTO: 12.9 X10 (3) UL (ref 1.5–7.7)
NEUTROPHILS NFR BLD: 72 %
NEUTS BAND NFR BLD: 9 %
NEUTS HYPERSEG # BLD: 17.42 X10(3) UL (ref 1.5–7.7)
NON GYNE INTERPRETATION: NEGATIVE
OSMOLALITY SERPL CALC.SUM OF ELEC: 292 MOSM/KG (ref 275–295)
P AXIS: 63 DEGREES
P-R INTERVAL: 142 MS
PHOSPHATE SERPL-MCNC: 2.8 MG/DL (ref 2.5–4.9)
PLATELET # BLD AUTO: 246 10(3)UL (ref 150–450)
PLATELET MORPHOLOGY: NORMAL
POTASSIUM SERPL-SCNC: 3.8 MMOL/L (ref 3.5–5.1)
Q-T INTERVAL: 310 MS
QRS DURATION: 72 MS
QTC CALCULATION (BEZET): 443 MS
R AXIS: 44 DEGREES
RBC # BLD AUTO: 2.37 X10(6)UL (ref 3.8–5.8)
SODIUM SERPL-SCNC: 138 MMOL/L (ref 136–145)
T AXIS: 49 DEGREES
TOTAL CELLS COUNTED: 100
VENTRICULAR RATE: 123 BPM
WBC # BLD AUTO: 21.5 X10(3) UL (ref 4–11)

## 2019-12-10 PROCEDURE — 99232 SBSQ HOSP IP/OBS MODERATE 35: CPT | Performed by: PHYSICIAN ASSISTANT

## 2019-12-10 PROCEDURE — 99232 SBSQ HOSP IP/OBS MODERATE 35: CPT | Performed by: OTHER

## 2019-12-10 PROCEDURE — 71045 X-RAY EXAM CHEST 1 VIEW: CPT | Performed by: INTERNAL MEDICINE

## 2019-12-10 PROCEDURE — 76705 ECHO EXAM OF ABDOMEN: CPT | Performed by: INTERNAL MEDICINE

## 2019-12-10 PROCEDURE — 99233 SBSQ HOSP IP/OBS HIGH 50: CPT | Performed by: HOSPITALIST

## 2019-12-10 PROCEDURE — 99233 SBSQ HOSP IP/OBS HIGH 50: CPT | Performed by: INTERNAL MEDICINE

## 2019-12-10 PROCEDURE — 93970 EXTREMITY STUDY: CPT | Performed by: INTERNAL MEDICINE

## 2019-12-10 RX ORDER — HEPARIN SODIUM 1000 [USP'U]/ML
1.5 INJECTION, SOLUTION INTRAVENOUS; SUBCUTANEOUS ONCE
Status: DISCONTINUED | OUTPATIENT
Start: 2019-12-10 | End: 2019-12-10

## 2019-12-10 RX ORDER — QUETIAPINE 25 MG/1
25 TABLET, FILM COATED ORAL NIGHTLY
Status: DISCONTINUED | OUTPATIENT
Start: 2019-12-10 | End: 2019-12-19

## 2019-12-10 RX ORDER — ALBUMIN (HUMAN) 12.5 G/50ML
100 SOLUTION INTRAVENOUS AS NEEDED
Status: DISCONTINUED | OUTPATIENT
Start: 2019-12-10 | End: 2019-12-13

## 2019-12-10 NOTE — CM/SW NOTE
Patient discussed with ICU staff. Patient to have an ultrasound of the abdomen today. Still on Acute HD. Psych to see. Recommendations are still for JANNA. SW will follow for placement.   The patient's wife has requested to defer discharge planning until

## 2019-12-10 NOTE — PROGRESS NOTES
[facility]  Nephrology Progress Note    Sabas South Patient Status:  Inpatient    3/31/1945 MRN [id number]   [facility] 4SW-A Attending Andres Medrano MD   [address] Day # 13 PCP Scott Denise MD       SUBJECTIVE:  Tachycardic a 21.5*   HGB 9.9* 10.1* 10.1* 8.5* 9.2* 8.7*   .1* 108.8* 109.6* 112.8*  --  111.8*   .0 222.0 259.0 220.0  --  246.0   BAND 2 3 12 6  --  9       Recent Labs   Lab 12/06/19  0434 12/07/19  0416 12/08/19  0445 12/09/19  0452 12/10/19  0435 40 mg, Intravenous, QAM AC  melatonin tab TABS 3 mg, 3 mg, Oral, Nightly  QUEtiapine Fumarate (SEROQUEL) partial tab 12.5 mg, 12.5 mg, Oral, Nightly  QUEtiapine Fumarate (SEROQUEL) partial tab 12.5 mg, 12.5 mg, Oral, Daily PRN  haloperidol lactate (HALDOL) days     #2. Acute pancreatitis-possibly multifactorial with a history of alcohol use as well as gallstones. Surgery and GI are following. CT noted. Ongoing supportive care     #3.   Altered mental status-thought to be multifactorial related to pancreati

## 2019-12-10 NOTE — PROGRESS NOTES
Gastroenterology Progress Note  Patient Name: Collins Trejo  Chief Complaint: gallstone/Etoh pancreatitis, pseudocysts  S: Pt with increased abd distention today.    O: /59 (BP Location: Left arm)   Pulse 112   Temp 98.3 °F (36.8 °C) (Temporal) cannot be entirely excluded, correlate   Clinically. Assessment:   1. Etoh/Gallstone pancreatitis now with pseudocysts and ?necrosis  2. Fluid overload/SOB   Plan:   1. On Meropenem per ID recs  2.  Will consider drain if he clinically decompensates and

## 2019-12-10 NOTE — PHYSICAL THERAPY NOTE
Pt being followed by physical therapy. Attempted to see Pt this am, however, Pt with tachycardia and dyspnea this morning. Pt awaiting B LEs dopplers and abd US. Discussed with RN and will hold therapy at this time.  Will follow up as schedule permits and p

## 2019-12-10 NOTE — PROGRESS NOTES
[facility] Patient Status:  Inpatient    3/31/1945 MRN [id number]   [facility] 4SW-A Attending Nori Lewis MD   [address] Day # 13 PCP Jean-Paul Moreno MD     Critical Care Progress Note     Date of Admission:  Fumarate (SEROQUEL) partial tab 12.5 mg, 12.5 mg, Oral, Nightly  •  QUEtiapine Fumarate (SEROQUEL) partial tab 12.5 mg, 12.5 mg, Oral, Daily PRN  •  haloperidol lactate (HALDOL) 5 MG/ML injection 1 mg, 1 mg, Intravenous, BID PRN  •  simethicone (MYLICON) c this shift:  In: 1053 [P.O.:560; I.V.:107; IV PIGGYBACK:300]  Out: 3200 [Other:3200]     Physical Exam:                          VAINLEF: IZMBR, slightly confused but cooperative, in NAD                          HEENT: oropharynx clear without erythema or withdrawal. Overall improved though still waxes / wanes  - supportive care   3. Dyspnea  -due to atelectasis and pleural effusion  -s/p thoracentesis  -dialysis per renal  -trial of CPAP at night  -encourage IS  4.  Pleural Effusion  -exudative by light's c

## 2019-12-10 NOTE — DIETARY NOTE
[address] [facility] ASSESSMENT    Pt does not meet malnutrition criteria.     NUTRITION DIAGNOSIS/PROBLEM:  Inadequate oral intake related to inability to consume sufficient intake as evidenced by estimated intake less than estimated need Pancreatitis. PMH includes HTN, GERD, Substance Abuse. Pt seen for LOS / Diet advancement. Pt was on Low Fiber / Low Fat x 2 days with minimal intake per RN. Diet downgraded to Clear Liquids after CT. He reports no significant nausea or abdominal pain.

## 2019-12-10 NOTE — PLAN OF CARE
Received AO x3, off with date/time. Family at bedside. On RA maintaining sats. COOPER. ST on the monitor. BP stable. Afebrile. PostHD, 3.2 L removed. Tolerated well. BLE red, warm to touch. For venous doppler today.       Problem: CARDIOVASCULAR - ADULT  G labs and assess for signs and symptoms of volume excess or deficit  - Monitor intake, output and patient weight  - Monitor urine specific gravity, serum osmolarity and serum sodium as indicated or ordered  - Monitor response to interventions for patient's neurological status  - Initiate measures to prevent increased intracranial pressure  - Maintain blood pressure and fluid volume within ordered parameters to optimize cerebral perfusion and minimize risk of hemorrhage  - Monitor temperature, glucose, and so

## 2019-12-10 NOTE — PROGRESS NOTES
AIME HOSPITALIST  Progress Note     Matthew Albrecht Patient Status:  Inpatient    3/31/1945 MRN [id number]   [facility]A [facility] PCP Deja Dickey MD     Chief Complaint: Pancreatiti 31 40* 61*   ALT 66* 53 54   BILT 2.3* 2.4* 3.5*   TP 5.3* 5.1* 5.7*       Estimated Creatinine Clearance: 19.5 mL/min (A) (based on SCr of 3.43 mg/dL (H)). No results for input(s): PTP, INR in the last 168 hours.     No results for input(s): TROP, CK in uremia  1. Lactulose  2. Avoid narcotics  10. GERD  1. PPI  11. SVT  1. Telemetry  12. Dyslipidemia  1.  Hold Zetia    Quality:  · DVT Prophylaxis: Heparin  · CODE status: Full  · Rangel: No  · Central line: No    Will the patient be referred to [facility] on disch

## 2019-12-10 NOTE — PROGRESS NOTES
[facility]  Progress Note    Raul Gonzales Patient Status:  Inpatient    3/31/1945 MRN [id number]   [facility] 4SW-A Attending Nori Lewis MD   [facility] Day # 13 PCP Jean-Paul Moreno MD     Subjective:   The patient denies new com pain of left knee     Acute bronchitis     Acute bronchitis, unspecified organism     Leukocytosis, unspecified type     Hypertension, unspecified type     Hypokalemia     Leukocytosis     Azotemia     Acute pancreatitis     Metabolic acidosis     Hypergly

## 2019-12-10 NOTE — PROGRESS NOTES
[facility]  Report of Psychiatric Progress Note    Nelson Milnernt Patient Status:  Inpatient    3/31/1945 MRN [id number]   [facility] 3NE-A Attending Kelly Michelle MD   [facility] Day # 13 PCP Cris Montilla MD     Date of Admission: 1 pancreatitis. Lipase was 4151 and CT showed \"peripancreatic inflammatory changes with adjacent free fluid consistent with acute pancreatitis\".      Psych consulted to eval for possibility of substance withdrawal. He has a hx of Fentanyl use disorder for 2 Some feelings of helplessness, but no hopelessness or suicidal ideation. 12/5/19- Last night, he was anxious and restless. He did not sleep.  He received 0.5mg Ativan, didn't help, then 1mg Xanax, which per RN helped with anxiety but caused him to be [facility] (leukemia [Other]) Paternal Grandfather    • Cancer Maternal Grandfather         Prostate? • Thyroid disease Daughter       reports that he quit smoking about 50 years ago. His smoking use included cigarettes. He has a 1.00 pack-year smoking history.  He Nightly  •  QUEtiapine Fumarate (SEROQUEL) partial tab 12.5 mg, 12.5 mg, Oral, Daily PRN  •  haloperidol lactate (HALDOL) 5 MG/ML injection 1 mg, 1 mg, Intravenous, BID PRN  •  simethicone (MYLICON) chewable tab 80 mg, 80 mg, Oral, QID PRN  •  Heparin Sodi and Concentration: poor, unable to spell WORLD backwards or perform serial 7's  Memory:  Intact remote  Language: Intact naming   Fund of Knowledge: unable to recite name of [place] president    Insight: limited  Judgment: limited    Laboratory Data:  Lab Resul effusion. PLEURA:  No pneumothorax or effusion. AORTA:  No aneurysm. VASCULATURE:  No visible pulmonary arterial thrombus or attenuation. ABDOMEN/PELVIS:  LIVER:  Stable. Homogeneous enhancement.   BILIARY:  Cholelithiasis with distended gallb duodenum likely reactive from the pancreatitis. Colonic diverticulosis. Atelectasis in the lungs. Stable peripherally calcified probable left inferior thyroid nodule.

## 2019-12-10 NOTE — PROGRESS NOTES
Brief Note  Patient with worsening tachycardia  No hypotension  On room air    Plan:  Check EKG  Initiate Metoprolol 25 BID  Monitor hemodynamics  Telemetry  Discussed with Ramón Beasley MD

## 2019-12-10 NOTE — PLAN OF CARE
Continues to be tachycardic with labored breathing, swelling BLE, dopplers negative for DVT. Patient continues to report anxiety, Dr. Brandi Carter to see patient, changed seroquel to BID PRN. HD tomorrow. Abdomen firm, US abdomen reveals small amount of ascites.

## 2019-12-10 NOTE — OCCUPATIONAL THERAPY NOTE
Pt being followed by occupational therapy. Attempted to see Pt this am, however, Pt with tachycardia and dyspnea this morning. Pt awaiting B LEs dopplers and abd US. Discussed with RN and will hold therapy at this time.  Will follow up as schedule permits a

## 2019-12-11 LAB
ALBUMIN SERPL-MCNC: 2.5 G/DL (ref 3.4–5)
ALBUMIN/GLOB SERPL: 0.8 {RATIO} (ref 1–2)
ALP LIVER SERPL-CCNC: 132 U/L (ref 45–117)
ALT SERPL-CCNC: 54 U/L (ref 16–61)
ANION GAP SERPL CALC-SCNC: 12 MMOL/L (ref 0–18)
AST SERPL-CCNC: 83 U/L (ref 15–37)
BASOPHILS # BLD: 0.22 X10(3) UL (ref 0–0.2)
BASOPHILS NFR BLD: 1 %
BILIRUB SERPL-MCNC: 3.2 MG/DL (ref 0.1–2)
BUN BLD-MCNC: 20 MG/DL (ref 7–18)
BUN/CREAT SERPL: 4.3 (ref 10–20)
CALCIUM BLD-MCNC: 9.8 MG/DL (ref 8.5–10.1)
CHLORIDE SERPL-SCNC: 100 MMOL/L (ref 98–112)
CO2 SERPL-SCNC: 25 MMOL/L (ref 21–32)
CREAT BLD-MCNC: 4.64 MG/DL (ref 0.7–1.3)
DEPRECATED RDW RBC AUTO: 65.1 FL (ref 35.1–46.3)
EOSINOPHIL # BLD: 0.22 X10(3) UL (ref 0–0.7)
EOSINOPHIL NFR BLD: 1 %
ERYTHROCYTE [DISTWIDTH] IN BLOOD BY AUTOMATED COUNT: 15.7 % (ref 11–15)
GLOBULIN PLAS-MCNC: 3.1 G/DL (ref 2.8–4.4)
GLUCOSE BLD-MCNC: 123 MG/DL (ref 70–99)
GLUCOSE BLD-MCNC: 206 MG/DL (ref 70–99)
GLUCOSE BLD-MCNC: 246 MG/DL (ref 70–99)
GLUCOSE BLD-MCNC: 255 MG/DL (ref 70–99)
HCT VFR BLD AUTO: 28 % (ref 39–53)
HGB BLD-MCNC: 8.8 G/DL (ref 13–17.5)
LYMPHOCYTES NFR BLD: 0.43 X10(3) UL (ref 1–4)
LYMPHOCYTES NFR BLD: 2 %
M PROTEIN MFR SERPL ELPH: 5.6 G/DL (ref 6.4–8.2)
MCH RBC QN AUTO: 36.1 PG (ref 26–34)
MCHC RBC AUTO-ENTMCNC: 31.4 G/DL (ref 31–37)
MCV RBC AUTO: 114.8 FL (ref 80–100)
METAMYELOCYTES # BLD: 0.86 X10(3) UL
METAMYELOCYTES NFR BLD: 4 %
MONOCYTES # BLD: 1.72 X10(3) UL (ref 0.1–1)
MONOCYTES NFR BLD: 8 %
MYELOCYTES # BLD: 1.51 X10(3) UL
MYELOCYTES NFR BLD: 7 %
NEUTROPHILS # BLD AUTO: 12.87 X10 (3) UL (ref 1.5–7.7)
NEUTROPHILS NFR BLD: 64 %
NEUTS BAND NFR BLD: 13 %
NEUTS HYPERSEG # BLD: 16.56 X10(3) UL (ref 1.5–7.7)
OSMOLALITY SERPL CALC.SUM OF ELEC: 295 MOSM/KG (ref 275–295)
PLATELET # BLD AUTO: 248 10(3)UL (ref 150–450)
PLATELET MORPHOLOGY: NORMAL
POTASSIUM SERPL-SCNC: 4.5 MMOL/L (ref 3.5–5.1)
RBC # BLD AUTO: 2.44 X10(6)UL (ref 3.8–5.8)
SODIUM SERPL-SCNC: 137 MMOL/L (ref 136–145)
TOTAL CELLS COUNTED: 100
TRIGL SERPL-MCNC: 587 MG/DL (ref 30–149)
WBC # BLD AUTO: 21.5 X10(3) UL (ref 4–11)

## 2019-12-11 PROCEDURE — 99232 SBSQ HOSP IP/OBS MODERATE 35: CPT | Performed by: HOSPITALIST

## 2019-12-11 PROCEDURE — 99232 SBSQ HOSP IP/OBS MODERATE 35: CPT | Performed by: OTHER

## 2019-12-11 PROCEDURE — 90935 HEMODIALYSIS ONE EVALUATION: CPT | Performed by: INTERNAL MEDICINE

## 2019-12-11 PROCEDURE — 99232 SBSQ HOSP IP/OBS MODERATE 35: CPT | Performed by: PHYSICIAN ASSISTANT

## 2019-12-11 RX ORDER — BUMETANIDE 0.25 MG/ML
2 INJECTION, SOLUTION INTRAMUSCULAR; INTRAVENOUS ONCE
Status: COMPLETED | OUTPATIENT
Start: 2019-12-11 | End: 2019-12-11

## 2019-12-11 RX ORDER — DULOXETIN HYDROCHLORIDE 30 MG/1
30 CAPSULE, DELAYED RELEASE ORAL 2 TIMES DAILY
Status: DISCONTINUED | OUTPATIENT
Start: 2019-12-11 | End: 2020-01-04

## 2019-12-11 NOTE — PROGRESS NOTES
[facility]                INFECTIOUS DISEASE PROGRESS NOTE    Nelson Riley Patient Status:  Inpatient    3/31/1945 MRN [id number]   [facility] 4SW-A Attending Germán Sarmiento MD   [facility] Day # 12 PCP Cris Montilla MD     A (Preliminary result)    Collection Time: 12/08/19  4:30 PM   Result Value Ref Range    Body Fluid Culture Result No Growth 2 Days N/A    Body Fld Smear No organisms seen N/A    Body Fld Smear 1+ Neutrophils seen N/A    Body Fld Smear This is a cytocentrifu Pleural effusion sp thora/exudative  Ascites present, no signs of empyema  Repeat CXR stable, pulm following    3.  ARF/HD per renal      Allison SenderMD [facility]  [phone]

## 2019-12-11 NOTE — PROGRESS NOTES
[facility]  Nephrology Progress Note    Raul Gonzales Patient Status:  Inpatient    3/31/1945 MRN [id number]   [facility] 4SW-A Attending Nori Lewis MD   [facility] Day # 12 PCP Jean-Paul Moreno MD       SUBJECTIVE:  Pt states he WBC 22.0* 22.5* 20.2*  --  21.5* 21.5*   HGB 10.1* 10.1* 8.5* 9.2* 8.7* 8.8*   .8* 109.6* 112.8*  --  111.8* 114.8*   .0 259.0 220.0  --  246.0 248.0   BAND 3 12 6  --  9 13       Recent Labs   Lab 12/06/19  0434 12/07/19  0416 12/08/19  04 C (DEX-4) chewable tab 8 tablet, 8 tablet, Oral, Q15 Min PRN  Insulin Aspart Pen (NOVOLOG) 100 UNIT/ML flexpen 1-5 Units, 1-5 Units, Subcutaneous, Q6H  Pantoprazole Sodium (PROTONIX) 40 mg in Sodium Chloride 0.9 % 10 mL IV push, 40 mg, Intravenous, QAM AC today although he believes he is making more urine. .  Will give IV diuretics today and cont to follow UOP and creat. Has received daily HD but will hold after today and dialyze prn     #2.   Acute pancreatitis-possibly multifactorial with a history of alco

## 2019-12-11 NOTE — PHYSICAL THERAPY NOTE
Attempted to see Pt this am, however, Pt occupied with HD. Will continue to follow as schedule permits.

## 2019-12-11 NOTE — DIETARY NOTE
Clinical Nutrition  Noted triglycerides 587 this AM. Plan to cycle TPN to 20 hours tonight and omit lipids from bag. Will re-check trigs by Friday.  Bag #7 will provide 700 dextrose calories, 0 lipid calories, 0% lipids, 130 grams protein, in minimal fluid

## 2019-12-11 NOTE — PROGRESS NOTES
[facility]  Report of Psychiatric Progress Note    Nelson Brownleeivan Patient Status:  Inpatient    3/31/1945 MRN [id number]   [facility] 3NE-A Attending Kelly Michelle MD   [facility] Day # 12 PCP Cris Montilla MD     Date of Admission: 1 Gisela Landeros    History of Present Illness:  70 yo WM with opioid use disorder (in remission 26 yrs) was admitted on 11/25/19 for treatment of acute pancreatitis.  Lipase was 4151 and CT showed \"peripancreatic inflammatory changes with adjacent free fluid consiste better when reassured that he is gradually getting better. No suicidal ideation. No voices/visions. 12/3/19- He feels tired and depressed. Some feelings of helplessness, but no hopelessness or suicidal ideation.      12/5/19- Last night, he was anxious OR   • OTHER SURGICAL HISTORY  01/01/2005    cardiac cath    • OTHER SURGICAL HISTORY  07/24/2017    [facility] us Dr Ruthie Cheadle   • Carlota Mutter    w/adenoidectomy     Family History   Problem Relation Age of Onset   • Other (asthma[Other]) Father    • Lipids injection 50 mL, 50 mL, Intravenous, Q15 Min PRN **OR** glucose (DEX4) oral liquid 30 g, 30 g, Oral, Q15 Min PRN **OR** Glucose-Vitamin C (DEX-4) chewable tab 8 tablet, 8 tablet, Oral, Q15 Min PRN  •  Insulin Aspart Pen (NOVOLOG) 100 UNIT/ML flexpen 1-5 Un depression.      Mental Status Exam:     Objective       12/11/19  0700   BP: 118/52   Pulse: 99   Resp: (!) 30   Temp:      Appearance: fair grooming  Behavior: normal psychomotor, fair eye contact  Gait: not observed    Speech: soft, normal rate and rhyth pain      CONTRAST USED:  100cc of Omnipaque 350     FINDINGS:       CHEST:    LUNGS:  Respiratory motion with scattered bilateral subsegmental atelectasis.   MEDIASTINUM:  Stable 1.8 x 2.1 cm left paratracheal low-density nodule with calcification likely a embolism. Peripancreatic inflammatory changes with adjacent free fluid consistent with acute pancreatitis.      Stable peripancreatic low-attenuation mass arising from versus abutting the pancreatic body most suggestive of a pseudo cyst.  No significan

## 2019-12-11 NOTE — PLAN OF CARE
Pt c/o generalized aches, given warm packs and tylenol PRN with some relief. On HD this am. Tolerated fair, requiring 2 doses of albumin. Anuric 1 loose stool this am. Tolerating clear liquids. Wife and daughter at bedside this am updated with POC. On TPN.

## 2019-12-11 NOTE — PLAN OF CARE
Received patient after report. Patient resting in bed on 2L NC. Patient denies pain. Patient asking for bedpan frequently. Lactulose held per titration orders. TPN infusing through PICC. PICC dressing changed. Patient drowsy but easily arousable.  Oriented

## 2019-12-11 NOTE — PROGRESS NOTES
[facility] Patient Status:  Inpatient    3/31/1945 MRN [id number]   [facility] 4SW-A Attending Leni Quiñones MD   [facility] Day # 12 PCP Tara Green MD     Pulm / Critical Care Progress Note     S: overall he is kg)  11/26/19 : 211 lb 13.8 oz (96.1 kg)  09/20/19 : 227 lb (103 kg)       I/O last 3 completed shifts: In: 8641.4 [P.O.:1255; I.V.:259; IV PIGGYBACK:2200]  Out: 8508 [Urine:8; Other:8500]  I/O this shift:  In: 2447 [P.O.:240;  I.V.:106; IV PIGGYBACK:100] thoracentesis  -dialysis per renal  -encourage IS  4. Pleural Effusions: s/p R thora 12/08 with 810cc removed  -exudative by light's criteria  -cytology and cultures negative thus far  5. Acute renal failure - secondary to above  - HD per renal  6.  Anemia

## 2019-12-11 NOTE — PROGRESS NOTES
[facility]  Progress Note     Sabas Brannon Patient Status:  Inpatient    3/31/1945 MRN [id number]   [facility] 4SW-A Attending [facility] Day # 12 PCP Scott Denise MD     Chief Complaint: Pancreatiti Estimated Creatinine Clearance: 14.4 mL/min (A) (based on SCr of 4.64 mg/dL (H)). No results for input(s): PTP, INR in the last 168 hours. No results for input(s): TROP, CK in the last 168 hours. Imaging: Imaging data reviewed in Epic. opiate w/d  2. Lactulose w/ frequent BMs  3. Avoid narcotics, psych evaluated. received seroquel yesterday  12. GERD  1. PPI  13. Dyslipidemia  1.  Hold Zetia    Quality:  · DVT Prophylaxis: Heparin  · CODE status: Full  · GI ppx PPI    Will the patient be

## 2019-12-11 NOTE — PROGRESS NOTES
[facility]  Progress Note    Herve Gonsalez Patient Status:  Inpatient    3/31/1945 MRN [id number]   [facility] 4SW-A Attending Zi Kim MD   [address] Day # 12 PCP Kathy Dhillon MD     Subjective:  Patient laying comfortably male erectile dysfunction     Multinodular goiter     Benign non-nodular prostatic hyperplasia with lower urinary tract symptoms     Chronic pain of left knee     Acute bronchitis     Acute bronchitis, unspecified organism     Leukocytosis, unspecified typ

## 2019-12-11 NOTE — PROGRESS NOTES
Received patient transfer from ICU alert but forgetful w/ TPN running at 81.1 cc vir rt picc line,wife at bedside,made comfortable in bed,had dialysis this am via rt temp hemodialysis catheter  Via rt jugular,abdominal ascitis,but not enough fluids to Jorge Mauro

## 2019-12-11 NOTE — PROGRESS NOTES
Gastroenterology Progress Note  Patient Name: Varsha Lombardi  Chief Complaint: gallstone/Etoh pancreatitis, pseudocysts  S: Pt with improved HR today. Denies abd pain. Tolerating cld.    O: BP (!) 89/47   Pulse 99   Temp 98.3 °F (36.8 °C) (Temporal)   Re Clinically. Assessment:   1. Etoh/Gallstone pancreatitis now with pseudocysts and ?necrosis  2. Fluid overload/SOB   Plan:   1. On Meropenem per ID recs  2.  Will consider drain if he clinically decompensates and there is concern for infected necrosis;

## 2019-12-12 LAB
ALBUMIN SERPL-MCNC: 2.6 G/DL (ref 3.4–5)
ALBUMIN/GLOB SERPL: 0.8 {RATIO} (ref 1–2)
ALP LIVER SERPL-CCNC: 176 U/L (ref 45–117)
ALT SERPL-CCNC: 58 U/L (ref 16–61)
ANION GAP SERPL CALC-SCNC: 12 MMOL/L (ref 0–18)
AST SERPL-CCNC: 126 U/L (ref 15–37)
BASOPHILS # BLD: 0.21 X10(3) UL (ref 0–0.2)
BASOPHILS NFR BLD: 1 %
BILIRUB SERPL-MCNC: 3.7 MG/DL (ref 0.1–2)
BUN BLD-MCNC: 15 MG/DL (ref 7–18)
BUN/CREAT SERPL: 4.2 (ref 10–20)
CALCIUM BLD-MCNC: 9.2 MG/DL (ref 8.5–10.1)
CHLORIDE SERPL-SCNC: 101 MMOL/L (ref 98–112)
CO2 SERPL-SCNC: 24 MMOL/L (ref 21–32)
CREAT BLD-MCNC: 3.56 MG/DL (ref 0.7–1.3)
DEPRECATED RDW RBC AUTO: 66.7 FL (ref 35.1–46.3)
EOSINOPHIL # BLD: 0.63 X10(3) UL (ref 0–0.7)
EOSINOPHIL NFR BLD: 3 %
ERYTHROCYTE [DISTWIDTH] IN BLOOD BY AUTOMATED COUNT: 15.5 % (ref 11–15)
GLOBULIN PLAS-MCNC: 3.1 G/DL (ref 2.8–4.4)
GLUCOSE BLD-MCNC: 218 MG/DL (ref 70–99)
GLUCOSE BLD-MCNC: 234 MG/DL (ref 70–99)
GLUCOSE BLD-MCNC: 237 MG/DL (ref 70–99)
GLUCOSE BLD-MCNC: 242 MG/DL (ref 70–99)
GLUCOSE BLD-MCNC: 257 MG/DL (ref 70–99)
GLUCOSE BLD-MCNC: 288 MG/DL (ref 70–99)
HAV IGM SER QL: 1.9 MG/DL (ref 1.6–2.6)
HCT VFR BLD AUTO: 27.6 % (ref 39–53)
HGB BLD-MCNC: 8.5 G/DL (ref 13–17.5)
LYMPHOCYTES NFR BLD: 11 %
LYMPHOCYTES NFR BLD: 2.31 X10(3) UL (ref 1–4)
M PROTEIN MFR SERPL ELPH: 5.7 G/DL (ref 6.4–8.2)
MCH RBC QN AUTO: 36.3 PG (ref 26–34)
MCHC RBC AUTO-ENTMCNC: 30.8 G/DL (ref 31–37)
MCV RBC AUTO: 117.9 FL (ref 80–100)
METAMYELOCYTES # BLD: 1.26 X10(3) UL
METAMYELOCYTES NFR BLD: 6 %
MONOCYTES # BLD: 1.05 X10(3) UL (ref 0.1–1)
MONOCYTES NFR BLD: 5 %
MYELOCYTES # BLD: 0.63 X10(3) UL
MYELOCYTES NFR BLD: 3 %
NEUTROPHILS # BLD AUTO: 12.28 X10 (3) UL (ref 1.5–7.7)
NEUTROPHILS NFR BLD: 59 %
NEUTS BAND NFR BLD: 12 %
NEUTS HYPERSEG # BLD: 14.91 X10(3) UL (ref 1.5–7.7)
OSMOLALITY SERPL CALC.SUM OF ELEC: 292 MOSM/KG (ref 275–295)
PHOSPHATE SERPL-MCNC: 2.7 MG/DL (ref 2.5–4.9)
PLATELET # BLD AUTO: 229 10(3)UL (ref 150–450)
PLATELET MORPHOLOGY: NORMAL
POTASSIUM SERPL-SCNC: 4.1 MMOL/L (ref 3.5–5.1)
RBC # BLD AUTO: 2.34 X10(6)UL (ref 3.8–5.8)
SODIUM SERPL-SCNC: 137 MMOL/L (ref 136–145)
TOTAL CELLS COUNTED: 100
WBC # BLD AUTO: 21 X10(3) UL (ref 4–11)

## 2019-12-12 PROCEDURE — 99232 SBSQ HOSP IP/OBS MODERATE 35: CPT | Performed by: HOSPITALIST

## 2019-12-12 PROCEDURE — 99232 SBSQ HOSP IP/OBS MODERATE 35: CPT | Performed by: OTHER

## 2019-12-12 PROCEDURE — 99232 SBSQ HOSP IP/OBS MODERATE 35: CPT | Performed by: SURGERY

## 2019-12-12 PROCEDURE — 99232 SBSQ HOSP IP/OBS MODERATE 35: CPT | Performed by: INTERNAL MEDICINE

## 2019-12-12 RX ORDER — BUMETANIDE 0.25 MG/ML
2 INJECTION, SOLUTION INTRAMUSCULAR; INTRAVENOUS
Status: DISCONTINUED | OUTPATIENT
Start: 2019-12-12 | End: 2019-12-13

## 2019-12-12 NOTE — PROGRESS NOTES
[facility]  Report of Psychiatric Progress Note    Nic Riddle Patient Status:  Inpatient    3/31/1945 MRN [id number]   [facility] 3NE-A Attending Garry Araya MD   [facility] Day # 16 PCP Jerrod Banuelos MD     Date of Admission: 1 is metabolized in the liver. Enmanuel Tate    History of Present Illness:  68 yo WM with opioid use disorder (in remission 26 yrs) was admitted on 11/25/19 for treatment of acute pancreatitis.  Lipase was 4151 and CT showed \"peripancreatic inflammatory c he was moved out of the ICU. He felt better when reassured that he is gradually getting better. No suicidal ideation. No voices/visions. 12/3/19- He feels tired and depressed. Some feelings of helplessness, but no hopelessness or suicidal ideation. Visual impairment     glasses     Past Surgical History:   Procedure Laterality Date   • ADENOIDECTOMY     • BIOPSY OF THYROID,PERCUT  08/20/10    NO CYTOLOGIC EVIDENCE OF PAPILLARY CARCINOMA   • COLONOSCOPY     • KNEE REPLACEMENT SURGERY     • KNEE TOTAL Intravenous, Q24H  •  heparin sodium 1000 UNIT/ML injection 3,000 Units, 3,000 Units, Intravenous, PRN Dialysis  •  ipratropium-albuterol (DUONEB) nebulizer solution 3 mL, 3 mL, Nebulization, Q4H PRN  •  glucose (DEX4) oral liquid 15 g, 15 g, Oral, Q15 Min 25 mL, Intravenous, Once  •  latanoprost (XALATAN) 0.005 % ophthalmic solution 1 drop, 1 drop, Both Eyes, Nightly  •  ondansetron HCl (ZOFRAN) injection 4 mg, 4 mg, Intravenous, Q6H PRN    Review of Systems   Constitutional: Positive for malaise/fatigue. intravenous contrast material. Multi-planar reformatted/3-D images were created to optimize visualization of vascular anatomy. Dose reduction   techniques were used.  Dose information is transmitted to the [facility]) Pat Moralez[address] inflammation. ABDOMINAL WALL:  Small fat containing umbilical hernia. Monique Smith PELVIC ORGANS:  Prostatic hypertrophy. Monique Smith   BONES:  Disc space narrowing with degenerative disc disease L2-3 through L5-S1.  8 mm sclerotic focus arising from multiple mid thoracic verte

## 2019-12-12 NOTE — DIETARY NOTE
Clinical Nutrition- TPN Short Note  Bag #8 will provide 700 dextrose calories, 0 lipid calories, 0% lipids (due to elevated TG), 130 grams protein, in minimal fluid given renal function.   This is meeting 70% of calorie goal, 100% of protein goal. Electroly

## 2019-12-12 NOTE — PROGRESS NOTES
[facility]  Progress Note     Karol Cabral Patient Status:  Inpatient    3/31/1945 MRN [id number]   [facility] 4SW-A Attending [facility] Day # 16 PCP Martin Cade MD     Chief Complaint: abdominal d SCr of 3.56 mg/dL (H)). No results for input(s): PTP, INR in the last 168 hours. No results for input(s): TROP, CK in the last 168 hours. Imaging: Imaging data reviewed in Epic.     Medications:   • bumetanide  2 mg Intravenous BID (Diuretic) to d/c psych recommending trial buprenorphine. 13. GERD  1. PPI  14. Dyslipidemia  1.  Hold Zetia    Quality:  · DVT Prophylaxis: Heparin  · CODE status: Full  · GI ppx PPI    Estimated date of discharge: TBD, likely JANNA    Plan of care discussed with hermann

## 2019-12-12 NOTE — PROGRESS NOTES
[facility] Patient Status:  Inpatient    3/31/1945 MRN [id number]   [facility] 4NW-A Attending Nancy Mckoy MD   [facility] Day # 16 PCP Cris Montilla MD     Pulm / Critical Care Progress Note     S: pt states he kg)  11/26/19 : 211 lb 13.8 oz (96.1 kg)  09/20/19 : 227 lb (103 kg)       I/O last 3 completed shifts: In: 8242.3 [P.O.:1320; I.V.:257; IV PIGGYBACK:1150]  Out: 5505 [Urine:5; Other:5500]  I/O this shift:  In: Vandana Lewis [P.O.:120;  I.V.:750]  Out: 3000 [Other: per renal  -encourage IS  - on only minimal O2.   4. Pleural Effusions: s/p R thora 12/08 with 810cc removed  -exudative by light's criteria  -cytology and cultures negative thus far  5. Acute renal failure - secondary to above  - HD per renal  6.  Anemia

## 2019-12-12 NOTE — PROGRESS NOTES
[facility]                INFECTIOUS DISEASE PROGRESS NOTE    Nic Riddle Patient Status:  Inpatient    3/31/1945 MRN [id number]   [facility] 4SW-A Attending Ary Yu MD   [facility] Day # 16 PCP Jerrod Banuelos MD     A (Preliminary result)    Collection Time: 12/08/19  4:30 PM   Result Value Ref Range    Body Fluid Culture Result No Growth 3 Days N/A    Body Fld Smear No organisms seen N/A    Body Fld Smear 1+ Neutrophils seen N/A    Body Fld Smear This is a cytocentrifu evaulation    2. Pleural effusion sp thora/exudative  Ascites present, no signs of empyema  Repeat CXR stable, pulm following    3.  ARF/HD per renal      Mica Lindsay MD  [facility]  [phone]

## 2019-12-12 NOTE — PROGRESS NOTES
[facility]  Nephrology Progress Note    Ira Roblero Patient Status:  Inpatient    3/31/1945 MRN [id number]   [facility] 4SW-A Attending Avani Lai MD   [facility] Day # 16 PCP Candace Corrigan MD       SUBJECTIVE:  Transferred t 12/12/19  0557   WBC 22.5* 20.2*  --  21.5* 21.5* 21.0*   HGB 10.1* 8.5* 9.2* 8.7* 8.8* 8.5*   .6* 112.8*  --  111.8* 114.8* 117.9*   .0 220.0  --  246.0 248.0 229.0   BAND 12 6  --  9 13 12       Recent Labs   Lab 12/07/19  0416 12/08/19  04 Or  glucose (DEX4) oral liquid 30 g, 30 g, Oral, Q15 Min PRN    Or  Glucose-Vitamin C (DEX-4) chewable tab 8 tablet, 8 tablet, Oral, Q15 Min PRN  Insulin Aspart Pen (NOVOLOG) 100 UNIT/ML flexpen 1-5 Units, 1-5 Units, Subcutaneous, Q6H  Pantoprazole Sodium give IV bumex and hold HD today. Cont to follow for signs of recovery       #2. Acute pancreatitis-possibly multifactorial with a history of alcohol use as well as gallstones. Surgery and GI are following. CT noted. Ongoing supportive care     #3.   Alt

## 2019-12-12 NOTE — PROGRESS NOTES
[facility]  Progress Note    Noel Mayer Patient Status:  Inpatient    3/31/1945 MRN [id number]   [facility] 4NW-A Attending Tommie Saavedra MD   [facility] Day # 16 PCP Markell Marsh MD     Subjective:  Pt feeling better.   Transfe

## 2019-12-12 NOTE — PLAN OF CARE
Pt AOX4 with no complaints of pain. Pt with anxiety and restlessness. Pt expresses he cant stop thinking about when this will all end. Pt given scheduled seroquel and then later a PRN dose. Pt able to sleep on and off for very short periods.  Pt given meds Maintain adequate hydration with IV or PO as ordered and tolerated  - Nasogastric tube to low intermittent suction as ordered  - Evaluate effectiveness of ordered antiemetic medications  - Provide nonpharmacologic comfort measures as appropriate  - Advance safety including physical limitations  - Instruct pt to call for assistance with activity based on assessment  - Modify environment to reduce risk of injury  - Provide assistive devices as appropriate  - Consider OT/PT consult to assist with strengthening/ Problem: NEUROLOGICAL - ADULT  Goal: Achieves stable or improved neurological status  Description  INTERVENTIONS  - Assess for and report changes in neurological status  - Initiate measures to prevent increased intracranial pressure  - Maintain blood pre

## 2019-12-13 LAB
ALBUMIN SERPL-MCNC: 2.5 G/DL (ref 3.4–5)
ALBUMIN/GLOB SERPL: 0.7 {RATIO} (ref 1–2)
ALP LIVER SERPL-CCNC: 257 U/L (ref 45–117)
ALT SERPL-CCNC: 67 U/L (ref 16–61)
ANION GAP SERPL CALC-SCNC: 15 MMOL/L (ref 0–18)
AST SERPL-CCNC: 149 U/L (ref 15–37)
BASOPHILS # BLD: 0 X10(3) UL (ref 0–0.2)
BASOPHILS NFR BLD: 0 %
BILIRUB SERPL-MCNC: 3.4 MG/DL (ref 0.1–2)
BUN BLD-MCNC: 28 MG/DL (ref 7–18)
BUN/CREAT SERPL: 5.2 (ref 10–20)
CALCIUM BLD-MCNC: 9.9 MG/DL (ref 8.5–10.1)
CHLORIDE SERPL-SCNC: 104 MMOL/L (ref 98–112)
CO2 SERPL-SCNC: 17 MMOL/L (ref 21–32)
CREAT BLD-MCNC: 5.4 MG/DL (ref 0.7–1.3)
DEPRECATED RDW RBC AUTO: 79.5 FL (ref 35.1–46.3)
EOSINOPHIL # BLD: 0.19 X10(3) UL (ref 0–0.7)
EOSINOPHIL NFR BLD: 1 %
ERYTHROCYTE [DISTWIDTH] IN BLOOD BY AUTOMATED COUNT: 16.3 % (ref 11–15)
GLOBULIN PLAS-MCNC: 3.8 G/DL (ref 2.8–4.4)
GLUCOSE BLD-MCNC: 252 MG/DL (ref 70–99)
GLUCOSE BLD-MCNC: 257 MG/DL (ref 70–99)
GLUCOSE BLD-MCNC: 281 MG/DL (ref 70–99)
GLUCOSE BLD-MCNC: 292 MG/DL (ref 70–99)
HAV IGM SER QL: 2 MG/DL (ref 1.6–2.6)
HCT VFR BLD AUTO: 29.4 % (ref 39–53)
HGB BLD-MCNC: 8.1 G/DL (ref 13–17.5)
LYMPHOCYTES NFR BLD: 1.33 X10(3) UL (ref 1–4)
LYMPHOCYTES NFR BLD: 7 %
M PROTEIN MFR SERPL ELPH: 6.3 G/DL (ref 6.4–8.2)
MCH RBC QN AUTO: 36.8 PG (ref 26–34)
MCHC RBC AUTO-ENTMCNC: 27.6 G/DL (ref 31–37)
MCV RBC AUTO: 133.6 FL (ref 80–100)
METAMYELOCYTES # BLD: 0.38 X10(3) UL
METAMYELOCYTES NFR BLD: 2 %
MONOCYTES # BLD: 1.33 X10(3) UL (ref 0.1–1)
MONOCYTES NFR BLD: 7 %
NEUTROPHILS # BLD AUTO: 10.87 X10 (3) UL (ref 1.5–7.7)
NEUTROPHILS NFR BLD: 72 %
NEUTS BAND NFR BLD: 11 %
NEUTS HYPERSEG # BLD: 15.77 X10(3) UL (ref 1.5–7.7)
OSMOLALITY SERPL CALC.SUM OF ELEC: 298 MOSM/KG (ref 275–295)
PHOSPHATE SERPL-MCNC: 4.6 MG/DL (ref 2.5–4.9)
PLATELET # BLD AUTO: 243 10(3)UL (ref 150–450)
PLATELET MORPHOLOGY: NORMAL
POTASSIUM SERPL-SCNC: 4.8 MMOL/L (ref 3.5–5.1)
RBC # BLD AUTO: 2.2 X10(6)UL (ref 3.8–5.8)
SODIUM SERPL-SCNC: 136 MMOL/L (ref 136–145)
TOTAL CELLS COUNTED: 100
TRIGL SERPL-MCNC: 547 MG/DL (ref 30–149)
WBC # BLD AUTO: 19 X10(3) UL (ref 4–11)

## 2019-12-13 PROCEDURE — 99232 SBSQ HOSP IP/OBS MODERATE 35: CPT | Performed by: PHYSICIAN ASSISTANT

## 2019-12-13 PROCEDURE — 99232 SBSQ HOSP IP/OBS MODERATE 35: CPT | Performed by: OTHER

## 2019-12-13 PROCEDURE — 99232 SBSQ HOSP IP/OBS MODERATE 35: CPT | Performed by: HOSPITALIST

## 2019-12-13 PROCEDURE — 99233 SBSQ HOSP IP/OBS HIGH 50: CPT | Performed by: INTERNAL MEDICINE

## 2019-12-13 RX ORDER — ALBUMIN (HUMAN) 12.5 G/50ML
100 SOLUTION INTRAVENOUS ONCE
Status: COMPLETED | OUTPATIENT
Start: 2019-12-13 | End: 2019-12-13

## 2019-12-13 RX ORDER — HEPARIN SODIUM 1000 [USP'U]/ML
1.5 INJECTION, SOLUTION INTRAVENOUS; SUBCUTANEOUS ONCE
Status: DISCONTINUED | OUTPATIENT
Start: 2019-12-13 | End: 2019-12-17

## 2019-12-13 RX ORDER — BUMETANIDE 0.25 MG/ML
2 INJECTION, SOLUTION INTRAMUSCULAR; INTRAVENOUS
Status: COMPLETED | OUTPATIENT
Start: 2019-12-13 | End: 2019-12-14

## 2019-12-13 RX ORDER — ALBUMIN (HUMAN) 12.5 G/50ML
100 SOLUTION INTRAVENOUS AS NEEDED
Status: DISCONTINUED | OUTPATIENT
Start: 2019-12-13 | End: 2019-12-17

## 2019-12-13 NOTE — PLAN OF CARE
A&O times 3, forgetful. Denies any pain. VSS, afebrile. On 2L O2NC, sating 94%. Lung sounds diminished, denies any SOB. TPN infusing. IV merrem continued. PT to eval .  Call light within reach. Fall precautions in place. Will monitor.      Problem: pre-medicate as appropriate  Outcome: Progressing     Problem: SAFETY ADULT - FALL  Goal: Free from fall injury  Description  INTERVENTIONS:  - Assess pt frequently for physical needs  - Identify cognitive and physical deficits and behaviors that affect ri

## 2019-12-13 NOTE — DIETARY NOTE
[facility]     NUTRITION FOLLOW UP ASSESSMENT     Pt does not meet malnutrition criteria.     NUTRITION DIAGNOSIS/PROBLEM:  Inadequate oral intake related to inability to consume sufficient intake as evidenced by TPN.      NUTRITION INTERVENTION: 12/03/19 : 108.5 kg (239 lb 3.2 oz)  11/26/19 : 96.1 kg (211 lb 13.8 oz)  09/17/19 : 106.1 kg (233 lb 14.5 oz)  11/20/18 : 108.9 kg (240 lb)     NUTRITION:  Diet: Low Fiber/ GI Soft     FOOD/NUTRITION RELATED HISTORY:  Appetite: Poor  Intake: 10% of luis

## 2019-12-13 NOTE — PROGRESS NOTES
[facility]                INFECTIOUS DISEASE PROGRESS NOTE    Toby Quezada Patient Status:  Inpatient    3/31/1945 MRN [id number]   [facility] 4SW-A Attending Cheri Garcia MD   [address] Day # 25 PCP Elsy Zhang MD     A Collection Time: 12/08/19  4:30 PM   Result Value Ref Range    Body Fluid Culture Result No Growth 4 Days N/A    Body Fld Smear No organisms seen N/A    Body Fld Smear 1+ Neutrophils seen N/A    Body Fld Smear This is a cytocentrifuged smear. N/A   2.  BLOO thora/exudative  Ascites present, no signs of empyema  Repeat CXR stable, pulm following    3.  ARF/HD per renal      Manav Diallo MD  [facility]  [phone]

## 2019-12-13 NOTE — SLP NOTE
Attempted evaluation. Spoke with RN who reported patient BP currently low and being treated for this. She advised to hold evaluation at this time. Will reattempt as schedule permits.     Franchesca Coffman Edvin 87 CCC-SLP  Pager [phone]

## 2019-12-13 NOTE — PROGRESS NOTES
On tpn at reg rates to run for 20 hrs via rt picc line,w/ good blood return,denies any pain,abdomen is distended,on lactulose,having loose stool,kept clean and dry and made comfortable,redness on carina legs,negative for dvt,can be anxious at times,has poor

## 2019-12-13 NOTE — PROGRESS NOTES
[facility]  Report of Psychiatric Progress Note    Jules Cui Patient Status:  Inpatient    3/31/1945 MRN [id number]   [facility] 3NE-A Attending Laurie Decker MD   [facility] Day # 25 PCP Stephen Carney MD     Date of Admission: 1 liver.     Abdoul Ribeiro    History of Present Illness:  70 yo WM with opioid use disorder (in remission 26 yrs) was admitted on 11/25/19 for treatment of acute pancreatitis.  Lipase was 4151 and CT showed \"peripancreatic inflammatory changes with adjacent f the ICU. He felt better when reassured that he is gradually getting better. No suicidal ideation. No voices/visions. 12/3/19- He feels tired and depressed. Some feelings of helplessness, but no hopelessness or suicidal ideation.      12/5/19- Last night 01/01/1987    addicted to drugs and alcohol   • Unspecified essential hypertension    • Visual impairment     glasses     Past Surgical History:   Procedure Laterality Date   • ADENOIDECTOMY     • BIOPSY OF THYROID,SAM  08/20/10    NO CYTOLOGIC EVIDENCE Nightly  •  QUEtiapine Fumarate (SEROQUEL) partial tab 12.5 mg, 12.5 mg, Oral, BID PRN  •  Meropenem (MERREM) 500 mg in sodium chloride 0.9% 100 mL MBP, 500 mg, Intravenous, Q24H  •  heparin sodium 1000 UNIT/ML injection 3,000 Units, 3,000 Units, Intraveno liquid 650 mg, 650 mg, Oral, Q6H PRN **OR** acetaminophen (TYLENOL) 650 MG rectal suppository 650 mg, 650 mg, Rectal, Q6H PRN  •  dextrose 50 % injection 25 mL, 25 mL, Intravenous, Once  •  latanoprost (XALATAN) 0.005 % ophthalmic solution 1 drop, 1 drop, ONLY)(CPT=74177), 9/17/2019, 11:19.      INDICATIONS:  chest pain     TECHNIQUE:  CT of the chest (with CTA imaging), abdomen, and pelvis were obtained post- injection of non-ionic intravenous contrast material. Multi-planar reformatted/3-D images were crea 2nd and 3rd portions of the duodenum as well as soft tissue stranding adjacent to the greater   curvature of the stomach likely an extension from the peripancreatic inflammation. ABDOMINAL WALL:  Small fat containing umbilical hernia. Shyann Riedel   PELVIC ORGANS:  Pr

## 2019-12-13 NOTE — PROGRESS NOTES
Gastroenterology Progress Note  Patient Name: Noel Mayer  Chief Complaint: gallstone/Etoh pancreatitis, pseudocysts  S: Pt states that he is not able to take in much PO at this time as he is not hungry.  Denies abd pain or nausea/vomiting with PO int infected necrosis; would continue conservative management for now; after discussion and review of images with biliary, will not pursue any intervention for now and likely will need repeat imaging in 2 weeks  3.  Soft diet as tolerated; SLP eval for dysphagi

## 2019-12-13 NOTE — PROGRESS NOTES
[facility]  Nephrology Progress Note    Marsha Abhishek Patient Status:  Inpatient    3/31/1945 MRN [id number]   [facility] 4SW-A Attending Roberta Corona MD   [address] Day # 25 PCP Leslie Daniels MD       SUBJECTIVE:  C/o abd napoleone 21.0*   HGB 10.1* 8.5* 9.2* 8.7* 8.8* 8.5*   .6* 112.8*  --  111.8* 114.8* 117.9*   .0 220.0  --  246.0 248.0 229.0   BAND 12 6  --  9 13 12       Recent Labs   Lab 12/07/19  0416 12/08/19  0445 12/09/19  0452 12/10/19  0435 12/11/19  0507 12 Intravenous, Q15 Min PRN    Or  glucose (DEX4) oral liquid 30 g, 30 g, Oral, Q15 Min PRN    Or  Glucose-Vitamin C (DEX-4) chewable tab 8 tablet, 8 tablet, Oral, Q15 Min PRN  Insulin Aspart Pen (NOVOLOG) 100 UNIT/ML flexpen 1-5 Units, 1-5 Units, Subcutaneou several treatments although held for past day. Awaiting labs, may need today. Cont IV diuretics although UOP still poor. Cont to follow for signs of recovery       #2.   Acute pancreatitis-possibly multifactorial with a history of alcohol use as well as normal...

## 2019-12-13 NOTE — PROGRESS NOTES
[facility]  Progress Note     [facility] Patient Status:  Inpatient    3/31/1945 MRN [id number]   [facility] 4SW-A Attending C[facility] Day # 25 PCP Brenda Berger MD     Chief Complaint: abdominal d 61* 83* 126*   ALT 54 54 58   BILT 3.5* 3.2* 3.7*   TP 5.7* 5.6* 5.7*       Estimated Creatinine Clearance: 18.8 mL/min (A) (based on SCr of 3.56 mg/dL (H)). No results for input(s): PTP, INR in the last 168 hours.     No results for input(s): TROP, CK i units  2. also TPN insulin  3. Correctional scale  12. Anemia, stable  13. Acute encephalopathy, multifactorial, Improved  1. suspect hepatic/uremia/ETOH and opiate w/d  2. Lactulose per GI - many BMs yesterday.    3. Seroquel/cymbalta per psych and when cl

## 2019-12-13 NOTE — PHYSICAL THERAPY NOTE
PHYSICAL THERAPY TREATMENT NOTE - INPATIENT    Room Number: 410/410-A    Session: 2  Number of Visits to Meet Established Goals: 6  History related to current admission: Pt is 69year old male admitted on 11/25/2019 from home with c/o epigastic and chest REPLACEMENT SURGERY     • KNEE TOTAL REPLACEMENT Right 5/13/2016    Performed by Candace Corbett MD at [facility] MAIN OR   • OTHER SURGICAL HISTORY  01/01/2005    cardiac cath    • OTHER SURGICAL HISTORY  07/24/2017    flow us [address] drag(attempting to sit to chair prior to being in close proximity)  Stoop/Curb Assistance: Not tested  Comment : above scores based on dept protocol    Skilled Therapy Provided:     Pt presented in supine upon PT arrival. Pt willing to participate in sessi downgrade of overall functional mobility. Due to above deficits, Pt will benefit from continued IP PT, so that patient may achieve highest functional independence/return to baseline.        DISCHARGE RECOMMENDATIONS  PT Discharge Recommendations: Sub-acute

## 2019-12-13 NOTE — PROGRESS NOTES
[facility]  Progress Note    Raul Gonzales Patient Status:  Inpatient    3/31/1945 MRN [id number]   [facility] 4NW-A Attending Teri Lerma MD   [facility] Day # 25 PCP Jean-Paul Moreno MD     Subjective:  Patient states he is feelin

## 2019-12-14 LAB
ALBUMIN SERPL-MCNC: 2.4 G/DL (ref 3.4–5)
ALBUMIN/GLOB SERPL: 0.7 {RATIO} (ref 1–2)
ALP LIVER SERPL-CCNC: 243 U/L (ref 45–117)
ALT SERPL-CCNC: 58 U/L (ref 16–61)
AMMONIA PLAS-MCNC: <10 UMOL/L (ref 11–32)
ANION GAP SERPL CALC-SCNC: 13 MMOL/L (ref 0–18)
AST SERPL-CCNC: 123 U/L (ref 15–37)
BILIRUB SERPL-MCNC: 2.7 MG/DL (ref 0.1–2)
BUN BLD-MCNC: 41 MG/DL (ref 7–18)
BUN/CREAT SERPL: 6.6 (ref 10–20)
CALCIUM BLD-MCNC: 9.9 MG/DL (ref 8.5–10.1)
CHLORIDE SERPL-SCNC: 105 MMOL/L (ref 98–112)
CO2 SERPL-SCNC: 21 MMOL/L (ref 21–32)
CREAT BLD-MCNC: 6.18 MG/DL (ref 0.7–1.3)
GLOBULIN PLAS-MCNC: 3.3 G/DL (ref 2.8–4.4)
GLUCOSE BLD-MCNC: 144 MG/DL (ref 70–99)
GLUCOSE BLD-MCNC: 175 MG/DL (ref 70–99)
GLUCOSE BLD-MCNC: 181 MG/DL (ref 70–99)
GLUCOSE BLD-MCNC: 192 MG/DL (ref 70–99)
GLUCOSE BLD-MCNC: 202 MG/DL (ref 70–99)
HAV IGM SER QL: 2 MG/DL (ref 1.6–2.6)
M PROTEIN MFR SERPL ELPH: 5.7 G/DL (ref 6.4–8.2)
OSMOLALITY SERPL CALC.SUM OF ELEC: 304 MOSM/KG (ref 275–295)
PHOSPHATE SERPL-MCNC: 5.3 MG/DL (ref 2.5–4.9)
POTASSIUM SERPL-SCNC: 4.5 MMOL/L (ref 3.5–5.1)
SODIUM SERPL-SCNC: 139 MMOL/L (ref 136–145)

## 2019-12-14 PROCEDURE — 90935 HEMODIALYSIS ONE EVALUATION: CPT | Performed by: INTERNAL MEDICINE

## 2019-12-14 PROCEDURE — 99232 SBSQ HOSP IP/OBS MODERATE 35: CPT | Performed by: PHYSICIAN ASSISTANT

## 2019-12-14 PROCEDURE — 99232 SBSQ HOSP IP/OBS MODERATE 35: CPT | Performed by: HOSPITALIST

## 2019-12-14 NOTE — PLAN OF CARE
Problem: Impaired Swallowing  Goal: Minimize aspiration risk  Description  Interventions:  - Patient should be alert and upright for all feedings (90 degrees preferred)  - Offer food and liquids at a slow rate  - No straws  - Encourage small bites of julio cesar

## 2019-12-14 NOTE — PROGRESS NOTES
[facility]                INFECTIOUS DISEASE PROGRESS NOTE    Konrad Bernal Patient Status:  Inpatient    3/31/1945 MRN [id number]   [facility] 4SW-A Attending Sana Eddy MD   [facility] Day # 23 PCP Maryuri Herring MD     A Time: 12/08/19  4:30 PM   Result Value Ref Range    Body Fluid Culture Result No Growth 5 Days N/A    Body Fld Smear No organisms seen N/A    Body Fld Smear 1+ Neutrophils seen N/A    Body Fld Smear This is a cytocentrifuged smear. N/A   2.  BLOOD CULTURE thora/exudative  Ascites present, no signs of empyema  Repeat CXR stable, pulm following    3.  ARF/HD per renal      Satinder Riggs MD  [facility]  [phone]

## 2019-12-14 NOTE — DIETARY NOTE
Clinical Nutrition  Tolerating TPN infusing via PICC. Appetite very poor. Minimal intake w/ fluid overload, abdominal distension noted. Continue cyclic TPN for nutrition. Receiving HD at visit.  Bag #9 tonight will provide 700 dextrose calories, 0 lipid jose

## 2019-12-14 NOTE — PLAN OF CARE
Pt is aox3 pt is very drowsy, pt is on room air and denies any SOB. Pt has denied any pain at this time. Pt Received dialysis today and 3 liter were taken off.  Pt blood pressure have been on the low side albumin was given with dialysis and pt tolerated wel ordered  Outcome: Progressing     Problem: GASTROINTESTINAL - ADULT  Goal: Minimal or absence of nausea and vomiting  Description  INTERVENTIONS:  - Maintain adequate hydration with IV or PO as ordered and tolerated  - Nasogastric tube to low intermittent non-pharmacological measures as appropriate and evaluate response  - Consider cultural and social influences on pain and pain management  - Manage/alleviate anxiety  - Utilize distraction and/or relaxation techniques  - Monitor for opioid side effects  - N Progressing     Problem: RESPIRATORY - ADULT  Goal: Achieves optimal ventilation and oxygenation  Description  INTERVENTIONS:  - Assess for changes in respiratory status  - Assess for changes in mentation and behavior  - Position to facilitate oxygenation changes in neurological status  - Encourage and assist patient to increase activity and self care with guidance from PT/OT  - Encourage visually impaired, hearing impaired and aphasic patients to use assistive/communication devices  Outcome: Progressing

## 2019-12-14 NOTE — SLP NOTE
Subjective:      Patient ID: Guillermo Castillo is a 49 y.o. male.    Chief Complaint: Otalgia (r ear)    Otalgia    There is pain in the right ear. This is a new problem. The current episode started yesterday. The problem occurs constantly. The problem has been gradually worsening. There has been no fever. The pain is at a severity of 4/10. The pain is moderate. Associated symptoms include headaches and neck pain. Pertinent negatives include no abdominal pain, coughing, diarrhea, drainage, ear discharge, hearing loss, rash, rhinorrhea, sore throat or vomiting. Associated symptoms comments: Right jaw pain, nausea. Feels like he has water in his ear. Sometimes sounds like he is under water. . He has tried acetaminophen for the symptoms. The treatment provided no relief. There is no history of a chronic ear infection, hearing loss or a tympanostomy tube.   Admits to a history of grinding teeth at night. Denies any tooth pain, reports the ear pain is so bad that it radiates to his jaw.   Denies any dizziness or ringing in the ears.    Review of Systems   Constitutional: Negative for activity change, appetite change, chills, diaphoresis, fatigue, fever and unexpected weight change.   HENT: Positive for ear pain. Negative for congestion, dental problem, drooling, ear discharge, facial swelling, hearing loss, mouth sores, nosebleeds, postnasal drip, rhinorrhea, sinus pain, sinus pressure, sneezing, sore throat, tinnitus, trouble swallowing and voice change.    Eyes: Positive for discharge, redness and itching. Negative for photophobia, pain and visual disturbance.   Respiratory: Negative.  Negative for cough, choking, chest tightness and shortness of breath.    Cardiovascular: Negative for chest pain, palpitations and leg swelling.   Gastrointestinal: Negative for abdominal distention, abdominal pain, blood in stool, constipation, diarrhea, nausea and vomiting.   Endocrine: Negative for cold intolerance, heat intolerance,  ADULT SWALLOWING EVALUATION    ASSESSMENT    ASSESSMENT/OVERALL IMPRESSION:  B/S swallow eval completed upon receipt of MD order.   History related to current admission: Pt is 69year old male admitted on 11/25/2019 from home with c/o epigastic and chest pa - Liquid: Thin  Aspiration Precautions: Upright position; Slow rate;Small bites and sips  Medication Administration Recommendations: Whole in puree  Treatment Plan/Recommendations: Dysphagia therapy  Discharge Recommendations/Plan: Undetermined    HISTORY polydipsia and polyuria.   Genitourinary: Negative.  Negative for difficulty urinating and frequency.   Musculoskeletal: Positive for neck pain. Negative for arthralgias, back pain, gait problem, joint swelling, myalgias and neck stiffness.   Skin: Negative for color change, pallor, rash and wound.   Neurological: Positive for headaches. Negative for dizziness, tremors, weakness, light-headedness and numbness.   Hematological: Negative for adenopathy.   Psychiatric/Behavioral: Negative for behavioral problems, confusion, self-injury, sleep disturbance and suicidal ideas. The patient is not nervous/anxious.      Objective:   /82   Pulse 67   Temp 97 °F (36.1 °C) (Tympanic)   Ht 6' (1.829 m)   Wt 109.8 kg (242 lb 1 oz)   SpO2 97%   BMI 32.83 kg/m²     Physical Exam   Constitutional: He is oriented to person, place, and time. He appears well-developed and well-nourished.   HENT:   Head: Normocephalic and atraumatic.   Right Ear: External ear normal. No lacerations. No drainage, swelling or tenderness. No foreign bodies. No mastoid tenderness. Tympanic membrane is injected and erythematous. Tympanic membrane is not scarred, not perforated, not retracted and not bulging. Tympanic membrane mobility is normal. A middle ear effusion (serous) is present. No hemotympanum. No decreased hearing is noted.   Left Ear: Hearing, tympanic membrane, external ear and ear canal normal.   Nose: Nose normal. No mucosal edema or rhinorrhea. Right sinus exhibits no maxillary sinus tenderness and no frontal sinus tenderness. Left sinus exhibits no maxillary sinus tenderness and no frontal sinus tenderness.   Mouth/Throat: Oropharynx is clear and moist. No oropharyngeal exudate.   Eyes: Conjunctivae and EOM are normal. Pupils are equal, round, and reactive to light.   Neck: Normal range of motion. Neck supple.   Cardiovascular: Normal rate, regular rhythm and normal heart sounds.  Exam reveals no gallop and no friction rub.    No  Limits    Voice Quality: Weak  Respiratory Status: Supplemental O2;Nasal cannula  Consistencies Trialed: Thin liquids;Puree; Soft solid  Method of Presentation: Self presentation;Spoon;Cup;Single sips  Patient Positioning: Upright    Oral Phase of Swallow: murmur heard.  Pulmonary/Chest: Effort normal and breath sounds normal. No respiratory distress. He has no wheezes. He has no rales. He exhibits no tenderness.   Musculoskeletal: Normal range of motion.   Lymphadenopathy:     He has no cervical adenopathy.   Neurological: He is alert and oriented to person, place, and time.   Skin: Skin is warm and dry.   Psychiatric: He has a normal mood and affect. His behavior is normal. Judgment and thought content normal.   Vitals reviewed.      Assessment:     1. Right acute serous otitis media, recurrence not specified      Plan:   Right acute serous otitis media, recurrence not specified  -     methylPREDNISolone acetate injection 60 mg; Inject 1.5 mLs (60 mg total) into the muscle once.    -start antihistamine daily for the next 2-4 weeks. (zyrtec, Claritin,or allegra) and take once daily.   -tylenol for pain today  -tomorrow, start ibuprofen or motrin. Take 2-3 tablets three times daily with food. Or start Aleve take 2 OTC tablets (500mg) with food twice daily  -if no improvement over the next week, contact clinic, may need to see ENT   -Educational handout on over-the-counter medications and at-home conservative care, pertinent to the patients diagnosis today, was handed to the patient and discussed in detail.    Follow-up if symptoms worsen or fail to improve.

## 2019-12-14 NOTE — PLAN OF CARE
Assumed care at [facility]. Pt A/O x2-3. VSS. Afebrile. No c/o pain. Pt very drowsy all night, therefore Melatonin and Seroquel held. Lactulose held d/t pt having more than 3 BM's. Cyclic TPN infusing through midline. Insulin coverage given per MAR.  IV abx given maintained within normal limits  Description  INTERVENTIONS:  - Monitor labs and rhythm and assess patient for signs and symptoms of electrolyte imbalances  - Administer electrolyte replacement as ordered  - Monitor response to electrolyte replacements, in deficits and behaviors that affect risk of falls.   - [place] fall precautions as indicated by assessment.  - Educate pt/family on patient safety including physical limitations  - Instruct pt to call for assistance with activity based on assessment  - Mod respiratory difficulty  - Respiratory Therapy support as indicated  - Manage/alleviate anxiety  - Monitor for signs/symptoms of CO2 retention  Outcome: Progressing     Problem: MUSCULOSKELETAL - ADULT  Goal: Return mobility to safest level of function  Ronnie

## 2019-12-14 NOTE — PLAN OF CARE
Patient was sleeping while rounding with night nurse, cpox at 96%, still on o2 at 3 liters per nasal cannula. Patient sat up in chair for four hours during shift.  Patient had episode of hypotesion and getting back to bed, denies pain,

## 2019-12-14 NOTE — PROGRESS NOTES
[facility]  Progress Note     Collins Coma Patient Status:  Inpatient    3/31/1945 MRN [id number]   [facility] 4SW-A Attending Angel [facility] Day # 23 PCP Dariusz Multani MD     Chief Complaint: abdominal d hours. No results for input(s): TROP, CK in the last 168 hours. Imaging: Imaging data reviewed in Epic.     Medications:   • bumetanide  2 mg Intravenous BID (Diuretic)   • insulin detemir  15 Units Subcutaneous Daily   • heparin sodium  1.5 mL I 3. Seroquel/cymbalta per psych and when closer to d/c psych recommending trial buprenorphine. 4. Repeat ammonia today  14. GERD  1. PPI  15. Dyslipidemia  1.  Hold Zetia    Quality:  · DVT Prophylaxis: Heparin  · CODE status: Full  · GI ppx PPI    Estim

## 2019-12-14 NOTE — SLP NOTE
Attempted to see pt for swallow eval this am.  Pt currently occupied with hemodialysis. SLP will reattempt later today as schedule permits. Dorian Justice M.S.,CCC-SLP  Speech Language Pathologist

## 2019-12-14 NOTE — PLAN OF CARE
Patient on o2 at 3 liters per nasal cannula, with cpox at 95%, patient alert times three, forgetful. Dr Herlinda Rojas notified of hypotension, bp reading of 74/36 pulse 88, rechecked vitals twice, and obtained an order for albumin that was given at 1300.  BP sy

## 2019-12-14 NOTE — PROGRESS NOTES
[facility]  Progress Note    Nic Riddle Patient Status:  Inpatient    3/31/1945 MRN [id number]   [facility] 4NW-A Attending Ollie Martin MD   [facility] Day # 23 PCP Jerrod Banuelos MD     Subjective:   The patient reports weaknes hyperplasia with lower urinary tract symptoms     Chronic pain of left knee     Acute bronchitis     Acute bronchitis, unspecified organism     Leukocytosis, unspecified type     Hypertension, unspecified type     Hypokalemia     Leukocytosis     Azotemia

## 2019-12-14 NOTE — PROGRESS NOTES
Gastroenterology Progress Note  Patient Name: Niya Gilmore  Chief Complaint: gallstone/Etoh pancreatitis, pseudocysts  S: Having loose stools with lactulose. Sleepy most of the night. No new complaints, feels breathing is improving appears labored.   O necrosis cannot be entirely excluded, correlate   Clinically. Assessment:   1. Etoh/Gallstone pancreatitis -moderate to severe, complicated by acute kidney injury, necrosis? - to have dialysis today  2. Fluid overload/SOB   Plan:   1.  Continue Meropenem

## 2019-12-14 NOTE — PROGRESS NOTES
[facility]  Nephrology Progress Note    Fe Herrera Patient Status:  Inpatient    3/31/1945 MRN [id number]   [facility] 4SW-A Attending Santy Moscoso MD   [address] Day # 23 PCP Emil Sharp MD       SUBJECTIVE:    Seen on HD [facility]) chewable tab 80 mg, 80 mg, Oral, QID PRN  Heparin Sodium (Porcine) 5000 UNIT/ML injection 5,000 Units, 5,000 Units, Subcutaneous, 2 times per day  dextrose 10 % infusion, , Intravenous, Continuous PRN  Normal Saline Flush 0.9 % injection 10 mL, 1 R>L  Abdomen: Soft, + distended.  NT to palpation, + bowel sounds  Extremities + anasarca  Neurologic: moving all extremities  Skin: Warm and dry, no rash    Recent Labs     12/12/19  0557 12/13/19  0935   WBC 21.0* 19.0*   HGB 8.5* 8.1*   .9* 133.6*

## 2019-12-15 LAB
ANION GAP SERPL CALC-SCNC: 7 MMOL/L (ref 0–18)
ANTIBODY SCREEN: NEGATIVE
BASOPHILS # BLD: 0 X10(3) UL (ref 0–0.2)
BASOPHILS NFR BLD: 0 %
BUN BLD-MCNC: 25 MG/DL (ref 7–18)
BUN/CREAT SERPL: 5.6 (ref 10–20)
CALCIUM BLD-MCNC: 9.5 MG/DL (ref 8.5–10.1)
CHLORIDE SERPL-SCNC: 108 MMOL/L (ref 98–112)
CO2 SERPL-SCNC: 26 MMOL/L (ref 21–32)
CREAT BLD-MCNC: 4.48 MG/DL (ref 0.7–1.3)
DEPRECATED RDW RBC AUTO: 86.5 FL (ref 35.1–46.3)
EOSINOPHIL # BLD: 0.46 X10(3) UL (ref 0–0.7)
EOSINOPHIL NFR BLD: 3 %
ERYTHROCYTE [DISTWIDTH] IN BLOOD BY AUTOMATED COUNT: 16.7 % (ref 11–15)
GLUCOSE BLD-MCNC: 184 MG/DL (ref 70–99)
GLUCOSE BLD-MCNC: 191 MG/DL (ref 70–99)
GLUCOSE BLD-MCNC: 200 MG/DL (ref 70–99)
GLUCOSE BLD-MCNC: 201 MG/DL (ref 70–99)
HAV IGM SER QL: 2 MG/DL (ref 1.6–2.6)
HCT VFR BLD AUTO: 24.4 % (ref 39–53)
HGB BLD-MCNC: 6.3 G/DL (ref 13–17.5)
HGB BLD-MCNC: 7.7 G/DL (ref 13–17.5)
LYMPHOCYTES NFR BLD: 0.77 X10(3) UL (ref 1–4)
LYMPHOCYTES NFR BLD: 5 %
MCH RBC QN AUTO: 36 PG (ref 26–34)
MCHC RBC AUTO-ENTMCNC: 25.8 G/DL (ref 31–37)
MCV RBC AUTO: 139.4 FL (ref 80–100)
METAMYELOCYTES # BLD: 0.15 X10(3) UL
METAMYELOCYTES NFR BLD: 1 %
MONOCYTES # BLD: 0 X10(3) UL (ref 0.1–1)
MONOCYTES NFR BLD: 0 %
NEUTROPHILS # BLD AUTO: 8.9 X10 (3) UL (ref 1.5–7.7)
NEUTROPHILS NFR BLD: 85 %
NEUTS BAND NFR BLD: 6 %
NEUTS HYPERSEG # BLD: 14.01 X10(3) UL (ref 1.5–7.7)
NRBC BLD MANUAL-RTO: 3 %
OSMOLALITY SERPL CALC.SUM OF ELEC: 301 MOSM/KG (ref 275–295)
PHOSPHATE SERPL-MCNC: 2.5 MG/DL (ref 2.5–4.9)
PLATELET # BLD AUTO: 175 10(3)UL (ref 150–450)
PLATELET MORPHOLOGY: NORMAL
POTASSIUM SERPL-SCNC: 3.8 MMOL/L (ref 3.5–5.1)
RBC # BLD AUTO: 1.75 X10(6)UL (ref 3.8–5.8)
RH BLOOD TYPE: POSITIVE
SODIUM SERPL-SCNC: 141 MMOL/L (ref 136–145)
TOTAL CELLS COUNTED: 100
WBC # BLD AUTO: 15.4 X10(3) UL (ref 4–11)

## 2019-12-15 PROCEDURE — 99232 SBSQ HOSP IP/OBS MODERATE 35: CPT | Performed by: HOSPITALIST

## 2019-12-15 PROCEDURE — 30233N1 TRANSFUSION OF NONAUTOLOGOUS RED BLOOD CELLS INTO PERIPHERAL VEIN, PERCUTANEOUS APPROACH: ICD-10-PCS | Performed by: HOSPITALIST

## 2019-12-15 PROCEDURE — 90935 HEMODIALYSIS ONE EVALUATION: CPT | Performed by: INTERNAL MEDICINE

## 2019-12-15 RX ORDER — SODIUM CHLORIDE 9 MG/ML
INJECTION, SOLUTION INTRAVENOUS ONCE
Status: COMPLETED | OUTPATIENT
Start: 2019-12-15 | End: 2019-12-15

## 2019-12-15 RX ORDER — BUMETANIDE 0.25 MG/ML
2 INJECTION, SOLUTION INTRAMUSCULAR; INTRAVENOUS
Status: DISPENSED | OUTPATIENT
Start: 2019-12-15 | End: 2019-12-16

## 2019-12-15 NOTE — PROGRESS NOTES
Gastroenterology Progress Note  Patient Name: Ari Rahman  Chief Complaint: gallstone/Etoh pancreatitis, pseudocysts  S: Remains the same, no new symptoms. No appetite. Having 2-3 BM's with lactulose.  Tolerated some milk and yogurt yesterday  O: BP 1 excluded, correlate   Clinically. Assessment:   1. Etoh/Gallstone pancreatitis -moderate to severe, complicated by acute kidney injury, necrosis? , clinically stable, passing BM's, but has not had any po intake, on TPN  2.  Fluid overload/SOB -improved w

## 2019-12-15 NOTE — DIETARY NOTE
Clinical Nutrition  Tolerating TPN infusing via PICC. Appetite very poor. Minimal intake w/ fluid overload, abdominal distension noted. Continue cyclic TPN for nutrition. Per MD, will likely need HD today if CT w/ contrast is completed.  Bag #9 tonight will

## 2019-12-15 NOTE — PROGRESS NOTES
[facility]                INFECTIOUS DISEASE PROGRESS NOTE    Toby Quezada Patient Status:  Inpatient    3/31/1945 MRN [id number]   [facility] 4SW-A Attending Cheri Garcia MD   Hosp Day # 21 PCP Elsy Zhang MD     A Smear 1+ Neutrophils seen N/A    Body Fld Smear This is a cytocentrifuged smear. N/A   2.  BLOOD CULTURE     Status: None    Collection Time: 12/07/19  7:50 PM   Result Value Ref Range    Blood Culture Result No Growth 5 Days N/A   3. URINE CULTURE, ROUTINE MD [facility]  [phone]

## 2019-12-15 NOTE — SLP NOTE
SPEECH DAILY NOTE - INPATIENT    ASSESSMENT & PLAN   ASSESSMENT  Pt seen at bedside this AM for speech therapy services. Pt cooperative, pleasant, and alert. Per RN, pt tolerating diet well with no concerns of aspiration.  Pt demonstrated cough response fol have any questions, please contact Juan C Loco M.S. Rose Marie Alcaraz  Pager [phone]

## 2019-12-15 NOTE — PROGRESS NOTES
[facility]  Progress Note     Pau Falling Patient Status:  Inpatient    3/31/1945 MRN [id number]   [facility] 4SW-A Attending Dr. Rachel Norman # 21 PCP Sumanth Roblero MD     Chief Complaint: abdominal distention Clearance: 14.9 mL/min (A) (based on SCr of 4.48 mg/dL (H)). No results for input(s): PTP, INR in the last 168 hours. No results for input(s): TROP, CK in the last 168 hours. Imaging: Imaging data reviewed in Epic.     Medications:   • bumetan down  1. hgb 6.3 today, PRBCs ordered  14. Acute encephalopathy, multifactorial, Improved  1. suspect hepatic/uremia/ETOH and opiate w/d  2. Lactulose per GI - passing stool, Repeat ammonia was <10  3.  Seroquel/cymbalta per psych and when closer to d/c psy

## 2019-12-15 NOTE — PROGRESS NOTES
[facility]  Nephrology Progress Note    Ari Blazing Patient Status:  Inpatient    3/31/1945 MRN [id number]   [facility] 4SW-A Attending Marcia Blanchard MD   [facility] Day # 21 PCP Anupam Covarrubias MD       SUBJECTIVE:    No acute ev Subcutaneous, 2 times per day  dextrose 10 % infusion, , Intravenous, Continuous PRN  Normal Saline Flush 0.9 % injection 10 mL, 10 mL, Intravenous, PRN  norepinephrine (LEVOPHED) 4 mg/250 ml premix infusion, 0.5-30 mcg/min, Intravenous, Continuous PRN  va dry, no rash    Recent Labs     12/13/19  0935 12/15/19  0540   WBC 19.0* 15.4*   HGB 8.1* 6.3*   .6* 139.4*   .0 175.0   BAND 11 6       Recent Labs     12/13/19  1057 12/14/19  0600 12/15/19  0644    139 141   K 4.8 4.5 3.8    1

## 2019-12-15 NOTE — PLAN OF CARE
Pt is aox3 on 2 ls of o2 and maintain 02 level around 92-96 on 2l. Pt has some episode of tachypnea, head of the bed is elevated continued to monitor pt. MD aware and order ultrafiltration on pt.  Pt is schedule to continue the TPN, and scheduled for dialys Continuous cardiac monitoring, monitor vital signs, obtain 12 lead EKG if indicated  - Evaluate effectiveness of antiarrhythmic and heart rate control medications as ordered  - Initiate emergency measures for life threatening arrhythmias  - Monitor electro ADULT  Goal: Verbalizes/displays adequate comfort level or patient's stated pain goal  Description  INTERVENTIONS:  - Encourage pt to monitor pain and request assistance  - Assess pain using appropriate pain scale  - Administer analgesics based on type and care  Description  Interventions:  - Assess ability and encourage patient to participate in ADLs to maximize function  - Promote sitting position while performing ADLs such as feeding, grooming, and bathing  - Educate and encourage patient/family in [place] parameters to optimize cerebral perfusion and minimize risk of hemorrhage  - Monitor temperature, glucose, and sodium.  Initiate appropriate interventions as ordered  Outcome: Progressing  Goal: Achieves maximal functionality and self care  Description  INT

## 2019-12-15 NOTE — PROGRESS NOTES
[facility]  Progress Note    Avi Peak Patient Status:  Inpatient    3/31/1945 MRN [id number]   [facility] 4NW-A Attending Rosey Irwin MD   [address] Day # 21 PCP Weston Salmeron MD     Subjective:   The patient is sitting up i unspecified type     Hypertension, unspecified type     Hypokalemia     Leukocytosis     Azotemia     Acute pancreatitis     Metabolic acidosis     Hyperglycemia     Acute pancreatitis, unspecified complication status, unspecified pancreatitis type     SVT

## 2019-12-16 ENCOUNTER — APPOINTMENT (OUTPATIENT)
Dept: GENERAL RADIOLOGY | Facility: HOSPITAL | Age: 74
DRG: 438 | End: 2019-12-16
Attending: INTERNAL MEDICINE
Payer: MEDICARE

## 2019-12-16 PROBLEM — Z99.89 OSA ON CPAP: Status: ACTIVE | Noted: 2019-12-16

## 2019-12-16 PROBLEM — G47.33 OSA ON CPAP: Status: ACTIVE | Noted: 2019-12-16

## 2019-12-16 LAB
ALBUMIN SERPL-MCNC: 3 G/DL (ref 3.4–5)
ALP LIVER SERPL-CCNC: 270 U/L (ref 45–117)
ALT SERPL-CCNC: 70 U/L (ref 16–61)
ANION GAP SERPL CALC-SCNC: 11 MMOL/L (ref 0–18)
AST SERPL-CCNC: 182 U/L (ref 15–37)
BASOPHILS # BLD: 0.2 X10(3) UL (ref 0–0.2)
BASOPHILS NFR BLD: 1 %
BILIRUB DIRECT SERPL-MCNC: 1.8 MG/DL (ref 0–0.2)
BILIRUB SERPL-MCNC: 2.5 MG/DL (ref 0.1–2)
BLOOD TYPE BARCODE: 5100
BUN BLD-MCNC: 36 MG/DL (ref 7–18)
BUN/CREAT SERPL: 6.2 (ref 10–20)
CALCIUM BLD-MCNC: 10.6 MG/DL (ref 8.5–10.1)
CHLORIDE SERPL-SCNC: 109 MMOL/L (ref 98–112)
CO2 SERPL-SCNC: 22 MMOL/L (ref 21–32)
CREAT BLD-MCNC: 5.78 MG/DL (ref 0.7–1.3)
DEPRECATED RDW RBC AUTO: 83.5 FL (ref 35.1–46.3)
EOSINOPHIL # BLD: 0 X10(3) UL (ref 0–0.7)
EOSINOPHIL NFR BLD: 0 %
ERYTHROCYTE [DISTWIDTH] IN BLOOD BY AUTOMATED COUNT: 19.2 % (ref 11–15)
GLUCOSE BLD-MCNC: 159 MG/DL (ref 70–99)
GLUCOSE BLD-MCNC: 182 MG/DL (ref 70–99)
GLUCOSE BLD-MCNC: 188 MG/DL (ref 70–99)
GLUCOSE BLD-MCNC: 195 MG/DL (ref 70–99)
HAV IGM SER QL: 2.1 MG/DL (ref 1.6–2.6)
HAV IGM SER QL: NONREACTIVE
HBV CORE IGM SER QL: NONREACTIVE
HBV SURFACE AG SERPL QL IA: NONREACTIVE
HCT VFR BLD AUTO: 29 % (ref 39–53)
HCV AB SERPL QL IA: NONREACTIVE
HGB BLD-MCNC: 8.8 G/DL (ref 13–17.5)
LYMPHOCYTES NFR BLD: 1.62 X10(3) UL (ref 1–4)
LYMPHOCYTES NFR BLD: 8 %
M PROTEIN MFR SERPL ELPH: 6.1 G/DL (ref 6.4–8.2)
MCH RBC QN AUTO: 35.8 PG (ref 26–34)
MCHC RBC AUTO-ENTMCNC: 30.3 G/DL (ref 31–37)
MCV RBC AUTO: 117.9 FL (ref 80–100)
METAMYELOCYTES # BLD: 1.02 X10(3) UL
METAMYELOCYTES NFR BLD: 5 %
MONOCYTES # BLD: 2.44 X10(3) UL (ref 0.1–1)
MONOCYTES NFR BLD: 12 %
MORPHOLOGY: NORMAL
MYELOCYTES # BLD: 1.02 X10(3) UL
MYELOCYTES NFR BLD: 5 %
NEUTROPHILS # BLD AUTO: 11.91 X10 (3) UL (ref 1.5–7.7)
NEUTROPHILS NFR BLD: 55 %
NEUTS BAND NFR BLD: 14 %
NEUTS HYPERSEG # BLD: 14.01 X10(3) UL (ref 1.5–7.7)
OSMOLALITY SERPL CALC.SUM OF ELEC: 307 MOSM/KG (ref 275–295)
PHOSPHATE SERPL-MCNC: 3 MG/DL (ref 2.5–4.9)
PLATELET # BLD AUTO: 220 10(3)UL (ref 150–450)
PLATELET MORPHOLOGY: NORMAL
POTASSIUM SERPL-SCNC: 3.9 MMOL/L (ref 3.5–5.1)
RBC # BLD AUTO: 2.46 X10(6)UL (ref 3.8–5.8)
SODIUM SERPL-SCNC: 142 MMOL/L (ref 136–145)
TOTAL CELLS COUNTED: 100
WBC # BLD AUTO: 20.3 X10(3) UL (ref 4–11)

## 2019-12-16 PROCEDURE — 90935 HEMODIALYSIS ONE EVALUATION: CPT | Performed by: INTERNAL MEDICINE

## 2019-12-16 PROCEDURE — 71046 X-RAY EXAM CHEST 2 VIEWS: CPT | Performed by: INTERNAL MEDICINE

## 2019-12-16 PROCEDURE — 99233 SBSQ HOSP IP/OBS HIGH 50: CPT | Performed by: HOSPITALIST

## 2019-12-16 NOTE — PROGRESS NOTES
Gastroenterology Progress Note  Patient Name: Fe Herrera  Chief Complaint: gallstone/Etoh pancreatitis, pseudocysts  S: Pt states that he is not able to take in much PO at this time as he is not hungry.  Denies abd pain or nausea/vomiting with PO int body and tail enhance more heterogeneously when compared to prior study and early necrosis cannot be entirely excluded, correlate   Clinically. Assessment:   1. Etoh/Gallstone pancreatitis now with pseudocysts and ?necrosis  2.  Fluid overload/SOB   Plan

## 2019-12-16 NOTE — PROGRESS NOTES
[facility]  Progress Note     Nic Riddle Patient Status:  Inpatient    3/31/1945 MRN [id number]   [facility] 4SW-A Attending Dr. Fleta Meckel # 21 PCP Jerrod Banuelos MD     Chief Complaint: abdominal distention    S: 2.4*  --  3.0*    139 141 142   K 4.8 4.5 3.8 3.9    105 108 109   CO2 17.0* 21.0 26.0 22.0   ALKPHO 257* 243*  --  270*   * 123*  --  182*   ALT 67* 58  --  70*   BILT 3.4* 2.7*  --  2.5*   TP 6.3* 5.7*  --  6.1*       Estimated Creatin per renal  2. Requiring albumin due to low bp  10. Transaminitis, increased, monitor  11. Difficulty eating/swallowing  1. Mostly due to tachypnea - reviewed w/ SLP  12. Hyperglycemia, A1c 6.0%  1.  Levemir 15 units, also TPN insulin and Correctional scale

## 2019-12-16 NOTE — PLAN OF CARE
Problem: CARDIOVASCULAR - ADULT  Goal: Maintains optimal cardiac output and hemodynamic stability  Description  INTERVENTIONS:  - Monitor vital signs, rhythm, and trends  - Monitor for bleeding, hypotension and signs of decreased cardiac output  - Evalua Instruct patient on fluid and nutrition restrictions as appropriate  Outcome: Progressing     Problem: PAIN - ADULT  Goal: Verbalizes/displays adequate comfort level or patient's stated pain goal  Description  INTERVENTIONS:  - Encourage pt to monitor pain MUSCULOSKELETAL - ADULT  Goal: Return mobility to safest level of function  Description  INTERVENTIONS:  - Assess patient stability and activity tolerance for standing, transferring and ambulating w/ or w/o assistive devices  - Assist with transfers and am

## 2019-12-16 NOTE — PHYSICAL THERAPY NOTE
Attempted to see Pt this AM - RN aware of attempt. Pt occupied with HD. Will f/u later today if time permits, after all other patients are attempted per tentative schedule.

## 2019-12-16 NOTE — PROGRESS NOTES
[facility]  Progress Note     Jules Cui Patient Status:  Inpatient    3/31/1945 MRN [id number]   [facility] 4SW-A Attending [facility] Day # 21 PCP Stephen Carney MD     Chief Complaint: Pancreatiti 182*   ALT 67* 58  --  70*   BILT 3.4* 2.7*  --  2.5*   TP 6.3* 5.7*  --  6.1*       Estimated Creatinine Clearance: 11.6 mL/min (A) (based on SCr of 5.78 mg/dL (H)). No results for input(s): PTP, INR in the last 168 hours.     No results for input(s): T nephrology  2. Monitor UOP, creatinine and electrolytes  3. Nephrology following  7. Hyperglycemia, A1c 6.0  1. Increase Levemir to 18  2. Correctional scale  8. Anemia, mixed sp PRBC 12/14  1.  Monitor H&H  9. Acute encephalopathy > ICU 11/26, back to ICU

## 2019-12-16 NOTE — CM/SW NOTE
Pt will need HD at [facility]. Spoke w/pt and wife in regards to this. They are agreeable to a referral to [facility] and [facility]. Sent referrals vai [facility]. DON requested. Will ask CM for Hep B labs.

## 2019-12-16 NOTE — PROGRESS NOTES
[facility]  Nephrology Progress Note    Karol Cabral Patient Status:  Inpatient    3/31/1945 MRN [id number]   [facility] 4SW-A Attending Vin Lynn MD   [address] Day # 21 PCP Martin Cade MD       SUBJECTIVE:  Dialysis note 21.0* 19.0* 15.4*  --  20.3*   HGB 8.8* 8.5* 8.1* 6.3* 7.7* 8.8*   .8* 117.9* 133.6* 139.4*  --  117.9*   .0 229.0 243.0 175.0  --  220.0   BAND 13 12 11 6  --  14       Recent Labs   Lab 12/12/19  0557 12/13/19  1057 12/14/19  0600 12/15/19 Q15 Min PRN    Or  dextrose 50 % injection 50 mL, 50 mL, Intravenous, Q15 Min PRN    Or  glucose (DEX4) oral liquid 30 g, 30 g, Oral, Q15 Min PRN    Or  Glucose-Vitamin C (DEX-4) chewable tab 8 tablet, 8 tablet, Oral, Q15 Min PRN  Insulin Aspart Pen (NOVOL initiated and unfortunately he remains oligo-anuric and dialysis dependent at this time. He underwent ultrafiltration yesterday and is receiving dialysis today. We will continue to monitor closely for signs of renal recovery.       #2.   Acute pancreatiti

## 2019-12-16 NOTE — PROGRESS NOTES
Pulmonary Progress Note        NAME: Sabas South - ROOM: 410/410-A - MRN: [id number] - Age: 69year old - : 3/31/1945        Last 24hrs: No events overnight, resting in bed, awakens easily, notably tachypnic with low tidal volumes    OBJECTIVE:   1 normal, no murmur, click, rub or gallop, regular rate and rhythm  Abdomen: soft but distended abd + BS  Extremities: edema 1+ edema    Recent Labs     12/15/19  0540 12/15/19  1402 12/16/19  0643   WBC 15.4*  --  20.3*   HGB 6.3* 7.7* 8.8*   .4*  -- negative and cultures negative    [facility]        Addendum  Chest x-ray reviewed- consistent w/ pulm edema  -would cont diuresis per renal w/ HD  Need to encourage pt to use IS and ambulate frequently

## 2019-12-16 NOTE — DIETARY NOTE
Clinical Nutrition  Tolerating TPN infusing via PICC. Continue cyclic TPN for nutrition. Per MD, pt getting HD today. Bag #10 tonight will provide 700 dextrose calories, 0 lipid calories, 0% lipids (due to elevated TG), 130 grams protein, in 1700 ml fluid.

## 2019-12-16 NOTE — PROGRESS NOTES
[facility]                INFECTIOUS DISEASE PROGRESS NOTE    Lubna Shanel Patient Status:  Inpatient    3/31/1945 MRN [id number]   [facility] 4SW-A Attending Javier Chong MD   [address] Day # 21 PCP Rylee Perez MD     A FLUID CULT,AEROBIC AND ANAEROBIC     Status: None    Collection Time: 12/08/19  4:30 PM   Result Value Ref Range    Body Fluid Culture Result No Growth 5 Days N/A    Body Fld Smear No organisms seen N/A    Body Fld Smear 1+ Neutrophils seen N/A    Body Fld leukocytosis, afebrile, on meropenem    2. Pleural effusion sp thora/exudative  Ascites present, no signs of empyema  -some sob today, repeat CXR pending    3.  ARF/HD per renal    dw GI, CT this week in coordination with timing of interventions  Cont merop

## 2019-12-16 NOTE — PLAN OF CARE
Pt is desating to 80's when sleeping  and pt is refusing CPAP, increased pt 02 to 5l with humidified air notified Dr. Eli Lennon.

## 2019-12-16 NOTE — RESPIRATORY THERAPY NOTE
Called by nurse to place pt on cpap due to sats dropping into the 80's. Pt is still refusing to go on cpap.

## 2019-12-17 ENCOUNTER — APPOINTMENT (OUTPATIENT)
Dept: CT IMAGING | Facility: HOSPITAL | Age: 74
DRG: 438 | End: 2019-12-17
Attending: HOSPITALIST
Payer: MEDICARE

## 2019-12-17 LAB
ALBUMIN SERPL-MCNC: 2.9 G/DL (ref 3.4–5)
ALBUMIN/GLOB SERPL: 0.9 {RATIO} (ref 1–2)
ALP LIVER SERPL-CCNC: 275 U/L (ref 45–117)
ALT SERPL-CCNC: 76 U/L (ref 16–61)
ANION GAP SERPL CALC-SCNC: 8 MMOL/L (ref 0–18)
ANION GAP SERPL CALC-SCNC: 8 MMOL/L (ref 0–18)
AST SERPL-CCNC: 196 U/L (ref 15–37)
BASOPHILS # BLD: 0.18 X10(3) UL (ref 0–0.2)
BASOPHILS NFR BLD: 1 %
BILIRUB SERPL-MCNC: 2.5 MG/DL (ref 0.1–2)
BUN BLD-MCNC: 28 MG/DL (ref 7–18)
BUN BLD-MCNC: 28 MG/DL (ref 7–18)
BUN/CREAT SERPL: 6 (ref 10–20)
BUN/CREAT SERPL: 6 (ref 10–20)
CALCIUM BLD-MCNC: 10.6 MG/DL (ref 8.5–10.1)
CALCIUM BLD-MCNC: 10.6 MG/DL (ref 8.5–10.1)
CHLORIDE SERPL-SCNC: 109 MMOL/L (ref 98–112)
CHLORIDE SERPL-SCNC: 109 MMOL/L (ref 98–112)
CO2 SERPL-SCNC: 25 MMOL/L (ref 21–32)
CO2 SERPL-SCNC: 25 MMOL/L (ref 21–32)
CREAT BLD-MCNC: 4.64 MG/DL (ref 0.7–1.3)
CREAT BLD-MCNC: 4.64 MG/DL (ref 0.7–1.3)
DEPRECATED RDW RBC AUTO: 82.4 FL (ref 35.1–46.3)
EOSINOPHIL # BLD: 0 X10(3) UL (ref 0–0.7)
EOSINOPHIL NFR BLD: 0 %
ERYTHROCYTE [DISTWIDTH] IN BLOOD BY AUTOMATED COUNT: 18.6 % (ref 11–15)
GLOBULIN PLAS-MCNC: 3.1 G/DL (ref 2.8–4.4)
GLUCOSE BLD-MCNC: 136 MG/DL (ref 70–99)
GLUCOSE BLD-MCNC: 165 MG/DL (ref 70–99)
GLUCOSE BLD-MCNC: 178 MG/DL (ref 70–99)
GLUCOSE BLD-MCNC: 184 MG/DL (ref 70–99)
GLUCOSE BLD-MCNC: 184 MG/DL (ref 70–99)
GLUCOSE BLD-MCNC: 235 MG/DL (ref 70–99)
HAV IGM SER QL: 2 MG/DL (ref 1.6–2.6)
HCT VFR BLD AUTO: 29.6 % (ref 39–53)
HGB BLD-MCNC: 8.9 G/DL (ref 13–17.5)
LYMPHOCYTES NFR BLD: 0.72 X10(3) UL (ref 1–4)
LYMPHOCYTES NFR BLD: 4 %
M PROTEIN MFR SERPL ELPH: 6 G/DL (ref 6.4–8.2)
MCH RBC QN AUTO: 35.6 PG (ref 26–34)
MCHC RBC AUTO-ENTMCNC: 30.1 G/DL (ref 31–37)
MCV RBC AUTO: 118.4 FL (ref 80–100)
METAMYELOCYTES # BLD: 0.72 X10(3) UL
METAMYELOCYTES NFR BLD: 4 %
MONOCYTES # BLD: 1.98 X10(3) UL (ref 0.1–1)
MONOCYTES NFR BLD: 11 %
NEUTROPHILS # BLD AUTO: 10.82 X10 (3) UL (ref 1.5–7.7)
NEUTROPHILS NFR BLD: 71 %
NEUTS BAND NFR BLD: 9 %
NEUTS HYPERSEG # BLD: 14.4 X10(3) UL (ref 1.5–7.7)
NRBC BLD MANUAL-RTO: 2 %
OSMOLALITY SERPL CALC.SUM OF ELEC: 304 MOSM/KG (ref 275–295)
OSMOLALITY SERPL CALC.SUM OF ELEC: 304 MOSM/KG (ref 275–295)
PHOSPHATE SERPL-MCNC: 2.1 MG/DL (ref 2.5–4.9)
PLATELET # BLD AUTO: 225 10(3)UL (ref 150–450)
PLATELET MORPHOLOGY: NORMAL
POTASSIUM SERPL-SCNC: 3.6 MMOL/L (ref 3.5–5.1)
POTASSIUM SERPL-SCNC: 3.6 MMOL/L (ref 3.5–5.1)
RBC # BLD AUTO: 2.5 X10(6)UL (ref 3.8–5.8)
SODIUM SERPL-SCNC: 142 MMOL/L (ref 136–145)
SODIUM SERPL-SCNC: 142 MMOL/L (ref 136–145)
TOTAL CELLS COUNTED: 100
WBC # BLD AUTO: 18 X10(3) UL (ref 4–11)

## 2019-12-17 PROCEDURE — 74177 CT ABD & PELVIS W/CONTRAST: CPT | Performed by: HOSPITALIST

## 2019-12-17 PROCEDURE — 99232 SBSQ HOSP IP/OBS MODERATE 35: CPT | Performed by: INTERNAL MEDICINE

## 2019-12-17 PROCEDURE — 99232 SBSQ HOSP IP/OBS MODERATE 35: CPT | Performed by: OTHER

## 2019-12-17 PROCEDURE — 99233 SBSQ HOSP IP/OBS HIGH 50: CPT | Performed by: HOSPITALIST

## 2019-12-17 PROCEDURE — 71275 CT ANGIOGRAPHY CHEST: CPT | Performed by: HOSPITALIST

## 2019-12-17 RX ORDER — IPRATROPIUM BROMIDE AND ALBUTEROL SULFATE 2.5; .5 MG/3ML; MG/3ML
3 SOLUTION RESPIRATORY (INHALATION)
Status: DISCONTINUED | OUTPATIENT
Start: 2019-12-17 | End: 2019-12-18

## 2019-12-17 RX ORDER — MIDODRINE HYDROCHLORIDE 10 MG/1
10 TABLET ORAL
Status: COMPLETED | OUTPATIENT
Start: 2019-12-18 | End: 2019-12-18

## 2019-12-17 RX ORDER — IPRATROPIUM BROMIDE AND ALBUTEROL SULFATE 2.5; .5 MG/3ML; MG/3ML
3 SOLUTION RESPIRATORY (INHALATION)
Status: DISCONTINUED | OUTPATIENT
Start: 2019-12-17 | End: 2019-12-17

## 2019-12-17 RX ORDER — HEPARIN SODIUM 1000 [USP'U]/ML
1.5 INJECTION, SOLUTION INTRAVENOUS; SUBCUTANEOUS ONCE
Status: COMPLETED | OUTPATIENT
Start: 2019-12-17 | End: 2019-12-18

## 2019-12-17 RX ORDER — ALBUMIN (HUMAN) 12.5 G/50ML
100 SOLUTION INTRAVENOUS AS NEEDED
Status: DISCONTINUED | OUTPATIENT
Start: 2019-12-17 | End: 2019-12-19

## 2019-12-17 RX ORDER — PREDNISONE 20 MG/1
20 TABLET ORAL
Status: DISCONTINUED | OUTPATIENT
Start: 2019-12-18 | End: 2019-12-18

## 2019-12-17 NOTE — SLP NOTE
SPEECH DAILY NOTE - INPATIENT    ASSESSMENT & PLAN   ASSESSMENT  Pt seen for dysphagia tx to assess tolerance with recommended diet, ensure proper utilization of aspiration precautions and provide pt/family education. Patient alert and up in bed.   He amara and swallow strategies independently over 2-3 session(s).     Met   Goal #3 The patient will utilize compensatory strategies as outlined by  BSSE (clinical evaluation) including Slow rate, Small bites, Small sips, Multiple swallows, Upright 90 degrees, Jennifer

## 2019-12-17 NOTE — PLAN OF CARE
Problem: CARDIOVASCULAR - ADULT  Goal: Maintains optimal cardiac output and hemodynamic stability  Description  INTERVENTIONS:  - Monitor vital signs, rhythm, and trends  - Monitor for bleeding, hypotension and signs of decreased cardiac output  - Evalua output, blood pressure (other measures as available)  - Encourage oral intake as appropriate  - Instruct patient on fluid and nutrition restrictions as appropriate  Outcome: Progressing     Condition unchanged,still poor appetite,tpn tolerated well at reg

## 2019-12-17 NOTE — PROGRESS NOTES
[facility]                INFECTIOUS DISEASE PROGRESS NOTE    Konrad Bernal Patient Status:  Inpatient    3/31/1945 MRN [id number]   [facility] 4SW-A Attending Sana Eddy MD   [facility] Day # 25 PCP Maryuri Herring MD     A Culture Result No Growth 5 Days N/A    Body Fld Smear No organisms seen N/A    Body Fld Smear 1+ Neutrophils seen N/A    Body Fld Smear This is a cytocentrifuged smear. N/A   2.  BLOOD CULTURE     Status: None    Collection Time: 12/07/19  7:50 PM   Result no sig reacumulation    3.  ARF/HD per renal        Joanne Elaine MD  [facility]  [phone]

## 2019-12-17 NOTE — PROGRESS NOTES
[facility] H[facility]PITAL[facility]  Progress Note     Nelson Riley Patient Status:  Inpatient    3/31/1945 MRN [id number]   [facility] 4SW-A Attending Dr. Wang Ferreira # 25 PCP Cris Montilla MD     Chief Complaint: abdominal distention    S: K 4.5 3.8 3.9 3.6  3.6    108 109 109  109   CO2 21.0 26.0 22.0 25.0  25.0   ALKPHO 243*  --  270* 275*   *  --  182* 196*   ALT 58  --  70* 76*   BILT 2.7*  --  2.5* 2.5*   TP 5.7*  --  6.1* 6.0*       Estimated Creatinine Clearance: 14.4 m increase  12. Hyperglycemia, A1c 6.0%  1. Levemir 18 units, also TPN w/ 60 units of insulin and Correctional scale  13. Anemia. Stable after PRBCs  14. Acute encephalopathy, multifactorial, Improved  1. suspect hepatic/uremia/ETOH and opiate w/d  2.  Lactul 4.5 3.8 3.9 3.6  3.6    108 109 109  109   CO2 21.0 26.0 22.0 25.0  25.0   ALKPHO 243*  --  270* 275*   *  --  182* 196*   ALT 58  --  70* 76*   BILT 2.7*  --  2.5* 2.5*   TP 5.7*  --  6.1* 6.0*     Acute pancreatitis with necrosis and pseudoc

## 2019-12-17 NOTE — PLAN OF CARE
Pt drowsy but easy to arouse. Pt oriented to self, place, and situation, disoriented to time. Pt with complaints of headache pain for which he requested tylenol. It was given. TPN infusing per MAR. Pt with shallow breathing at increased rate.  Pt on 5L high restriction as ordered  - Instruct patient on fluid and nutrition restrictions as appropriate  Outcome: Progressing     Problem: PAIN - ADULT  Goal: Verbalizes/displays adequate comfort level or patient's stated pain goal  Description  INTERVENTIONS:  - En Progressing     Problem: RESPIRATORY - ADULT  Goal: Achieves optimal ventilation and oxygenation  Description  INTERVENTIONS:  - Assess for changes in respiratory status  - Assess for changes in mentation and behavior  - Position to facilitate oxygenation

## 2019-12-17 NOTE — PROGRESS NOTES
[facility] Patient Status:  Inpatient    3/31/1945 MRN [id number]   [facility] 4NW-A Attending Jonah Crespo MD   [facility] Day # 25 PCP Joey Shay MD     SUBJECTIVE: no new events.  Still dyspneic     OBJECTIV 30 g, 30 g, Oral, Q15 Min PRN **OR** Glucose-Vitamin C (DEX-4) chewable tab 8 tablet, 8 tablet, Oral, Q15 Min PRN  •  Insulin Aspart Pen (NOVOLOG) 100 UNIT/ML flexpen 1-5 Units, 1-5 Units, Subcutaneous, Q6H  •  Pantoprazole Sodium (PROTONIX) 40 mg in [place] masses,  no organomegaly  Extremities: edema +2 along with erythema     Lab Results   Component Value Date    WBC 18.0 12/17/2019    RBC 2.50 12/17/2019    HGB 8.9 12/17/2019    HCT 29.6 12/17/2019    .4 12/17/2019    MCH 35.6 12/17/2019    MCHC 30.

## 2019-12-17 NOTE — PROGRESS NOTES
[facility]  Nephrology Progress Note    Collins Coma Patient Status:  Inpatient    3/31/1945 MRN [id number]   [facility] 4SW-A Attending Ivan Recinos MD   [address] Day # 25 PCP Dariusz Multani MD       SUBJECTIVE:  Stable this A rash        Labs:     Recent Labs   Lab 12/12/19  0557 12/13/19  0935 12/15/19  0540 12/15/19  1402 12/16/19  0643 12/17/19  0547   WBC 21.0* 19.0* 15.4*  --  20.3* 18.0*   HGB 8.5* 8.1* 6.3* 7.7* 8.8* 8.9*   .9* 133.6* 139.4*  --  117.9* 118.4*   P PRN  glucose (DEX4) oral liquid 15 g, 15 g, Oral, Q15 Min PRN    Or  Glucose-Vitamin C (DEX-4) chewable tab 4 tablet, 4 tablet, Oral, Q15 Min PRN    Or  dextrose 50 % injection 50 mL, 50 mL, Intravenous, Q15 Min PRN    Or  glucose (DEX4) oral liquid 30 g, hx most consistent with ATN which is likely multifactorial including hypotension, severe pancreatitis and contrast nephropathy. . HD initiated and unfortunately he remains oligo-anuric and dialysis dependent at this time although he believes UOP is improvin

## 2019-12-17 NOTE — PHYSICAL THERAPY NOTE
Attempted to see Pt this PM - RN aware of attempt. Per RN - Pt just transferred off floor to CT of abdomen/pelvis. Will f/u later today if time permits, after all other patients are attempted per tentative schedule.

## 2019-12-17 NOTE — PROGRESS NOTES
Gastroenterology Progress Note  Patient Name: Kuldeep Justice  Chief Complaint: gallstone/Etoh pancreatitis, pseudocysts  S: Pt tried taking more PO yday.    O: /43 (BP Location: Left arm)   Pulse 104   Temp 99.6 °F (37.6 °C) (Oral)   Resp 20   Ht 6 correlate clinically. 2. Development of peripancreatic focal fluid collections most likely represent pseudocysts as detailed above.   Pancreatic body and tail enhance more heterogeneously when compared to prior study and early necrosis cannot be entirely e

## 2019-12-17 NOTE — PROGRESS NOTES
[facility]  Report of Psychiatric Progress Note    Kendra Blankenship Patient Status:  Inpatient    3/31/1945 MRN [id number]   [facility] 3NE-A Attending Mary Varghese MD   [facility] Day # 25 PCP Jessica Obrien MD     Date of Admission: 1 with the atelectasis from obesity and pleural effusions causes him to have dyspnea and anxiety. 6) Don't recommend a trial of buprenorphine (ie 8mg twice a day) until he is working more with PT and c/o his usual osteoarthritis joint pains.      Madyson Rust thirsty and wants water. No hopelessness or suicidal ideation. Interval Hx:  12/2/19- He feels anxious and tired and depressed. He feels helpless and thought he was going to die in the hospital, despite the fact that he was moved out of the ICU.  He fel children.  He is a retired nurse anesthetist.     Past Medical History:   Diagnosis Date   • Erectile dysfunction    • Esophageal reflux    • High blood pressure    • High cholesterol    • Osteoarthrosis, unspecified whether generalized or localized, unspec Once in dialysis  •  metoprolol tartrate (LOPRESSOR) partial tablet 12.5 mg, 12.5 mg, Oral, 2x Daily(Beta Blocker)  •  insulin detemir (LEVEMIR) 100 UNIT/ML flextouch 18 Units, 18 Units, Subcutaneous, Daily  •  adult 3 in 1 tpn, 1,700 mL, Intravenous, Cycl vasopressin (PITRESSIN) 20 Units in sodium chloride 0.9% 50 mL infusion for shock, 0.04 Units/min, Intravenous, Continuous PRN  •  lactulose (CHRONULAC) 10 GM/15ML solution 30 g, 30 g, Oral, BID  •  metoprolol Tartrate (LOPRESSOR) 5 MG/5ML injection 5 mg, 184 12/17/2019     12/17/2019    CA 10.6 12/17/2019    CA 10.6 12/17/2019    ALB 2.9 12/17/2019    ALKPHO 275 12/17/2019    BILT 2.5 12/17/2019    TP 6.0 12/17/2019     12/17/2019    ALT 76 12/17/2019    MG 2.0 12/17/2019    PHOS 2.1 12/17/20 pneumothorax or effusion. AORTA:  No aneurysm. VASCULATURE:  No visible pulmonary arterial thrombus or attenuation. ABDOMEN/PELVIS:  LIVER:  Stable. Homogeneous enhancement. BILIARY:  Cholelithiasis with distended gallbladder.   No significant from the pancreatitis. Colonic diverticulosis. Atelectasis in the lungs. Stable peripherally calcified probable left inferior thyroid nodule.

## 2019-12-17 NOTE — CM/SW NOTE
[facility] not wanting to [facility] pt due to NEHA. They are stating the pt needs a dx of ESKD. Pt does not appear to have this. He may need to go to an OP dialysis clinic while at a [facility]. Will discuss this option w/wife. Not sure how long he is going to need HD.

## 2019-12-17 NOTE — PROGRESS NOTES
Had dialysis this am and removed 2 liters of fluid,albumin was given during dialysis d/t low blood pressure,went for chest xray,has poor appetite,on tpn at 16 hrs rate. on hi flow o2 at 5 l/nc,no s/s of diabetic reaction.

## 2019-12-17 NOTE — DIETARY NOTE
[facility]     NUTRITION FOLLOW UP ASSESSMENT     Pt does not meet malnutrition criteria.     NUTRITION DIAGNOSIS/PROBLEM:  Inadequate oral intake related to inability to consume sufficient intake (severe pancreatitis) as evidenced by TPN.      NUTRIT mass index is 34.05 kg/m².   IBW: 72.7  kg     WEIGHT HISTORY:   12/17/19: 106.6 kg (235 lb)  12/03/19 : 108.5 kg (239 lb 3.2 oz)  11/26/19 : 96.1 kg (211 lb 13.8 oz)  09/17/19 : 106.1 kg (233 lb 14.5 oz)  11/20/18 : 108.9 kg (240 lb)     NUTRITION:  Diet:

## 2019-12-18 ENCOUNTER — APPOINTMENT (OUTPATIENT)
Dept: CV DIAGNOSTICS | Facility: HOSPITAL | Age: 74
DRG: 438 | End: 2019-12-18
Attending: HOSPITALIST
Payer: MEDICARE

## 2019-12-18 LAB
ALBUMIN SERPL-MCNC: 2.8 G/DL (ref 3.4–5)
ALBUMIN/GLOB SERPL: 0.8 {RATIO} (ref 1–2)
ALP LIVER SERPL-CCNC: 275 U/L (ref 45–117)
ALT SERPL-CCNC: 84 U/L (ref 16–61)
ANION GAP SERPL CALC-SCNC: 13 MMOL/L (ref 0–18)
AST SERPL-CCNC: 212 U/L (ref 15–37)
BASOPHILS # BLD: 0.79 X10(3) UL (ref 0–0.2)
BASOPHILS NFR BLD: 4 %
BILIRUB SERPL-MCNC: 2.6 MG/DL (ref 0.1–2)
BUN BLD-MCNC: 40 MG/DL (ref 7–18)
BUN/CREAT SERPL: 6.7 (ref 10–20)
CALCIUM BLD-MCNC: 12.5 MG/DL (ref 8.5–10.1)
CHLORIDE SERPL-SCNC: 108 MMOL/L (ref 98–112)
CO2 SERPL-SCNC: 22 MMOL/L (ref 21–32)
CREAT BLD-MCNC: 5.94 MG/DL (ref 0.7–1.3)
DEPRECATED RDW RBC AUTO: 81 FL (ref 35.1–46.3)
EOSINOPHIL # BLD: 0.2 X10(3) UL (ref 0–0.7)
EOSINOPHIL NFR BLD: 1 %
ERYTHROCYTE [DISTWIDTH] IN BLOOD BY AUTOMATED COUNT: 18.5 % (ref 11–15)
GLOBULIN PLAS-MCNC: 3.5 G/DL (ref 2.8–4.4)
GLUCOSE BLD-MCNC: 148 MG/DL (ref 70–99)
GLUCOSE BLD-MCNC: 148 MG/DL (ref 70–99)
GLUCOSE BLD-MCNC: 156 MG/DL (ref 70–99)
GLUCOSE BLD-MCNC: 158 MG/DL (ref 70–99)
GLUCOSE BLD-MCNC: 234 MG/DL (ref 70–99)
HAV IGM SER QL: 2.1 MG/DL (ref 1.6–2.6)
HCT VFR BLD AUTO: 31.1 % (ref 39–53)
HGB BLD-MCNC: 9.4 G/DL (ref 13–17.5)
LYMPHOCYTES NFR BLD: 1.97 X10(3) UL (ref 1–4)
LYMPHOCYTES NFR BLD: 10 %
M PROTEIN MFR SERPL ELPH: 6.3 G/DL (ref 6.4–8.2)
MCH RBC QN AUTO: 36 PG (ref 26–34)
MCHC RBC AUTO-ENTMCNC: 30.2 G/DL (ref 31–37)
MCV RBC AUTO: 119.2 FL (ref 80–100)
METAMYELOCYTES # BLD: 0.2 X10(3) UL
METAMYELOCYTES NFR BLD: 1 %
MONOCYTES # BLD: 1.58 X10(3) UL (ref 0.1–1)
MONOCYTES NFR BLD: 8 %
NEUTROPHILS # BLD AUTO: 12.2 X10 (3) UL (ref 1.5–7.7)
NEUTROPHILS NFR BLD: 63 %
NEUTS BAND NFR BLD: 13 %
NEUTS HYPERSEG # BLD: 14.97 X10(3) UL (ref 1.5–7.7)
NRBC BLD MANUAL-RTO: 1 %
OSMOLALITY SERPL CALC.SUM OF ELEC: 309 MOSM/KG (ref 275–295)
PHOSPHATE SERPL-MCNC: 2.8 MG/DL (ref 2.5–4.9)
PLATELET # BLD AUTO: 266 10(3)UL (ref 150–450)
PLATELET MORPHOLOGY: NORMAL
POTASSIUM SERPL-SCNC: 4 MMOL/L (ref 3.5–5.1)
RBC # BLD AUTO: 2.61 X10(6)UL (ref 3.8–5.8)
SODIUM SERPL-SCNC: 143 MMOL/L (ref 136–145)
TOTAL CELLS COUNTED: 100
TRIGL SERPL-MCNC: 519 MG/DL (ref 30–149)
WBC # BLD AUTO: 19.7 X10(3) UL (ref 4–11)

## 2019-12-18 PROCEDURE — 93306 TTE W/DOPPLER COMPLETE: CPT | Performed by: HOSPITALIST

## 2019-12-18 PROCEDURE — 90935 HEMODIALYSIS ONE EVALUATION: CPT | Performed by: INTERNAL MEDICINE

## 2019-12-18 PROCEDURE — 99233 SBSQ HOSP IP/OBS HIGH 50: CPT | Performed by: HOSPITALIST

## 2019-12-18 RX ORDER — IPRATROPIUM BROMIDE AND ALBUTEROL SULFATE 2.5; .5 MG/3ML; MG/3ML
3 SOLUTION RESPIRATORY (INHALATION) EVERY 4 HOURS PRN
Status: DISCONTINUED | OUTPATIENT
Start: 2019-12-18 | End: 2019-12-18

## 2019-12-18 RX ORDER — METOCLOPRAMIDE HYDROCHLORIDE 5 MG/ML
5 INJECTION INTRAMUSCULAR; INTRAVENOUS EVERY 6 HOURS
Status: COMPLETED | OUTPATIENT
Start: 2019-12-18 | End: 2019-12-19

## 2019-12-18 NOTE — PROGRESS NOTES
Pulmonary Progress Note        NAME: Lubna Ugarte - ROOM: 410/410-A - MRN: [id number] - Age: 69year old - : 3/31/1945        Last 24hrs: No events overnight, getting HD currently, wife at bedside    OBJECTIVE:   19  2205 19  0030  rate and rhythm  Abdomen: soft, non-tender; bowel sounds normal; no masses,  no organomegaly  Extremities: edema 1+ carina    Recent Labs     12/15/19  1402 12/16/19  0643 12/17/19  0547 12/18/19  0531   WBC  --  20.3* 18.0* 19.7*   HGB 7.7* 8.8* 8.9* 9.4* negative and cultures negative  -diuresis per renal, would not tap at this time  3. TIM- high suspicion  - pt declining CPAP during sleep  - likely contributing to noct desats  4.  Bronchospasm- resolved today  - BD's scheduled  - hold on further steroids s

## 2019-12-18 NOTE — PROGRESS NOTES
[facility]                INFECTIOUS DISEASE PROGRESS NOTE    Pau Falling Patient Status:  Inpatient    3/31/1945 MRN [id number]   [facility] 4SW-A Attending Alexia Curling, MD   [facility] Day # 23 PCP Sumanth Roblero MD     A Smear 1+ Neutrophils seen N/A    Body Fld Smear This is a cytocentrifuged smear. N/A   2.  BLOOD CULTURE     Status: None    Collection Time: 12/07/19  7:50 PM   Result Value Ref Range    Blood Culture Result No Growth 5 Days N/A   3. URINE CULTURE, [facility]  [phone]

## 2019-12-18 NOTE — PHYSICAL THERAPY NOTE
Attempted to see Pt this AM - RN aware of attempt. Pt is currently occupied with HD. Requested RN contact writer via page ASAP when HD is finished. Will f/u later today if time permits, after all other patients are attempted per tentative schedule.

## 2019-12-18 NOTE — CM/SW NOTE
Sent flowsheets and labs to [facility]. Waiting to see if they can accept a pt w/NEHA. It seems as if they may be able to. If not, pt will need to go to an OP clinic. Such as [facility].

## 2019-12-18 NOTE — CM/SW NOTE
[facility] unable to accept patient for dialysis with dx of NEHA.     Dolly JACKSON, 12/18/19, 2:08 PM

## 2019-12-18 NOTE — PLAN OF CARE
Pt drowsy but easy to arouse. Disoriented to time. Pt denies having any pain. Pt with shallow breaths and tachypnea. Pt started with breathing treatments. Pt on 3L high-flow nasal canula with saturation of 94%.  Pt left nostril very dry and when nc adjusted Monitor labs and assess for signs and symptoms of volume excess or deficit  - Monitor intake, output and patient weight  - Monitor urine specific gravity, serum osmolarity and serum sodium as indicated or ordered  - Monitor response to interventions for pa oxygenation  Description  INTERVENTIONS:  - Assess for changes in respiratory status  - Assess for changes in mentation and behavior  - Position to facilitate oxygenation and minimize respiratory effort  - Oxygen supplementation based on oxygen saturation safe patient handling equipment as needed  - Ensure adequate protection for wounds/incisions during mobilization  - Obtain PT/OT consults as needed  - Advance activity as appropriate  - Communicate ordered activity level and limitations with patient/family

## 2019-12-18 NOTE — DIETARY NOTE
Clinical Nutrition  Tolerating TPN infusing via PICC. Continue cyclic TPN for nutrition. Per MD, pt getting HD today. Bag #11 tonight will provide 700 dextrose calories, 0 lipid calories, 0% lipids (due to elevated TG), 130 grams protein, in 1700 ml fluid.

## 2019-12-18 NOTE — CONSULTS
Pt. Is a 69year old man who was referred for bedside psychotherapy with symptoms of depression and feelings of discouragement, low motivation and at times feelings of hopelessness.  He has been seen by psychiatry ( Dr. Quique Araiza) and continues on psychotropic m

## 2019-12-18 NOTE — PROGRESS NOTES
Gastroenterology Progress Note  Patient Name: Karol Cabral  Chief Complaint: gallstone/Etoh pancreatitis, pseudocysts  S: Pt tried taking more PO yday. Had nausea. Had some hypotentions with HD today.    O: /53 (BP Location: Right arm)   Pulse 1 above. Pancreatic body and tail enhance more heterogeneously when compared to prior study and early necrosis cannot be entirely excluded, correlate   Clinically. Assessment:   1. Etoh/Gallstone pancreatitis now with pseudocysts and ?necrosis  2.  Fluid

## 2019-12-18 NOTE — PROGRESS NOTES
[facility]  Nephrology Progress Note    Konrad Bernal Patient Status:  Inpatient    3/31/1945 MRN [id number]   [facility] 4SW-A Attending Sana Eddy MD   [address] Day # 21 PCP Maryuri Herring MD       SUBJECTIVE:  Dialysis note sounds  Extremities  1+ generalized edema  Neurologic: moving all extremities  Skin: Warm and dry, no rash        Labs:     Recent Labs   Lab 12/13/19  0935 12/15/19  0540 12/15/19  1402 12/16/19  0643 12/17/19  0547 12/18/19  0531   WBC 19.0* 15.4*  --  2 Fumarate (SEROQUEL) partial tab 12.5 mg, 12.5 mg, Oral, BID PRN  Meropenem (MERREM) 500 mg in sodium chloride 0.9% 100 mL MBP, 500 mg, Intravenous, Q24H  heparin sodium 1000 UNIT/ML injection 3,000 Units, 3,000 Units, Intravenous, PRN Dialysis  ipratropium solution 1 drop, 1 drop, Both Eyes, Nightly  ondansetron HCl (ZOFRAN) injection 4 mg, 4 mg, Intravenous, Q6H PRN          Impression/Plan:    #1.   Acute kidney injury -Baseline renal function is normal and he has developed anuric acute kidney injury with w

## 2019-12-18 NOTE — PHYSICAL THERAPY NOTE
PHYSICAL THERAPY TREATMENT NOTE - INPATIENT    Room Number: 410/410-A    Session: 3  Number of Visits to Meet Established Goals: 6  History related to current admission: Pt is 69year old male admitted on 11/25/2019 from home with c/o epigastic and chest • COLONOSCOPY     • KNEE REPLACEMENT SURGERY     • KNEE TOTAL REPLACEMENT Right 5/13/2016    Performed by Jayesh Narayanan MD at [facility] MAIN OR   • OTHER SURGICAL HISTORY  01/01/2005    cardiac cath    • OTHER SURGICAL HISTORY  07/24/2017    flow us Dr Mary Jane Singh proximity )  Stoop/Curb Assistance: Not tested  Comment : above scores based on dept protocol    Skilled Therapy Provided:     Pt presented in supine upon PT/PT Aide arrival. Pt willing to participate in session, working towards increased functional mobili Recommendations: Sub-acute rehabilitation(ELOS 9-11 days)     PLAN  PT Treatment Plan: Bed mobility; Body mechanics; Endurance;Gait training;Patient education; Family education;Transfer training;Balance training;Neuromuscular re-educate  Rehab Potential : Ravinder

## 2019-12-18 NOTE — PROGRESS NOTES
[facility]  Progress Note     Matthuma Ina Patient Status:  Inpatient    3/31/1945 MRN [id number]   [facility] 4SW-A Attending Dr. Triston Burch # 21 PCP Tara Green MD     Chief Complaint: abdominal distention    S: CO2 22.0 25.0  25.0 22.0   ALKPHO 270* 275* 275*   * 196* 212*   ALT 70* 76* 84*   BILT 2.5* 2.5* 2.6*   TP 6.1* 6.0* 6.3*       Estimated Creatinine Clearance: 11.2 mL/min (A) (based on SCr of 5.94 mg/dL (H)).     No results for input(s): PTP, INR 6. 0%  1. Levemir 18 units, also TPN w/ 60 units of insulin and Correctional scale  12. Anemia. Stable after PRBCs  13. Acute encephalopathy, multifactorial, Improved  1. suspect hepatic/uremia/ETOH and opiate w/d  2. Lactulose - had 1 BM yesterday.    3. Se to wean TPN  -Add scheduled Reglan x 4 doses  -Add hold parameters for Metoprolol  -Check ECHO  -Increase activity - OOB to chair for all meals and PRN  -Incentive spirometry  -Discussed with patient and wife  Deidre MYERS

## 2019-12-18 NOTE — PLAN OF CARE
Pt. Alert and oriented x3, drowsy but easily arousable. Resting in bed. 2L O2 via NC. VSS except hypotensive during dialysis this am. Dialysis RN gave albumin x2 with improvement in BP. 1.8L removed per Dialysis RN.  One time dose midodrine ordered for this ADULT  Goal: Minimal or absence of nausea and vomiting  Description  INTERVENTIONS:  - Maintain adequate hydration with IV or PO as ordered and tolerated  - Nasogastric tube to low intermittent suction as ordered  - Evaluate effectiveness of ordered antiem response  - Consider cultural and social influences on pain and pain management  - Manage/alleviate anxiety  - Utilize distraction and/or relaxation techniques  - Monitor for opioid side effects  - Notify MD/LIP if interventions unsuccessful or patient rep status  - Encourage and assist patient to increase activity and self care with guidance from PT/OT  - Encourage visually impaired, hearing impaired and aphasic patients to use assistive/communication devices  Outcome: Progressing     Problem: Impaired Swal handling equipment as needed  - Ensure adequate protection for wounds/incisions during mobilization  - Obtain PT/OT consults as needed  - Advance activity as appropriate  - Communicate ordered activity level and limitations with patient/family  Outcome: No

## 2019-12-19 LAB
ALBUMIN SERPL-MCNC: 3 G/DL (ref 3.4–5)
ALBUMIN/GLOB SERPL: 0.9 {RATIO} (ref 1–2)
ALP LIVER SERPL-CCNC: 275 U/L (ref 45–117)
ALT SERPL-CCNC: 95 U/L (ref 16–61)
ANION GAP SERPL CALC-SCNC: 10 MMOL/L (ref 0–18)
AST SERPL-CCNC: 227 U/L (ref 15–37)
BASOPHILS # BLD: 0.19 X10(3) UL (ref 0–0.2)
BASOPHILS NFR BLD: 1 %
BILIRUB SERPL-MCNC: 2.8 MG/DL (ref 0.1–2)
BUN BLD-MCNC: 31 MG/DL (ref 7–18)
BUN/CREAT SERPL: 6.6 (ref 10–20)
CALCIUM BLD-MCNC: 11.3 MG/DL (ref 8.5–10.1)
CHLORIDE SERPL-SCNC: 107 MMOL/L (ref 98–112)
CO2 SERPL-SCNC: 23 MMOL/L (ref 21–32)
CREAT BLD-MCNC: 4.69 MG/DL (ref 0.7–1.3)
DEPRECATED RDW RBC AUTO: 84.4 FL (ref 35.1–46.3)
EOSINOPHIL # BLD: 0.77 X10(3) UL (ref 0–0.7)
EOSINOPHIL NFR BLD: 4 %
ERYTHROCYTE [DISTWIDTH] IN BLOOD BY AUTOMATED COUNT: 18.7 % (ref 11–15)
GLOBULIN PLAS-MCNC: 3.4 G/DL (ref 2.8–4.4)
GLUCOSE BLD-MCNC: 222 MG/DL (ref 70–99)
GLUCOSE BLD-MCNC: 243 MG/DL (ref 70–99)
GLUCOSE BLD-MCNC: 246 MG/DL (ref 70–99)
GLUCOSE BLD-MCNC: 298 MG/DL (ref 70–99)
GLUCOSE BLD-MCNC: 307 MG/DL (ref 70–99)
HAV IGM SER QL: 2.2 MG/DL (ref 1.6–2.6)
HCT VFR BLD AUTO: 28.7 % (ref 39–53)
HGB BLD-MCNC: 8.3 G/DL (ref 13–17.5)
LYMPHOCYTES NFR BLD: 1.92 X10(3) UL (ref 1–4)
LYMPHOCYTES NFR BLD: 10 %
M PROTEIN MFR SERPL ELPH: 6.4 G/DL (ref 6.4–8.2)
MCH RBC QN AUTO: 35.8 PG (ref 26–34)
MCHC RBC AUTO-ENTMCNC: 28.9 G/DL (ref 31–37)
MCV RBC AUTO: 123.7 FL (ref 80–100)
METAMYELOCYTES # BLD: 0.58 X10(3) UL
METAMYELOCYTES NFR BLD: 3 %
MONOCYTES # BLD: 1.15 X10(3) UL (ref 0.1–1)
MONOCYTES NFR BLD: 6 %
MORPHOLOGY: NORMAL
MYELOCYTES # BLD: 0.19 X10(3) UL
MYELOCYTES NFR BLD: 1 %
NEUTROPHILS # BLD AUTO: 13.43 X10 (3) UL (ref 1.5–7.7)
NEUTROPHILS NFR BLD: 57 %
NEUTS BAND NFR BLD: 18 %
NEUTS HYPERSEG # BLD: 14.4 X10(3) UL (ref 1.5–7.7)
OSMOLALITY SERPL CALC.SUM OF ELEC: 305 MOSM/KG (ref 275–295)
PHOSPHATE SERPL-MCNC: 1.2 MG/DL (ref 2.5–4.9)
PLATELET # BLD AUTO: 240 10(3)UL (ref 150–450)
PLATELET MORPHOLOGY: NORMAL
POTASSIUM SERPL-SCNC: 3.9 MMOL/L (ref 3.5–5.1)
RBC # BLD AUTO: 2.32 X10(6)UL (ref 3.8–5.8)
SODIUM SERPL-SCNC: 140 MMOL/L (ref 136–145)
TOTAL CELLS COUNTED: 100
WBC # BLD AUTO: 19.2 X10(3) UL (ref 4–11)

## 2019-12-19 PROCEDURE — 99233 SBSQ HOSP IP/OBS HIGH 50: CPT | Performed by: HOSPITALIST

## 2019-12-19 PROCEDURE — 99232 SBSQ HOSP IP/OBS MODERATE 35: CPT | Performed by: INTERNAL MEDICINE

## 2019-12-19 PROCEDURE — 99232 SBSQ HOSP IP/OBS MODERATE 35: CPT | Performed by: OTHER

## 2019-12-19 RX ORDER — HEPARIN SODIUM 1000 [USP'U]/ML
1.5 INJECTION, SOLUTION INTRAVENOUS; SUBCUTANEOUS ONCE
Status: DISCONTINUED | OUTPATIENT
Start: 2019-12-19 | End: 2019-12-24

## 2019-12-19 RX ORDER — ALBUMIN (HUMAN) 12.5 G/50ML
100 SOLUTION INTRAVENOUS AS NEEDED
Status: DISCONTINUED | OUTPATIENT
Start: 2019-12-19 | End: 2019-12-24

## 2019-12-19 RX ORDER — METOCLOPRAMIDE HYDROCHLORIDE 5 MG/ML
5 INJECTION INTRAMUSCULAR; INTRAVENOUS EVERY 6 HOURS
Status: DISCONTINUED | OUTPATIENT
Start: 2019-12-19 | End: 2020-01-03

## 2019-12-19 NOTE — DIETARY NOTE
Clinical Nutrition  Tolerating TPN infusing via PICC. Continue cyclic TPN for nutrition. Bag #12 tonight will provide 700 dextrose calories, 0 lipid calories, 0% lipids (due to elevated TG), 110 grams protein, in 1500 ml fluid.   This is meeting 70% of henny

## 2019-12-19 NOTE — OCCUPATIONAL THERAPY NOTE
OCCUPATIONAL THERAPY TREATMENT NOTE - INPATIENT     Room Number: 410/410-A  Session: 2    Number of Visits to Meet Established Goals: 5    Presenting Problem: substance withdrawal, delirium, pancreatitis    History related to current admission: Pt is 74 ye 08/20/10    NO CYTOLOGIC EVIDENCE OF PAPILLARY CARCINOMA   • COLONOSCOPY     • KNEE REPLACEMENT SURGERY     • KNEE TOTAL REPLACEMENT Right 5/13/2016    Performed by Radha Bender MD at [facility] MAIN OR   • OTHER SURGICAL HISTORY  01/01/2005    cardiac cath w/ decreased alertness and decreased command following this session, requiring max A for ADLs. Will continue to benefit from skilled OT intervention.     OT Discharge Recommendations: Sub-acute rehabilitation(ELOS 12-14 days )       PLAN  OT Treatment Plan

## 2019-12-19 NOTE — PROGRESS NOTES
[facility] Patient Status:  Inpatient    3/31/1945 MRN [id number]   [facility] 4NW-A Attending Cheri Garcia MD   [facility] Day # 25 PCP Elsy Zhang MD     Pulm / Critical Care Progress Note     S: pt feels ok. shifts: In: 9183 [P.O.:1310; I.V.:3033]  Out: 1830 [Emesis/NG output:30; Other:1800]  No intake/output data recorded. Physical Exam:   General: alert, cooperative, No respiratory distress.    Head: Normocephalic, without obvious abnormality, atraumatic pt needs aggressive rehab to help w/ his atelectasis and overall deconditioning         Ally Perez M.D.  [facility] pulmonary / critical care  12/19/2019  1:38 PM

## 2019-12-19 NOTE — PROGRESS NOTES
[facility]  Report of Psychiatric Progress Note    Aliya Tucker Patient Status:  Inpatient    3/31/1945 MRN [id number]   [facility] 3NE-A Attending Lexi Vegas MD   [facility] Day # 25 PCP Rich Mao MD     Date of Admission: 1 buprenorphine (ie 8mg twice a day) until he is working more with PT and c/o his usual osteoarthritis joint pains.      Abdoul Ribeiro    History of Present Illness:  70 yo WM with opioid use disorder (in remission 26 yrs) was admitted on 11/25/19 for treatment feels helpless and thought he was going to die in the hospital, despite the fact that he was moved out of the ICU. He felt better when reassured that he is gradually getting better. No suicidal ideation. No voices/visions.      12/3/19- He feels tired and d anesthetist.     Past Medical History:   Diagnosis Date   • Erectile dysfunction    • Esophageal reflux    • High blood pressure    • High cholesterol    • Osteoarthrosis, unspecified whether generalized or localized, unspecified site    • Other and unspec Daily  •  Insulin Aspart Pen (NOVOLOG) 100 UNIT/ML flexpen 1-10 Units, 1-10 Units, Subcutaneous, Q6H  •  adult 3 in 1 tpn, 1,700 mL, Intravenous, Cyclic TPN  •  metoprolol tartrate (LOPRESSOR) partial tablet 12.5 mg, 12.5 mg, Oral, 2x Daily(Beta Blocker) Intravenous, Continuous PRN  •  lactulose (CHRONULAC) 10 GM/15ML solution 30 g, 30 g, Oral, BID  •  metoprolol Tartrate (LOPRESSOR) 5 MG/5ML injection 5 mg, 5 mg, Intravenous, Q4H PRN  •  [DISCONTINUED] acetaminophen (TYLENOL) tab 650 mg, 650 mg, Oral, Q6H 12/19/2019       STUDIES:    12/17/19  CT Chest/Abd/Pelvis  CONCLUSION:    1. No CT evidence for pulmonary embolism. 2. Compared to most recent CT performed 12/7/2019, decreasing bilateral pleural effusions. 3. Increasing fatty infiltration of the liver. bile duct distension. Willy Sagastume PANCREAS:  There is new peripancreatic inflammatory changes with a small amount of adjacent free fluid abutting the pancreatic head and uncinate process and trace free fluid inferior to the distal pancreatic body and tail.   Stable l

## 2019-12-19 NOTE — PROGRESS NOTES
Gastroenterology Progress Note  Patient Name: Ari Rahman  Chief Complaint: gallstone/Etoh pancreatitis, pseudocysts  S: Pt tried taking more PO yday.      O: /47 (BP Location: Left arm)   Pulse 105   Temp 97.6 °F (36.4 °C) (Oral)   Resp 20   Ht heterogeneously when compared to prior study and early necrosis cannot be entirely excluded, correlate   Clinically. Assessment:   1. Etoh/Gallstone pancreatitis now with pseudocysts and ?necrosis  2. Fluid overload/SOB   Plan:   1.  On Meropenem per ID

## 2019-12-19 NOTE — PROGRESS NOTES
[facility]                INFECTIOUS DISEASE PROGRESS NOTE    Obdulio Buckley Patient Status:  Inpatient    3/31/1945 MRN [id number]   [facility] 4SW-A Attending Prudencio Buerger, MD   [facility] Day # 25 PCP Tara Green MD     A Fluid Culture Result No Growth 5 Days N/A    Body Fld Smear No organisms seen N/A    Body Fld Smear 1+ Neutrophils seen N/A    Body Fld Smear This is a cytocentrifuged smear. N/A   2.  BLOOD CULTURE     Status: None    Collection Time: 12/07/19  7:50 PM   R empyema  Repeat CXR shows no sig reacumulation, CT improved    3.  ARF/HD per renal        Bridgette Lagos MD  [facility]  [phone]

## 2019-12-19 NOTE — PROGRESS NOTES
Pt alert and oriented to self and place, disoriented to time and situation. Pt given meds per MAR. Pt had two BM over night. Pt moving better since having dialysis and since swelling has decreased. Pt with 2L O2 nc.  Pt with complaints of pain in the right

## 2019-12-19 NOTE — RESPIRATORY THERAPY NOTE
TIM - Equipment Use Daily Summary:                  . Set Mode:                . Usage in hours:                . 90% Pressure (EPAP) level:                . 90% Insp. Pressure (IPAP): William Aden AHI:                .  Supplemental Oxygen:    LPM

## 2019-12-19 NOTE — PROGRESS NOTES
[facility]  Nephrology Progress Note    Ira Roblero Patient Status:  Inpatient    3/31/1945 MRN [id number]   [facility] 4SW-A Attending Avani Lai MD   [facility] Day # 25 PCP Candace Corrigan MD       SUBJECTIVE:  Looks better R>L  Abdomen: Soft, + distended.  NT to palpation, + bowel sounds  Extremities  1+ generalized edema  Neurologic: moving all extremities  Skin: Warm and dry, no rash        Labs:     Recent Labs   Lab 12/15/19  0540 12/15/19  1402 12/16/19  0643 12/17/19  0 Dialysis  ipratropium-albuterol (DUONEB) nebulizer solution 3 mL, 3 mL, Nebulization, Q4H PRN  glucose (DEX4) oral liquid 15 g, 15 g, Oral, Q15 Min PRN    Or  Glucose-Vitamin C (DEX-4) chewable tab 4 tablet, 4 tablet, Oral, Q15 Min PRN    Or  dextrose 50 % kidney injury with w/u and hx most consistent with ATN which is likely multifactorial including hypotension, severe pancreatitis and contrast nephropathy. . HD initiated and unfortunately he remains oligo-anuric and dialysis dependent at this time.   We will

## 2019-12-19 NOTE — PROGRESS NOTES
[facility]  Progress Note     Nic Riddle Patient Status:  Inpatient    3/31/1945 MRN [id number]   [facility] 4SW-A Attending Dr. Fleta Meckel # 25 PCP Jerrod Banuelos MD     Chief Complaint: abdominal distention    S: CO2 25.0  25.0 22.0 23.0   ALKPHO 275* 275* 275*   * 212* 227*   ALT 76* 84* 95*   BILT 2.5* 2.6* 2.8*   TP 6.0* 6.3* 6.4       Estimated Creatinine Clearance: 14.2 mL/min (A) (based on SCr of 4.69 mg/dL (H)).     No results for input(s): PTP, INR units of insulin and Correctional scale  12. Anemia. Stable after PRBCs  13. Acute encephalopathy, multifactorial, Improved  1. suspect hepatic/uremia/ETOH and opiate w/d  2. Lactulose - had 1 BM yesterday.    3. Seroquel/cymbalta per psych and when closer 6.4     Plan as outlined above  Encourage PO intake - calorie count in progress  Continue scheduled Reglan  TPN orders adjusted, will try to cut down volume slowly  Increase activity  PT following  Incentive spirometry  HD tomorrow - hold AM Metoprolol  Gini Conway

## 2019-12-20 LAB
ALBUMIN SERPL-MCNC: 3 G/DL (ref 3.4–5)
ALBUMIN/GLOB SERPL: 0.9 {RATIO} (ref 1–2)
ALP LIVER SERPL-CCNC: 276 U/L (ref 45–117)
ALT SERPL-CCNC: 93 U/L (ref 16–61)
ANION GAP SERPL CALC-SCNC: 11 MMOL/L (ref 0–18)
AST SERPL-CCNC: 173 U/L (ref 15–37)
BASOPHILS # BLD: 0 X10(3) UL (ref 0–0.2)
BASOPHILS NFR BLD: 0 %
BILIRUB SERPL-MCNC: 2.3 MG/DL (ref 0.1–2)
BUN BLD-MCNC: 45 MG/DL (ref 7–18)
BUN/CREAT SERPL: 7.7 (ref 10–20)
CALCIUM BLD-MCNC: 13 MG/DL (ref 8.5–10.1)
CHLORIDE SERPL-SCNC: 106 MMOL/L (ref 98–112)
CO2 SERPL-SCNC: 24 MMOL/L (ref 21–32)
CREAT BLD-MCNC: 5.82 MG/DL (ref 0.7–1.3)
DEPRECATED RDW RBC AUTO: 77.2 FL (ref 35.1–46.3)
EOSINOPHIL # BLD: 0.42 X10(3) UL (ref 0–0.7)
EOSINOPHIL NFR BLD: 2 %
ERYTHROCYTE [DISTWIDTH] IN BLOOD BY AUTOMATED COUNT: 17.9 % (ref 11–15)
GLOBULIN PLAS-MCNC: 3.3 G/DL (ref 2.8–4.4)
GLUCOSE BLD-MCNC: 150 MG/DL (ref 70–99)
GLUCOSE BLD-MCNC: 198 MG/DL (ref 70–99)
GLUCOSE BLD-MCNC: 204 MG/DL (ref 70–99)
GLUCOSE BLD-MCNC: 220 MG/DL (ref 70–99)
GLUCOSE BLD-MCNC: 227 MG/DL (ref 70–99)
HAV IGM SER QL: 2.3 MG/DL (ref 1.6–2.6)
HCT VFR BLD AUTO: 30.2 % (ref 39–53)
HGB BLD-MCNC: 9.1 G/DL (ref 13–17.5)
LYMPHOCYTES NFR BLD: 13 %
LYMPHOCYTES NFR BLD: 2.73 X10(3) UL (ref 1–4)
M PROTEIN MFR SERPL ELPH: 6.3 G/DL (ref 6.4–8.2)
MCH RBC QN AUTO: 35.3 PG (ref 26–34)
MCHC RBC AUTO-ENTMCNC: 30.1 G/DL (ref 31–37)
MCV RBC AUTO: 117.1 FL (ref 80–100)
METAMYELOCYTES # BLD: 0.21 X10(3) UL
METAMYELOCYTES NFR BLD: 1 %
MONOCYTES # BLD: 2.1 X10(3) UL (ref 0.1–1)
MONOCYTES NFR BLD: 10 %
NEUTROPHILS # BLD AUTO: 13.68 X10 (3) UL (ref 1.5–7.7)
NEUTROPHILS NFR BLD: 67 %
NEUTS BAND NFR BLD: 7 %
NEUTS HYPERSEG # BLD: 15.54 X10(3) UL (ref 1.5–7.7)
OSMOLALITY SERPL CALC.SUM OF ELEC: 309 MOSM/KG (ref 275–295)
PHOSPHATE SERPL-MCNC: 4 MG/DL (ref 2.5–4.9)
PLATELET # BLD AUTO: 247 10(3)UL (ref 150–450)
PLATELET MORPHOLOGY: NORMAL
POTASSIUM SERPL-SCNC: 3.6 MMOL/L (ref 3.5–5.1)
RBC # BLD AUTO: 2.58 X10(6)UL (ref 3.8–5.8)
SODIUM SERPL-SCNC: 141 MMOL/L (ref 136–145)
TOTAL CELLS COUNTED: 100
WBC # BLD AUTO: 21 X10(3) UL (ref 4–11)

## 2019-12-20 PROCEDURE — 99233 SBSQ HOSP IP/OBS HIGH 50: CPT | Performed by: HOSPITALIST

## 2019-12-20 PROCEDURE — 99232 SBSQ HOSP IP/OBS MODERATE 35: CPT | Performed by: OTHER

## 2019-12-20 PROCEDURE — 90935 HEMODIALYSIS ONE EVALUATION: CPT | Performed by: INTERNAL MEDICINE

## 2019-12-20 RX ORDER — MIDODRINE HYDROCHLORIDE 10 MG/1
10 TABLET ORAL
Status: DISCONTINUED | OUTPATIENT
Start: 2019-12-20 | End: 2020-01-04

## 2019-12-20 RX ORDER — SALIVA STIMULANT COMB. NO.3
SPRAY, NON-AEROSOL (ML) MUCOUS MEMBRANE AS NEEDED
Status: DISCONTINUED | OUTPATIENT
Start: 2019-12-20 | End: 2020-01-04

## 2019-12-20 NOTE — PLAN OF CARE
Pt. A&O x 3-4; can be forgetful with delayed responses. VSS. No c/o SOB; pt. On 2L O2 via NC; O2 sats in the high 90's. Pt. Receiving HD today; tolerated well.  B/P did drop during treatment; albumin and midodrine given per MAR. 2.5L removed per dialysis te medications  - Provide nonpharmacologic comfort measures as appropriate  - Advance diet as tolerated, if ordered  - Obtain nutritional consult as needed  - Evaluate fluid balance  Outcome: Progressing     Problem: METABOLIC/FLUID AND ELECTROLYTES - ADULT interventions unsuccessful or patient reports new pain  - Anticipate increased pain with activity and pre-medicate as appropriate  Outcome: Progressing     Problem: SAFETY ADULT - FALL  Goal: Free from fall injury  Description  INTERVENTIONS:  - Assess pt respiratory effort  - Oxygen supplementation based on oxygen saturation or ABGs  - Provide Smoking Cessation handout, if applicable  - Encourage broncho-pulmonary hygiene including cough, deep breathe, Incentive Spirometry  - Assess the need for suctioning

## 2019-12-20 NOTE — DIETARY NOTE
[facility]     NUTRITION FOLLOW UP ASSESSMENT     Pt does not meet malnutrition criteria.     NUTRITION DIAGNOSIS/PROBLEM:  Inadequate oral intake related to inability to consume sufficient intake (severe pancreatitis) as evidenced by TPN and poor PO appetite. Hypophosphatemia & Hypoalbuminemia noted. Given that pt is not improving with conservative course, consider initiation of PN.     ANTHROPOMETRICS:  Ht: 175.3 cm (5' 9\")  Wt: 104.6 kg (230 lb 9.6 oz).  This is 147 % of IBW  BMI: Body mass index is

## 2019-12-20 NOTE — DIETARY NOTE
Clinical Nutrition     3 day calorie count started - envelope hanging on door and will be monitored daily by RD.    Diet order: Low fiber/Soft and TPN   Date: 12/19  Breakfast: -  Lunch:  160 calories,  16 grams protein   Dinner: -  Daily total :  160 calor

## 2019-12-20 NOTE — PROGRESS NOTES
Pt. Was seen bedside this morning. He was awake and oriented to person, place and situation. His mood was euthymic and his affect somewhat blunted. He appeared fatigued and spoke slowly with several pauses.  He did remember who I was and remembered our conv

## 2019-12-20 NOTE — PROGRESS NOTES
[facility]  Nephrology Progress Note    Maryhermilo Zuñiga Patient Status:  Inpatient    3/31/1945 MRN [id number]   [facility] 4SW-A Attending Wendy Ruiz MD   [address] Day # 22 PCP Iline Felty, MD       SUBJECTIVE:  Dialysis note bowel sounds  Extremities  1+ generalized edema  Neurologic: moving all extremities  Skin: Warm and dry, no rash        Labs:     Recent Labs   Lab 12/16/19  0643 12/17/19  0547 12/18/19  0531 12/19/19  0640 12/20/19  0412   WBC 20.3* 18.0* 19.7* 19.2* 21. sodium chloride 0.9% 100 mL MBP, 500 mg, Intravenous, Q24H  heparin sodium 1000 UNIT/ML injection 3,000 Units, 3,000 Units, Intravenous, PRN Dialysis  ipratropium-albuterol (DUONEB) nebulizer solution 3 mL, 3 mL, Nebulization, Q4H PRN  glucose (DEX4) oral function is normal and he has developed anuric acute kidney injury with w/u and hx most consistent with ATN which is likely multifactorial including hypotension, severe pancreatitis and contrast nephropathy. . HD initiated and unfortunately he remains oligo

## 2019-12-20 NOTE — PROGRESS NOTES
[facility]  Progress Note     Sabas South Patient Status:  Inpatient    3/31/1945 MRN [id number]   [facility] 4SW-A Attending Dr. Savanah Chaudhari # 22 PCP Scott Denise MD     Chief Complaint: Acute pancreatitis    S: P 1.5 mL Intravenous Once   • insulin detemir  22 Units Subcutaneous Daily   • Insulin Aspart Pen  1-10 Units Subcutaneous Q6H   • Metoclopramide HCl  5 mg Intravenous Q6H   • metoprolol Tartrate  12.5 mg Oral 2x Daily(Beta Blocker)   • DULoxetine HCl  30 m

## 2019-12-20 NOTE — PROGRESS NOTES
[facility]                INFECTIOUS DISEASE PROGRESS NOTE    Jovanna Flynn Patient Status:  Inpatient    3/31/1945 MRN [id number]   [facility] 4SW-A Attending Sis Villagomez MD   [facility] Day # 22 PCP Erika Ortega MD     A Collection Time: 12/08/19  4:30 PM   Result Value Ref Range    Body Fluid Culture Result No Growth 5 Days N/A    Body Fld Smear No organisms seen N/A    Body Fld Smear 1+ Neutrophils seen N/A    Body Fld Smear This is a cytocentrifuged smear. N/A   2.  BLOO continue meropenem      2. Pleural effusion sp thora/exudative  Ascites present, no signs of empyema  Repeat CXR shows no sig reacumulation, CT improved    3.  ARF/HD per renal        Francisviolet Bal MD  [facility]  [phone]

## 2019-12-20 NOTE — PLAN OF CARE
Pt. A&O x3-4. VSS. No c/o SOB; pt. On 2L O2 via NC; O2 sats in the high 90's. Does get SOB upon exertion. No c/o pain. Pt. Sitting in the chair for some of the day. Pt. Receiving TPN; tolerating well. Q6 accuchecks; insulin per MAR. Wife at the bedside.  Pt restrictions as appropriate  Outcome: Progressing     Problem: PAIN - ADULT  Goal: Verbalizes/displays adequate comfort level or patient's stated pain goal  Description  INTERVENTIONS:  - Encourage pt to monitor pain and request assistance  - Assess pain u highest/safest level of independence in self care  Description  Interventions:  - Assess ability and encourage patient to participate in ADLs to maximize function  - Promote sitting position while performing ADLs such as feeding, grooming, and bathing  - E

## 2019-12-20 NOTE — CM/SW NOTE
Interdisciplinary Rounds: 12/20/19  Admitted: 11/25/2019 LOS: 25  Disciplines in attendance: charge nurse, staff nurse, CM, [address] and discharge plan reviewed. JANNA, final decision pending dialysis needs. SW working on plan.     Active issues needing

## 2019-12-20 NOTE — PROGRESS NOTES
Alert,forgetful. No complaint of pain. TPN ongoing,currently at 78.9mg per hour No complaint of N/V. Resting comfortably at this time.

## 2019-12-20 NOTE — CM/SW NOTE
SW had long discussion w/wife in regards to dc plan. Informed wife to facilities with onsite dialysis ([facility] and [facility]) are not able to accept the pt for JANNA/in house dialysis due to NEHA.     Aysha Matt seems to be considering but will not give a final answe

## 2019-12-20 NOTE — PROGRESS NOTES
Gastroenterology Progress Note  Patient Name: Sabas South  Chief Complaint: gallstone/Etoh pancreatitis, pseudocysts  S: No acute complaints overnight.    O: BP 90/47 (BP Location: Left arm)   Pulse 100   Temp 97.5 °F (36.4 °C) (Oral)   Resp 18   Ht 6 entirely excluded, correlate   Clinically. Assessment:   1. Etoh/Gallstone pancreatitis now with pseudocysts and ?necrosis  2. Fluid overload/SOB   Plan:   1. On Meropenem per ID recs  2.   Would continue conservative management for now; CT done 12/17 wi

## 2019-12-20 NOTE — PHYSICAL THERAPY NOTE
PHYSICAL THERAPY TREATMENT NOTE - INPATIENT    Room Number: 410/410-A    Session: 4  Number of Visits to Meet Established Goals: 6  History related to current admission: Pt is 69year old male admitted on 11/25/2019 from home with c/o epigastic and chest PAPILLARY CARCINOMA   • COLONOSCOPY     • KNEE REPLACEMENT SURGERY     • KNEE TOTAL REPLACEMENT Right 5/13/2016    Performed by Kayla Aguila MD at [facility] MAIN OR   • OTHER SURGICAL HISTORY  01/01/2005    cardiac cath    • OTHER SURGICAL HISTORY  07/24/2017 None(bilateral HHA)  Pattern: Shuffle(BLE shakiness, unsteady, unable to maintain close proximity )  Stoop/Curb Assistance: Not tested  Comment : above scores based on dept protocol    Skilled Therapy Provided:     Pt presented in supine upon PT/PT Aide ar Discharge Recommendations: Sub-acute rehabilitation(ELOS 9-11 days)     PLAN  PT Treatment Plan: Bed mobility; Body mechanics; Endurance;Gait training;Patient education; Family education;Transfer training;Balance training;Neuromuscular re-educate  Rehab Jasmin

## 2019-12-21 LAB
ALBUMIN SERPL-MCNC: 3.3 G/DL (ref 3.4–5)
ALBUMIN/GLOB SERPL: 1.1 {RATIO} (ref 1–2)
ALP LIVER SERPL-CCNC: 323 U/L (ref 45–117)
ALT SERPL-CCNC: 108 U/L (ref 16–61)
ANION GAP SERPL CALC-SCNC: 14 MMOL/L (ref 0–18)
AST SERPL-CCNC: 231 U/L (ref 15–37)
BASOPHILS # BLD: 0 X10(3) UL (ref 0–0.2)
BASOPHILS # BLD: 0.33 X10(3) UL (ref 0–0.2)
BASOPHILS NFR BLD: 0 %
BASOPHILS NFR BLD: 2 %
BILIRUB SERPL-MCNC: 2.8 MG/DL (ref 0.1–2)
BUN BLD-MCNC: 34 MG/DL (ref 7–18)
BUN/CREAT SERPL: 7.3 (ref 10–20)
CALCIUM BLD-MCNC: 11.2 MG/DL (ref 8.5–10.1)
CHLORIDE SERPL-SCNC: 101 MMOL/L (ref 98–112)
CO2 SERPL-SCNC: 23 MMOL/L (ref 21–32)
CREAT BLD-MCNC: 4.65 MG/DL (ref 0.7–1.3)
DEPRECATED RDW RBC AUTO: 75.2 FL (ref 35.1–46.3)
DEPRECATED RDW RBC AUTO: 90.2 FL (ref 35.1–46.3)
EOSINOPHIL # BLD: 0.74 X10(3) UL (ref 0–0.7)
EOSINOPHIL # BLD: 0.82 X10(3) UL (ref 0–0.7)
EOSINOPHIL NFR BLD: 4 %
EOSINOPHIL NFR BLD: 5 %
ERYTHROCYTE [DISTWIDTH] IN BLOOD BY AUTOMATED COUNT: 17.9 % (ref 11–15)
ERYTHROCYTE [DISTWIDTH] IN BLOOD BY AUTOMATED COUNT: 18.8 % (ref 11–15)
GLOBULIN PLAS-MCNC: 3.1 G/DL (ref 2.8–4.4)
GLUCOSE BLD-MCNC: 171 MG/DL (ref 70–99)
GLUCOSE BLD-MCNC: 177 MG/DL (ref 70–99)
GLUCOSE BLD-MCNC: 186 MG/DL (ref 70–99)
GLUCOSE BLD-MCNC: 187 MG/DL (ref 70–99)
GLUCOSE BLD-MCNC: 189 MG/DL (ref 70–99)
GLUCOSE BLD-MCNC: 203 MG/DL (ref 70–99)
GLUCOSE BLD-MCNC: 206 MG/DL (ref 70–99)
HAV IGM SER QL: 2 MG/DL (ref 1.6–2.6)
HCT VFR BLD AUTO: 29.3 % (ref 39–53)
HCT VFR BLD AUTO: 29.6 % (ref 39–53)
HGB BLD-MCNC: 8 G/DL (ref 13–17.5)
HGB BLD-MCNC: 9.1 G/DL (ref 13–17.5)
LYMPHOCYTES NFR BLD: 12 %
LYMPHOCYTES NFR BLD: 14 %
LYMPHOCYTES NFR BLD: 2.22 X10(3) UL (ref 1–4)
LYMPHOCYTES NFR BLD: 2.3 X10(3) UL (ref 1–4)
M PROTEIN MFR SERPL ELPH: 6.4 G/DL (ref 6.4–8.2)
MCH RBC QN AUTO: 34.9 PG (ref 26–34)
MCH RBC QN AUTO: 35.1 PG (ref 26–34)
MCHC RBC AUTO-ENTMCNC: 27.3 G/DL (ref 31–37)
MCHC RBC AUTO-ENTMCNC: 30.7 G/DL (ref 31–37)
MCV RBC AUTO: 113.4 FL (ref 80–100)
MCV RBC AUTO: 128.5 FL (ref 80–100)
METAMYELOCYTES # BLD: 0.16 X10(3) UL
METAMYELOCYTES # BLD: 0.93 X10(3) UL
METAMYELOCYTES NFR BLD: 1 %
METAMYELOCYTES NFR BLD: 5 %
MONOCYTES # BLD: 0.16 X10(3) UL (ref 0.1–1)
MONOCYTES # BLD: 2.04 X10(3) UL (ref 0.1–1)
MONOCYTES NFR BLD: 1 %
MONOCYTES NFR BLD: 11 %
MYELOCYTES # BLD: 0.19 X10(3) UL
MYELOCYTES # BLD: 0.66 X10(3) UL
MYELOCYTES NFR BLD: 1 %
MYELOCYTES NFR BLD: 4 %
NEUTROPHILS # BLD AUTO: 10.17 X10 (3) UL (ref 1.5–7.7)
NEUTROPHILS # BLD AUTO: 11.81 X10 (3) UL (ref 1.5–7.7)
NEUTROPHILS NFR BLD: 51 %
NEUTROPHILS NFR BLD: 51 %
NEUTS BAND NFR BLD: 16 %
NEUTS BAND NFR BLD: 22 %
NEUTS HYPERSEG # BLD: 11.97 X10(3) UL (ref 1.5–7.7)
NEUTS HYPERSEG # BLD: 12.4 X10(3) UL (ref 1.5–7.7)
NRBC BLD MANUAL-RTO: 3 %
OSMOLALITY SERPL CALC.SUM OF ELEC: 298 MOSM/KG (ref 275–295)
PHOSPHATE SERPL-MCNC: 4.4 MG/DL (ref 2.5–4.9)
PLATELET # BLD AUTO: 177 10(3)UL (ref 150–450)
PLATELET # BLD AUTO: 197 10(3)UL (ref 150–450)
PLATELET MORPHOLOGY: NORMAL
PLATELET MORPHOLOGY: NORMAL
POTASSIUM SERPL-SCNC: 3.3 MMOL/L (ref 3.5–5.1)
PTH-INTACT SERPL-MCNC: 6.9 PG/ML (ref 18.5–88)
RBC # BLD AUTO: 2.28 X10(6)UL (ref 3.8–5.8)
RBC # BLD AUTO: 2.61 X10(6)UL (ref 3.8–5.8)
SODIUM SERPL-SCNC: 138 MMOL/L (ref 136–145)
TOTAL CELLS COUNTED: 100
TOTAL CELLS COUNTED: 100
WBC # BLD AUTO: 16.4 X10(3) UL (ref 4–11)
WBC # BLD AUTO: 18.5 X10(3) UL (ref 4–11)

## 2019-12-21 PROCEDURE — 99233 SBSQ HOSP IP/OBS HIGH 50: CPT | Performed by: INTERNAL MEDICINE

## 2019-12-21 PROCEDURE — 99232 SBSQ HOSP IP/OBS MODERATE 35: CPT | Performed by: HOSPITALIST

## 2019-12-21 NOTE — PROGRESS NOTES
Gastroenterology Progress Note  Patient Name: Marchelle Eisenmenger  Chief Complaint: Acute pancreatitis related to gallstone and alcohol  S: The patient reports continued epigastric abdominal pain, but better today. No vomiting, but does have some nausea.   E pancreatic enhancement, raising a concern for components of necrosis. He is stable for now.  .   Plan: 1) Continue supportive care             2) Antibiotics are on board per ID             3) TPN - though would be nice to switch to [place] tube feeding

## 2019-12-21 NOTE — PLAN OF CARE
Problem: CARDIOVASCULAR - ADULT  Goal: Maintains optimal cardiac output and hemodynamic stability  Description  INTERVENTIONS:  - Monitor vital signs, rhythm, and trends  - Monitor for bleeding, hypotension and signs of decreased cardiac output  - Evalua Instruct patient on fluid and nutrition restrictions as appropriate  Outcome: Progressing  Goal: Hemodynamic stability and optimal renal function maintained  Description  INTERVENTIONS:  - Monitor labs and assess for signs and symptoms of volume excess or Consider OT/PT consult to assist with strengthening/mobility  - Encourage toileting schedule  Outcome: Progressing     Problem: NEUROLOGICAL - ADULT  Goal: Achieves stable or improved neurological status  Description  INTERVENTIONS  - Assess for and report

## 2019-12-21 NOTE — PROGRESS NOTES
[facility]  Report of Psychiatric Progress Note    Nic Riddle Patient Status:  Inpatient    3/31/1945 MRN [id number]   [facility] 3NE-A Attending Garry Araya MD   [facility] Day # 22 PCP Jerrod Banuelos MD     Date of Admission: 1 of Fentanyl use disorder for 2 yrs. He went to chem dependency rehab and has been clean for 26 yrs. 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  He was OFF all opioids for many years, but has required some short term opioids to treat kn anxiety but caused him to be disoriented in AM.     He c/o 8/10 abd pain. KUB without free air, unremarkable. He feels anxious and tired and depressed. Feels hopeless but has no suicidal ideation. 12/10/19- He feels tired and anxious and depressed.  He and unspecified hyperlipidemia    • Unspecified drug dependence, unspecified 01/01/1987    addicted to drugs and alcohol   • Unspecified essential hypertension    • Visual impairment     glasses     Past Surgical History:   Procedure Laterality Date   • AD Subcutaneous, Q6H  •  adult 3 in 1 TPN, , Intravenous, Cyclic TPN  •  Metoclopramide HCl (REGLAN) injection 5 mg, 5 mg, Intravenous, Q6H  •  metoprolol tartrate (LOPRESSOR) partial tablet 12.5 mg, 12.5 mg, Oral, 2x Daily(Beta Blocker)  •  DULoxetine HCl (C Oral, BID  •  metoprolol Tartrate (LOPRESSOR) 5 MG/5ML injection 5 mg, 5 mg, Intravenous, Q4H PRN  •  [DISCONTINUED] acetaminophen (TYLENOL) tab 650 mg, 650 mg, Oral, Q6H PRN **OR** acetaminophen (TYLENOL) 160 MG/5ML oral liquid 650 mg, 650 mg, Oral, Q6H P evidence for pulmonary embolism. 2. Compared to most recent CT performed 12/7/2019, decreasing bilateral pleural effusions. 3. Increasing fatty infiltration of the liver. Hepatosplenomegaly now present.   4. Slight improvement of peripancreatic inflammat with a small amount of adjacent free fluid abutting the pancreatic head and uncinate process and trace free fluid inferior to the distal pancreatic body and tail.   Stable low-attenuation   structure arising from versus abutting the body of the pancreas nataliia

## 2019-12-21 NOTE — PROGRESS NOTES
[facility]  Nephrology Progress Note    Fe Herrera Attending:  Santy Moscoso MD       Assessment and Plan:    1) NEHA- oliguric ATN due to severe pancreatitis with MSOF exac by contrast nephropathy; remains volume up / dyspneic -> HD / UF toda 12/21/2019     12/21/2019    CA 11.2 12/21/2019    ALB 3.3 12/21/2019    ALKPHO 323 12/21/2019    BILT 2.8 12/21/2019    TP 6.4 12/21/2019     12/21/2019     12/21/2019    MG 2.0 12/21/2019    PHOS 4.4 12/21/2019    PGLU 187 12/21/2019 injection 1 mg, 1 mg, Intravenous, BID PRN  simethicone (MYLICON) chewable tab 80 mg, 80 mg, Oral, QID PRN  Heparin Sodium (Porcine) 5000 UNIT/ML injection 5,000 Units, 5,000 Units, Subcutaneous, 2 times per day  dextrose 10 % infusion, , Intravenous, Cont

## 2019-12-21 NOTE — PROGRESS NOTES
[facility]                INFECTIOUS DISEASE PROGRESS NOTE    Marsha Weiss Patient Status:  Inpatient    3/31/1945 MRN [id number]   [facility] 4SW-A Attending Roberta Corona MD   [facility] Day # 32 PCP Leslie Daniels MD     A cytocentrifuged smear. N/A   2.  BLOOD CULTURE     Status: None    Collection Time: 12/07/19  7:50 PM   Result Value Ref Range    Blood Culture Result No Growth 5 Days N/A   3. URINE CULTURE, ROUTINE     Status: None    Collection Time: 12/01/19  4:39 AM effusion sp thora/exudative  Ascites present, no signs of empyema  Repeat CXR shows no sig reacumulation, CT improved    3.  ARF/HD per renal        Sherine Kauffman MD  [facility]  [phone]

## 2019-12-21 NOTE — PROGRESS NOTES
[facility]  Progress Note     Sabas South Patient Status:  Inpatient    3/31/1945 MRN [id number]   [facility] 4SW-A Attending Dr. Savanah Chaudhari # 32 PCP Scott Denise MD     Chief Complaint: Acute pancreatitis    S: P Intravenous Q MWF   • heparin sodium  1.5 mL Intravenous Once   • insulin detemir  22 Units Subcutaneous Daily   • Insulin Aspart Pen  1-10 Units Subcutaneous Q6H   • Metoclopramide HCl  5 mg Intravenous Q6H   • metoprolol Tartrate  12.5 mg Oral 2x Daily(B

## 2019-12-22 LAB
ALBUMIN SERPL-MCNC: 3.2 G/DL (ref 3.4–5)
ALBUMIN/GLOB SERPL: 1 {RATIO} (ref 1–2)
ALP LIVER SERPL-CCNC: 354 U/L (ref 45–117)
ALT SERPL-CCNC: 119 U/L (ref 16–61)
ANION GAP SERPL CALC-SCNC: 10 MMOL/L (ref 0–18)
AST SERPL-CCNC: 248 U/L (ref 15–37)
ATRIAL RATE: 102 BPM
ATRIAL RATE: 110 BPM
BASOPHILS # BLD: 0 X10(3) UL (ref 0–0.2)
BASOPHILS NFR BLD: 0 %
BILIRUB SERPL-MCNC: 2.9 MG/DL (ref 0.1–2)
BUN BLD-MCNC: 30 MG/DL (ref 7–18)
BUN/CREAT SERPL: 6.9 (ref 10–20)
CALCIUM BLD-MCNC: 11.5 MG/DL (ref 8.5–10.1)
CHLORIDE SERPL-SCNC: 105 MMOL/L (ref 98–112)
CO2 SERPL-SCNC: 25 MMOL/L (ref 21–32)
CREAT BLD-MCNC: 4.37 MG/DL (ref 0.7–1.3)
DEPRECATED RDW RBC AUTO: 75.2 FL (ref 35.1–46.3)
EOSINOPHIL # BLD: 0.35 X10(3) UL (ref 0–0.7)
EOSINOPHIL NFR BLD: 2 %
ERYTHROCYTE [DISTWIDTH] IN BLOOD BY AUTOMATED COUNT: 17.6 % (ref 11–15)
GLOBULIN PLAS-MCNC: 3.2 G/DL (ref 2.8–4.4)
GLUCOSE BLD-MCNC: 132 MG/DL (ref 70–99)
GLUCOSE BLD-MCNC: 133 MG/DL (ref 70–99)
GLUCOSE BLD-MCNC: 134 MG/DL (ref 70–99)
GLUCOSE BLD-MCNC: 136 MG/DL (ref 70–99)
GLUCOSE BLD-MCNC: 138 MG/DL (ref 70–99)
HAV IGM SER QL: 2 MG/DL (ref 1.6–2.6)
HCT VFR BLD AUTO: 30.8 % (ref 39–53)
HGB BLD-MCNC: 9.2 G/DL (ref 13–17.5)
LYMPHOCYTES NFR BLD: 15 %
LYMPHOCYTES NFR BLD: 2.66 X10(3) UL (ref 1–4)
M PROTEIN MFR SERPL ELPH: 6.4 G/DL (ref 6.4–8.2)
MCH RBC QN AUTO: 35 PG (ref 26–34)
MCHC RBC AUTO-ENTMCNC: 29.9 G/DL (ref 31–37)
MCV RBC AUTO: 117.1 FL (ref 80–100)
METAMYELOCYTES # BLD: 0.18 X10(3) UL
METAMYELOCYTES NFR BLD: 1 %
MONOCYTES # BLD: 2.83 X10(3) UL (ref 0.1–1)
MONOCYTES NFR BLD: 16 %
MYELOCYTES # BLD: 0.18 X10(3) UL
MYELOCYTES NFR BLD: 1 %
NEUTROPHILS # BLD AUTO: 10.94 X10 (3) UL (ref 1.5–7.7)
NEUTROPHILS NFR BLD: 63 %
NEUTS BAND NFR BLD: 2 %
NEUTS HYPERSEG # BLD: 11.51 X10(3) UL (ref 1.5–7.7)
NRBC BLD MANUAL-RTO: 1 %
OSMOLALITY SERPL CALC.SUM OF ELEC: 298 MOSM/KG (ref 275–295)
P AXIS: 58 DEGREES
P-R INTERVAL: 164 MS
PHOSPHATE SERPL-MCNC: 4.8 MG/DL (ref 2.5–4.9)
PLATELET # BLD AUTO: 180 10(3)UL (ref 150–450)
PLATELET MORPHOLOGY: NORMAL
POTASSIUM SERPL-SCNC: 3.4 MMOL/L (ref 3.5–5.1)
Q-T INTERVAL: 318 MS
Q-T INTERVAL: 324 MS
QRS DURATION: 70 MS
QRS DURATION: 72 MS
QTC CALCULATION (BEZET): 438 MS
QTC CALCULATION (BEZET): 442 MS
R AXIS: 37 DEGREES
R AXIS: 42 DEGREES
RBC # BLD AUTO: 2.63 X10(6)UL (ref 3.8–5.8)
SODIUM SERPL-SCNC: 140 MMOL/L (ref 136–145)
T AXIS: 29 DEGREES
T AXIS: 54 DEGREES
TOTAL CELLS COUNTED: 100
VENTRICULAR RATE: 110 BPM
VENTRICULAR RATE: 116 BPM
WBC # BLD AUTO: 17.7 X10(3) UL (ref 4–11)

## 2019-12-22 PROCEDURE — 99233 SBSQ HOSP IP/OBS HIGH 50: CPT | Performed by: CLINICAL NURSE SPECIALIST

## 2019-12-22 PROCEDURE — 99233 SBSQ HOSP IP/OBS HIGH 50: CPT | Performed by: HOSPITALIST

## 2019-12-22 PROCEDURE — 99233 SBSQ HOSP IP/OBS HIGH 50: CPT | Performed by: INTERNAL MEDICINE

## 2019-12-22 RX ORDER — METOPROLOL TARTRATE 5 MG/5ML
5 INJECTION INTRAVENOUS ONCE
Status: COMPLETED | OUTPATIENT
Start: 2019-12-22 | End: 2019-12-22

## 2019-12-22 RX ORDER — DILTIAZEM HYDROCHLORIDE 5 MG/ML
10 INJECTION INTRAVENOUS
Status: DISCONTINUED | OUTPATIENT
Start: 2019-12-22 | End: 2020-01-04

## 2019-12-22 NOTE — PROGRESS NOTES
[facility]  Nephrology Progress Note    Aleksey Frankel Attending:  Ana Cristina Mendieta MD       Assessment and Plan:    1) NEHA- oliguric ATN due to severe pancreatitis with MSOF exac by contrast nephropathy; remains volume up / dyspneic (better today)- 12/22/2019    .0 12/22/2019    CREATSERUM 4.37 12/22/2019    BUN 30 12/22/2019     12/22/2019    K 3.4 12/22/2019     12/22/2019    CO2 25.0 12/22/2019     12/22/2019    CA 11.5 12/22/2019    ALB 3.2 12/22/2019    ALKPHO 354 12/22 chewable tab 8 tablet, 8 tablet, Oral, Q15 Min PRN  Pantoprazole Sodium (PROTONIX) 40 mg in Sodium Chloride 0.9 % 10 mL IV push, 40 mg, Intravenous, QAM AC  melatonin tab TABS 3 mg, 3 mg, Oral, Nightly  haloperidol lactate (HALDOL) 5 MG/ML injection 1 mg,

## 2019-12-22 NOTE — PLAN OF CARE
Tele notified RN patient was having Afib & possible aflutter suggesting to do ECG. HR 150s sustaining in the monitor,metoprolol 5mg IV given. Previous shift RN held oral metoprolol due to patient having dialysis treatment today. HR at 119s to 130s after IV

## 2019-12-22 NOTE — PLAN OF CARE
Problem: GASTROINTESTINAL - ADULT  Goal: Minimal or absence of nausea and vomiting  Description  INTERVENTIONS:  - Maintain adequate hydration with IV or PO as ordered and tolerated  - Nasogastric tube to low intermittent suction as ordered  - Evaluate e Living  Goal: Achieve highest/safest level of independence in self care  Description  Interventions:  - Assess ability and encourage patient to participate in ADLs to maximize function  - Promote sitting position while performing ADLs such as feeding, groo

## 2019-12-22 NOTE — PLAN OF CARE
Patient unable to finish hemodialysis due to low BP. BP went down to SBP as low as 60s per dialysis tech. Albumin was given but isn't helping at this time. H ewanted to notify DR Deb Xavier for he he thinks the treatment needs to be stopped. Notified Dr Deb Xavier re th

## 2019-12-22 NOTE — PROGRESS NOTES
[facility] Cardiology Progress Note    Discussed with Dr. Hermes Elizondo. Asked to see due to new onset atrial fibrillation with RVR. See prior consult from Dr. Lisa Ivory 11/25 and follow up visit by Dr. Jessica Cortes.      Subjective:  He overall feels anxious, but no oth 12/8  · anemia    Plan:     · IV lopressor as above  · Increase po dose to 25 mg bid  · Will order prn cardizem for sustained heart rates. · Will discuss possible amiodarone with Dr. Mercy Chan  · Not a great AC candidate with multiple issues.      ** discusse

## 2019-12-22 NOTE — PROGRESS NOTES
Gastroenterology Progress Note  Patient Name: Kuldeep Justice  Chief Complaint: Acute alcohol/gallstone pancreatitis  S: The patient reports continued mild epigastric abdominal pain. No n/v. No appetite.     O: /44 (BP Location: Left arm)   Pulse also is not eating much, and likely warrants jejunal feeding.    Plan:   1) Would plan EUS this week - to assess for biliary stone  2) Would also plan Nasojejunal feeding tube placement this week

## 2019-12-22 NOTE — PROGRESS NOTES
[facility]                INFECTIOUS DISEASE PROGRESS NOTE    Niya Gilmore Patient Status:  Inpatient    3/31/1945 MRN [id number]   [facility] 4SW-A Attending Tiffanie Taylor MD   [facility] Day # 32 PCP Shayan Kang MD     A Body Fld Smear This is a cytocentrifuged smear. N/A   2.  BLOOD CULTURE     Status: None    Collection Time: 12/07/19  7:50 PM   Result Value Ref Range    Blood Culture Result No Growth 5 Days N/A   3. URINE CULTURE, ROUTINE     Status: None    Collection T intervention       2. Pleural effusion sp thora/exudative  Ascites present, no signs of empyema  Repeat CXR shows no sig reacumulation, CT improved    3.  ARF/HD per renal  HD on hold today due to afib/rvr, low bp            Brooklyn Yousif MD  [facility]

## 2019-12-22 NOTE — PROGRESS NOTES
[facility]  Progress Note     Abran Hashimoto Patient Status:  Inpatient    3/31/1945 MRN [id number]   [facility] 4SW-A Attending Dr. Bashir Aden Day # 32 PCP Joey Shay MD     Chief Complaint: Acute pancreatitis    S: P Intravenous Once   • [START ON 12/23/2019] epoetin brielle-epbx  10,000 Units Intravenous Q MWF   • insulin detemir  10 Units Subcutaneous Daily   • heparin sodium  1.5 mL Intravenous Once   • Insulin Aspart Pen  1-10 Units Subcutaneous Q6H   • Metoclopramide Heparin  · CODE status: Full  · GI ppx PPI    Plan of care discussed with patient, RN and cardiology team.    Queen Jose D MYERS

## 2019-12-22 NOTE — PLAN OF CARE
Assumed care @ 0730. Patient alert, oriented to person and place, disoriented to time and situation. Respirations 28, slightly labored, lungs sound diminished, O2 sat 90-92% on O2 3l/nc. Patient with generalized edema.  Patient c/o mild generalized pain, T

## 2019-12-23 ENCOUNTER — APPOINTMENT (OUTPATIENT)
Dept: MRI IMAGING | Facility: HOSPITAL | Age: 74
DRG: 438 | End: 2019-12-23
Attending: INTERNAL MEDICINE
Payer: MEDICARE

## 2019-12-23 ENCOUNTER — APPOINTMENT (OUTPATIENT)
Dept: GENERAL RADIOLOGY | Facility: HOSPITAL | Age: 74
DRG: 438 | End: 2019-12-23
Attending: INTERNAL MEDICINE
Payer: MEDICARE

## 2019-12-23 LAB
ALBUMIN SERPL-MCNC: 3.3 G/DL (ref 3.4–5)
ALBUMIN/GLOB SERPL: 1.1 {RATIO} (ref 1–2)
ALP LIVER SERPL-CCNC: 335 U/L (ref 45–117)
ALT SERPL-CCNC: 111 U/L (ref 16–61)
ANION GAP SERPL CALC-SCNC: 11 MMOL/L (ref 0–18)
AST SERPL-CCNC: 218 U/L (ref 15–37)
BASOPHILS # BLD: 0 X10(3) UL (ref 0–0.2)
BASOPHILS NFR BLD: 0 %
BILIRUB SERPL-MCNC: 2.8 MG/DL (ref 0.1–2)
BUN BLD-MCNC: 37 MG/DL (ref 7–18)
BUN/CREAT SERPL: 6.8 (ref 10–20)
CALCIUM BLD-MCNC: 12.9 MG/DL (ref 8.5–10.1)
CHLORIDE SERPL-SCNC: 105 MMOL/L (ref 98–112)
CO2 SERPL-SCNC: 24 MMOL/L (ref 21–32)
CREAT BLD-MCNC: 5.47 MG/DL (ref 0.7–1.3)
DEPRECATED RDW RBC AUTO: 75.1 FL (ref 35.1–46.3)
EOSINOPHIL # BLD: 0.9 X10(3) UL (ref 0–0.7)
EOSINOPHIL NFR BLD: 5 %
ERYTHROCYTE [DISTWIDTH] IN BLOOD BY AUTOMATED COUNT: 17.3 % (ref 11–15)
GLOBULIN PLAS-MCNC: 3 G/DL (ref 2.8–4.4)
GLUCOSE BLD-MCNC: 119 MG/DL (ref 70–99)
GLUCOSE BLD-MCNC: 124 MG/DL (ref 70–99)
GLUCOSE BLD-MCNC: 125 MG/DL (ref 70–99)
GLUCOSE BLD-MCNC: 127 MG/DL (ref 70–99)
GLUCOSE BLD-MCNC: 127 MG/DL (ref 70–99)
GLUCOSE BLD-MCNC: 139 MG/DL (ref 70–99)
GLUCOSE BLD-MCNC: 141 MG/DL (ref 70–99)
HAV IGM SER QL: 2.3 MG/DL (ref 1.6–2.6)
HCT VFR BLD AUTO: 31.6 % (ref 39–53)
HGB BLD-MCNC: 9.4 G/DL (ref 13–17.5)
LYMPHOCYTES NFR BLD: 1.61 X10(3) UL (ref 1–4)
LYMPHOCYTES NFR BLD: 9 %
M PROTEIN MFR SERPL ELPH: 6.3 G/DL (ref 6.4–8.2)
MCH RBC QN AUTO: 35.1 PG (ref 26–34)
MCHC RBC AUTO-ENTMCNC: 29.7 G/DL (ref 31–37)
MCV RBC AUTO: 117.9 FL (ref 80–100)
METAMYELOCYTES # BLD: 0.72 X10(3) UL
METAMYELOCYTES NFR BLD: 4 %
MONOCYTES # BLD: 1.07 X10(3) UL (ref 0.1–1)
MONOCYTES NFR BLD: 6 %
MYELOCYTES # BLD: 0.18 X10(3) UL
MYELOCYTES NFR BLD: 1 %
NEUTROPHILS # BLD AUTO: 11.32 X10 (3) UL (ref 1.5–7.7)
NEUTROPHILS NFR BLD: 68 %
NEUTS BAND NFR BLD: 7 %
NEUTS HYPERSEG # BLD: 13.43 X10(3) UL (ref 1.5–7.7)
NRBC BLD MANUAL-RTO: 1 %
OSMOLALITY SERPL CALC.SUM OF ELEC: 300 MOSM/KG (ref 275–295)
PHOSPHATE SERPL-MCNC: 5.7 MG/DL (ref 2.5–4.9)
PLATELET # BLD AUTO: 208 10(3)UL (ref 150–450)
POTASSIUM SERPL-SCNC: 4.1 MMOL/L (ref 3.5–5.1)
RBC # BLD AUTO: 2.68 X10(6)UL (ref 3.8–5.8)
SODIUM SERPL-SCNC: 140 MMOL/L (ref 136–145)
TOTAL CELLS COUNTED: 100
WBC # BLD AUTO: 17.9 X10(3) UL (ref 4–11)

## 2019-12-23 PROCEDURE — 90935 HEMODIALYSIS ONE EVALUATION: CPT | Performed by: INTERNAL MEDICINE

## 2019-12-23 PROCEDURE — 71045 X-RAY EXAM CHEST 1 VIEW: CPT | Performed by: INTERNAL MEDICINE

## 2019-12-23 PROCEDURE — 74181 MRI ABDOMEN W/O CONTRAST: CPT | Performed by: INTERNAL MEDICINE

## 2019-12-23 PROCEDURE — 99232 SBSQ HOSP IP/OBS MODERATE 35: CPT | Performed by: INTERNAL MEDICINE

## 2019-12-23 PROCEDURE — 76376 3D RENDER W/INTRP POSTPROCES: CPT | Performed by: INTERNAL MEDICINE

## 2019-12-23 PROCEDURE — 99232 SBSQ HOSP IP/OBS MODERATE 35: CPT | Performed by: OTHER

## 2019-12-23 PROCEDURE — 99232 SBSQ HOSP IP/OBS MODERATE 35: CPT | Performed by: HOSPITALIST

## 2019-12-23 RX ORDER — AMIODARONE HYDROCHLORIDE 200 MG/1
200 TABLET ORAL DAILY
Status: DISCONTINUED | OUTPATIENT
Start: 2019-12-23 | End: 2019-12-23

## 2019-12-23 NOTE — PROGRESS NOTES
[facility]  Nephrology Progress Note    Sergio Cook Patient Status:  Inpatient    3/31/1945 MRN [id number]   [facility] 4SW-A Attending Sandra Martinez MD   [facility] Day # 29 PCP Horacio Benavides MD       SUBJECTIVE:  Dialysis note S1, S2 normal, no murmur or rub  Lungs: decr BS B R>L  Abdomen: Soft, + distended.  NT to palpation, + bowel sounds  Extremities  1+ generalized edema  Neurologic: moving all extremities  Skin: Warm and dry, no rash        Labs:     Recent Labs   Lab 12/20/ Pen (NOVOLOG) 100 UNIT/ML flexpen 1-10 Units, 1-10 Units, Subcutaneous, Q6H  Metoclopramide HCl (REGLAN) injection 5 mg, 5 mg, Intravenous, Q6H  DULoxetine HCl (CYMBALTA) DR particles cap 30 mg, 30 mg, Oral, BID  QUEtiapine Fumarate (SEROQUEL) partial tab 650 mg, 650 mg, Rectal, Q6H PRN  dextrose 50 % injection 25 mL, 25 mL, Intravenous, Once  latanoprost (XALATAN) 0.005 % ophthalmic solution 1 drop, 1 drop, Both Eyes, Nightly  ondansetron HCl (ZOFRAN) injection 4 mg, 4 mg, Intravenous, Q6H PRN          Imp

## 2019-12-23 NOTE — DIETARY NOTE
[facility]     NUTRITION FOLLOW UP ASSESSMENT     Pt does not meet malnutrition criteria.     NUTRITION DIAGNOSIS/PROBLEM:  Inadequate oral intake related to inability to consume sufficient intake (severe pancreatitis) as evidenced by TPN and poor PO with Acute Pancreatitis. PMH includes HTN, GERD, Substance Abuse.  Pt seen for LOS / Diet advancement.  Pt was on Low Fiber / Low Fat x 2 days with minimal intake per RN.  Diet downgraded to Clear Liquids after CT.  He reports no significant nausea or abdom DATE: 12/26     Arlene Oneill RDN  Clinical Dietitian  Pager- [phone]

## 2019-12-23 NOTE — PLAN OF CARE
Assumed pt care at 0730  VSS  Pt denies pain or SOB  HD completed; pt tolerated well; albumin x2 and midodrine for low BP  Dobhoff placed for tube feedings- placement confirmed with xray  MRI and NPO orders noted-GI called for order clarification regarding

## 2019-12-23 NOTE — CM/SW NOTE
Per rounds, pt is getting an NG tube today. Pt off TPN. Pt has permacath. Spoke with Anamaria Swenson from [facility] [phone] who said they can accept pt for JANNA and in house HD but will need updated HD documents once pt is closer to d/c.

## 2019-12-23 NOTE — PLAN OF CARE
Assumed care 1900. Pt A&Ox4. NSR, ST on tele. 2L O2. Pt with wheezes. Respiratory called for PRN breathing treatments. Pt tolerated well. Pt with some abd pain. Tylenol given with relief. Stated felt better after flatulence.    Labs drawn this AM.

## 2019-12-23 NOTE — PROGRESS NOTES
[facility]  Report of Psychiatric Progress Note    Avi Peak Patient Status:  Inpatient    3/31/1945 MRN [id number]   [facility] 3NE-A Attending Madonna Redman MD   [facility] Day # 29 PCP Weston Salmeron MD     Date of Admission: 1 pains.     [facility]    History of Present Illness:  68 yo WM with opioid use disorder (in remission 26 yrs) was admitted on 11/25/19 for treatment of acute pancreatitis.  Lipase was 4151 and CT showed \"peripancreatic inflammatory changes with adjacent f the ICU. He felt better when reassured that he is gradually getting better. No suicidal ideation. No voices/visions. 12/3/19- He feels tired and depressed. Some feelings of helplessness, but no hopelessness or suicidal ideation.      12/5/19- Last night appetite. Past Psychiatric History: None for anxiety or depression per patient. Per wife, he has a hx of depression. Substance Use History: Ex-cigarette smoker. Severe opioid use disorder, in remission 26 yrs. Psych Family History:  Mother with Comment:cough    Medications:    Current Facility-Administered Medications:   •  metoprolol Tartrate (LOPRESSOR) tab 25 mg, 25 mg, Oral, 2x Daily(Beta Blocker)  •  dilTIAZem HCl (CARDIZEM) injection 10 mg, 10 mg, Intravenous, Q1H PRN  •  Amioda tab 40 mg, 40 mg, Oral, QAM AC  •  melatonin tab TABS 3 mg, 3 mg, Oral, Nightly  •  haloperidol lactate (HALDOL) 5 MG/ML injection 1 mg, 1 mg, Intravenous, BID PRN  •  simethicone (MYLICON) chewable tab 80 mg, 80 mg, Oral, QID PRN  •  Heparin Sodium (Porci 12/23/2019    CREATSERUM 5.47 12/23/2019    BUN 37 12/23/2019     12/23/2019    K 4.1 12/23/2019     12/23/2019    CO2 24.0 12/23/2019     12/23/2019    CA 12.9 12/23/2019    ALB 3.3 12/23/2019    ALKPHO 335 12/23/2019    BILT 2.8 12/23/ No enlarged mediastinal or hilar adenopathy. Mary Master CARDIAC:  Trace pericardial effusion. PLEURA:  No pneumothorax or effusion. AORTA:  No aneurysm. VASCULATURE:  No visible pulmonary arterial thrombus or attenuation.        ABDOMEN/PELVIS:  LIVER:  St Soft tissue stranding and thickening involving the 2nd and 3rd portions of the duodenum likely reactive from the pancreatitis. Colonic diverticulosis. Atelectasis in the lungs.      Stable peripherally calcified probable left inferior thyroid nodu

## 2019-12-23 NOTE — PROGRESS NOTES
Gastroenterology Progress Note  Patient Name: Ari Rahman  Chief Complaint: gallstone/Etoh pancreatitis, pseudocysts  S: No acute complaints overnight.    O: BP 97/50 (BP Location: Right arm)   Pulse 102   Temp 97.7 °F (36.5 °C)   Resp 18   Ht 69\" be entirely excluded, correlate   Clinically. Assessment:   1. Etoh/Gallstone pancreatitis now with pseudocysts and ?necrosis  2. Fluid overload/SOB   Plan:   1. On Meropenem per ID recs  2.   Would continue conservative management for now; CT done 12/17

## 2019-12-23 NOTE — PLAN OF CARE
Assumed care @1100 transferred from med/onc for afib RVR. Upon arrival to our floor, patient was ST. No amiodarone was given. VSS, A&Ox4, 2L O2  ST, Denies Pain, Up with 2 assist pivot to chair. Tolerating soft diet with little appetite.   2 loose BM's effectiveness of antiarrhythmic and heart rate control medications as ordered  - Initiate emergency measures for life threatening arrhythmias  - Monitor electrolytes and administer replacement therapy as ordered  Outcome: Adequate for Discharge     Problem patient's stated pain goal  Description  INTERVENTIONS:  - Encourage pt to monitor pain and request assistance  - Assess pain using appropriate pain scale  - Administer analgesics based on type and severity of pain and evaluate response  - Implement non-ph and encourage patient to participate in ADLs to maximize function  - Promote sitting position while performing ADLs such as feeding, grooming, and bathing  - Educate and encourage patient/family in tolerated functional activity level and precautions during optimize cerebral perfusion and minimize risk of hemorrhage  - Monitor temperature, glucose, and sodium.  Initiate appropriate interventions as ordered  Outcome: Adequate for Discharge  Goal: Achieves maximal functionality and self care  Description  INTERV

## 2019-12-23 NOTE — PROGRESS NOTES
[facility]                INFECTIOUS DISEASE PROGRESS NOTE    Negra Oliva Patient Status:  Inpatient    3/31/1945 MRN [id number]   [facility] 4SW-A Attending Madhav Huang MD   [facility] Day # 29 PCP Aliyah Sorensen MD     A cytocentrifuged smear. N/A   2.  BLOOD CULTURE     Status: None    Collection Time: 12/07/19  7:50 PM   Result Value Ref Range    Blood Culture Result No Growth 5 Days N/A   3. URINE CULTURE, ROUTINE     Status: None    Collection Time: 12/01/19  4:39 AM shows no sig reacumulation, CT improved    3.  ARF/HD per renal  HD on hold today due to afib/rvr, low bp          Sonia Dickinson MD  [facility]  [phone]

## 2019-12-23 NOTE — PROGRESS NOTES
[facility]  Cardiology Progress Note    Subjective:  No chest pain. Some SOB. Patient stays \"I was really scared yesterday\". Seems better now. More calm.      Objective:  /54 (BP Location: Left arm)   Pulse 102   Temp 97.9 °F (36.6 °C) (Oral) 25 mL Intravenous Once   • latanoprost  1 drop Both Eyes Nightly     Assessment:  · *NEW* atrial fibrillation: oral BB, rates improved compared to yesterday. · Acute pancreatitis: r/t ETOH. There is concern for biliary obstruction.  May need [place] tube per G

## 2019-12-23 NOTE — CM/SW NOTE
Received a message from [facility] asking about the pt's diet. Informed them the pt appears to be off the TPN at this time.      Also received a message from [facility] stating if the pt is approved at [facility] they will provide the pt with transportation to

## 2019-12-23 NOTE — PROGRESS NOTES
[facility]  Progress Note     Varsha Lombardi Patient Status:  Inpatient    3/31/1945 MRN [id number]   [facility] 4SW-A Attending Dr. Deandra Mosher # 29 PCP Giorgio Sumner MD     Chief Complaint: Acute pancreatitis    S: P Epic.    Medications:   • metoprolol Tartrate  25 mg Oral 2x Daily(Beta Blocker)   • amiodarone (CORDARONE) IV bolus  150 mg Intravenous Once   • epoetin brielle-epbx  10,000 Units Intravenous Q MWF   • insulin detemir  10 Units Subcutaneous Daily   • heparin MD

## 2019-12-24 ENCOUNTER — APPOINTMENT (OUTPATIENT)
Dept: GENERAL RADIOLOGY | Facility: HOSPITAL | Age: 74
DRG: 438 | End: 2019-12-24
Attending: HOSPITALIST
Payer: MEDICARE

## 2019-12-24 LAB
ALBUMIN SERPL-MCNC: 3.5 G/DL (ref 3.4–5)
ALBUMIN/GLOB SERPL: 1.1 {RATIO} (ref 1–2)
ALP LIVER SERPL-CCNC: 307 U/L (ref 45–117)
ALT SERPL-CCNC: 102 U/L (ref 16–61)
ANION GAP SERPL CALC-SCNC: 11 MMOL/L (ref 0–18)
AST SERPL-CCNC: 209 U/L (ref 15–37)
ATRIAL RATE: 97 BPM
BASOPHILS # BLD: 0.36 X10(3) UL (ref 0–0.2)
BASOPHILS NFR BLD: 4 %
BILIRUB SERPL-MCNC: 2.8 MG/DL (ref 0.1–2)
BUN BLD-MCNC: 30 MG/DL (ref 7–18)
BUN/CREAT SERPL: 6 (ref 10–20)
CALCIUM BLD-MCNC: 11 MG/DL (ref 8.5–10.1)
CHLORIDE SERPL-SCNC: 104 MMOL/L (ref 98–112)
CO2 SERPL-SCNC: 24 MMOL/L (ref 21–32)
CREAT BLD-MCNC: 5.04 MG/DL (ref 0.7–1.3)
DEPRECATED RDW RBC AUTO: 48.9 FL (ref 35.1–46.3)
EOSINOPHIL # BLD: 0.18 X10(3) UL (ref 0–0.7)
EOSINOPHIL NFR BLD: 2 %
ERYTHROCYTE [DISTWIDTH] IN BLOOD BY AUTOMATED COUNT: 14 % (ref 11–15)
GLOBULIN PLAS-MCNC: 3.1 G/DL (ref 2.8–4.4)
GLUCOSE BLD-MCNC: 127 MG/DL (ref 70–99)
GLUCOSE BLD-MCNC: 129 MG/DL (ref 70–99)
GLUCOSE BLD-MCNC: 136 MG/DL (ref 70–99)
GLUCOSE BLD-MCNC: 144 MG/DL (ref 70–99)
HCT VFR BLD AUTO: 25.1 % (ref 39–53)
HGB BLD-MCNC: 8 G/DL (ref 13–17.5)
IMMUNOGLOBULIN PNL SER-MCNC: 652 MG/DL (ref 791–1643)
LYMPHOCYTES NFR BLD: 0.81 X10(3) UL (ref 1–4)
LYMPHOCYTES NFR BLD: 9 %
M PROTEIN MFR SERPL ELPH: 6.6 G/DL (ref 6.4–8.2)
MCH RBC QN AUTO: 30.5 PG (ref 26–34)
MCHC RBC AUTO-ENTMCNC: 31.9 G/DL (ref 31–37)
MCV RBC AUTO: 95.8 FL (ref 80–100)
METAMYELOCYTES # BLD: 0.18 X10(3) UL
METAMYELOCYTES NFR BLD: 2 %
MONOCYTES # BLD: 0.63 X10(3) UL (ref 0.1–1)
MONOCYTES NFR BLD: 7 %
NEUTROPHILS # BLD AUTO: 7.04 X10 (3) UL (ref 1.5–7.7)
NEUTROPHILS NFR BLD: 61 %
NEUTS BAND NFR BLD: 15 %
NEUTS HYPERSEG # BLD: 6.84 X10(3) UL (ref 1.5–7.7)
OSMOLALITY SERPL CALC.SUM OF ELEC: 296 MOSM/KG (ref 275–295)
P AXIS: 55 DEGREES
P-R INTERVAL: 170 MS
PLATELET # BLD AUTO: 311 10(3)UL (ref 150–450)
PLATELET MORPHOLOGY: NORMAL
POTASSIUM SERPL-SCNC: 3.8 MMOL/L (ref 3.5–5.1)
Q-T INTERVAL: 332 MS
QRS DURATION: 84 MS
QTC CALCULATION (BEZET): 421 MS
R AXIS: 34 DEGREES
RBC # BLD AUTO: 2.62 X10(6)UL (ref 3.8–5.8)
SODIUM SERPL-SCNC: 139 MMOL/L (ref 136–145)
T AXIS: 43 DEGREES
TOTAL CELLS COUNTED: 100
TRIGL SERPL-MCNC: 374 MG/DL (ref 30–149)
VENTRICULAR RATE: 97 BPM
WBC # BLD AUTO: 9 X10(3) UL (ref 4–11)

## 2019-12-24 PROCEDURE — 71045 X-RAY EXAM CHEST 1 VIEW: CPT | Performed by: HOSPITALIST

## 2019-12-24 PROCEDURE — 99232 SBSQ HOSP IP/OBS MODERATE 35: CPT | Performed by: INTERNAL MEDICINE

## 2019-12-24 PROCEDURE — 99232 SBSQ HOSP IP/OBS MODERATE 35: CPT | Performed by: OTHER

## 2019-12-24 PROCEDURE — 99232 SBSQ HOSP IP/OBS MODERATE 35: CPT | Performed by: HOSPITALIST

## 2019-12-24 PROCEDURE — 99233 SBSQ HOSP IP/OBS HIGH 50: CPT | Performed by: INTERNAL MEDICINE

## 2019-12-24 RX ORDER — MIDODRINE HYDROCHLORIDE 10 MG/1
10 TABLET ORAL
Status: COMPLETED | OUTPATIENT
Start: 2019-12-24 | End: 2019-12-24

## 2019-12-24 RX ORDER — NYSTATIN 100000 U/G
CREAM TOPICAL 2 TIMES DAILY
Status: DISCONTINUED | OUTPATIENT
Start: 2019-12-24 | End: 2020-01-04

## 2019-12-24 RX ORDER — AMIODARONE HYDROCHLORIDE 200 MG/1
200 TABLET ORAL DAILY
Status: DISCONTINUED | OUTPATIENT
Start: 2019-12-24 | End: 2020-01-04

## 2019-12-24 RX ORDER — HEPARIN SODIUM 1000 [USP'U]/ML
1.5 INJECTION, SOLUTION INTRAVENOUS; SUBCUTANEOUS ONCE
Status: DISCONTINUED | OUTPATIENT
Start: 2019-12-24 | End: 2020-01-04

## 2019-12-24 RX ORDER — ALBUMIN (HUMAN) 12.5 G/50ML
100 SOLUTION INTRAVENOUS AS NEEDED
Status: DISCONTINUED | OUTPATIENT
Start: 2019-12-24 | End: 2019-12-27

## 2019-12-24 NOTE — PHYSICAL THERAPY NOTE
Per chart - Pt transferred from 410 to 9762. Pt will require new PT/OT orders to resume services. TIN castaneda.

## 2019-12-24 NOTE — PROGRESS NOTES
[facility]  Progress Note     [facility] Patient Status:  Inpatient    3/31/1945 MRN [id number]   [facility] 4SW-A Attending Dr. Jeannette Reagan Day # 34 PCP Brenda Berger MD     Chief Complaint: Acute pancreatitis    S: P hours.         Imaging: Imaging data reviewed in Epic.     Medications:   • nystatin   Topical BID   • metoprolol Tartrate  25 mg Oral 2x Daily(Beta Blocker)   • amiodarone (CORDARONE) IV bolus  150 mg Intravenous Once   • epoetin brielle-epbx  10,000 Units In MD

## 2019-12-24 NOTE — PROGRESS NOTES
[facility]                INFECTIOUS DISEASE PROGRESS NOTE    Avi Real Patient Status:  Inpatient    3/31/1945 MRN [id number]   [facility] 4SW-A Attending Job Dial, MD   [facility] Day # 34 PCP Weston Salmeron MD     A Smear 1+ Neutrophils seen N/A    Body Fld Smear This is a cytocentrifuged smear. N/A   2.  BLOOD CULTURE     Status: None    Collection Time: 12/07/19  7:50 PM   Result Value Ref Range    Blood Culture Result No Growth 5 Days N/A   3. URINE CULTURE, ROUTINE signs of empyema  Repeat CXR shows no sig reacumulation, CT improved    3.  ARF/HD per renal            Joanne Elaine MD  [facility]  [phone]

## 2019-12-24 NOTE — PROGRESS NOTES
[facility]  Nephrology Progress Note    Sabas South Patient Status:  Inpatient    3/31/1945 MRN [id number]   [facility] 4SW-A Attending Andres Medrano MD   [address] Day # 34 PCP Scott Denise MD       SUBJECTIVE:  NG placed and regular, S1, S2 normal, no murmur or rub  Lungs: decr BS B R>L  Abdomen: Soft, + distended.  + mildly tender, + bowel sounds  Extremities  1+ generalized edema  Neurologic: moving all extremities  Skin: Warm and dry, no rash        Labs:     Recent Labs   L substitute (BIOTENE) solution SOLN, , Oral, PRN  Midodrine HCl (PROAMATINE) tab 10 mg, 10 mg, Oral, PRN Dialysis  Insulin Aspart Pen (NOVOLOG) 100 UNIT/ML flexpen 1-10 Units, 1-10 Units, Subcutaneous, Q6H  Metoclopramide HCl (REGLAN) injection 5 mg, 5 mg, Eyes, Nightly  ondansetron HCl (ZOFRAN) injection 4 mg, 4 mg, Intravenous, Q6H PRN          Impression/Plan:    #1.   Acute kidney injury -Baseline renal function is normal and he has developed anuric acute kidney injury with w/u and hx most consistent with

## 2019-12-24 NOTE — PROGRESS NOTES
Gastroenterology Progress Note  Patient Name: Tony Arizmendi  Chief Complaint: gallstone/Etoh pancreatitis, pseudocysts  S: No acute complaints overnight.    O: /61 (BP Location: Left arm)   Pulse 97   Temp 98 °F (36.7 °C) (Oral)   Resp 19   Ht 69\ Clinically. Assessment:   1. Etoh/Gallstone pancreatitis now with pseudocysts and ?necrosis  2. Fluid overload/SOB   Plan:   1. On Meropenem per ID recs  2.   Would continue conservative management for now; CT done 12/17 with fluid collections; MR done

## 2019-12-24 NOTE — OCCUPATIONAL THERAPY NOTE
OCCUPATIONAL THERAPY TREATMENT NOTE - INPATIENT     Room Number: 410/410-A  Session: 3    Number of Visits to Meet Established Goals: 5    Presenting Problem: substance withdrawal, delirium, pancreatitis    History related to current admission: Pt is 74 ye History:   Procedure Laterality Date   • ADENOIDECTOMY     • BIOPSY OF THYROID,PERCUT  08/20/10    NO CYTOLOGIC EVIDENCE OF PAPILLARY CARCINOMA   • COLONOSCOPY     • KNEE REPLACEMENT SURGERY     • KNEE TOTAL REPLACEMENT Right 5/13/2016    Performed by Bryce Barbosa stand with mod A  Walked to bathroom w/ chair follow and use of RW: mod A   Tolerated standing x 2 min w/ c/o fatigue  Patient End of Session: Up in chair; With [facility] staff;Needs met;Call light within reach;RN aware of session/findings; All patient questions an

## 2019-12-24 NOTE — PROGRESS NOTES
[facility]  Report of Psychiatric Progress Note    Nelson Riley Patient Status:  Inpatient    3/31/1945 MRN [id number]   [facility] 3NE-A Attending Kelly Michelle MD   [facility] Day # 34 PCP Cris Montilla MD     Date of Admission: 1 Dr. Diogo Torrez seeing him for bedside psychotherapy.      5) Continue to avoiding benzos and opoids to decrease delirium risk at this time    6) Don't recommend a trial of buprenorphine (ie 8mg twice a day) until he is working more with PT and c/o his us NO hx of suicide attempts. He has fair appetite and concentration. Some insomnia. No tremors, diaphoresis, nausea, headache. No yawning, gooseflesh, rhinorrhea. Currently, he feels tired, anxious, depressed, and irritable. He c/o abd pain.  He feels thi anxious/depressed today. More hopeful. No voices/visions/paranoia. Low appetite persists. 12/20/19- He worked with PT and walked short distances today. He feels hopeful and less discouraged. He has less anxiety and depressed mood today.  Still has low a 07/24/2017    [facility] Dr Juan Gary   • Bettey Simple    w/adenoidectomy     Family History   Problem Relation Age of Onset   • Other (asthma[Other]) Father    • Lipids Mother    • Psychiatric Mother 79        Alzheimers   • Other (CVA[Other]) Mother 80 Meropenem (MERREM) 500 mg in sodium chloride 0.9% 100 mL MBP, 500 mg, Intravenous, Q24H  •  heparin sodium 1000 UNIT/ML injection 3,000 Units, 3,000 Units, Intravenous, PRN Dialysis  •  ipratropium-albuterol (DUONEB) nebulizer solution 3 mL, 3 mL, Nebuliza patient is nervous/anxious.       Mental Status Exam:     Objective       12/24/19  0900   BP:    Pulse: 97   Resp:    Temp:      Appearance: fair grooming  Behavior: normal psychomotor, fair eye contact  Gait: not observed    Speech: soft, normal rate and injection of non-ionic intravenous contrast material. Multi-planar reformatted/3-D images were created to optimize visualization of vascular anatomy. Dose reduction   techniques were used.  Dose information is transmitted to the ACR ([address] peripancreatic inflammation. ABDOMINAL WALL:  Small fat containing umbilical hernia. [place] PELVIC ORGANS:  Prostatic hypertrophy. [place]   BONES:  Disc space narrowing with degenerative disc disease L2-3 through L5-S1.  8 mm sclerotic focus arising from multiple mid

## 2019-12-24 NOTE — PROGRESS NOTES
[facility]  Cardiology Progress Note    Subjective:  No chest pain or shortness of breath.     Objective:  /62 (BP Location: Left arm)   Pulse 103   Temp 97.5 °F (36.4 °C) (Oral)   Resp 20   Ht 5' 9\" (1.753 m)   Wt 226 lb 1.6 oz (102.6 kg)   SpO is concern for biliary obstruction. Currently on tube feedings/TPN. GI following and planning for conservative medical management. · Hypoxic respiratory failure: s/p thoracentesis 12/8  · NEHA: now on HD.  Albumin w HD for hypotension  · Anemia: HGB drop i

## 2019-12-24 NOTE — PLAN OF CARE
Assumed care at 0730  A&Ox3-4, VSS, NSR on tele  Up to the chair this AM  Dialysis completed. 2.5L removed  TF residual 180ml this afternoon. Dietician notified. Instructed to keep TF at 25 ml/hr until tomorrow.  Pt denies nausea or abdominal pain  Pt and s

## 2019-12-24 NOTE — PLAN OF CARE
Assumed care 1900. Pt A&O x4. VSS. MRI unable to be completed. MD notified. Orders to start TF. NPO except water and meds. TF started at 15ml/hr with 150ml water flush q6h. Denies nausea. Pt changed and repositioned frequently. RR 30.  Pt denies fee

## 2019-12-25 LAB
BASOPHILS # BLD: 0.18 X10(3) UL (ref 0–0.2)
BASOPHILS NFR BLD: 1 %
DEPRECATED RDW RBC AUTO: 72.3 FL (ref 35.1–46.3)
EOSINOPHIL # BLD: 0.36 X10(3) UL (ref 0–0.7)
EOSINOPHIL NFR BLD: 2 %
ERYTHROCYTE [DISTWIDTH] IN BLOOD BY AUTOMATED COUNT: 17 % (ref 11–15)
GLUCOSE BLD-MCNC: 140 MG/DL (ref 70–99)
GLUCOSE BLD-MCNC: 150 MG/DL (ref 70–99)
GLUCOSE BLD-MCNC: 150 MG/DL (ref 70–99)
GLUCOSE BLD-MCNC: 188 MG/DL (ref 70–99)
HCT VFR BLD AUTO: 33.1 % (ref 39–53)
HGB BLD-MCNC: 9.8 G/DL (ref 13–17.5)
LYMPHOCYTES NFR BLD: 1.45 X10(3) UL (ref 1–4)
LYMPHOCYTES NFR BLD: 8 %
MCH RBC QN AUTO: 34.1 PG (ref 26–34)
MCHC RBC AUTO-ENTMCNC: 29.6 G/DL (ref 31–37)
MCV RBC AUTO: 115.3 FL (ref 80–100)
METAMYELOCYTES # BLD: 0.54 X10(3) UL
METAMYELOCYTES NFR BLD: 3 %
MONOCYTES # BLD: 1.81 X10(3) UL (ref 0.1–1)
MONOCYTES NFR BLD: 10 %
MORPHOLOGY: NORMAL
NEUTROPHILS # BLD AUTO: 11.74 X10 (3) UL (ref 1.5–7.7)
NEUTROPHILS NFR BLD: 67 %
NEUTS BAND NFR BLD: 9 %
NEUTS HYPERSEG # BLD: 13.76 X10(3) UL (ref 1.5–7.7)
PLATELET # BLD AUTO: 195 10(3)UL (ref 150–450)
PLATELET MORPHOLOGY: NORMAL
RBC # BLD AUTO: 2.87 X10(6)UL (ref 3.8–5.8)
TOTAL CELLS COUNTED: 100
TRIGL SERPL-MCNC: 369 MG/DL (ref 30–149)
WBC # BLD AUTO: 18.1 X10(3) UL (ref 4–11)

## 2019-12-25 PROCEDURE — 99233 SBSQ HOSP IP/OBS HIGH 50: CPT | Performed by: HOSPITALIST

## 2019-12-25 PROCEDURE — 99232 SBSQ HOSP IP/OBS MODERATE 35: CPT | Performed by: INTERNAL MEDICINE

## 2019-12-25 RX ORDER — HEPARIN SODIUM 1000 [USP'U]/ML
1.5 INJECTION, SOLUTION INTRAVENOUS; SUBCUTANEOUS ONCE
Status: DISCONTINUED | OUTPATIENT
Start: 2019-12-25 | End: 2020-01-04

## 2019-12-25 RX ORDER — ALBUMIN (HUMAN) 12.5 G/50ML
100 SOLUTION INTRAVENOUS AS NEEDED
Status: DISCONTINUED | OUTPATIENT
Start: 2019-12-25 | End: 2019-12-27

## 2019-12-25 NOTE — PLAN OF CARE
Assumed care 1900. Pt A&Ox4 with periods of forgetfulness. NSR on tele. ST  TF kept at 25 per dietary. Going to reassess this morning. Pt repositioned and changed frequently. Still requiring 2L oxygen. No c/o SOB. Perineal area red.  Barrier cream a

## 2019-12-25 NOTE — PROGRESS NOTES
[facility]  Nephrology Progress Note    Kuldeep Justice Patient Status:  Inpatient    3/31/1945 MRN [id number]   [facility] 4SW-A Attending Zohreh Patten MD   [facility] Day # 27 PCP Garrett Bernal MD       SUBJECTIVE:  Up in chair, place  Neck: Supple  Cardiac: tachy, regular, S1, S2 normal, no murmur or rub  Lungs: decr BS B R>L  Abdomen: Soft, + distended.  + mildly tender, + bowel sounds  Extremities  1+ generalized edema  Neurologic: moving all extremities  Skin: Warm and dry, no saliva substitute (BIOTENE) solution SOLN, , Oral, PRN  Midodrine HCl (PROAMATINE) tab 10 mg, 10 mg, Oral, PRN Dialysis  Insulin Aspart Pen (NOVOLOG) 100 UNIT/ML flexpen 1-10 Units, 1-10 Units, Subcutaneous, Q6H  Metoclopramide HCl (REGLAN) injection 5 mg, PRN          Impression/Plan:    #1.   Acute kidney injury -Baseline renal function is normal and he developed anuric acute kidney injury with w/u and hx most consistent with ATN which is likely multifactorial including hypotension, severe pancreatitis and

## 2019-12-25 NOTE — PROGRESS NOTES
[facility]  Report of GI Progress Note    Nic Riddle Patient Status:  Inpatient    3/31/1945 MRN [id number]   [facility] 7NE-A Attending Sammie Gaytan MD   [facility] Day # 27 PCP Jerrod Banuelos MD     Date of Admission:  / UNIT/ML injection 3,000 Units, 3,000 Units, Intravenous, PRN Dialysis  ipratropium-albuterol (DUONEB) nebulizer solution 3 mL, 3 mL, Nebulization, Q4H PRN  glucose (DEX4) oral liquid 15 g, 15 g, Oral, Q15 Min PRN    Or  Glucose-Vitamin C (DEX-4) chewable t palpation, voluntary guarding  EXT: BLE edema  SKIN: Rash present on legs seems to be receeding from the marker lines    Results:     Laboratory Data:  Lab Results   Component Value Date    WBC 18.1 (H) 12/25/2019    HGB 9.8 (L) 12/25/2019    .0 12/ on 12/23/2019 at 21:34     Approved by: Matthew Parker MD on 12/23/2019 at 21:40          Xr Chest Ap Portable  (cpt=71045)    Result Date: 12/23/2019  CONCLUSION:  Dobbhoff tube terminates within the stomach.     Dictated by: Gene Cao MD on 12/23/2

## 2019-12-25 NOTE — PROGRESS NOTES
[facility]  Progress Note     Marchelle Eisenmenger Patient Status:  Inpatient    3/31/1945 MRN [id number]   [facility] 4SW-A Attending Dr. Ernst Loop Day # 27 PCP Kristina Collier MD     Chief Complaint: abdominal distention    S: (A) (based on SCr of 5.04 mg/dL (H)). No results for input(s): PTP, INR in the last 168 hours. No results for input(s): TROP, CK in the last 168 hours. Imaging: Imaging data reviewed in Epic.     Medications:   • nystatin   Topical BID   • hep Correctional scale  12. Anemia. Stable after PRBCs/epogen  13. Leukocytosis   1. 18 today, not sure if 9 yesterday is accurate? 17 the day prior. 14. Acute encephalopathy, multifactorial, Improved  1. suspect hepatic/uremia/ETOH and opiate w/d  2.  Lactul

## 2019-12-26 LAB
ALBUMIN SERPL-MCNC: 3 G/DL (ref 3.4–5)
ALP LIVER SERPL-CCNC: 268 U/L (ref 45–117)
ALT SERPL-CCNC: 69 U/L (ref 16–61)
AMMONIA PLAS-MCNC: <10 UMOL/L (ref 11–32)
ANION GAP SERPL CALC-SCNC: 12 MMOL/L (ref 0–18)
AST SERPL-CCNC: 124 U/L (ref 15–37)
ATRIAL RATE: 103 BPM
BASOPHILS # BLD: 0.35 X10(3) UL (ref 0–0.2)
BASOPHILS NFR BLD: 2 %
BILIRUB DIRECT SERPL-MCNC: 1.3 MG/DL (ref 0–0.2)
BILIRUB SERPL-MCNC: 2 MG/DL (ref 0.1–2)
BUN BLD-MCNC: 43 MG/DL (ref 7–18)
BUN/CREAT SERPL: 6.6 (ref 10–20)
CALCIUM BLD-MCNC: 11.8 MG/DL (ref 8.5–10.1)
CHLORIDE SERPL-SCNC: 101 MMOL/L (ref 98–112)
CO2 SERPL-SCNC: 22 MMOL/L (ref 21–32)
CREAT BLD-MCNC: 6.55 MG/DL (ref 0.7–1.3)
DEPRECATED RDW RBC AUTO: 71 FL (ref 35.1–46.3)
EOSINOPHIL # BLD: 0.17 X10(3) UL (ref 0–0.7)
EOSINOPHIL NFR BLD: 1 %
ERYTHROCYTE [DISTWIDTH] IN BLOOD BY AUTOMATED COUNT: 16.8 % (ref 11–15)
F-ACTIN (SMOOTH MUSCLE) AB: 4 UNITS
GLUCOSE BLD-MCNC: 181 MG/DL (ref 70–99)
GLUCOSE BLD-MCNC: 185 MG/DL (ref 70–99)
GLUCOSE BLD-MCNC: 189 MG/DL (ref 70–99)
GLUCOSE BLD-MCNC: 196 MG/DL (ref 70–99)
GLUCOSE BLD-MCNC: 196 MG/DL (ref 70–99)
GLUCOSE BLD-MCNC: 201 MG/DL (ref 70–99)
HCT VFR BLD AUTO: 32.1 % (ref 39–53)
HGB BLD-MCNC: 9.8 G/DL (ref 13–17.5)
LYMPHOCYTES NFR BLD: 1.73 X10(3) UL (ref 1–4)
LYMPHOCYTES NFR BLD: 10 %
M PROTEIN MFR SERPL ELPH: 6.8 G/DL (ref 6.4–8.2)
MCH RBC QN AUTO: 34.6 PG (ref 26–34)
MCHC RBC AUTO-ENTMCNC: 30.5 G/DL (ref 31–37)
MCV RBC AUTO: 113.4 FL (ref 80–100)
METAMYELOCYTES # BLD: 0.35 X10(3) UL
METAMYELOCYTES NFR BLD: 2 %
MITOCHONDRIAL M2 AB, IGG: 1.8 UNITS
MONOCYTES # BLD: 2.25 X10(3) UL (ref 0.1–1)
MONOCYTES NFR BLD: 13 %
NEUTROPHILS # BLD AUTO: 11.46 X10 (3) UL (ref 1.5–7.7)
NEUTROPHILS NFR BLD: 66 %
NEUTS BAND NFR BLD: 6 %
NEUTS HYPERSEG # BLD: 12.46 X10(3) UL (ref 1.5–7.7)
OSMOLALITY SERPL CALC.SUM OF ELEC: 296 MOSM/KG (ref 275–295)
P AXIS: 57 DEGREES
P-R INTERVAL: 158 MS
PLATELET # BLD AUTO: 240 10(3)UL (ref 150–450)
PLATELET MORPHOLOGY: NORMAL
POTASSIUM SERPL-SCNC: 3.5 MMOL/L (ref 3.5–5.1)
Q-T INTERVAL: 332 MS
QRS DURATION: 80 MS
QTC CALCULATION (BEZET): 434 MS
R AXIS: 29 DEGREES
RBC # BLD AUTO: 2.83 X10(6)UL (ref 3.8–5.8)
SODIUM SERPL-SCNC: 135 MMOL/L (ref 136–145)
T AXIS: 47 DEGREES
TOTAL CELLS COUNTED: 100
VENTRICULAR RATE: 103 BPM
WBC # BLD AUTO: 17.3 X10(3) UL (ref 4–11)

## 2019-12-26 PROCEDURE — 90935 HEMODIALYSIS ONE EVALUATION: CPT | Performed by: INTERNAL MEDICINE

## 2019-12-26 PROCEDURE — 99232 SBSQ HOSP IP/OBS MODERATE 35: CPT | Performed by: INTERNAL MEDICINE

## 2019-12-26 PROCEDURE — 99233 SBSQ HOSP IP/OBS HIGH 50: CPT | Performed by: HOSPITALIST

## 2019-12-26 PROCEDURE — 99232 SBSQ HOSP IP/OBS MODERATE 35: CPT | Performed by: OTHER

## 2019-12-26 RX ORDER — ARIPIPRAZOLE 2 MG/1
2 TABLET ORAL DAILY
Status: DISCONTINUED | OUTPATIENT
Start: 2019-12-27 | End: 2019-12-27

## 2019-12-26 RX ORDER — ALBUMIN (HUMAN) 12.5 G/50ML
100 SOLUTION INTRAVENOUS ONCE
Status: COMPLETED | OUTPATIENT
Start: 2019-12-26 | End: 2019-12-26

## 2019-12-26 NOTE — OCCUPATIONAL THERAPY NOTE
Received order for OT re-evaluation. Pt is currently occupied with dialysis. Will continue to follow. Followed up in the afternoon. Resting HR 120s and hypotension this morning. Will follow-up tomorrow.

## 2019-12-26 NOTE — PROGRESS NOTES
[facility]  Progress Note     Ari Blazing Patient Status:  Inpatient    3/31/1945 MRN [id number]   [facility] 4SW-A Attending Dr. Mendez Chinchilla Day # 32 PCP Anupam Covarrubias MD     Chief Complaint: abdominal distention    S: results for input(s): PTP, INR in the last 168 hours. No results for input(s): TROP, CK in the last 168 hours. Imaging: Imaging data reviewed in Epic.     Medications:   • heparin sodium  1.5 mL Intravenous Once   • nystatin   Topical BID   • hep encephalopathy, multifactorial, Improved  1. suspect hepatic/uremia/ETOH and opiate w/d  2. Lactulose was stopped as pt having multiple BMs prior, recheck ammonia give AMS  3.  Avoid benzos/narcotics  4. cymbalta per psych w/ seroquel prn  5.  psych recomme

## 2019-12-26 NOTE — PROGRESS NOTES
[facility]  Cardiology Progress Note    Subjective:  No chest pain or shortness of breath. Appears more confused today. Not able to tell me where he is, who he is, or when it is.      Objective:  BP (!) 85/42 (BP Location: Left arm)   Pulse 112   Temp 12/24/19  · Acute pancreatitis: r/t ETOH. There is concern for biliary obstruction. Currently on tube feedings/TPN. GI following and planning for conservative medical management.    · Hypoxic respiratory failure: s/p thoracentesis 12/8  · NEHA: now on HD. Al

## 2019-12-26 NOTE — PROGRESS NOTES
[facility]                INFECTIOUS DISEASE PROGRESS NOTE    Nic Riddle Patient Status:  Inpatient    3/31/1945 MRN [id number]   [facility] 4SW-A Attending Ary Yu MD   [facility] Day # 32 PCP Jerrod Banuelos MD     A 12/08/19  4:30 PM   Result Value Ref Range    Body Fluid Culture Result No Growth 5 Days N/A    Body Fld Smear No organisms seen N/A    Body Fld Smear 1+ Neutrophils seen N/A    Body Fld Smear This is a cytocentrifuged smear. N/A   2.  BLOOD CULTURE     Sta one of the collections, and slight decrease in another  MR suggesting improvement in fluid collections  Following off abx      2.  Pleural effusion sp thora/exudative  Ascites present, no signs of empyema  Repeat CXR shows no sig reacumulation, CT improved

## 2019-12-26 NOTE — CM/SW NOTE
TRICIA has reviewed chart and discussed patient with nurse during rounds. There is mention of possible LTAC referral.   TRICIA has also spoken with Leighton PETER.   There needs to be a decision made by the medical team as to how nutrition will be received echo

## 2019-12-26 NOTE — PROGRESS NOTES
[facility]  Nephrology Progress Note    Nic Riddle Patient Status:  Inpatient    3/31/1945 MRN [id number]   [facility] 4SW-A Attending Ary Yu MD   [facility] Day # 32 PCP Jerrod Banuelos MD       SUBJECTIVE:  Dialysis note place  Neck: Supple  Cardiac: tachy, regular, S1, S2 normal, no murmur or rub  Lungs: decr BS B R>L  Abdomen: Soft, + distended.  + mildly tender, + bowel sounds  Extremities  1+ generalized edema  Neurologic: moving all extremities  Skin: Warm and dry, no injection 10 mg, 10 mg, Intravenous, Q1H PRN  epoetin brielle-epbx (RETACRIT) 39229 UNIT/ML injection 10,000 Units, 10,000 Units, Intravenous, Q MWF  artificial saliva substitute (BIOTENE) solution SOLN, , Oral, PRN  Midodrine HCl (PROAMATINE) tab 10 mg, 10 m Once  latanoprost (XALATAN) 0.005 % ophthalmic solution 1 drop, 1 drop, Both Eyes, Nightly  ondansetron HCl (ZOFRAN) injection 4 mg, 4 mg, Intravenous, Q6H PRN          Impression/Plan:    #1.   Acute kidney injury -Baseline renal function is normal and he

## 2019-12-26 NOTE — PLAN OF CARE
Assumed care at 1. Pt a/ox3-4, confused at times, gabled speech. VSS. ST per tele. O2 at 2-3l/nc in use. Tylenol given for generalized pain w/ relief. TF via dobhoff. No residual.  R IJ dialysis cath intact.   Turned and repositioned frequently per st

## 2019-12-26 NOTE — PROGRESS NOTES
[facility]  Report of Psychiatric Progress Note    Noel Mayer Patient Status:  Inpatient    3/31/1945 MRN [id number]   [facility] 3NE-A Attending Negin Cochran MD   [facility] Day # 32 PCP Markell Marsh MD     Date of Admission: 1 risk at this time    6) Don't recommend a trial of buprenorphine (ie 8mg twice a day) until he is working more with PT and c/o his usual osteoarthritis joint pains.      Abdoul Ribeiro    History of Present Illness:  70 yo WM with opioid use disorder (in remis Hx:  12/2/19- He feels anxious and tired and depressed. He feels helpless and thought he was going to die in the hospital, despite the fact that he was moved out of the ICU. He felt better when reassured that he is gradually getting better.  No suicidal christie and anxious. 9/10 anxiety with 10 being the highest. 7/10 abd pain with 10 being the highest. He feels more discouraged and depressed today, but not hopeless. Low appetite. 12/24/19- He feels very depressed today.  He has anxiety and feelings of hopeles Onset   • Other (asthma[Other]) Father    • Lipids Mother    • Psychiatric Mother 79        Alzheimers   • Other (CVA[Other]) Mother 80   • Other (leukemia [Other]) Paternal Grandfather    • Cancer Maternal Grandfather         Prostate?    • Thyroid disease 12.5 mg, 12.5 mg, Oral, BID PRN  •  heparin sodium 1000 UNIT/ML injection 3,000 Units, 3,000 Units, Intravenous, PRN Dialysis  •  ipratropium-albuterol (DUONEB) nebulizer solution 3 mL, 3 mL, Nebulization, Q4H PRN  •  glucose (DEX4) oral liquid 15 g, 15 g, Objective       12/26/19  1215   BP: 95/48   Pulse: 99   Resp: 16   Temp: 97.7 °F (36.5 °C)     Appearance: fair grooming  Behavior: normal psychomotor, fair eye contact  Gait: not observed    Speech: soft, normal rate and rhythm    Mood: Depressed, an non-ionic intravenous contrast material. Multi-planar reformatted/3-D images were created to optimize visualization of vascular anatomy. Dose reduction   techniques were used.  Dose information is transmitted to the [facility] of Radiology) Marissa Jones inflammation. ABDOMINAL WALL:  Small fat containing umbilical hernia. Ottie Clack PELVIC ORGANS:  Prostatic hypertrophy. Ottie Clack   BONES:  Disc space narrowing with degenerative disc disease L2-3 through L5-S1.  8 mm sclerotic focus arising from multiple mid thoracic verte

## 2019-12-26 NOTE — PROGRESS NOTES
Pt. Was seen bedside this morning. He was having dialysis at the time. His mood was depressed and he appeared fatigued. His speech was garbled. He has difficulty talking but indicated that he could listen.  We talked a bit about his interest in photograph

## 2019-12-26 NOTE — DIETARY NOTE
[facility]     NUTRITION FOLLOW UP ASSESSMENT     Pt does not meet malnutrition criteria.     NUTRITION DIAGNOSIS/PROBLEM:  Inadequate oral intake related to inability to consume sufficient intake (severe pancreatitis) as evidenced by TPN and poor PO yesterday (12/16).        12/2- 74/M admitted with Acute Pancreatitis. PMH includes HTN, GERD, Substance Abuse.  Pt seen for LOS / Diet advancement.  Pt was on Low Fiber / Low Fat x 2 days with minimal intake per RN.  Diet downgraded to Clear Liquids after C meals     MEDICATIONS:  Insulin, Protonix, Reglan      LABS:  T .    Glu 185  Na 135  BUN 43  Cr 6.55  Elevated LFTs  Bili 1.3     Pt is at HIGH nutrition risk     FOLLOW-UP DATE:      Puja Zeng RD,LDN  Pager #[phone]  Spectra [phone]

## 2019-12-27 ENCOUNTER — APPOINTMENT (OUTPATIENT)
Dept: CT IMAGING | Facility: HOSPITAL | Age: 74
DRG: 438 | End: 2019-12-27
Attending: PHYSICIAN ASSISTANT
Payer: MEDICARE

## 2019-12-27 LAB
ALBUMIN SERPL-MCNC: 3.3 G/DL (ref 3.4–5)
ALP LIVER SERPL-CCNC: 217 U/L (ref 45–117)
ALT SERPL-CCNC: 67 U/L (ref 16–61)
ANA SCREEN: NEGATIVE
AST SERPL-CCNC: 120 U/L (ref 15–37)
BASOPHILS # BLD: 0.33 X10(3) UL (ref 0–0.2)
BASOPHILS # BLD: 0.54 X10(3) UL (ref 0–0.2)
BASOPHILS NFR BLD: 2 %
BASOPHILS NFR BLD: 3 %
BILIRUB DIRECT SERPL-MCNC: 1.2 MG/DL (ref 0–0.2)
BILIRUB SERPL-MCNC: 2 MG/DL (ref 0.1–2)
DEPRECATED RDW RBC AUTO: 71.8 FL (ref 35.1–46.3)
DEPRECATED RDW RBC AUTO: 72.1 FL (ref 35.1–46.3)
EOSINOPHIL # BLD: 0.36 X10(3) UL (ref 0–0.7)
EOSINOPHIL # BLD: 1.33 X10(3) UL (ref 0–0.7)
EOSINOPHIL NFR BLD: 2 %
EOSINOPHIL NFR BLD: 8 %
ERYTHROCYTE [DISTWIDTH] IN BLOOD BY AUTOMATED COUNT: 16.7 % (ref 11–15)
ERYTHROCYTE [DISTWIDTH] IN BLOOD BY AUTOMATED COUNT: 16.9 % (ref 11–15)
GLUCOSE BLD-MCNC: 130 MG/DL (ref 70–99)
GLUCOSE BLD-MCNC: 148 MG/DL (ref 70–99)
GLUCOSE BLD-MCNC: 170 MG/DL (ref 70–99)
HCT VFR BLD AUTO: 30.9 % (ref 39–53)
HCT VFR BLD AUTO: 31.5 % (ref 39–53)
HGB BLD-MCNC: 9.1 G/DL (ref 13–17.5)
HGB BLD-MCNC: 9.5 G/DL (ref 13–17.5)
INR BLD: 1.12 (ref 0.9–1.1)
LYMPHOCYTES NFR BLD: 1.07 X10(3) UL (ref 1–4)
LYMPHOCYTES NFR BLD: 16 %
LYMPHOCYTES NFR BLD: 2.66 X10(3) UL (ref 1–4)
LYMPHOCYTES NFR BLD: 6 %
M PROTEIN MFR SERPL ELPH: 7.3 G/DL (ref 6.4–8.2)
MCH RBC QN AUTO: 34.2 PG (ref 26–34)
MCH RBC QN AUTO: 34.9 PG (ref 26–34)
MCHC RBC AUTO-ENTMCNC: 29.4 G/DL (ref 31–37)
MCHC RBC AUTO-ENTMCNC: 30.2 G/DL (ref 31–37)
MCV RBC AUTO: 115.8 FL (ref 80–100)
MCV RBC AUTO: 116.2 FL (ref 80–100)
METAMYELOCYTES # BLD: 0.33 X10(3) UL
METAMYELOCYTES # BLD: 0.72 X10(3) UL
METAMYELOCYTES NFR BLD: 2 %
METAMYELOCYTES NFR BLD: 4 %
MONOCYTES # BLD: 1.16 X10(3) UL (ref 0.1–1)
MONOCYTES # BLD: 1.97 X10(3) UL (ref 0.1–1)
MONOCYTES NFR BLD: 11 %
MONOCYTES NFR BLD: 7 %
MYELOCYTES # BLD: 0.17 X10(3) UL
MYELOCYTES # BLD: 0.18 X10(3) UL
MYELOCYTES NFR BLD: 1 %
MYELOCYTES NFR BLD: 1 %
NEUTROPHILS # BLD AUTO: 10.11 X10 (3) UL (ref 1.5–7.7)
NEUTROPHILS # BLD AUTO: 11.96 X10 (3) UL (ref 1.5–7.7)
NEUTROPHILS NFR BLD: 57 %
NEUTROPHILS NFR BLD: 70 %
NEUTS BAND NFR BLD: 3 %
NEUTS BAND NFR BLD: 7 %
NEUTS HYPERSEG # BLD: 10.62 X10(3) UL (ref 1.5–7.7)
NEUTS HYPERSEG # BLD: 13.07 X10(3) UL (ref 1.5–7.7)
NRBC BLD MANUAL-RTO: 1 %
PLATELET # BLD AUTO: 244 10(3)UL (ref 150–450)
PLATELET # BLD AUTO: 261 10(3)UL (ref 150–450)
PLATELET MORPHOLOGY: NORMAL
PSA SERPL DL<=0.01 NG/ML-MCNC: 14.9 SECONDS (ref 12.5–14.7)
RBC # BLD AUTO: 2.66 X10(6)UL (ref 3.8–5.8)
RBC # BLD AUTO: 2.72 X10(6)UL (ref 3.8–5.8)
TOTAL CELLS COUNTED: 100
TOTAL CELLS COUNTED: 100
WBC # BLD AUTO: 16.6 X10(3) UL (ref 4–11)
WBC # BLD AUTO: 17.9 X10(3) UL (ref 4–11)

## 2019-12-27 PROCEDURE — 74178 CT ABD&PLV WO CNTR FLWD CNTR: CPT | Performed by: PHYSICIAN ASSISTANT

## 2019-12-27 PROCEDURE — 99232 SBSQ HOSP IP/OBS MODERATE 35: CPT | Performed by: OTHER

## 2019-12-27 PROCEDURE — 99232 SBSQ HOSP IP/OBS MODERATE 35: CPT | Performed by: INTERNAL MEDICINE

## 2019-12-27 PROCEDURE — 99233 SBSQ HOSP IP/OBS HIGH 50: CPT | Performed by: HOSPITALIST

## 2019-12-27 RX ORDER — MIDODRINE HYDROCHLORIDE 10 MG/1
10 TABLET ORAL 3 TIMES DAILY
Status: DISCONTINUED | OUTPATIENT
Start: 2019-12-27 | End: 2020-01-04

## 2019-12-27 RX ORDER — ALBUMIN (HUMAN) 12.5 G/50ML
100 SOLUTION INTRAVENOUS AS NEEDED
Status: DISCONTINUED | OUTPATIENT
Start: 2019-12-27 | End: 2020-01-04

## 2019-12-27 RX ORDER — HEPARIN SODIUM 1000 [USP'U]/ML
1.5 INJECTION, SOLUTION INTRAVENOUS; SUBCUTANEOUS ONCE
Status: COMPLETED | OUTPATIENT
Start: 2019-12-27 | End: 2019-12-28

## 2019-12-27 NOTE — PLAN OF CARE
Patient has been restless and irritable towards his spouse/staff regarding procedure for NJ. Patient got up on his own and had unwitnessed fall. Says he fell forward and maybe rolled over. Staff found him on all fours.   Patient denies head trauma but un

## 2019-12-27 NOTE — PROGRESS NOTES
[facility]  Report of Psychiatric Progress Note    Aleksey Frankel Patient Status:  Inpatient    3/31/1945 MRN [id number]   [facility] 3NE-A Attending Amanda De Luna MD   [facility] Day # 28 PCP Kalie Parmar MD     Date of Admission: 1 of transaminitis and NEHA.    4) Continue Seroquel 12.5mg twice a day PRN anxiety or mood irritability or insomnia    5) Appreciate Dr. Og Number seeing him for bedside psychotherapy.      6) Continue to avoiding benzos and opioids to decrease delirium No tremors, diaphoresis, nausea, headache. No yawning, gooseflesh, rhinorrhea. Currently, he feels tired, anxious, depressed, and irritable. He c/o abd pain. He feels thirsty and wants water. No hopelessness or suicidal ideation.      Interval Hx:  12/2 12/20/19- He worked with PT and walked short distances today. He feels hopeful and less discouraged. He has less anxiety and depressed mood today. Still has low appetite and says food doesn't taste good. 12/23/19- Getting HD now.  He feels tired and History:   Procedure Laterality Date   • ADENOIDECTOMY     • BIOPSY OF THYROID,PERCUT  08/20/10    NO CYTOLOGIC EVIDENCE OF PAPILLARY CARCINOMA   • COLONOSCOPY     • KNEE REPLACEMENT SURGERY     • KNEE TOTAL REPLACEMENT Right 5/13/2016    Performed by Inocencia Chao metoprolol Tartrate (LOPRESSOR) tab 25 mg, 25 mg, Oral, 2x Daily(Beta Blocker)  •  dilTIAZem HCl (CARDIZEM) injection 10 mg, 10 mg, Intravenous, Q1H PRN  •  epoetin brielle-epbx (RETACRIT) 64713 UNIT/ML injection 10,000 Units, 10,000 Units, Intravenous, Q MW 5 mg, Intravenous, Q4H PRN  •  [DISCONTINUED] acetaminophen (TYLENOL) tab 650 mg, 650 mg, Oral, Q6H PRN **OR** acetaminophen (TYLENOL) 160 MG/5ML oral liquid 650 mg, 650 mg, Oral, Q6H PRN **OR** acetaminophen (TYLENOL) 650 MG rectal suppository 650 mg, 650 improvement of peripancreatic inflammatory changes with slight interval increase in size of 1 fluid collection and decrease in size of another as detailed above.          11/25/19  PROCEDURE:  CTA CHEST + CT ABD (W) + CT PEL (W) (CPT=71275/83894)     COMP versus abutting the body of the pancreas measuring 3.8 x 2.1 cm, previously 3.8 x 2.2 cm. SPLEEN:  Normal caliber. KIDNEYS:  No hydronephrosis. ADRENALS:  Normal.  AORTA/VASCULAR:  No aneurysm. RETROPERITONEUM:  No enlarged adenopathy.   BOWEL/MESENTER

## 2019-12-27 NOTE — PHYSICAL THERAPY NOTE
PHYSICAL THERAPY RE-EVALUATION - INPATIENT     Room Number: 8304/1641-W  Evaluation Date: 12/27/2019  Type of Evaluation: Re-evaluation  Physician Order: PT Eval and Treat    Presenting Problem: Acute pancreatitis with SIRS;  Ileus; suspected TME per Other and unspecified hyperlipidemia    • Unspecified drug dependence, unspecified 01/01/1987    addicted to drugs and alcohol   • Unspecified essential hypertension    • Visual impairment     glasses       Past Surgical History  Past Surgical History:   P and strength -see OT evaluation    Lower extremity ROM is within functional limits - passively wfl    Lower extremity strength is within functional limits except for the following:    Right Hip flexion  2+/5  Left Hip flexion  2+/5  Right Knee extension  3 supine<>sit w/ mod a of 1, requiring assist for both trunk and le's. Pt able to follow cues to assist w/ limb movement during the transition. Pt c/o of mild dizziness upon sitting, tried to obtain bp, but unit was not working appropriately.   Once pt had care, but suspect improvement in mobility as medical status stabilizes and more appropriate for JANNA at d/c. These impairments and comorbidities manifest themselves as functional limitations in independent bed mobility, transfers, and gait.   The patient is

## 2019-12-27 NOTE — PROGRESS NOTES
[facility]                INFECTIOUS DISEASE PROGRESS NOTE    Herve Gonsalez Patient Status:  Inpatient    3/31/1945 MRN [id number]   [facility] 4SW-A Attending Eric Bustillos MD   [facility] Day # 28 PCP Kathy Dhillon MD     A Encounter on 11/25/19   1.  BODY FLUID CULT,AEROBIC AND ANAEROBIC     Status: None    Collection Time: 12/08/19  4:30 PM   Result Value Ref Range    Body Fluid Culture Result No Growth 5 Days N/A    Body Fld Smear No organisms seen N/A    Body Fld Smear 1+ Pancreatitis/peripancreatic fluid collections  Leukocytosis, stable on meropenem  -repeat CT showing slight increase in one of the collections, and slight decrease in another  MR suggesting improvement in fluid collections  WBC trending down, off abx    2.

## 2019-12-27 NOTE — PROGRESS NOTES
[facility]  Nephrology Progress Note    Jovanna Flynn Patient Status:  Inpatient    3/31/1945 MRN [id number]   [facility] 4SW-A Attending Sis Villagomez MD   [address] Day # 28 PCP Erika Ortega MD       SUBJECTIVE:  BP low but st icterus, MMM, NG tube in place  Neck: Supple  Cardiac: tachy, regular, S1, S2 normal, no murmur or rub  Lungs: decr BS B R>L  Abdomen: Soft, + distended.  + mildly tender, + bowel sounds  Extremities  1+ generalized edema  Neurologic: moving all extremities (LOPRESSOR) tab 25 mg, 25 mg, Oral, 2x Daily(Beta Blocker)  dilTIAZem HCl (CARDIZEM) injection 10 mg, 10 mg, Intravenous, Q1H PRN  epoetin brielle-epbx (RETACRIT) 72721 UNIT/ML injection 10,000 Units, 10,000 Units, Intravenous, Q MWF  artificial saliva substi 650 mg, 650 mg, Rectal, Q6H PRN  dextrose 50 % injection 25 mL, 25 mL, Intravenous, Once  latanoprost (XALATAN) 0.005 % ophthalmic solution 1 drop, 1 drop, Both Eyes, Nightly  ondansetron HCl (ZOFRAN) injection 4 mg, 4 mg, Intravenous, Q6H PRN          Imp

## 2019-12-27 NOTE — OCCUPATIONAL THERAPY NOTE
OCCUPATIONAL THERAPY EVALUATION - INPATIENT     Room Number: 6818/2542-V  Evaluation Date: 12/27/2019  Type of Evaluation: Re-evaluation  Presenting Problem: substance withdrawal, delirium, pancreatitis    Physician Order: IP Consult to Carmen Souza Unspecified essential hypertension    • Visual impairment     glasses       Past Surgical History  Past Surgical History:   Procedure Laterality Date   • ADENOIDECTOMY     • BIOPSY OF THYROID,PERCUT  08/20/10    NO CYTOLOGIC EVIDENCE OF PAPILLARY CARCINOMA deficits    PERCEPTION  Overall Perception Status:   WFL - within functional limits    SENSATION  Light touch:  intact    Communication: clear speech    Behavioral/Emotional/Social: pleasant    RANGE OF MOTION AND STRENGTH ASSESSMENT  Upper extremity ROM i Patient End of Session: Up in chair;Needs met;Call light within reach;RN aware of session/findings; All patient questions and concerns addressed; Alarm set; Family present    ASSESSMENT     Patient is a 69year old male admitted on 11/25/2019 from home on 1 training;Functional transfer training;UE strengthening/ROM; Patient/Family education;Patient/Family training;Equipment eval/education; Compensatory technique education  Rehab Potential : Good  Frequency (Obs): 5x/week  Number of Visits to Meet Established Go

## 2019-12-27 NOTE — PLAN OF CARE
Assumed care @ 0700  Pt confused. Oriented to self and place. Speech garbled at times  ST on tele. HR sustaining 120s after dialysis. Marika FRANCOIS updated. HR 110s this evening  Hypotensive prior to start of dialysis. Midodrine and albumin given.  Dr. Arianne Gracia

## 2019-12-27 NOTE — PROGRESS NOTES
[facility]  Progress Note     Varsha Lombardi Patient Status:  Inpatient    3/31/1945 MRN [id number]   [facility] 4SW-A Attending Dr. Sakina Melgar Day # 28 PCP Giorgio Sumner MD     Chief Complaint: abdominal distention    S: Clearance: 10.2 mL/min (A) (based on SCr of 6.55 mg/dL (H)). No results for input(s): PTP, INR in the last 168 hours. No results for input(s): TROP, CK in the last 168 hours. Imaging: Imaging data reviewed in Epic.     Medications:   • insulin A1c 6.0%, stable  1. Levemir drop to 10 units daily as pt npo, Correctional scale  12. Anemia. Stable after PRBCs/epogen  13. Leukocytosis, plateau, off abx  14. R conjunctivitis  1. cipro ointment ordered  15.  Acute encephalopathy, multifactorial, Improve

## 2019-12-27 NOTE — PROGRESS NOTES
[address]  Report of GI Progress Note    Raulramona Barrs Patient Status:  Inpatient    3/31/1945 MRN [id number]   [facility] 7NE-A Attending Delfino Hallman MD   [address] Day # 32 PCP Jean-Paul Moreno MD     Date of Admission:  / 30 mg, Oral, BID  QUEtiapine Fumarate (SEROQUEL) partial tab 12.5 mg, 12.5 mg, Oral, BID PRN  heparin sodium 1000 UNIT/ML injection 3,000 Units, 3,000 Units, Intravenous, PRN Dialysis  ipratropium-albuterol (DUONEB) nebulizer solution 3 mL, 3 mL, Nebulizat SpO2 93 %. GENERAL: NAD, oriented x 3. HEENT: NG tube remains in place. CV: Regular, no murmurs  ABD: diffusely tender across the left abdomen. Moderate distention. Rare BS.       Results:     Laboratory Data:  Lab Results   Component Value Date

## 2019-12-27 NOTE — CM/SW NOTE
MSW sent updated clinical to [facility] via [facility] [address].       Jose Márquez LCSW

## 2019-12-27 NOTE — PLAN OF CARE
Assumed care at [address] [facility]. Pt A&O X 1-2. Confused, but easily reoriented. Denies pain. Offers no c/o. Dobhoff in place, remains clamped. NPO since 0000 for NJ tube placement today. Turned and repositioned q 2 hours and prn. Awake most of the night.   ST 1 ADULT  Goal: Minimal or absence of nausea and vomiting  Description  INTERVENTIONS:  - Maintain adequate hydration with IV or PO as ordered and tolerated  - Nasogastric tube to low intermittent suction as ordered  - Evaluate effectiveness of ordered antiem response  - Consider cultural and social influences on pain and pain management  - Manage/alleviate anxiety  - Utilize distraction and/or relaxation techniques  - Monitor for opioid side effects  - Notify MD/LIP if interventions unsuccessful or patient rep optimal ventilation and oxygenation  Description  INTERVENTIONS:  - Assess for changes in respiratory status  - Assess for changes in mentation and behavior  - Position to facilitate oxygenation and minimize respiratory effort  - Oxygen supplementation bas to increase activity and self care with guidance from PT/OT  - Encourage visually impaired, hearing impaired and aphasic patients to use assistive/communication devices  Outcome: Progressing     Problem: Impaired Swallowing  Goal: Minimize aspiration risk

## 2019-12-27 NOTE — PROGRESS NOTES
Pt. Was seen bedside this morning. He was more verbal but appeared confused at times. He talked about wanting to go home and stated that the thought of it made him feel better.  I have given him a newspaper to read with the hope that this will help both eng

## 2019-12-27 NOTE — PROGRESS NOTES
[facility]  Cardiology Progress Note    Subjective:  No chest pain or shortness of breath. +left sided abdominal pain.      Objective:  /49 (BP Location: Left arm)   Pulse 110   Temp 97.6 °F (36.4 °C) (Oral)   Resp 18   Ht 5' 9\" (1.753 m)   Wt 2 drop in the past 24 hours. From 9.4 to 8.0. HGB tomorrow. Plan:  · Concerned for his worsening confusion. Last week he was able to tell me who he was and where he was. Yesterday he was unable to tell me any information.  This AM he is able to tell me who

## 2019-12-28 LAB
BASOPHILS # BLD: 0 X10(3) UL (ref 0–0.2)
BASOPHILS NFR BLD: 0 %
DEPRECATED RDW RBC AUTO: 69.8 FL (ref 35.1–46.3)
EOSINOPHIL # BLD: 0.58 X10(3) UL (ref 0–0.7)
EOSINOPHIL NFR BLD: 3 %
ERYTHROCYTE [DISTWIDTH] IN BLOOD BY AUTOMATED COUNT: 16.8 % (ref 11–15)
GLUCOSE BLD-MCNC: 120 MG/DL (ref 70–99)
GLUCOSE BLD-MCNC: 124 MG/DL (ref 70–99)
GLUCOSE BLD-MCNC: 129 MG/DL (ref 70–99)
GLUCOSE BLD-MCNC: 145 MG/DL (ref 70–99)
HCT VFR BLD AUTO: 31.3 % (ref 39–53)
HGB BLD-MCNC: 9.3 G/DL (ref 13–17.5)
LYMPHOCYTES NFR BLD: 19 %
LYMPHOCYTES NFR BLD: 3.65 X10(3) UL (ref 1–4)
MCH RBC QN AUTO: 34.1 PG (ref 26–34)
MCHC RBC AUTO-ENTMCNC: 29.7 G/DL (ref 31–37)
MCV RBC AUTO: 114.7 FL (ref 80–100)
METAMYELOCYTES # BLD: 0.38 X10(3) UL
METAMYELOCYTES NFR BLD: 2 %
MONOCYTES # BLD: 1.15 X10(3) UL (ref 0.1–1)
MONOCYTES NFR BLD: 6 %
MORPHOLOGY: NORMAL
MYELOCYTES # BLD: 0.19 X10(3) UL
MYELOCYTES NFR BLD: 1 %
NEUTROPHILS # BLD AUTO: 12.08 X10 (3) UL (ref 1.5–7.7)
NEUTROPHILS NFR BLD: 64 %
NEUTS BAND NFR BLD: 5 %
NEUTS HYPERSEG # BLD: 13.25 X10(3) UL (ref 1.5–7.7)
NRBC BLD MANUAL-RTO: 1 %
PLATELET # BLD AUTO: 304 10(3)UL (ref 150–450)
PLATELET MORPHOLOGY: NORMAL
RBC # BLD AUTO: 2.73 X10(6)UL (ref 3.8–5.8)
TOTAL CELLS COUNTED: 100
WBC # BLD AUTO: 19.2 X10(3) UL (ref 4–11)

## 2019-12-28 PROCEDURE — 99233 SBSQ HOSP IP/OBS HIGH 50: CPT | Performed by: HOSPITALIST

## 2019-12-28 PROCEDURE — 90935 HEMODIALYSIS ONE EVALUATION: CPT | Performed by: INTERNAL MEDICINE

## 2019-12-28 NOTE — PLAN OF CARE
Assumed care at [address] [facility]. Pt A&O X 1-2. States he is in a hospital in [place], but unable to state the town. States name and age, but unable to state full birthday. Speech mumbled/garbled often, other times more clear. Appears to have slept most of the night.   A signs, obtain 12 lead EKG if indicated  - Evaluate effectiveness of antiarrhythmic and heart rate control medications as ordered  - Initiate emergency measures for life threatening arrhythmias  - Monitor electrolytes and administer replacement therapy as o or patient's stated pain goal  Description  INTERVENTIONS:  - Encourage pt to monitor pain and request assistance  - Assess pain using appropriate pain scale  - Administer analgesics based on type and severity of pain and evaluate response  - Implement non participate in ADLs to maximize function  - Promote sitting position while performing ADLs such as feeding, grooming, and bathing  - Educate and encourage patient/family in tolerated functional activity level and precautions during self-care     Outcome: P Monitor temperature, glucose, and sodium.  Initiate appropriate interventions as ordered  Outcome: Progressing  Goal: Achieves maximal functionality and self care  Description  INTERVENTIONS  - Monitor swallowing and airway patency with patient fatigue and

## 2019-12-28 NOTE — PLAN OF CARE
Assumed care at 0700  Patient alert x1-2. Confused. Speech garbled at times. Systolic bp 80-271Q. Midodrine ordered scheduled. Discharge in R eye. Started on cipro. Worked with PT/OT. Walked in room. Up in chair. Unwitnessed fall.  Patient found on al

## 2019-12-28 NOTE — PROGRESS NOTES
[facility]  Progress Note     Jules Cui Patient Status:  Inpatient    3/31/1945 MRN [id number]   [facility] 4SW-A Attending Dr. Santoro  Day # 35 PCP Stephen Carney MD     Chief Complaint: abdominal distention    S: ALKPHO 335* 307* 268* 217*   * 209* 124* 120*   * 102* 69* 67*   BILT 2.8* 2.8* 2.0 2.0   TP 6.3* 6.6 6.8 7.3       Estimated Creatinine Clearance: 10.2 mL/min (A) (based on SCr of 6.55 mg/dL (H)).     Recent Labs   Lab 12/27/19  1649   PTP hypotension  1. Temp cath placed 12/5  2. HD, will need permacath prior to d/c  10. Transaminitis, improving  11. Hyperglycemia, A1c 6.0%, improving  1. Levemir drop to 6 units daily as not eating,  Correctional scale  12. Anemia. Stable, PRBCs/epogen  13.

## 2019-12-28 NOTE — PROGRESS NOTES
[facility]  Nephrology Progress Note    Marsha Abhishek Patient Status:  Inpatient    3/31/1945 MRN [id number]   [facility] 4SW-A Attending Roberta Corona MD   [address] Day # 35 PCP Leslie Daniels MD       SUBJECTIVE:  Pt seen on [facility] kg)    General: Alert, no distress, confused  HEENT: No scleral icterus, MMM, NG tube in place  Neck: Supple  Cardiac: tachy, regular, S1, S2 normal, no murmur or rub  Lungs: decr BS B R>L  Abdomen: Soft, + distended.  + mildly tender, + bowel sounds  Extre Tartrate (LOPRESSOR) tab 25 mg, 25 mg, Oral, 2x Daily(Beta Blocker)  dilTIAZem HCl (CARDIZEM) injection 10 mg, 10 mg, Intravenous, Q1H PRN  epoetin brielle-epbx (RETACRIT) 89524 UNIT/ML injection 10,000 Units, 10,000 Units, Intravenous, Q MWF  artificial sali Once  latanoprost (XALATAN) 0.005 % ophthalmic solution 1 drop, 1 drop, Both Eyes, Nightly  ondansetron HCl (ZOFRAN) injection 4 mg, 4 mg, Intravenous, Q6H PRN          Impression/Plan:    #1.   Acute kidney injury -Baseline renal function is normal and he

## 2019-12-29 LAB
BASOPHILS # BLD: 0.15 X10(3) UL (ref 0–0.2)
BASOPHILS NFR BLD: 1 %
DEPRECATED RDW RBC AUTO: 71 FL (ref 35.1–46.3)
EOSINOPHIL # BLD: 0.73 X10(3) UL (ref 0–0.7)
EOSINOPHIL NFR BLD: 5 %
ERYTHROCYTE [DISTWIDTH] IN BLOOD BY AUTOMATED COUNT: 17 % (ref 11–15)
GLUCOSE BLD-MCNC: 131 MG/DL (ref 70–99)
GLUCOSE BLD-MCNC: 136 MG/DL (ref 70–99)
GLUCOSE BLD-MCNC: 137 MG/DL (ref 70–99)
GLUCOSE BLD-MCNC: 153 MG/DL (ref 70–99)
HCT VFR BLD AUTO: 30 % (ref 39–53)
HGB BLD-MCNC: 8.9 G/DL (ref 13–17.5)
LYMPHOCYTES NFR BLD: 1.75 X10(3) UL (ref 1–4)
LYMPHOCYTES NFR BLD: 12 %
MCH RBC QN AUTO: 34 PG (ref 26–34)
MCHC RBC AUTO-ENTMCNC: 29.7 G/DL (ref 31–37)
MCV RBC AUTO: 114.5 FL (ref 80–100)
METAMYELOCYTES # BLD: 0.29 X10(3) UL
METAMYELOCYTES NFR BLD: 2 %
MONOCYTES # BLD: 1.75 X10(3) UL (ref 0.1–1)
MONOCYTES NFR BLD: 12 %
MYELOCYTES # BLD: 0.44 X10(3) UL
MYELOCYTES NFR BLD: 3 %
NEUTROPHILS # BLD AUTO: 8.61 X10 (3) UL (ref 1.5–7.7)
NEUTROPHILS NFR BLD: 62 %
NEUTS BAND NFR BLD: 3 %
NEUTS HYPERSEG # BLD: 9.49 X10(3) UL (ref 1.5–7.7)
PLATELET # BLD AUTO: 219 10(3)UL (ref 150–450)
PLATELET MORPHOLOGY: NORMAL
RBC # BLD AUTO: 2.62 X10(6)UL (ref 3.8–5.8)
TOTAL CELLS COUNTED: 100
WBC # BLD AUTO: 14.6 X10(3) UL (ref 4–11)

## 2019-12-29 PROCEDURE — 99232 SBSQ HOSP IP/OBS MODERATE 35: CPT | Performed by: INTERNAL MEDICINE

## 2019-12-29 PROCEDURE — 99233 SBSQ HOSP IP/OBS HIGH 50: CPT | Performed by: HOSPITALIST

## 2019-12-29 NOTE — PROGRESS NOTES
[facility]  Report of GI Progress Note    Mary Zuñiga Patient Status:  Inpatient    3/31/1945 MRN [id number]   [facility] 7NE-A Attending Jose Farah MD   [facility] Day # 35 PCP Iline Felty, MD     Date of Admission:  / Intravenous, Q6H  DULoxetine HCl (CYMBALTA) DR particles cap 30 mg, 30 mg, Oral, BID  QUEtiapine Fumarate (SEROQUEL) partial tab 12.5 mg, 12.5 mg, Oral, BID PRN  heparin sodium 1000 UNIT/ML injection 3,000 Units, 3,000 Units, Intravenous, PRN Dialysis  ipr drowsy, feeble effort to communicate/speak  CV: Regular, no murmurs  ABD: Moderately distended in the upper and mid abdomen, with tympany on percussion. No focal tenderness or grimacing.     EXT: Mild BLE edema, better than before      Results:     Barrington Anderson steatosi. Dictated by: Rhys Newman MD on 12/27/2019 at 23:56     Approved by: Rhys Newman MD on 12/28/2019 at 0:04               Impression:   1.  EtOH related pancreatitis with necrosis and jordan-pancreatic fluid collections and persistently el

## 2019-12-29 NOTE — PLAN OF CARE
A/O to self, 2LO2, VSS, SR/ ST per Tele  Slept most of the night   Denies pain at this time  TF infusing per order  X2 soft BMs during the night  Fall precautions in place  Will cont to monitor.

## 2019-12-29 NOTE — PROGRESS NOTES
[facility]  Nephrology Progress Note    Fe Herrera Patient Status:  Inpatient    3/31/1945 MRN [id number]   [facility] 4SW-A Attending Santy Moscoso MD   [facility] Day # 29 PCP Emil Sharp MD       SUBJECTIVE:  Up in chair, kg)  09/03/19 1358 : 234 lb (106.1 kg)    General: Alert, no distress, confused  HEENT: No scleral icterus, MMM, NG tube in place  Neck: Supple  Cardiac: tachy, regular, S1, S2 normal, no murmur or rub  Lungs: decr BS B R>L  Abdomen: Soft, + distended.  + m Blocker)  dilTIAZem HCl (CARDIZEM) injection 10 mg, 10 mg, Intravenous, Q1H PRN  epoetin brielle-epbx (RETACRIT) 25724 UNIT/ML injection 10,000 Units, 10,000 Units, Intravenous, Q MWF  artificial saliva substitute (BIOTENE) solution SOLN, , Oral, PRN  Midodri Both Eyes, Nightly  ondansetron HCl (ZOFRAN) injection 4 mg, 4 mg, Intravenous, Q6H PRN          Impression/Plan:    #1.   Acute kidney injury -Baseline renal function is normal and he developed anuric acute kidney injury with w/u and hx most consistent wit

## 2019-12-29 NOTE — PROGRESS NOTES
AIME HOSPITALIST  Progress Note     Sabas South Patient Status:  Inpatient    3/31/1945 MRN [id number]   [facility] 4SW-A Attending Dr. Jose Luis Adame Day # 29 PCP Scott Denise MD     Chief Complaint: abdominal distention    S: 217*   * 209* 124* 120*   * 102* 69* 67*   BILT 2.8* 2.8* 2.0 2.0   TP 6.3* 6.6 6.8 7.3       Estimated Creatinine Clearance: 10.2 mL/min (A) (based on SCr of 6.55 mg/dL (H)).     Recent Labs   Lab 12/27/19  1649   PTP 14.9*   INR 1.12* d/c  10. Transaminitis, improving  11. Hyperglycemia, A1c 6.0%, well controlled  1. Cont same regimen   12. Anemia. Stable, PRBCs/epogen  13. Leukocytosis- improving nicely   14. R conjunctivitis- cipro ointment  15.  Acute encephalopathy 2/2 hepatic/uremia

## 2019-12-29 NOTE — PROGRESS NOTES
[facility]  Report of GI Progress Note    Castrodenisha Albrecht Patient Status:  Inpatient    3/31/1945 MRN [id number]   [facility] 7NE-A Attending Eric Ardon MD   [address] Day # 29 PCP Deja Dickey MD     Date of Admission:  / Dialysis  ipratropium-albuterol (DUONEB) nebulizer solution 3 mL, 3 mL, Nebulization, Q4H PRN  glucose (DEX4) oral liquid 15 g, 15 g, Oral, Q15 Min PRN    Or  Glucose-Vitamin C (DEX-4) chewable tab 4 tablet, 4 tablet, Oral, Q15 Min PRN    Or  dextrose 50 % Component Value Date    WBC 14.6 (H) 12/29/2019    HGB 8.9 (L) 12/29/2019    .0 12/29/2019    CREATSERUM 6.55 (H) 12/26/2019    BUN 43 (H) 12/26/2019     (L) 12/26/2019    K 3.5 12/26/2019    CO2 22.0 12/26/2019    CA 11.8 (H) 12/26/2019 persistently elev LFTs  2. Malnutrition   3. NEHA requiring HD, secondary to multifactorial ATN  4. Leukocytosis, stable off of meropenem (stopped 12/24/19). 5. Drug rash     Recommendations:  1. OK to resume trickle feeds, but stop after midnight  2.  Cont

## 2019-12-29 NOTE — PLAN OF CARE
A/O x1, drowsy this afternoon after dialysis. Speech delayed. On 2L O2, some wheezing noted. ST per tele. No signs of pain or discomfort. Poor appetite, tube feeds restarted @ 5mL/hr per GI.   abd rounded, hypoactive Bowel sounds. x1 BM, incontinent.

## 2019-12-29 NOTE — PLAN OF CARE
A/O x2 this AM. Speech delayed. More alert  On 2L O2, some wheezing noted. Kvng treatment requested. NSR per tele. Reports pain on coccyx, repositioned frequently. Moisture barreir applied.    Poor appetite, tube feeds restarted @ 5mL/hr per GI.   abd rounde

## 2019-12-30 ENCOUNTER — ANESTHESIA (OUTPATIENT)
Dept: ENDOSCOPY | Facility: HOSPITAL | Age: 74
DRG: 438 | End: 2019-12-30
Payer: MEDICARE

## 2019-12-30 ENCOUNTER — ANESTHESIA EVENT (OUTPATIENT)
Dept: ENDOSCOPY | Facility: HOSPITAL | Age: 74
DRG: 438 | End: 2019-12-30
Payer: MEDICARE

## 2019-12-30 LAB
ANION GAP SERPL CALC-SCNC: 13 MMOL/L (ref 0–18)
BASOPHILS # BLD: 0.12 X10(3) UL (ref 0–0.2)
BASOPHILS NFR BLD: 1 %
BUN BLD-MCNC: 33 MG/DL (ref 7–18)
BUN/CREAT SERPL: 5.4 (ref 10–20)
CALCIUM BLD-MCNC: 10.8 MG/DL (ref 8.5–10.1)
CHLORIDE SERPL-SCNC: 100 MMOL/L (ref 98–112)
CO2 SERPL-SCNC: 25 MMOL/L (ref 21–32)
CREAT BLD-MCNC: 6.06 MG/DL (ref 0.7–1.3)
DEPRECATED RDW RBC AUTO: 68.3 FL (ref 35.1–46.3)
EOSINOPHIL # BLD: 0.99 X10(3) UL (ref 0–0.7)
EOSINOPHIL NFR BLD: 8 %
ERYTHROCYTE [DISTWIDTH] IN BLOOD BY AUTOMATED COUNT: 16.7 % (ref 11–15)
GLUCOSE BLD-MCNC: 126 MG/DL (ref 70–99)
GLUCOSE BLD-MCNC: 132 MG/DL (ref 70–99)
GLUCOSE BLD-MCNC: 132 MG/DL (ref 70–99)
GLUCOSE BLD-MCNC: 142 MG/DL (ref 70–99)
HCT VFR BLD AUTO: 30.9 % (ref 39–53)
HGB BLD-MCNC: 9.4 G/DL (ref 13–17.5)
LYMPHOCYTES NFR BLD: 1.49 X10(3) UL (ref 1–4)
LYMPHOCYTES NFR BLD: 12 %
MCH RBC QN AUTO: 34.3 PG (ref 26–34)
MCHC RBC AUTO-ENTMCNC: 30.4 G/DL (ref 31–37)
MCV RBC AUTO: 112.8 FL (ref 80–100)
MONOCYTES # BLD: 2.11 X10(3) UL (ref 0.1–1)
MONOCYTES NFR BLD: 17 %
MORPHOLOGY: NORMAL
MYELOCYTES # BLD: 0.37 X10(3) UL
MYELOCYTES NFR BLD: 3 %
NEUTROPHILS # BLD AUTO: 7.15 X10 (3) UL (ref 1.5–7.7)
NEUTROPHILS NFR BLD: 54 %
NEUTS BAND NFR BLD: 5 %
NEUTS HYPERSEG # BLD: 7.32 X10(3) UL (ref 1.5–7.7)
OSMOLALITY SERPL CALC.SUM OF ELEC: 295 MOSM/KG (ref 275–295)
PLATELET # BLD AUTO: 248 10(3)UL (ref 150–450)
PLATELET MORPHOLOGY: NORMAL
POTASSIUM SERPL-SCNC: 3.4 MMOL/L (ref 3.5–5.1)
RBC # BLD AUTO: 2.74 X10(6)UL (ref 3.8–5.8)
SODIUM SERPL-SCNC: 138 MMOL/L (ref 136–145)
TOTAL CELLS COUNTED: 100
WBC # BLD AUTO: 12.4 X10(3) UL (ref 4–11)

## 2019-12-30 PROCEDURE — 90935 HEMODIALYSIS ONE EVALUATION: CPT | Performed by: INTERNAL MEDICINE

## 2019-12-30 PROCEDURE — 99233 SBSQ HOSP IP/OBS HIGH 50: CPT | Performed by: HOSPITALIST

## 2019-12-30 PROCEDURE — 0DJ08ZZ INSPECTION OF UPPER INTESTINAL TRACT, VIA NATURAL OR ARTIFICIAL OPENING ENDOSCOPIC: ICD-10-PCS | Performed by: INTERNAL MEDICINE

## 2019-12-30 RX ORDER — SODIUM CHLORIDE, SODIUM LACTATE, POTASSIUM CHLORIDE, CALCIUM CHLORIDE 600; 310; 30; 20 MG/100ML; MG/100ML; MG/100ML; MG/100ML
INJECTION, SOLUTION INTRAVENOUS CONTINUOUS PRN
Status: DISCONTINUED | OUTPATIENT
Start: 2019-12-30 | End: 2019-12-30 | Stop reason: SURG

## 2019-12-30 RX ORDER — NALOXONE HYDROCHLORIDE 0.4 MG/ML
80 INJECTION, SOLUTION INTRAMUSCULAR; INTRAVENOUS; SUBCUTANEOUS AS NEEDED
Status: DISCONTINUED | OUTPATIENT
Start: 2019-12-30 | End: 2019-12-30 | Stop reason: HOSPADM

## 2019-12-30 RX ORDER — DEXTROSE MONOHYDRATE 25 G/50ML
50 INJECTION, SOLUTION INTRAVENOUS
Status: DISCONTINUED | OUTPATIENT
Start: 2019-12-30 | End: 2019-12-30 | Stop reason: HOSPADM

## 2019-12-30 RX ORDER — SODIUM CHLORIDE, SODIUM LACTATE, POTASSIUM CHLORIDE, CALCIUM CHLORIDE 600; 310; 30; 20 MG/100ML; MG/100ML; MG/100ML; MG/100ML
INJECTION, SOLUTION INTRAVENOUS CONTINUOUS
Status: DISCONTINUED | OUTPATIENT
Start: 2019-12-30 | End: 2020-01-02

## 2019-12-30 RX ADMIN — SODIUM CHLORIDE, SODIUM LACTATE, POTASSIUM CHLORIDE, CALCIUM CHLORIDE: 600; 310; 30; 20 INJECTION, SOLUTION INTRAVENOUS at 15:06:00

## 2019-12-30 NOTE — PROGRESS NOTES
AIME HOSPITALIST  Progress Note     Kuldeep Justice Patient Status:  Inpatient    3/31/1945 MRN [id number]   [facility] 4SW-A Attending Dr. Jody Mcintosh Day # 28 PCP Garrett Bernal MD     Chief Complaint: abdominal distention    S: Estimated Creatinine Clearance: 11 mL/min (A) (based on SCr of 6.06 mg/dL (H)). Recent Labs   Lab 12/27/19  1649   PTP 14.9*   INR 1.12*       No results for input(s): TROP, CK in the last 168 hours. Imaging: Imaging data reviewed in Epic. ointment d/t swelling  15. Acute encephalopathy 2/2 hepatic/uremia/ETOH and opiate w/d  1. Off lactulose and f/u ammonia was <10  2.  Avoid benzos/narcotics  3. cymbalta per psych w/ seroquel prn, abilify stopped as delirium worsened that day  4. psych jelena

## 2019-12-30 NOTE — DIETARY NOTE
[facility]     NUTRITION FOLLOW UP ASSESSMENT     Pt does not meet malnutrition criteria.     NUTRITION DIAGNOSIS/PROBLEM:  Inadequate oral intake related to inability to consume sufficient intake (severe pancreatitis) as evidenced by TPN and poor PO the Ensure High Protein yet. ONS ordered BID. Pt wants to try helena and [employer] Noted SLP eval on 12/15 with diet recommendations for Soft diet with thin liquids. Pt with difficulty eating/swallowing due to tachypnea per MD note.  Per GI note, may have t Protein: 110-146 grams protein/day (1.5-2 grams protein per kg/IBW)  Fluid: per MD discretion     MONITOR AND EVALUATE/NUTRITION GOALS:  1. No signs of skin breakdown  2. Maintain lean body mass  3.  Alternative means of nutrition at goal to meet 100% pat

## 2019-12-30 NOTE — OPERATIVE REPORT
Operative Report-Enteroscopy      PREOPERATIVE DIAGNOSIS/INDICATION: NJ placement    POSTOPERTATIVE DIAGNOSIS: Gastritis     PROCEDURE PERFORMED: Enteroscopy     INFORMED CONSENT: Once a brief history and physical examination was p

## 2019-12-30 NOTE — ANESTHESIA PREPROCEDURE EVALUATION
PRE-OP EVALUATION    Patient Name: Tony Arizmendi    Pre-op Diagnosis: Pancreatitis [K85.90]    Procedure(s):  ESOPHAGOGASTRODUODENOSCOPY WITH FEEDING TUBE PLACEMENT, WITH ANESTHESIA    Surgeon(s) and Role:     Althea Hobbs MD - Primary    Pre- Cyclic TPN  Metoclopramide HCl (REGLAN) injection 5 mg, 5 mg, Intravenous, Q6H  [COMPLETED] Metoclopramide HCl (REGLAN) injection 5 mg, 5 mg, Intravenous, Q6H  [] adult 3 in 1 tpn, 1,700 mL, Intravenous, Cyclic TPN  [COMPLETED] Perflutren Lipid Micr Units, 3,000 Units, Intravenous, PRN Dialysis  ipratropium-albuterol (DUONEB) nebulizer solution 3 mL, 3 mL, Nebulization, Q4H PRN  [COMPLETED] Potassium Phosphate Dibasic 20 mmol in sodium chloride 0.9% 250 mL IVPB, 20 mmol, Intravenous, Once  [COMPLETED] mg in Sodium Chloride 0.9 % 10 mL IV push, 40 mg, Intravenous, QAM AC  melatonin tab TABS 3 mg, 3 mg, Oral, Nightly  [] adult 3 in 1 tpn, , Intravenous, Continuous TPN  [] buprenorphine HCl (SUBUTEX) SL tab 4 mg, 4 mg, Sublingual, Once PRN  h 100 mL ADD-vantage, 3.375 g, Intravenous, Q8H  [COMPLETED] iohexol (OMNIPAQUE) 350 MG/ML injection 100 mL, 100 mL, Intravenous, ONCE PRN  metoprolol Tartrate (LOPRESSOR) 5 MG/5ML injection 5 mg, 5 mg, Intravenous, Q4H PRN  [COMPLETED] Albumin Human (ALBUMI sodium chloride 0.9% 100 mL MBP/ADD-vantage, 1 g, Intravenous, Once  [COMPLETED] potassium chloride 40 mEq in sodium chloride 0.9% 250 mL IVPB, 40 mEq, Intravenous, Once  [COMPLETED] HYDROmorphone HCl (DILAUDID) 1 MG/ML injection 1 mg, 1 mg, Intravenous, O Rfl:   ezetimibe 10 MG Oral Tab, Take 10 mg by mouth daily. , Disp: , Rfl:   Esomeprazole Magnesium (NEXIUM) 40 MG Oral Capsule Delayed Release, Take 40 mg by mouth every morning before breakfast., Disp: , Rfl:   valsartan (DIOVAN) 320 MG Oral Tab, Take 320 12/30/2019    HCT 30.9 (L) 12/30/2019    .8 (H) 12/30/2019    MCH 34.3 (H) 12/30/2019    MCHC 30.4 (L) 12/30/2019    RDW 16.7 (H) 12/30/2019    .0 12/30/2019     Lab Results   Component Value Date     12/30/2019    K 3.4 (L) 12/30/2019

## 2019-12-30 NOTE — PLAN OF CARE
A/OX2-3, 2LO2, VSS, SR/ ST per Tele  Denies pain at this time  NPO since midnight for NJ tube placement   Needs attended to, Will cont to monitor.

## 2019-12-30 NOTE — PROGRESS NOTES
[facility]  Nephrology Progress Note    Nic Riddle Patient Status:  Inpatient    3/31/1945 MRN [id number]   [facility] 4SW-A Attending Ary Yu MD   [facility] Day # 28 PCP Jerrod Banuelos MD       SUBJECTIVE:      No acute tablet, 8 tablet, Oral, Q15 Min PRN  Pantoprazole Sodium (PROTONIX) 40 mg in Sodium Chloride 0.9 % 10 mL IV push, 40 mg, Intravenous, QAM AC  melatonin tab TABS 3 mg, 3 mg, Oral, Nightly  haloperidol lactate (HALDOL) 5 MG/ML injection 1 mg, 1 mg, Intraveno Lab 12/27/19  0543 12/27/19  1514 12/27/19  1649 12/28/19  0629 12/29/19  0615 12/30/19  0533   WBC 16.6* 17.9*  --  19.2* 14.6* 12.4*   HGB 9.1* 9.5*  --  9.3* 8.9* 9.4*   .2* 115.8*  --  114.7* 114.5* 112.8*   .0 261.0  --  304.0 219.0 24

## 2019-12-30 NOTE — PROGRESS NOTES
Pt. Was seen bedside this afternoon. He was awake and more alert than he has been during our previous sessions. He was better oriented and his speech was less garbled. His wife was also present.  She was an active participant throughout the course of our con

## 2019-12-30 NOTE — PLAN OF CARE
Assumed care at 0730  A&Ox3, VSS, NSR on tele  Up to chair with minimal assist  [id number] Jessica Shahid for clear liquid diet per GI  HD tech unable to access permacath d/t occluded lumens. Renal notified.  Orders obtained for tpa  Pt and spouse updated on POC  Needs attended t

## 2019-12-30 NOTE — ANESTHESIA POSTPROCEDURE EVALUATION
[facility] Patient Status:  Inpatient   Age/Gender 69year old male MRN [id number]   Location [address] [place] [address]. Attending Santa Morales MD   [address] Day # 28 PCP Stephen Carney MD       Anesthesia Post-op Note    Procedu

## 2019-12-31 LAB
ALBUMIN SERPL-MCNC: 3.6 G/DL (ref 3.4–5)
ALP LIVER SERPL-CCNC: 192 U/L (ref 45–117)
ALT SERPL-CCNC: 47 U/L (ref 16–61)
ANION GAP SERPL CALC-SCNC: 11 MMOL/L (ref 0–18)
AST SERPL-CCNC: 88 U/L (ref 15–37)
BASOPHILS # BLD: 0.12 X10(3) UL (ref 0–0.2)
BASOPHILS NFR BLD: 1 %
BILIRUB DIRECT SERPL-MCNC: 0.9 MG/DL (ref 0–0.2)
BILIRUB SERPL-MCNC: 1.7 MG/DL (ref 0.1–2)
BUN BLD-MCNC: 21 MG/DL (ref 7–18)
BUN/CREAT SERPL: 4.6 (ref 10–20)
CALCIUM BLD-MCNC: 10.2 MG/DL (ref 8.5–10.1)
CHLORIDE SERPL-SCNC: 103 MMOL/L (ref 98–112)
CO2 SERPL-SCNC: 25 MMOL/L (ref 21–32)
CREAT BLD-MCNC: 4.58 MG/DL (ref 0.7–1.3)
DEPRECATED RDW RBC AUTO: 67.6 FL (ref 35.1–46.3)
EOSINOPHIL # BLD: 0.37 X10(3) UL (ref 0–0.7)
EOSINOPHIL NFR BLD: 3 %
ERYTHROCYTE [DISTWIDTH] IN BLOOD BY AUTOMATED COUNT: 16.1 % (ref 11–15)
GLUCOSE BLD-MCNC: 110 MG/DL (ref 70–99)
GLUCOSE BLD-MCNC: 122 MG/DL (ref 70–99)
GLUCOSE BLD-MCNC: 135 MG/DL (ref 70–99)
GLUCOSE BLD-MCNC: 135 MG/DL (ref 70–99)
GLUCOSE BLD-MCNC: 143 MG/DL (ref 70–99)
HCT VFR BLD AUTO: 30.6 % (ref 39–53)
HGB BLD-MCNC: 9.2 G/DL (ref 13–17.5)
LYMPHOCYTES NFR BLD: 1.59 X10(3) UL (ref 1–4)
LYMPHOCYTES NFR BLD: 13 %
M PROTEIN MFR SERPL ELPH: 7.1 G/DL (ref 6.4–8.2)
MCH RBC QN AUTO: 34.2 PG (ref 26–34)
MCHC RBC AUTO-ENTMCNC: 30.1 G/DL (ref 31–37)
MCV RBC AUTO: 113.8 FL (ref 80–100)
METAMYELOCYTES # BLD: 0.12 X10(3) UL
METAMYELOCYTES NFR BLD: 1 %
MONOCYTES # BLD: 1.34 X10(3) UL (ref 0.1–1)
MONOCYTES NFR BLD: 11 %
NEUTROPHILS # BLD AUTO: 7.65 X10 (3) UL (ref 1.5–7.7)
NEUTROPHILS NFR BLD: 59 %
NEUTS BAND NFR BLD: 12 %
NEUTS HYPERSEG # BLD: 8.66 X10(3) UL (ref 1.5–7.7)
OSMOLALITY SERPL CALC.SUM OF ELEC: 292 MOSM/KG (ref 275–295)
PLATELET # BLD AUTO: 197 10(3)UL (ref 150–450)
PLATELET MORPHOLOGY: NORMAL
POTASSIUM SERPL-SCNC: 3.5 MMOL/L (ref 3.5–5.1)
RBC # BLD AUTO: 2.69 X10(6)UL (ref 3.8–5.8)
SODIUM SERPL-SCNC: 139 MMOL/L (ref 136–145)
TOTAL CELLS COUNTED: 100
WBC # BLD AUTO: 12.2 X10(3) UL (ref 4–11)

## 2019-12-31 PROCEDURE — 99233 SBSQ HOSP IP/OBS HIGH 50: CPT | Performed by: HOSPITALIST

## 2019-12-31 PROCEDURE — 99233 SBSQ HOSP IP/OBS HIGH 50: CPT | Performed by: INTERNAL MEDICINE

## 2019-12-31 NOTE — OCCUPATIONAL THERAPY NOTE
Attempted to see the patient for OT session. After this OT set-up the chair and the room to initiate out of bed activities, pt asked the OT to check back later. Pt had bowel movement. Will follow-up later this morning. Informed RN and PCT.

## 2019-12-31 NOTE — PLAN OF CARE
Assumed care at [address] [facility]. Pt a/ox2-3. VSS. NSR per tele. O2 at 4l/nc in use. Denies pain. Abd distended, soft. Fall precaution maintained. Pt updated w/ POC. Will continue to monitor.   Problem: Patient/Family Goals  Goal: Patient/Family Long Term Goal  D ordered and tolerated  - Nasogastric tube to low intermittent suction as ordered  - Evaluate effectiveness of ordered antiemetic medications  - Provide nonpharmacologic comfort measures as appropriate  - Advance diet as tolerated, if ordered  - Obtain nutr level of independence in self care  Description  Interventions:  - Assess ability and encourage patient to participate in ADLs to maximize function  - Promote sitting position while performing ADLs such as feeding, grooming, and bathing  - Educate and enco volume within ordered parameters to optimize cerebral perfusion and minimize risk of hemorrhage  - Monitor temperature, glucose, and sodium.  Initiate appropriate interventions as ordered  Outcome: Progressing  Goal: Achieves maximal functionality and self injury  Description  INTERVENTIONS:  - Assess pt frequently for physical needs  - Identify cognitive and physical deficits and behaviors that affect risk of falls.   - [place] fall precautions as indicated by assessment.  - Educate pt/family on patient sa

## 2019-12-31 NOTE — PROGRESS NOTES
[facility]                INFECTIOUS DISEASE PROGRESS NOTE    Jules Cui Patient Status:  Inpatient    3/31/1945 MRN [id number]   [facility] 4SW-A Attending Gela Hernandez MD   [facility] Day # 39 PCP Stephen Carney MD     A Encounter on 11/25/19   1.  BODY FLUID CULT,AEROBIC AND ANAEROBIC     Status: None    Collection Time: 12/08/19  4:30 PM   Result Value Ref Range    Body Fluid Culture Result No Growth 5 Days N/A    Body Fld Smear No organisms seen N/A    Body Fld Smear 1+ acute      ASSESSMENT/PLAN:  1.  Pancreatitis/peripancreatic fluid collections  Leukocytosis, resolving off abx  Please call if needs reconsult    No objection to perm cath if needed per renal    Anali Reynolds MD  [facility]

## 2019-12-31 NOTE — PROGRESS NOTES
[facility]  Nephrology Progress Note    Marsha Abhishek Patient Status:  Inpatient    3/31/1945 MRN [id number]   [facility] 4SW-A Attending Roberta Corona MD   [facility] Day # 39 PCP Leslie Daniels MD       SUBJECTIVE:      No acute (DEX4) oral liquid 30 g, 30 g, Oral, Q15 Min PRN    Or  Glucose-Vitamin C (DEX-4) chewable tab 8 tablet, 8 tablet, Oral, Q15 Min PRN  Pantoprazole Sodium (PROTONIX) 40 mg in Sodium Chloride 0.9 % 10 mL IV push, 40 mg, Intravenous, QAM AC  melatonin tab TAB sounds  Extremities  generalized edema  Neurologic: moving all extremities  Skin: Warm and dry, no rash        Labs:     Recent Labs   Lab 12/27/19  1514 12/27/19  1649 12/28/19  0629 12/29/19  0615 12/30/19  0533 12/31/19  0624   WBC 17.9*  --  19.2* 14.6

## 2019-12-31 NOTE — PROGRESS NOTES
Gastroenterology Progress Note  Patient Name: Nelson Riley  Chief Complaint: gallstone/Etoh pancreatitis, pseudocysts  S: No acute complaints overnight. Pt tolerating water today.    O: /60   Pulse 94   Temp 98.1 °F (36.7 °C) (Oral)   Resp 20 Clinically. Assessment:   1. Etoh/Gallstone pancreatitis now with pseudocysts and ?necrosis  2. Fluid overload/SOB   Plan:   1. On Meropenem per ID recs  2.   Would continue conservative management for now; CT done 12/17 with fluid collections; MR done

## 2019-12-31 NOTE — PLAN OF CARE
[facility] dialysis night supervisor called, unable to do dialysis tonight due to some stat dialysis to other places. Paged Dr. Damion Gonzalez, waiting for response.

## 2019-12-31 NOTE — PLAN OF CARE
0245: Called richie re: pt dialysis treatment to changed to stat per [facility] dialysis supervisor's instruction. Ronit Martin dialysis nurse here to do dialysis treatment.

## 2019-12-31 NOTE — CM/SW NOTE
Order received for possible LTAC placement. Referral sent to [facility] to review and determine if pt would appropriate for LTAC - waiting for a response.

## 2019-12-31 NOTE — PHYSICAL THERAPY NOTE
PHYSICAL THERAPY TREATMENT NOTE - INPATIENT    Room Number: 6663/2735-C     Session: 1   Number of Visits to Meet Established Goals: 7    Presenting Problem: Acute pancreatitis with SIRS;  Ileus; suspected TME per pulmonary    Problem List  Principal Probl w/adenoidectomy       SUBJECTIVE  \" I am tired. \"    Patient’s self-stated goal is to rest.    OBJECTIVE  Precautions: Limb alert - right;Bed/chair alarm    WEIGHT BEARING RESTRICTION  Weight Bearing Restriction: None                PAIN ASSESSMENT   Rati to sit  And for transfers. THERAPEUTIC EXERCISES  Lower Extremity Ankle pumps  Hip AB/AD  Heel slides  LAQ       Patient End of Session: Up in chair;Needs met;Call light within reach; Alarm set    ASSESSMENT     The pt demonstrates progress in functiona

## 2019-12-31 NOTE — OCCUPATIONAL THERAPY NOTE
OCCUPATIONAL THERAPY TREATMENT NOTE - INPATIENT     Room Number: 7622/8723-G  Session: 1   Number of Visits to Meet Established Goals: 5    Presenting Problem: substance withdrawal, delirium, pancreatitis    History related to current admission: (Copied fr History  Past Surgical History:   Procedure Laterality Date   • ADENOIDECTOMY     • BIOPSY OF THYROID,PERCUT  08/20/10    NO CYTOLOGIC EVIDENCE OF PAPILLARY CARCINOMA   • COLONOSCOPY     • KNEE REPLACEMENT SURGERY     • KNEE TOTAL REPLACEMENT Right 5/13/20 and to take steps to the chair. Pt initially was focused on taking steps to the chair. After min encouragement, pt was agreeable to be in the hallway. Pt ambulated with PT and RW.  95% using 4 liters. Discussed plan of care.     Patient End of Session: Up

## 2019-12-31 NOTE — PROGRESS NOTES
AIME HOSPITALIST  Progress Note     Jovanna Flynn Patient Status:  Inpatient    3/31/1945 MRN [id number]   [facility] 4SW-A Attending Dr. Bee Pantoja Day # 39 PCP Erika Ortega MD     Chief Complaint: abdominal distention    S: --   --    * 120*  --   --    ALT 69* 67*  --   --    BILT 2.0 2.0  --   --    TP 6.8 7.3  --   --        Estimated Creatinine Clearance: 14.6 mL/min (A) (based on SCr of 4.58 mg/dL (H)).     Recent Labs   Lab 12/27/19  1649   PTP 14.9*   INR 1.12* controlled  1. levemir 6 units daily  12. Anemia. Stable, PRBCs/epogen  13. Leukocytosis- improved  14. R conjunctivitis, resolved  15. Acute encephalopathy 2/2 hepatic/uremia/ETOH and opiate w/d  1. Off lactulose and f/u ammonia was <10  2.  Avoid benzos/n

## 2019-12-31 NOTE — PLAN OF CARE
Talked to Dr. Carla Ricardo re: pt dialysis,  would like it arrange as soon as possible. Called and Notified Lee Wick, dialysis night supervisor re: Dr. Aguirre Factor orders.

## 2019-12-31 NOTE — CM/SW NOTE
Aysha from [facility] called to say that they can accept pt for LTAC - she will come out to assess pt on Thursday 1/2.

## 2020-01-01 LAB
ANION GAP SERPL CALC-SCNC: 11 MMOL/L (ref 0–18)
BASOPHILS # BLD: 0.12 X10(3) UL (ref 0–0.2)
BASOPHILS NFR BLD: 1 %
BUN BLD-MCNC: 27 MG/DL (ref 7–18)
BUN/CREAT SERPL: 4.5 (ref 10–20)
CALCIUM BLD-MCNC: 10.6 MG/DL (ref 8.5–10.1)
CHLORIDE SERPL-SCNC: 103 MMOL/L (ref 98–112)
CO2 SERPL-SCNC: 26 MMOL/L (ref 21–32)
CREAT BLD-MCNC: 5.97 MG/DL (ref 0.7–1.3)
DEPRECATED RDW RBC AUTO: 66 FL (ref 35.1–46.3)
EOSINOPHIL # BLD: 0.58 X10(3) UL (ref 0–0.7)
EOSINOPHIL NFR BLD: 5 %
ERYTHROCYTE [DISTWIDTH] IN BLOOD BY AUTOMATED COUNT: 15.9 % (ref 11–15)
GLUCOSE BLD-MCNC: 119 MG/DL (ref 70–99)
GLUCOSE BLD-MCNC: 124 MG/DL (ref 70–99)
GLUCOSE BLD-MCNC: 127 MG/DL (ref 70–99)
GLUCOSE BLD-MCNC: 130 MG/DL (ref 70–99)
GLUCOSE BLD-MCNC: 151 MG/DL (ref 70–99)
HAV IGM SER QL: 2.3 MG/DL (ref 1.6–2.6)
HCT VFR BLD AUTO: 31.6 % (ref 39–53)
HGB BLD-MCNC: 9.5 G/DL (ref 13–17.5)
LYMPHOCYTES NFR BLD: 1.97 X10(3) UL (ref 1–4)
LYMPHOCYTES NFR BLD: 17 %
MCH RBC QN AUTO: 33.6 PG (ref 26–34)
MCHC RBC AUTO-ENTMCNC: 30.1 G/DL (ref 31–37)
MCV RBC AUTO: 111.7 FL (ref 80–100)
METAMYELOCYTES # BLD: 0.35 X10(3) UL
METAMYELOCYTES NFR BLD: 3 %
MONOCYTES # BLD: 1.51 X10(3) UL (ref 0.1–1)
MONOCYTES NFR BLD: 13 %
MORPHOLOGY: NORMAL
MYELOCYTES # BLD: 0.23 X10(3) UL
MYELOCYTES NFR BLD: 2 %
NEUTROPHILS # BLD AUTO: 6.27 X10 (3) UL (ref 1.5–7.7)
NEUTROPHILS NFR BLD: 53 %
NEUTS BAND NFR BLD: 6 %
NEUTS HYPERSEG # BLD: 6.84 X10(3) UL (ref 1.5–7.7)
OSMOLALITY SERPL CALC.SUM OF ELEC: 296 MOSM/KG (ref 275–295)
PLATELET # BLD AUTO: 222 10(3)UL (ref 150–450)
PLATELET MORPHOLOGY: NORMAL
POTASSIUM SERPL-SCNC: 3.8 MMOL/L (ref 3.5–5.1)
RBC # BLD AUTO: 2.83 X10(6)UL (ref 3.8–5.8)
SODIUM SERPL-SCNC: 140 MMOL/L (ref 136–145)
TOTAL CELLS COUNTED: 100
TRIGL SERPL-MCNC: 232 MG/DL (ref 30–149)
WBC # BLD AUTO: 11.6 X10(3) UL (ref 4–11)

## 2020-01-01 PROCEDURE — 99233 SBSQ HOSP IP/OBS HIGH 50: CPT | Performed by: HOSPITALIST

## 2020-01-01 PROCEDURE — 99233 SBSQ HOSP IP/OBS HIGH 50: CPT | Performed by: INTERNAL MEDICINE

## 2020-01-01 RX ORDER — ECHINACEA PURPUREA EXTRACT 125 MG
1 TABLET ORAL
Status: DISCONTINUED | OUTPATIENT
Start: 2020-01-01 | End: 2020-01-04

## 2020-01-01 RX ORDER — SODIUM CHLORIDE/ALOE VERA
GEL (GRAM) NASAL AS NEEDED
Status: DISCONTINUED | OUTPATIENT
Start: 2020-01-01 | End: 2020-01-04

## 2020-01-01 NOTE — PROGRESS NOTES
AIME HOSPITALIST  Progress Note     Aliya Tucker Patient Status:  Inpatient    3/31/1945 MRN [id number]   [facility] 4SW-A Attending Dr. Gearrd Milligan Day # 40 PCP Rich Mao MD     Chief Complaint: abdominal distention    S: 140   K 3.5  --  3.4* 3.5 3.8     --  100 103 103   CO2 22.0  --  25.0 25.0 26.0   ALKPHO 268* 217*  --  192*  --    * 120*  --  88*  --    ALT 69* 67*  --  47  --    BILT 2.0 2.0  --  1.7  --    TP 6.8 7.3  --  7.1  --        Estimated Creati placed 12/5  2. HD, permacath prior to d/c  10. Transaminitis, improving  11. Hyperglycemia, A1c 6.0%, well controlled  1. levemir 6 units daily  12. Anemia. Stable, PRBCs/epogen  13. Leukocytosis, resolved  14. R conjunctivitis, resolved  15.  Acute enceph

## 2020-01-01 NOTE — PLAN OF CARE
Assumed care at [address] [facility]. Pt a/ox2-3. VSS. NSR per tele. O2 at 3l/nc in use. Denies pain. Incontinent, briefed. Kept clean and dry. Abd distended, soft. Tolerating clear liquid diet. Fall precaution maintained. Will continue to monitor.

## 2020-01-01 NOTE — PROGRESS NOTES
Gastroenterology Progress Note  Patient Name: Pau Islas  Chief Complaint: abdominal pain, pancreatitis  S: Pt reports that overall abdominal pain is better. He is having regular BMs. He is tolerating CLD.  He is hesitant to have his diet advanced as diet advanced today  4. HD per renal   5. Trial of increasing diet in am vs NJ (could consider in IR)  7. CMP in am    Total time spent with patient and coordinating care: 26 minutes.      Cristian Vasquez DO  9:04 AM  1/1/2020  [facility] Gastroenterology  6

## 2020-01-01 NOTE — PROGRESS NOTES
[facility]  Nephrology Progress Note    Ari Blazing Patient Status:  Inpatient    3/31/1945 MRN [id number]   [facility] 4SW-A Attending Marcia Blanchard MD   [facility] Day # 40 PCP Anupam Covarrubias MD       SUBJECTIVE:      No acute tablet, 4 tablet, Oral, Q15 Min PRN    Or  dextrose 50 % injection 50 mL, 50 mL, Intravenous, Q15 Min PRN    Or  glucose (DEX4) oral liquid 30 g, 30 g, Oral, Q15 Min PRN    Or  Glucose-Vitamin C (DEX-4) chewable tab 8 tablet, 8 tablet, Oral, Q15 Min PRN  P normal, no murmur or rub  Lungs: decr BS B R>L  Abdomen: Soft, + distended.  + mildly tender, + bowel sounds  Extremities  generalized edema  Neurologic: moving all extremities  Skin: Warm and dry, no rash        Labs:     Recent Labs   Lab 12/27/19  5959 1 immobility. Low ca dialysate. 5. Anemia- in setting of NEHA. On ESAs    6.   Hypotension- tid midodrine       Belen Phan  1/1/2020

## 2020-01-02 ENCOUNTER — ANESTHESIA (OUTPATIENT)
Dept: ENDOSCOPY | Facility: HOSPITAL | Age: 75
DRG: 438 | End: 2020-01-02
Payer: MEDICARE

## 2020-01-02 ENCOUNTER — ANESTHESIA EVENT (OUTPATIENT)
Dept: ENDOSCOPY | Facility: HOSPITAL | Age: 75
DRG: 438 | End: 2020-01-02
Payer: MEDICARE

## 2020-01-02 ENCOUNTER — APPOINTMENT (OUTPATIENT)
Dept: GENERAL RADIOLOGY | Facility: HOSPITAL | Age: 75
DRG: 438 | End: 2020-01-02
Attending: INTERNAL MEDICINE
Payer: MEDICARE

## 2020-01-02 LAB
ALBUMIN SERPL-MCNC: 3.1 G/DL (ref 3.4–5)
ALBUMIN/GLOB SERPL: 0.9 {RATIO} (ref 1–2)
ALP LIVER SERPL-CCNC: 163 U/L (ref 45–117)
ALT SERPL-CCNC: 44 U/L (ref 16–61)
ANION GAP SERPL CALC-SCNC: 12 MMOL/L (ref 0–18)
AST SERPL-CCNC: 75 U/L (ref 15–37)
BASOPHILS # BLD: 0 X10(3) UL (ref 0–0.2)
BASOPHILS NFR BLD: 0 %
BILIRUB SERPL-MCNC: 1.4 MG/DL (ref 0.1–2)
BUN BLD-MCNC: 33 MG/DL (ref 7–18)
BUN/CREAT SERPL: 5.3 (ref 10–20)
CALCIUM BLD-MCNC: 10.5 MG/DL (ref 8.5–10.1)
CHLORIDE SERPL-SCNC: 101 MMOL/L (ref 98–112)
CO2 SERPL-SCNC: 25 MMOL/L (ref 21–32)
CREAT BLD-MCNC: 6.17 MG/DL (ref 0.7–1.3)
DEPRECATED RDW RBC AUTO: 65.8 FL (ref 35.1–46.3)
EOSINOPHIL # BLD: 0.85 X10(3) UL (ref 0–0.7)
EOSINOPHIL NFR BLD: 7 %
ERYTHROCYTE [DISTWIDTH] IN BLOOD BY AUTOMATED COUNT: 15.9 % (ref 11–15)
GLOBULIN PLAS-MCNC: 3.6 G/DL (ref 2.8–4.4)
GLUCOSE BLD-MCNC: 102 MG/DL (ref 70–99)
GLUCOSE BLD-MCNC: 114 MG/DL (ref 70–99)
GLUCOSE BLD-MCNC: 115 MG/DL (ref 70–99)
GLUCOSE BLD-MCNC: 119 MG/DL (ref 70–99)
GLUCOSE BLD-MCNC: 125 MG/DL (ref 70–99)
GLUCOSE BLD-MCNC: 125 MG/DL (ref 70–99)
GLUCOSE BLD-MCNC: 134 MG/DL (ref 70–99)
HAV IGM SER QL: 2.2 MG/DL (ref 1.6–2.6)
HCT VFR BLD AUTO: 32.4 % (ref 39–53)
HGB BLD-MCNC: 9.6 G/DL (ref 13–17.5)
LYMPHOCYTES NFR BLD: 18 %
LYMPHOCYTES NFR BLD: 2.18 X10(3) UL (ref 1–4)
M PROTEIN MFR SERPL ELPH: 6.7 G/DL (ref 6.4–8.2)
MCH RBC QN AUTO: 32.9 PG (ref 26–34)
MCHC RBC AUTO-ENTMCNC: 29.6 G/DL (ref 31–37)
MCV RBC AUTO: 111 FL (ref 80–100)
MONOCYTES # BLD: 0.85 X10(3) UL (ref 0.1–1)
MONOCYTES NFR BLD: 7 %
MORPHOLOGY: NORMAL
NEUTROPHILS # BLD AUTO: 6.71 X10 (3) UL (ref 1.5–7.7)
NEUTROPHILS NFR BLD: 67 %
NEUTS BAND NFR BLD: 1 %
NEUTS HYPERSEG # BLD: 8.23 X10(3) UL (ref 1.5–7.7)
OSMOLALITY SERPL CALC.SUM OF ELEC: 294 MOSM/KG (ref 275–295)
PLATELET # BLD AUTO: 261 10(3)UL (ref 150–450)
PLATELET MORPHOLOGY: NORMAL
POTASSIUM SERPL-SCNC: 3.5 MMOL/L (ref 3.5–5.1)
RBC # BLD AUTO: 2.92 X10(6)UL (ref 3.8–5.8)
SODIUM SERPL-SCNC: 138 MMOL/L (ref 136–145)
TOTAL CELLS COUNTED: 100
WBC # BLD AUTO: 12.1 X10(3) UL (ref 4–11)

## 2020-01-02 PROCEDURE — 0DH68UZ INSERTION OF FEEDING DEVICE INTO STOMACH, VIA NATURAL OR ARTIFICIAL OPENING ENDOSCOPIC: ICD-10-PCS | Performed by: INTERNAL MEDICINE

## 2020-01-02 PROCEDURE — 99232 SBSQ HOSP IP/OBS MODERATE 35: CPT | Performed by: HOSPITALIST

## 2020-01-02 PROCEDURE — 90935 HEMODIALYSIS ONE EVALUATION: CPT | Performed by: INTERNAL MEDICINE

## 2020-01-02 RX ORDER — SODIUM CHLORIDE 9 MG/ML
INJECTION, SOLUTION INTRAVENOUS CONTINUOUS PRN
Status: DISCONTINUED | OUTPATIENT
Start: 2020-01-02 | End: 2020-01-02 | Stop reason: SURG

## 2020-01-02 RX ORDER — NALOXONE HYDROCHLORIDE 0.4 MG/ML
80 INJECTION, SOLUTION INTRAMUSCULAR; INTRAVENOUS; SUBCUTANEOUS AS NEEDED
Status: DISCONTINUED | OUTPATIENT
Start: 2020-01-02 | End: 2020-01-02 | Stop reason: HOSPADM

## 2020-01-02 RX ORDER — SODIUM CHLORIDE, SODIUM LACTATE, POTASSIUM CHLORIDE, CALCIUM CHLORIDE 600; 310; 30; 20 MG/100ML; MG/100ML; MG/100ML; MG/100ML
INJECTION, SOLUTION INTRAVENOUS CONTINUOUS
Status: DISCONTINUED | OUTPATIENT
Start: 2020-01-02 | End: 2020-01-02

## 2020-01-02 RX ORDER — KETAMINE HYDROCHLORIDE 50 MG/ML
INJECTION, SOLUTION, CONCENTRATE INTRAMUSCULAR; INTRAVENOUS AS NEEDED
Status: DISCONTINUED | OUTPATIENT
Start: 2020-01-02 | End: 2020-01-02 | Stop reason: SURG

## 2020-01-02 RX ORDER — MIDAZOLAM HYDROCHLORIDE 1 MG/ML
INJECTION INTRAMUSCULAR; INTRAVENOUS AS NEEDED
Status: DISCONTINUED | OUTPATIENT
Start: 2020-01-02 | End: 2020-01-02 | Stop reason: SURG

## 2020-01-02 RX ORDER — DEXTROSE MONOHYDRATE 25 G/50ML
50 INJECTION, SOLUTION INTRAVENOUS
Status: DISCONTINUED | OUTPATIENT
Start: 2020-01-02 | End: 2020-01-02 | Stop reason: HOSPADM

## 2020-01-02 RX ORDER — LIDOCAINE HYDROCHLORIDE 10 MG/ML
INJECTION, SOLUTION EPIDURAL; INFILTRATION; INTRACAUDAL; PERINEURAL AS NEEDED
Status: DISCONTINUED | OUTPATIENT
Start: 2020-01-02 | End: 2020-01-02 | Stop reason: SURG

## 2020-01-02 RX ADMIN — LIDOCAINE HYDROCHLORIDE 25 MG: 10 INJECTION, SOLUTION EPIDURAL; INFILTRATION; INTRACAUDAL; PERINEURAL at 12:39:00

## 2020-01-02 RX ADMIN — KETAMINE HYDROCHLORIDE 20 MG: 50 INJECTION, SOLUTION, CONCENTRATE INTRAMUSCULAR; INTRAVENOUS at 12:36:00

## 2020-01-02 RX ADMIN — MIDAZOLAM HYDROCHLORIDE 2 MG: 1 INJECTION INTRAMUSCULAR; INTRAVENOUS at 12:36:00

## 2020-01-02 RX ADMIN — SODIUM CHLORIDE: 9 INJECTION, SOLUTION INTRAVENOUS at 12:33:00

## 2020-01-02 NOTE — PLAN OF CARE
Assumed care at 1400. Pt back from [place] placement. Pt drowsy and confused from surgery. Oriented to place, situation, and person. Lungs clear. 1L O2  Pt to start jevity 1.5 at trickle feeds.  Start at 10ml, go up by 10ml q6h with goal of 55ml/hr with q6h gerardo

## 2020-01-02 NOTE — PROGRESS NOTES
AIME HOSPITALIST  Progress Note     Niya Gilmore Patient Status:  Inpatient    3/31/1945 MRN [id number]   [facility] 4SW-A Attending Dr. Lentz Sensing Day # 45 PCP Shayan Kang MD     Chief Complaint: Abdominal distention    S: < > 12* 9* 8*   CA  --    < > 10.2* 10.6* 10.5*   ALB 3.3*  --  3.6  --  3.1*   NA  --    < > 139 140 138   K  --    < > 3.5 3.8 3.5   CL  --    < > 103 103 101   CO2  --    < > 25.0 26.0 25.0   ALKPHO 217*  --  192*  --  163*   *  --  88*  --  75* resolving  8. Afib back to NSR, PSVT  1. amio/metoprolol per cards, AC TBD  9. ATN, 2/2 prior hypotension  1. Temp cath placed 12/5  2. HD, permacath prior to d/c  10. Transaminitis, improving  11.  Hyperglycemia, A1c 6.0%, well controlled  1. levemir 6 uni

## 2020-01-02 NOTE — PROGRESS NOTES
[facility]  Nephrology Progress Note    Karol Cabral Patient Status:  Inpatient    3/31/1945 MRN [id number]   [facility] 4SW-A Attending Vin Lynn MD   [facility] Day # 45 PCP Martin Cade MD       SUBJECTIVE:    Down for NG Min PRN    Or  glucose (DEX4) oral liquid 30 g, 30 g, Oral, Q15 Min PRN    Or  Glucose-Vitamin C (DEX-4) chewable tab 8 tablet, 8 tablet, Oral, Q15 Min PRN  Pantoprazole Sodium (PROTONIX) 40 mg in Sodium Chloride 0.9 % 10 mL IV push, 40 mg, Intravenous, QA sounds  Extremities  generalized edema  Neurologic: moving all extremities  Skin: Warm and dry, no rash    Recent Labs     12/31/19  0624 01/01/20  0643 01/02/20  0607   WBC 12.2* 11.6* 12.1*   HGB 9.2* 9.5* 9.6*   .8* 111.7* 111.0*   .0 222.

## 2020-01-02 NOTE — PLAN OF CARE
Patient w/ NJ in place now and starting TF per dietitian recs. Also ID ok w/ perm cath placement - updated nephrology. Updated  Kenya Galaviz on progress - RML liaison was to evaluated today and she will f/u w/ them. D/c planning.

## 2020-01-02 NOTE — ANESTHESIA PREPROCEDURE EVALUATION
PRE-OP EVALUATION    Patient Name: Ira Roblero    Pre-op Diagnosis: Feeding difficulties [R63.3]    Procedure(s):  ESOPHAGOGASTRODUODENOSCOPY with placement of a nasal jejunal tube     Surgeon(s) and Role:     Maine Oswald MD - Primary    Pr mg, Oral, PRN Dialysis  [] adult 3 in 1 TPN, , Intravenous, Cyclic TPN  Insulin Aspart Pen (NOVOLOG) 100 UNIT/ML flexpen 1-10 Units, 1-10 Units, Subcutaneous, Q6H  [] adult 3 in 1 TPN, , Intravenous, Cyclic TPN  Metoclopramide HCl (REGLAN) in TPN  [COMPLETED] Meropenem (MERREM) 500 mg in sodium chloride 0.9% 100 mL MBP, 500 mg, Intravenous, Q24H  [] adult 3 in 1 tpn, 1,882 mL, Intravenous, Continuous TPN  heparin sodium 1000 UNIT/ML injection 3,000 Units, 3,000 Units, Intravenous, PRN Berdie Conquest Once  [COMPLETED] heparin sodium 1000 UNIT/ML injection 1,500 Units, 1.5 mL, Intravenous, Once  [COMPLETED] heparin sodium 1000 UNIT/ML injection 1,500 Units, 1.5 mL, Intravenous, Once  Pantoprazole Sodium (PROTONIX) 40 mg in Sodium Chloride 0.9 % 10 mL IV Once  [COMPLETED] Potassium Chloride ER (K-DUR M20) CR tab 40 mEq, 40 mEq, Oral, Q4H  [COMPLETED] magnesium oxide (MAG-OX) tab 400 mg, 400 mg, Oral, Once  [COMPLETED] Piperacillin Sod-Tazobactam So (ZOSYN) 3.375 g in dextrose 5 % 100 mL ADD-vantage, 3.375 Lidocaine Viscous HCl (XYLOCAINE) 2 % mouth solution 10 mL, 10 mL, Mouth/Throat, Once  [COMPLETED] HYDROmorphone HCl (DILAUDID) 1 MG/ML injection 0.5 mg, 0.5 mg, Intravenous, Once  [COMPLETED] CefTRIAXone Sodium (ROCEPHIN) 1 g in sodium chloride 0.9% 100 m 0.005 % Ophthalmic Solution, Place 1 drop into both eyes nightly.  , Disp: , Rfl: 10  Rosuvastatin Calcium 20 MG Oral Tab, Take 20 mg by mouth daily. , Disp: , Rfl:   PSYLLIUM OR, Take 1 capsule by mouth daily as needed.   , Disp: , Rfl:   ezetimibe 10 MG Or 1950    w/adenoidectomy     Social History    Tobacco Use      Smoking status: Former Smoker        Packs/day: 0.50        Years: 2.00        Pack years: 1        Types: Cigarettes        Quit date: 1969        Years since quittin.0      [place] spouse                Present on Admission:  • Hypokalemia  • Leukocytosis  • Azotemia  • Metabolic acidosis  • Hyperglycemia

## 2020-01-02 NOTE — OCCUPATIONAL THERAPY NOTE
Attempted to see pt for skilled OT services this date. Pt is currently off the floor and is scheduled for EGD, IR Permacath placement and HD this date. Will re-attempt as schedule allows.  Macario Vegas, 01/02/20, 11:42 AM

## 2020-01-02 NOTE — OPERATIVE REPORT
Operative Report-Esophagogastroduodenoscopy      PREOPERATIVE DIAGNOSIS/INDICATION: Feeding difficulty     POSTOPERTATIVE DIAGNOSIS: NJ placement     PROCEDURE PERFORMED: EGD    INFORMED CONSENT: Once a brief history and physical e slowly in conjunction with nutrition    Nirali Carrier  1/2/2020  1:16 PM

## 2020-01-02 NOTE — DIETARY NOTE
[facility]     NUTRITION FOLLOW UP ASSESSMENT     Pt does not meet malnutrition criteria.     NUTRITION DIAGNOSIS/PROBLEM:  Inadequate oral intake related to inability to consume sufficient intake (severe pancreatitis) as evidenced by estimated intake labs. Will keep insulin in TPN to 60 units per Dr. Ree Hicks. Insulin outside of TPN being adjusted. Will change TPN infusion time from 16 hours to 20 hours per Dr. Ree Hicks.      Pt stated he only consumed some milk this AM. Felt nauseous, but reported no vomit Allergies: No  Cultural/Ethnic/[place] Preferences Addresses: Yes     NUTRITION RELATED PHYSICAL FINDINGS:     1. Body Fat/Muscle Mass: well nourished per visual exam.     2.  Fluid Accumulation: generalized edema per visual exam.     NUTRITION PRESCRIPTI

## 2020-01-02 NOTE — ANESTHESIA POSTPROCEDURE EVALUATION
[facility] Patient Status:  Inpatient   Age/Gender 69year old male MRN [id number]   Location [address] [place] [address]. Attending Maryse Abdi MD   [facility] Day # 45 PCP Joey Shay MD       Anesthesia Post-op Note    Procedu

## 2020-01-02 NOTE — PLAN OF CARE
Assumed care @ 0700. Pt a/o x4, VSS. Tele SR. No acute respiratory distress noted. O2 sat 94-96 on 1LNC. 88-89% on RA. Pt given IS, noted 750-1000 mL. Encouraged to use 10 times in an hour. Denies any pain.   Pt pivoted to chair, sat in chair this PM monitoring, monitor vital signs, obtain 12 lead EKG if indicated  - Evaluate effectiveness of antiarrhythmic and heart rate control medications as ordered  - Initiate emergency measures for life threatening arrhythmias  - Monitor electrolytes and administe Verbalizes/displays adequate comfort level or patient's stated pain goal  Description  INTERVENTIONS:  - Encourage pt to monitor pain and request assistance  - Assess pain using appropriate pain scale  - Administer analgesics based on type and severity of Assess ability and encourage patient to participate in ADLs to maximize function  - Promote sitting position while performing ADLs such as feeding, grooming, and bathing  - Educate and encourage patient/family in tolerated functional activity level and pre perfusion and minimize risk of hemorrhage  - Monitor temperature, glucose, and sodium.  Initiate appropriate interventions as ordered  Outcome: Progressing  Goal: Achieves maximal functionality and self care  Description  INTERVENTIONS  - Monitor swallowing

## 2020-01-02 NOTE — PLAN OF CARE
Assumed care @ 1900. Patient alert oriented x3   On telemetry monitoring. NPO after midnight  Denies SOB, Denies any pain. Updated patient with plan of care, verbalizes understanding. Ensures patient is safe at all times.   Maintained a calm and safe en tolerated, if ordered  - Obtain nutritional consult as needed  - Evaluate fluid balance  Outcome: Progressing     Problem: METABOLIC/FLUID AND ELECTROLYTES - ADULT  Goal: Electrolytes maintained within normal limits  Description  INTERVENTIONS:  - Monitor grooming, and bathing  - Educate and encourage patient/family in tolerated functional activity level and precautions during self-care     Outcome: Progressing     Problem: RESPIRATORY - ADULT  Goal: Achieves optimal ventilation and oxygenation  Description strengthening/mobility  - Encourage toileting schedule  Outcome: Progressing     Problem: HEMATOLOGIC - ADULT  Goal: Free from bleeding injury  Description  (Example usage: patient with low platelets)  INTERVENTIONS:  - Avoid intramuscular injections, enem

## 2020-01-03 ENCOUNTER — APPOINTMENT (OUTPATIENT)
Dept: INTERVENTIONAL RADIOLOGY/VASCULAR | Facility: HOSPITAL | Age: 75
DRG: 438 | End: 2020-01-03
Attending: INTERNAL MEDICINE
Payer: MEDICARE

## 2020-01-03 LAB
ANION GAP SERPL CALC-SCNC: 9 MMOL/L (ref 0–18)
BASOPHILS # BLD: 0.13 X10(3) UL (ref 0–0.2)
BASOPHILS NFR BLD: 1 %
BUN BLD-MCNC: 20 MG/DL (ref 7–18)
BUN/CREAT SERPL: 5.1 (ref 10–20)
CALCIUM BLD-MCNC: 10.2 MG/DL (ref 8.5–10.1)
CHLORIDE SERPL-SCNC: 103 MMOL/L (ref 98–112)
CO2 SERPL-SCNC: 25 MMOL/L (ref 21–32)
CREAT BLD-MCNC: 3.93 MG/DL (ref 0.7–1.3)
DEPRECATED RDW RBC AUTO: 64.2 FL (ref 35.1–46.3)
EOSINOPHIL # BLD: 0.88 X10(3) UL (ref 0–0.7)
EOSINOPHIL NFR BLD: 7 %
ERYTHROCYTE [DISTWIDTH] IN BLOOD BY AUTOMATED COUNT: 16.1 % (ref 11–15)
GLUCOSE BLD-MCNC: 126 MG/DL (ref 70–99)
GLUCOSE BLD-MCNC: 131 MG/DL (ref 70–99)
GLUCOSE BLD-MCNC: 131 MG/DL (ref 70–99)
GLUCOSE BLD-MCNC: 136 MG/DL (ref 70–99)
GLUCOSE BLD-MCNC: 168 MG/DL (ref 70–99)
HAV IGM SER QL: 2.1 MG/DL (ref 1.6–2.6)
HCT VFR BLD AUTO: 32.2 % (ref 39–53)
HGB BLD-MCNC: 9.7 G/DL (ref 13–17.5)
LYMPHOCYTES NFR BLD: 18 %
LYMPHOCYTES NFR BLD: 2.27 X10(3) UL (ref 1–4)
MCH RBC QN AUTO: 32.9 PG (ref 26–34)
MCHC RBC AUTO-ENTMCNC: 30.1 G/DL (ref 31–37)
MCV RBC AUTO: 109.2 FL (ref 80–100)
METAMYELOCYTES # BLD: 0.13 X10(3) UL
METAMYELOCYTES NFR BLD: 1 %
MONOCYTES # BLD: 1.01 X10(3) UL (ref 0.1–1)
MONOCYTES NFR BLD: 8 %
MORPHOLOGY: NORMAL
MYELOCYTES # BLD: 0.25 X10(3) UL
MYELOCYTES NFR BLD: 2 %
NEUTROPHILS # BLD AUTO: 7.17 X10 (3) UL (ref 1.5–7.7)
NEUTROPHILS NFR BLD: 61 %
NEUTS BAND NFR BLD: 2 %
NEUTS HYPERSEG # BLD: 7.94 X10(3) UL (ref 1.5–7.7)
OSMOLALITY SERPL CALC.SUM OF ELEC: 289 MOSM/KG (ref 275–295)
PLATELET # BLD AUTO: 219 10(3)UL (ref 150–450)
PLATELET MORPHOLOGY: NORMAL
POTASSIUM SERPL-SCNC: 3.6 MMOL/L (ref 3.5–5.1)
RBC # BLD AUTO: 2.95 X10(6)UL (ref 3.8–5.8)
SODIUM SERPL-SCNC: 137 MMOL/L (ref 136–145)
TOTAL CELLS COUNTED: 100
WBC # BLD AUTO: 12.6 X10(3) UL (ref 4–11)

## 2020-01-03 PROCEDURE — 0JH63XZ INSERTION OF TUNNELED VASCULAR ACCESS DEVICE INTO CHEST SUBCUTANEOUS TISSUE AND FASCIA, PERCUTANEOUS APPROACH: ICD-10-PCS | Performed by: RADIOLOGY

## 2020-01-03 PROCEDURE — 02HV33Z INSERTION OF INFUSION DEVICE INTO SUPERIOR VENA CAVA, PERCUTANEOUS APPROACH: ICD-10-PCS | Performed by: RADIOLOGY

## 2020-01-03 PROCEDURE — 99233 SBSQ HOSP IP/OBS HIGH 50: CPT | Performed by: INTERNAL MEDICINE

## 2020-01-03 PROCEDURE — B518ZZA FLUOROSCOPY OF SUPERIOR VENA CAVA, GUIDANCE: ICD-10-PCS | Performed by: RADIOLOGY

## 2020-01-03 PROCEDURE — 99232 SBSQ HOSP IP/OBS MODERATE 35: CPT | Performed by: HOSPITALIST

## 2020-01-03 RX ORDER — METOCLOPRAMIDE 5 MG/1
5 TABLET ORAL EVERY 6 HOURS PRN
Qty: 40 TABLET | Refills: 0 | Status: SHIPPED | OUTPATIENT
Start: 2020-01-03 | End: 2020-01-13

## 2020-01-03 RX ORDER — BUMETANIDE 0.25 MG/ML
2 INJECTION, SOLUTION INTRAMUSCULAR; INTRAVENOUS
Status: DISCONTINUED | OUTPATIENT
Start: 2020-01-03 | End: 2020-01-04

## 2020-01-03 RX ORDER — AMIODARONE HYDROCHLORIDE 200 MG/1
200 TABLET ORAL DAILY
Qty: 30 TABLET | Refills: 0 | Status: SHIPPED | OUTPATIENT
Start: 2020-01-04 | End: 2020-03-09

## 2020-01-03 RX ORDER — PANTOPRAZOLE SODIUM 40 MG/1
40 TABLET, DELAYED RELEASE ORAL
Status: DISCONTINUED | OUTPATIENT
Start: 2020-01-04 | End: 2020-01-04

## 2020-01-03 RX ORDER — LIDOCAINE HYDROCHLORIDE AND EPINEPHRINE 10; 10 MG/ML; UG/ML
INJECTION, SOLUTION INFILTRATION; PERINEURAL
Status: COMPLETED
Start: 2020-01-03 | End: 2020-01-03

## 2020-01-03 RX ORDER — HEPARIN SODIUM 5000 [USP'U]/ML
INJECTION, SOLUTION INTRAVENOUS; SUBCUTANEOUS
Status: COMPLETED
Start: 2020-01-03 | End: 2020-01-03

## 2020-01-03 RX ORDER — ECHINACEA PURPUREA EXTRACT 125 MG
1 TABLET ORAL
Qty: 1 BOTTLE | Refills: 0 | Status: SHIPPED | OUTPATIENT
Start: 2020-01-03 | End: 2020-03-09 | Stop reason: ALTCHOICE

## 2020-01-03 RX ORDER — DULOXETIN HYDROCHLORIDE 30 MG/1
30 CAPSULE, DELAYED RELEASE ORAL 2 TIMES DAILY
Qty: 60 CAPSULE | Refills: 0 | Status: SHIPPED | OUTPATIENT
Start: 2020-01-03 | End: 2020-02-02

## 2020-01-03 RX ORDER — MIDAZOLAM HYDROCHLORIDE 1 MG/ML
INJECTION INTRAMUSCULAR; INTRAVENOUS
Status: COMPLETED
Start: 2020-01-03 | End: 2020-01-03

## 2020-01-03 RX ORDER — BUMETANIDE 0.25 MG/ML
2 INJECTION, SOLUTION INTRAMUSCULAR; INTRAVENOUS
Qty: 80 ML | Refills: 0 | Status: SHIPPED | OUTPATIENT
Start: 2020-01-03 | End: 2020-01-08

## 2020-01-03 RX ORDER — METOCLOPRAMIDE 5 MG/1
5 TABLET ORAL EVERY 6 HOURS PRN
Status: DISCONTINUED | OUTPATIENT
Start: 2020-01-03 | End: 2020-01-04

## 2020-01-03 RX ORDER — LIDOCAINE HYDROCHLORIDE 10 MG/ML
INJECTION, SOLUTION INFILTRATION; PERINEURAL
Status: COMPLETED
Start: 2020-01-03 | End: 2020-01-03

## 2020-01-03 RX ORDER — MIDODRINE HYDROCHLORIDE 10 MG/1
10 TABLET ORAL 3 TIMES DAILY
Qty: 90 TABLET | Refills: 0 | Status: SHIPPED | OUTPATIENT
Start: 2020-01-03 | End: 2020-03-09

## 2020-01-03 RX ORDER — IPRATROPIUM BROMIDE AND ALBUTEROL SULFATE 2.5; .5 MG/3ML; MG/3ML
3 SOLUTION RESPIRATORY (INHALATION) EVERY 4 HOURS PRN
Qty: 360 ML | Refills: 0 | Status: SHIPPED | OUTPATIENT
Start: 2020-01-03 | End: 2020-03-09 | Stop reason: ALTCHOICE

## 2020-01-03 RX ORDER — CEFAZOLIN SODIUM 1 G/3ML
INJECTION, POWDER, FOR SOLUTION INTRAMUSCULAR; INTRAVENOUS
Status: COMPLETED
Start: 2020-01-03 | End: 2020-01-03

## 2020-01-03 RX ORDER — PANTOPRAZOLE SODIUM 40 MG/1
40 TABLET, DELAYED RELEASE ORAL
Qty: 30 TABLET | Refills: 0 | Status: SHIPPED | OUTPATIENT
Start: 2020-01-04 | End: 2020-03-09 | Stop reason: ALTCHOICE

## 2020-01-03 NOTE — CM/SW NOTE
Left a message for Aysha at [facility] [phone] regarding a bed today. Awaiting a call back.

## 2020-01-03 NOTE — PROGRESS NOTES
Gastroenterology Progress Note  Patient Name: Pau Falling  Chief Complaint: gallstone/Etoh pancreatitis, pseudocysts  S: No acute complaints overnight. NJ in place.    O: /53 (BP Location: Right arm)   Pulse 87   Temp 98 °F (36.7 °C) (Oral)   R overload/SOB   Plan:   1. S/p Meropenem course  per ID recs  2.   Would continue conservative management for now; CT done 12/17 with fluid collections; MR done 12/23 with improvement but incomplete imaging study; discussed and reviewed images with biliary;

## 2020-01-03 NOTE — PROCEDURES
[facility]  Procedure Note    Toby Quezada Patient Status:  Inpatient    3/31/1945 MRN [id number]   [facility] 7NE-A Attending Fawn Faulkner MD   [facility] Day # 44 PCP Elsy Zhang MD     Procedure: right IJ tunneled HD cathet

## 2020-01-03 NOTE — PLAN OF CARE
Spoke with GI after they evaluated the patient. They would like pt to be at goal tube feed rate prior to sending to [facility]. Patient currently at 30cc/hr and goal is 55cch/hr. Will discharge to [facility] tomorrow if tolerating goal tube feed rate.   Reviewed with

## 2020-01-03 NOTE — OCCUPATIONAL THERAPY NOTE
Attempted to see pt this AM, pt just returned to bed and is to go for permacath shortly, OT will follow up as schedule permits.

## 2020-01-03 NOTE — PHYSICAL THERAPY NOTE
Attempted to see Pt this AM - RN aware of attempt. Pt awaiting transport arrival for permacath procedure. RN requesting therapy wait until after procedure.   Will f/u later today if time permits, after all other patients are attempted per tentative schedu

## 2020-01-03 NOTE — CM/SW NOTE
01/03/20 1500   Discharge disposition   Expected discharge disposition Short Term H   Name of Facillity/Home Care/Hospice [facility]   Discharge transportation Other (comment)  ([facility])   Pt going to [facility] [place].  Betty Frost spoke w/wife

## 2020-01-03 NOTE — PROGRESS NOTES
[facility]                INFECTIOUS DISEASE PROGRESS NOTE    Raul Gonzales Patient Status:  Inpatient    3/31/1945 MRN [id number]   [facility] 4SW-A Attending Nori Lewis MD   [facility] Day # 44 PCP Jean-Paul Moreno MD     A [address] Encounter on 11/25/19   1.  BODY FLUID CULT,AEROBIC AND ANAEROBIC     Status: None    Collection Time: 12/08/19  4:30 PM   Result Value Ref Range    Body Fluid Culture Result No Growth 5 Days N/A    Body Fld Smear No organism Encephalopathy acute      ASSESSMENT/PLAN:  1. Pancreatitis/peripancreatic fluid collections  Wbc 12, down off antibiotics    2.  ARF/on HD, per renal will need long term HD with PC  -since there has been no recent bacteremia, was cleared for PC    Please c

## 2020-01-03 NOTE — PROGRESS NOTES
Pharmacy Note:  Route Optimization for Pantoprazole (Protonix)         Patient is currently on pantoprazole 40mg IV q24hr.   The patient meets the criteria to convert to the oral equivalent as established by the IV to oral conversion protocol approved by

## 2020-01-03 NOTE — PROGRESS NOTES
[facility]  Progress Note     Samimalcom Car Patient Status:  Inpatient    3/31/1945 MRN [id number]   [facility] 4SW-A Attending Dr. Marion Maldonado Day # 44 PCP Omid Wright MD     Chief Complaint: Abdominal distention    S: 10.5* 10.2*   ALB 3.3*  --  3.6  --  3.1*  --    NA  --    < > 139 140 138 137   K  --    < > 3.5 3.8 3.5 3.6   CL  --    < > 103 103 101 103   CO2  --    < > 25.0 26.0 25.0 25.0   ALKPHO 217*  --  192*  --  163*  --    *  --  88*  --  75*  --    AL TBD  9. ATN, 2/2 prior hypotension  1. Temp cath placed 12/5  2. HD per renal  3. permacath today  10. Transaminitis, improving  11. Hyperglycemia, A1c 6.0%, well controlled  1. levemir 6 units daily  12. Anemia. Stable, PRBCs/epogen  13.  Leukocytosis, res

## 2020-01-03 NOTE — CM/SW NOTE
Tee stated the dc is now cancelled due to tube feeding not being at goal rate. [facility] on call for tomorrow (if discharged).

## 2020-01-03 NOTE — PLAN OF CARE
Jevity 1.5 trickle feeds initiated at 2230. 10mls per hour for 6 hours with 100 ml flushes ever 6 hours.  Vss, will continue to follow

## 2020-01-03 NOTE — DISCHARGE SUMMARY
[facility]  DISCHARGE SUMMARY     Sierra Gonzalez Patient Status:  Inpatient    3/31/1945 MRN [id number]   [facility] 7NE-A Attending Macy Anderson MD   [facility] Day # 36 PCP Megan Ng MD     Date of Admission: 2019 secondary to hepatic, uremia, EtOH, opioid withdrawal.  Cardiology evaluated due to A. fib. Patient was managed with amiodarone, metoprolol. Patient developed worsening NEHA, anasarca. He continued on HD per nephrology.   ID assisted with management of an Thoracentesis  Indication:          Pleural effusion, dyspnea  Findings: 810 cc of pleural fluid was removed which was Clear in nature. Catheter was then withdrawn and a bandage applied to insertion site.          Consultants: GI, cardiology, general surge PROAMATINE      Take 1 tablet (10 mg total) by mouth 3 (three) times daily.    Quantity:  90 tablet  Refills:  0     Pantoprazole Sodium 40 MG Tbec  Commonly known as:  PROTONIX      Take 1 tablet (40 mg total) by mouth every morning before breakfast.   [place] soon as possible for a visit in 2 weeks  to discuss gallbladder removal surgery    Sameer Malin MD  [address]    Schedule an appointment as soon as possible for a visit in 1 week      Dwight Winters

## 2020-01-03 NOTE — PLAN OF CARE
Assumed care @ 2300. Pt a/o x4, VSS. Tele SR. No acute respiratory distress noted. Denies any pain. Pt sleeping in bed. (-) BM. TF infusing @ 10 mL/hr. Will advance to 20 mL/hr @ 0430. No other complaints made. Will continue to monitor. vomiting  Description  INTERVENTIONS:  - Maintain adequate hydration with IV or PO as ordered and tolerated  - Nasogastric tube to low intermittent suction as ordered  - Evaluate effectiveness of ordered antiemetic medications  - Provide nonpharmacologic c on pain and pain management  - Manage/alleviate anxiety  - Utilize distraction and/or relaxation techniques  - Monitor for opioid side effects  - Notify MD/LIP if interventions unsuccessful or patient reports new pain  - Anticipate increased pain with acti oxygenation  Description  INTERVENTIONS:  - Assess for changes in respiratory status  - Assess for changes in mentation and behavior  - Position to facilitate oxygenation and minimize respiratory effort  - Oxygen supplementation based on oxygen saturation and self care with guidance from PT/OT  - Encourage visually impaired, hearing impaired and aphasic patients to use assistive/communication devices  Outcome: Progressing     Problem: Impaired Swallowing  Goal: Minimize aspiration risk  Description  Interve

## 2020-01-03 NOTE — PROGRESS NOTES
[facility]  Nephrology Progress Note    Matthew Albrecht Patient Status:  Inpatient    3/31/1945 MRN [id number]   [facility] 4SW-A Attending Aaron Jessica MD   [facility] Day # 44 PCP Deja Dickey MD       SUBJECTIVE:    Doing andra mL, 50 mL, Intravenous, Q15 Min PRN    Or  glucose (DEX4) oral liquid 30 g, 30 g, Oral, Q15 Min PRN    Or  Glucose-Vitamin C (DEX-4) chewable tab 8 tablet, 8 tablet, Oral, Q15 Min PRN  Pantoprazole Sodium (PROTONIX) 40 mg in Sodium Chloride 0.9 % 10 mL IV generalized edema  Neurologic: moving all extremities  Skin: Warm and dry, no rash  Recent Labs     01/01/20  0643 01/02/20  0607 01/03/20  0526   WBC 11.6* 12.1* 12.6*   HGB 9.5* 9.6* 9.7*   .7* 111.0* 109.2*   .0 261.0 219.0   BAND 6 1 2

## 2020-01-03 NOTE — PROGRESS NOTES
Pt. Was seen bedside this afternoon. He has the [facility] placement a bit earlier today and was lethargic. Dialysis had also just been initiated. We spoke briefly. He appeared better oriented. We agreed to follow up at a later time.

## 2020-01-04 VITALS
BODY MASS INDEX: 31.55 KG/M2 | TEMPERATURE: 98 F | SYSTOLIC BLOOD PRESSURE: 106 MMHG | WEIGHT: 213 LBS | OXYGEN SATURATION: 93 % | DIASTOLIC BLOOD PRESSURE: 58 MMHG | RESPIRATION RATE: 23 BRPM | HEIGHT: 69 IN | HEART RATE: 102 BPM

## 2020-01-04 LAB
ANION GAP SERPL CALC-SCNC: 9 MMOL/L (ref 0–18)
BASOPHILS # BLD: 0.11 X10(3) UL (ref 0–0.2)
BASOPHILS NFR BLD: 1 %
BUN BLD-MCNC: 26 MG/DL (ref 7–18)
BUN/CREAT SERPL: 5.9 (ref 10–20)
CALCIUM BLD-MCNC: 9.9 MG/DL (ref 8.5–10.1)
CHLORIDE SERPL-SCNC: 102 MMOL/L (ref 98–112)
CO2 SERPL-SCNC: 26 MMOL/L (ref 21–32)
CREAT BLD-MCNC: 4.41 MG/DL (ref 0.7–1.3)
DEPRECATED RDW RBC AUTO: 63.8 FL (ref 35.1–46.3)
EOSINOPHIL # BLD: 0.57 X10(3) UL (ref 0–0.7)
EOSINOPHIL NFR BLD: 5 %
ERYTHROCYTE [DISTWIDTH] IN BLOOD BY AUTOMATED COUNT: 15.9 % (ref 11–15)
GLUCOSE BLD-MCNC: 178 MG/DL (ref 70–99)
GLUCOSE BLD-MCNC: 180 MG/DL (ref 70–99)
GLUCOSE BLD-MCNC: 180 MG/DL (ref 70–99)
GLUCOSE BLD-MCNC: 190 MG/DL (ref 70–99)
HAV IGM SER QL: 1.9 MG/DL (ref 1.6–2.6)
HCT VFR BLD AUTO: 31.4 % (ref 39–53)
HGB BLD-MCNC: 9.5 G/DL (ref 13–17.5)
LYMPHOCYTES NFR BLD: 1.58 X10(3) UL (ref 1–4)
LYMPHOCYTES NFR BLD: 14 %
MCH RBC QN AUTO: 32.9 PG (ref 26–34)
MCHC RBC AUTO-ENTMCNC: 30.3 G/DL (ref 31–37)
MCV RBC AUTO: 108.7 FL (ref 80–100)
METAMYELOCYTES # BLD: 0.23 X10(3) UL
METAMYELOCYTES NFR BLD: 2 %
MONOCYTES # BLD: 1.24 X10(3) UL (ref 0.1–1)
MONOCYTES NFR BLD: 11 %
MORPHOLOGY: NORMAL
MYELOCYTES # BLD: 0.45 X10(3) UL
MYELOCYTES NFR BLD: 4 %
NEUTROPHILS # BLD AUTO: 6.37 X10 (3) UL (ref 1.5–7.7)
NEUTROPHILS NFR BLD: 59 %
NEUTS BAND NFR BLD: 4 %
NEUTS HYPERSEG # BLD: 7.12 X10(3) UL (ref 1.5–7.7)
NRBC BLD MANUAL-RTO: 1 %
OSMOLALITY SERPL CALC.SUM OF ELEC: 293 MOSM/KG (ref 275–295)
PLATELET # BLD AUTO: 219 10(3)UL (ref 150–450)
PLATELET MORPHOLOGY: NORMAL
POTASSIUM SERPL-SCNC: 3.5 MMOL/L (ref 3.5–5.1)
RBC # BLD AUTO: 2.89 X10(6)UL (ref 3.8–5.8)
SODIUM SERPL-SCNC: 137 MMOL/L (ref 136–145)
TOTAL CELLS COUNTED: 100
WBC # BLD AUTO: 11.3 X10(3) UL (ref 4–11)

## 2020-01-04 PROCEDURE — 90935 HEMODIALYSIS ONE EVALUATION: CPT | Performed by: INTERNAL MEDICINE

## 2020-01-04 PROCEDURE — 99239 HOSP IP/OBS DSCHRG MGMT >30: CPT | Performed by: HOSPITALIST

## 2020-01-04 RX ORDER — POLYETHYLENE GLYCOL 3350 17 G/17G
17 POWDER, FOR SOLUTION ORAL DAILY
Status: DISCONTINUED | OUTPATIENT
Start: 2020-01-04 | End: 2020-01-04

## 2020-01-04 NOTE — PLAN OF CARE
A/O x4, drowsy post procedure. on 1L O2, NSR per tele. C/o mild shoulder discomfort. Controlled with tylenol. Minimal urine output, dark urine. Incontinent. permacath site c/d/I. Tubes feeds advanced to 40 mL/hr.  To be advanced further at 1130 pm. No r

## 2020-01-04 NOTE — PROGRESS NOTES
Pt. Was seen bedside this morning. He was awake alert. His mood was dysphoric but improved since last week. He stated that he was feeling some anxiety, but this too seems to have improved.  He was oriented to person, place and circumstance and there was no

## 2020-01-04 NOTE — CM/SW NOTE
MSW left a message for [facility] to arrange for discharge today and requesting a time and bed assignment. Awaiting response.     FELIX LazaroW

## 2020-01-04 NOTE — PLAN OF CARE
Assumed care @ 0700. Pt a/o x3-4, forgetful. VSS. Tele SR. No acute respiratory distress noted. Denies any pain. Pt rested in bed. (-) BM. Pt tolerating TF well. TF running at 55 mL/hr @ goal.  HD this AM, 2L off. Plan to DC to [facility] today after HD. Discharge     Problem: GASTROINTESTINAL - ADULT  Goal: Minimal or absence of nausea and vomiting  Description  INTERVENTIONS:  - Maintain adequate hydration with IV or PO as ordered and tolerated  - Nasogastric tube to low intermittent suction as ordered Implement non-pharmacological measures as appropriate and evaluate response  - Consider cultural and social influences on pain and pain management  - Manage/alleviate anxiety  - Utilize distraction and/or relaxation techniques  - Monitor for opioid side ef precautions during self-care     Outcome: Adequate for Discharge     Problem: RESPIRATORY - ADULT  Goal: Achieves optimal ventilation and oxygenation  Description  INTERVENTIONS:  - Assess for changes in respiratory status  - Assess for changes in [facility] care  Description  INTERVENTIONS  - Monitor swallowing and airway patency with patient fatigue and changes in neurological status  - Encourage and assist patient to increase activity and self care with guidance from PT/OT  - Encourage visually impaired, he ADULT  Goal: Absence of fever/infection during anticipated neutropenic period  Description  INTERVENTIONS  - Monitor WBC  - Administer growth factors as ordered  - Implement neutropenic guidelines  Outcome: Adequate for Discharge     Problem: DISCHARGE URBANO

## 2020-01-04 NOTE — CM/SW NOTE
MSW spoke with Ashlyn Robertson at [facility]. They are expecting the patient today. MSW arranged [facility] for 2pm discharge. PCS form completed and tubed to unit.     [place]:[phone]  Edward: [address] [place]

## 2020-01-04 NOTE — PROGRESS NOTES
[facility]  Progress Note     Fe Herrera Patient Status:  Inpatient    3/31/1945 MRN [id number]   [facility] 4SW-A Attending Dr. Saul Campos Day # 36 PCP Emil Sharp MD     Chief Complaint: Abdominal distention    S: interval not displayed. Estimated Creatinine Clearance: 15.1 mL/min (A) (based on SCr of 4.41 mg/dL (H)). No results for input(s): PTP, INR in the last 168 hours. No results for input(s): TROP, CK in the last 168 hours.          Imaging: Imaging lactulose and f/u ammonia was <10  2. Avoid benzos/narcotics  3. cymbalta per psych w/ seroquel prn, abilify stopped as delirium worsened that day  4. psych recommending trial buprenorphine but patient doing well right now- will hold   16.  Fall 2/2 above-

## 2020-01-04 NOTE — CM/SW NOTE
MSW was informed by [facility] that they are delayed by an hour due to multiple emergencies. MSW informed RN and [facility].     Antonino Mcclure LCSW

## 2020-01-04 NOTE — PLAN OF CARE
Assumed pt care @ 1930. Pt resting in bed, no acute distress observed. .     TF via [address] tube, getting Jevity 1.5, will meet goal rate tonight. Called [facility] dialysis for HD tomorrow.      Has triple lumen picc to right upper arm, has permacath to rig Progressing     Problem: GASTROINTESTINAL - ADULT  Goal: Minimal or absence of nausea and vomiting  Description  INTERVENTIONS:  - Maintain adequate hydration with IV or PO as ordered and tolerated  - Nasogastric tube to low intermittent suction as ordered assist for safety with use of gait belt  - Recommend up to chair for all meals   Outcome: Progressing     Problem: Impaired Activities of Daily Living  Goal: Achieve highest/safest level of independence in self care  Description  Interventions:  - Assess a status  Description  INTERVENTIONS  - Assess for and report changes in neurological status  - Initiate measures to prevent increased intracranial pressure  - Maintain blood pressure and fluid volume within ordered parameters to optimize cerebral perfusion severity of pain and evaluate response  - Implement non-pharmacological measures as appropriate and evaluate response  - Consider cultural and social influences on pain and pain management  - Manage/alleviate anxiety  - Utilize distraction and/or relaxatio be responsible for managing their own health  - Refer to Case Management Department for coordinating discharge planning if the patient needs post-hospital services based on physician/LIP order or complex needs related to functional status, cognitive abilit

## 2020-01-04 NOTE — PROGRESS NOTES
[facility]  Nephrology Progress Note    Sabas South Patient Status:  Inpatient    3/31/1945 MRN [id number]   [facility] 4SW-A Attending Andres Medrano MD   [address] Day # 36 PCP Scott Denise MD       SUBJECTIVE:  Seen on HD to mL, 3 mL, Nebulization, Q4H PRN  glucose (DEX4) oral liquid 15 g, 15 g, Oral, Q15 Min PRN    Or  Glucose-Vitamin C (DEX-4) chewable tab 4 tablet, 4 tablet, Oral, Q15 Min PRN    Or  dextrose 50 % injection 50 mL, 50 mL, Intravenous, Q15 Min PRN    Or  gluco S1, S2 normal, no murmur or rub  Lungs: decr BS B R>L  Abdomen: Soft, ND  Extremities  generalized edema  Neurologic: moving all extremities  Skin: Warm and dry, no rash    Recent Labs     01/02/20  0607 01/03/20  0526 01/04/20  0451   WBC 12.1* 12.6* 11.3

## 2020-01-04 NOTE — CM/SW NOTE
MSW left another message for [facility] and sent a message via [facility] requesting return call for patient's discharge today. MSW also attempted to call Liaison Simran with no response.     Anali Walls LCSW

## 2020-01-04 NOTE — PROGRESS NOTES
Gastroenterology Progress Note  Patient Name: Kuldeep Justice  Chief Complaint: gallstone/Etoh pancreatitis, pseudocysts  S: No acute complaints overnight. NJ in place. TF at goal and tolerating. Unclear when last BM was.    O: /58 (BP Location: L now with pseudocysts and ?necrosis  2. Fluid overload/SOB   Plan:   1. S/p Meropenem course  per ID recs  2.   Would continue conservative management for now; CT done 12/17 with fluid collections; MR done 12/23 with improvement but incomplete imaging study;

## 2020-01-05 NOTE — PROGRESS NOTES
Pt discharge to [facility], per MD order. Discharge instructions, medication education, prescription,   and follow up information reviewed with  patient and spouse  patient and spouse verbalized understandings. Pt left with all pt belongings.   Pt

## 2020-01-29 RX ORDER — ALLOPURINOL 100 MG/1
TABLET ORAL
Qty: 90 TABLET | Refills: 0 | Status: SHIPPED | OUTPATIENT
Start: 2020-01-29 | End: 2020-03-09

## 2020-03-09 ENCOUNTER — OFFICE VISIT (OUTPATIENT)
Dept: FAMILY MEDICINE CLINIC | Facility: CLINIC | Age: 75
End: 2020-03-09
Payer: MEDICARE

## 2020-03-09 VITALS
BODY MASS INDEX: 27.99 KG/M2 | DIASTOLIC BLOOD PRESSURE: 58 MMHG | SYSTOLIC BLOOD PRESSURE: 104 MMHG | HEIGHT: 69 IN | WEIGHT: 189 LBS | TEMPERATURE: 97 F | RESPIRATION RATE: 16 BRPM | HEART RATE: 74 BPM

## 2020-03-09 DIAGNOSIS — F33.1 MAJOR DEPRESSIVE DISORDER, RECURRENT EPISODE, MODERATE (HCC): ICD-10-CM

## 2020-03-09 DIAGNOSIS — K21.9 GASTROESOPHAGEAL REFLUX DISEASE WITHOUT ESOPHAGITIS: ICD-10-CM

## 2020-03-09 DIAGNOSIS — G90.3 NEUROGENIC ORTHOSTATIC HYPOTENSION (HCC): ICD-10-CM

## 2020-03-09 DIAGNOSIS — K85.12 ACUTE BILIARY PANCREATITIS WITH INFECTED NECROSIS: Primary | ICD-10-CM

## 2020-03-09 DIAGNOSIS — R53.81 PHYSICAL DECONDITIONING: ICD-10-CM

## 2020-03-09 DIAGNOSIS — Z09 RESOLVED CONDITION, FOLLOW-UP: ICD-10-CM

## 2020-03-09 DIAGNOSIS — F11.21 OPIOID DEPENDENCE, IN REMISSION (HCC): ICD-10-CM

## 2020-03-09 DIAGNOSIS — E03.2 HYPOTHYROIDISM DUE TO MEDICATION: ICD-10-CM

## 2020-03-09 DIAGNOSIS — I48.0 PAROXYSMAL ATRIAL FIBRILLATION (HCC): ICD-10-CM

## 2020-03-09 DIAGNOSIS — M10.9 GOUTY ARTHROPATHY: ICD-10-CM

## 2020-03-09 DIAGNOSIS — E78.00 PURE HYPERCHOLESTEROLEMIA: ICD-10-CM

## 2020-03-09 DIAGNOSIS — K81.1 CHOLECYSTITIS, CHRONIC: ICD-10-CM

## 2020-03-09 DIAGNOSIS — M54.12 LEFT CERVICAL RADICULOPATHY: ICD-10-CM

## 2020-03-09 DIAGNOSIS — T85.518S CHOLECYSTOSTOMY TUBE DYSFUNCTION, SEQUELA: ICD-10-CM

## 2020-03-09 PROCEDURE — 99215 OFFICE O/P EST HI 40 MIN: CPT | Performed by: FAMILY MEDICINE

## 2020-03-09 PROCEDURE — 1111F DSCHRG MED/CURRENT MED MERGE: CPT | Performed by: FAMILY MEDICINE

## 2020-03-09 RX ORDER — LEVOTHYROXINE SODIUM 0.07 MG/1
75 TABLET ORAL DAILY
COMMUNITY
Start: 2020-03-08 | End: 2020-03-09

## 2020-03-09 RX ORDER — FAMOTIDINE 20 MG/1
20 TABLET ORAL 2 TIMES DAILY
COMMUNITY
Start: 2020-03-08 | End: 2020-03-09

## 2020-03-09 RX ORDER — ROSUVASTATIN CALCIUM 20 MG/1
20 TABLET, COATED ORAL NIGHTLY
COMMUNITY
Start: 2020-03-07 | End: 2020-03-09

## 2020-03-09 RX ORDER — HYDROCODONE BITARTRATE AND ACETAMINOPHEN 5; 325 MG/1; MG/1
1 TABLET ORAL EVERY 4 HOURS PRN
Refills: 0 | Status: CANCELLED | OUTPATIENT
Start: 2020-03-09

## 2020-03-09 RX ORDER — HYDROCODONE BITARTRATE AND ACETAMINOPHEN 5; 325 MG/1; MG/1
1 TABLET ORAL
COMMUNITY
Start: 2020-03-07 | End: 2020-03-12

## 2020-03-09 RX ORDER — MIRTAZAPINE 15 MG/1
15 TABLET, FILM COATED ORAL NIGHTLY
COMMUNITY
Start: 2020-03-08 | End: 2020-03-09

## 2020-03-09 RX ORDER — DULOXETIN HYDROCHLORIDE 60 MG/1
60 CAPSULE, DELAYED RELEASE ORAL 2 TIMES DAILY
COMMUNITY
Start: 2020-03-08 | End: 2020-03-09

## 2020-03-09 RX ORDER — LIDOCAINE 4 G/G
1 PATCH TOPICAL DAILY
COMMUNITY
Start: 2020-03-08 | End: 2020-05-15 | Stop reason: ALTCHOICE

## 2020-03-09 RX ORDER — MELATONIN
3 EVERY EVENING
COMMUNITY
Start: 2020-03-08 | End: 2020-08-17 | Stop reason: ALTCHOICE

## 2020-03-09 NOTE — PROGRESS NOTES
Abran Hashimoto is a 69year old male. HPI:   Patient is a 72-year-old male who was recently released from [facility], [address] and [facility]. Patient is a 72-year-old male presented to the emergency department of [facility] on 11/25/20 is chronic. Current Outpatient Medications   Medication Sig Dispense Refill   • DULoxetine HCl 60 MG Oral Cap DR Particles Take 1 capsule (60 mg total) by mouth 2 (two) times daily.  180 capsule 0   • famoTIDine 20 MG Oral Tab Take 1 tablet (20 mg total) b quittin.2      Smokeless tobacco: Never Used    Alcohol use: Not Currently    Drug use: No       REVIEW OF SYSTEMS:   GENERAL HEALTH: feels well otherwise  SKIN: denies any unusual skin lesions or rashes  RESPIRATORY: denies shortness of breath with e mouth 2 (two) times daily. Dispense: 180 tablet; Refill: 0  - CBC WITH DIFFERENTIAL WITH PLATELET; Future    10. Gouty arthropathy  - COMP METABOLIC PANEL (14); Future  - URIC ACID, SERUM; Future    11.  Major depressive disorder, recurrent episode, modera

## 2020-03-12 ENCOUNTER — TELEPHONE (OUTPATIENT)
Dept: FAMILY MEDICINE CLINIC | Facility: CLINIC | Age: 75
End: 2020-03-12

## 2020-03-12 RX ORDER — MIRTAZAPINE 15 MG/1
15 TABLET, FILM COATED ORAL NIGHTLY
Qty: 90 TABLET | Refills: 0 | Status: SHIPPED | OUTPATIENT
Start: 2020-03-12 | End: 2020-06-22

## 2020-03-12 RX ORDER — MIDODRINE HYDROCHLORIDE 10 MG/1
10 TABLET ORAL 3 TIMES DAILY
Qty: 270 TABLET | Refills: 0 | Status: SHIPPED | OUTPATIENT
Start: 2020-03-12 | End: 2020-05-15 | Stop reason: ALTCHOICE

## 2020-03-12 RX ORDER — FAMOTIDINE 20 MG/1
20 TABLET ORAL 2 TIMES DAILY
Qty: 180 TABLET | Refills: 0 | Status: SHIPPED | OUTPATIENT
Start: 2020-03-12 | End: 2020-06-11

## 2020-03-12 RX ORDER — ROSUVASTATIN CALCIUM 20 MG/1
20 TABLET, COATED ORAL NIGHTLY
Qty: 90 TABLET | Refills: 0 | Status: SHIPPED | OUTPATIENT
Start: 2020-03-12

## 2020-03-12 RX ORDER — LATANOPROST 50 UG/ML
1 SOLUTION/ DROPS OPHTHALMIC NIGHTLY
Qty: 3 BOTTLE | Refills: 10 | Status: SHIPPED | OUTPATIENT
Start: 2020-03-12

## 2020-03-12 RX ORDER — DULOXETIN HYDROCHLORIDE 60 MG/1
60 CAPSULE, DELAYED RELEASE ORAL 2 TIMES DAILY
Qty: 180 CAPSULE | Refills: 0 | Status: SHIPPED | OUTPATIENT
Start: 2020-03-12 | End: 2020-06-11

## 2020-03-12 RX ORDER — HYDROCODONE BITARTRATE AND ACETAMINOPHEN 5; 325 MG/1; MG/1
1 TABLET ORAL EVERY 4 HOURS PRN
Qty: 30 TABLET | Refills: 0 | Status: SHIPPED | OUTPATIENT
Start: 2020-03-12 | End: 2020-03-24

## 2020-03-12 RX ORDER — LEVOTHYROXINE SODIUM 0.07 MG/1
75 TABLET ORAL DAILY
Qty: 90 TABLET | Refills: 0 | Status: SHIPPED | OUTPATIENT
Start: 2020-03-12 | End: 2020-06-11

## 2020-03-12 NOTE — TELEPHONE ENCOUNTER
Pt's wife called and stated some refills from the hospital were needed and had been discussed at his HFU. And she stated  would approve Norco for him.      Norco 1 tablet every 4 hrs as needed    DULoxetine HCl 60 MG Oral Cap DR Particles   3/8/2020

## 2020-03-12 NOTE — TELEPHONE ENCOUNTER
Dr Bessy Jacob, see below. These were all pended at his ov that day. Can you please sign from that encounter? Thanks.

## 2020-03-12 NOTE — TELEPHONE ENCOUNTER
Patient signed medical records release form to request records from [facility] in [facility] for all records in 2020. Release faxed to [phone] on 3/12/20.

## 2020-03-13 ENCOUNTER — MED REC SCAN ONLY (OUTPATIENT)
Dept: FAMILY MEDICINE CLINIC | Facility: CLINIC | Age: 75
End: 2020-03-13

## 2020-03-15 ENCOUNTER — HOSPITAL ENCOUNTER (EMERGENCY)
Facility: HOSPITAL | Age: 75
Discharge: HOME | End: 2020-03-15
Attending: EMERGENCY MEDICINE
Payer: MEDICARE

## 2020-03-15 VITALS
OXYGEN SATURATION: 97 % | TEMPERATURE: 98 F | SYSTOLIC BLOOD PRESSURE: 122 MMHG | WEIGHT: 189 LBS | HEART RATE: 79 BPM | DIASTOLIC BLOOD PRESSURE: 76 MMHG | BODY MASS INDEX: 28 KG/M2 | RESPIRATION RATE: 19 BRPM

## 2020-03-15 DIAGNOSIS — R19.7 DIARRHEA OF PRESUMED INFECTIOUS ORIGIN: Primary | ICD-10-CM

## 2020-03-15 LAB
ALBUMIN SERPL-MCNC: 2.8 G/DL (ref 3.4–5)
ALBUMIN/GLOB SERPL: 0.7 {RATIO} (ref 1–2)
ALP LIVER SERPL-CCNC: 134 U/L (ref 45–117)
ALT SERPL-CCNC: 18 U/L (ref 16–61)
ANION GAP SERPL CALC-SCNC: 10 MMOL/L (ref 0–18)
AST SERPL-CCNC: 21 U/L (ref 15–37)
BASOPHILS # BLD AUTO: 0.09 X10(3) UL (ref 0–0.2)
BASOPHILS NFR BLD AUTO: 0.5 %
BILIRUB SERPL-MCNC: 0.8 MG/DL (ref 0.1–2)
BUN BLD-MCNC: 15 MG/DL (ref 7–18)
BUN/CREAT SERPL: 8.1 (ref 10–20)
C DIFF TOX B STL QL: POSITIVE
CALCIUM BLD-MCNC: 10 MG/DL (ref 8.5–10.1)
CHLORIDE SERPL-SCNC: 103 MMOL/L (ref 98–112)
CO2 SERPL-SCNC: 21 MMOL/L (ref 21–32)
CREAT BLD-MCNC: 1.85 MG/DL (ref 0.7–1.3)
DEPRECATED RDW RBC AUTO: 51.2 FL (ref 35.1–46.3)
EOSINOPHIL # BLD AUTO: 0.1 X10(3) UL (ref 0–0.7)
EOSINOPHIL NFR BLD AUTO: 0.6 %
ERYTHROCYTE [DISTWIDTH] IN BLOOD BY AUTOMATED COUNT: 15.9 % (ref 11–15)
GLOBULIN PLAS-MCNC: 4.2 G/DL (ref 2.8–4.4)
GLUCOSE BLD-MCNC: 87 MG/DL (ref 70–99)
HCT VFR BLD AUTO: 35.4 % (ref 39–53)
HGB BLD-MCNC: 11.4 G/DL (ref 13–17.5)
IMM GRANULOCYTES # BLD AUTO: 0.05 X10(3) UL (ref 0–1)
IMM GRANULOCYTES NFR BLD: 0.3 %
LIPASE SERPL-CCNC: 17 U/L (ref 73–393)
LYMPHOCYTES # BLD AUTO: 1.16 X10(3) UL (ref 1–4)
LYMPHOCYTES NFR BLD AUTO: 7.1 %
M PROTEIN MFR SERPL ELPH: 7 G/DL (ref 6.4–8.2)
MCH RBC QN AUTO: 28.4 PG (ref 26–34)
MCHC RBC AUTO-ENTMCNC: 32.2 G/DL (ref 31–37)
MCV RBC AUTO: 88.1 FL (ref 80–100)
MONOCYTES # BLD AUTO: 1.71 X10(3) UL (ref 0.1–1)
MONOCYTES NFR BLD AUTO: 10.4 %
NEUTROPHILS # BLD AUTO: 13.31 X10 (3) UL (ref 1.5–7.7)
NEUTROPHILS # BLD AUTO: 13.31 X10(3) UL (ref 1.5–7.7)
NEUTROPHILS NFR BLD AUTO: 81.1 %
OSMOLALITY SERPL CALC.SUM OF ELEC: 278 MOSM/KG (ref 275–295)
PLATELET # BLD AUTO: 329 10(3)UL (ref 150–450)
POTASSIUM SERPL-SCNC: 3.7 MMOL/L (ref 3.5–5.1)
RBC # BLD AUTO: 4.02 X10(6)UL (ref 3.8–5.8)
SODIUM SERPL-SCNC: 134 MMOL/L (ref 136–145)
WBC # BLD AUTO: 16.4 X10(3) UL (ref 4–11)

## 2020-03-15 PROCEDURE — 83690 ASSAY OF LIPASE: CPT | Performed by: EMERGENCY MEDICINE

## 2020-03-15 PROCEDURE — 96360 HYDRATION IV INFUSION INIT: CPT | Performed by: EMERGENCY MEDICINE

## 2020-03-15 PROCEDURE — 96361 HYDRATE IV INFUSION ADD-ON: CPT | Performed by: EMERGENCY MEDICINE

## 2020-03-15 PROCEDURE — 99284 EMERGENCY DEPT VISIT MOD MDM: CPT | Performed by: EMERGENCY MEDICINE

## 2020-03-15 PROCEDURE — 80053 COMPREHEN METABOLIC PANEL: CPT | Performed by: EMERGENCY MEDICINE

## 2020-03-15 PROCEDURE — 87493 C DIFF AMPLIFIED PROBE: CPT | Performed by: EMERGENCY MEDICINE

## 2020-03-15 PROCEDURE — 85025 COMPLETE CBC W/AUTO DIFF WBC: CPT | Performed by: EMERGENCY MEDICINE

## 2020-03-15 RX ORDER — SODIUM CHLORIDE 9 MG/ML
125 INJECTION, SOLUTION INTRAVENOUS CONTINUOUS
Status: DISCONTINUED | OUTPATIENT
Start: 2020-03-15 | End: 2020-03-15

## 2020-03-15 RX ORDER — VANCOMYCIN HYDROCHLORIDE 250 MG/1
250 CAPSULE ORAL 4 TIMES DAILY
Qty: 56 CAPSULE | Refills: 0 | Status: SHIPPED | OUTPATIENT
Start: 2020-03-15 | End: 2020-03-29

## 2020-03-15 NOTE — ED INITIAL ASSESSMENT (HPI)
Per patient, C-diff like stools have returned after 2 weeks of Vanco treatment stopping the diarrhea. Patient denies pain, nausea or vomiting. AAO x 4.

## 2020-03-15 NOTE — ED PROVIDER NOTES
Patient Seen in: [facility] Emergency Department      History   Patient presents with:  Nausea/Vomiting/Diarrhea    Stated Complaint: Discharged after spending 105 days being admitted to three different hospitals. *    HPI    Patient presents to the Social History    Tobacco Use      Smoking status: Former Smoker        Packs/day: 0.50        Years: 2.00        Pack years: 1        Types: Cigarettes        Quit date: 1969        Years since quittin.2      Smokeless tobacco: light.   Neck:      Musculoskeletal: Normal range of motion and neck supple. Vascular: No JVD. Cardiovascular:      Rate and Rhythm: Normal rate and regular rhythm. Heart sounds: Normal heart sounds. No murmur. No friction rub. No gallop.     Pu limits   LIPASE - Abnormal; Notable for the following components:    Lipase 17 (*)     All other components within normal limits   CBC W/ DIFFERENTIAL - Abnormal; Notable for the following components:    WBC 16.4 (*)     HGB 11.4 (*)     HCT 35.4 (*)     R his wife they have expressed understanding and will follow-up there.   Patient much improved in the emergency department stable for discharge home              Disposition and Plan     Clinical Impression:  Diarrhea of presumed infectious origin  (primary e

## 2020-03-17 ENCOUNTER — TELEPHONE (OUTPATIENT)
Dept: FAMILY MEDICINE CLINIC | Facility: CLINIC | Age: 75
End: 2020-03-17

## 2020-03-17 NOTE — TELEPHONE ENCOUNTER
Pt's wife wants to know when Pt will be cleared to go to [facility] for his rehabilitation.

## 2020-03-17 NOTE — TELEPHONE ENCOUNTER
Record shows [facility] admit 2/24/2020 and ER visit 3/15/2020 w dx: diarrhea of presumed infectious origin-C.  Diff positive 3/15/2020  Call back to livier/wife-listed on [facility] consent-sts pt\" seen in [facility] 3/15/2020 w diarrhea and what was presumed

## 2020-03-24 ENCOUNTER — PATIENT OUTREACH (OUTPATIENT)
Dept: INFECTIOUS DISEASE | Facility: CLINIC | Age: 75
End: 2020-03-24

## 2020-03-24 DIAGNOSIS — M54.12 LEFT CERVICAL RADICULOPATHY: ICD-10-CM

## 2020-03-24 RX ORDER — HYDROCODONE BITARTRATE AND ACETAMINOPHEN 5; 325 MG/1; MG/1
1 TABLET ORAL EVERY 4 HOURS PRN
Qty: 30 TABLET | Refills: 0 | Status: SHIPPED | OUTPATIENT
Start: 2020-03-24 | End: 2020-04-02

## 2020-03-24 NOTE — PROGRESS NOTES
Recurrent cdiff, no diarrhea on po vanco, complete 2 weeks 4x/day, then bid x 4 weeks.  If diarrhea recurrs call for treatement or possible repeat testing

## 2020-03-24 NOTE — TELEPHONE ENCOUNTER
Called patient. He is following up with Dr. Junior Shaffer today. Does not need his apt with Dr. Cesario Mcmillaner - OK to cancel. Patient did ask about the [place] and said that he was given enough for 10 days and he is beginning to run out. Wants to know if he can get a refill?

## 2020-03-27 DIAGNOSIS — M54.12 LEFT CERVICAL RADICULOPATHY: ICD-10-CM

## 2020-03-27 RX ORDER — TAMSULOSIN HYDROCHLORIDE 0.4 MG/1
0.4 CAPSULE ORAL DAILY
Qty: 30 CAPSULE | Refills: 2 | Status: SHIPPED | OUTPATIENT
Start: 2020-03-27 | End: 2020-04-26

## 2020-03-27 NOTE — TELEPHONE ENCOUNTER
See note below, pt c/o weak stream now, denies pain, states he has seen urology before for kidney stones over 5 yrs ago and wants given flomax short term. Denies flank pain or fever, please advise.

## 2020-03-27 NOTE — TELEPHONE ENCOUNTER
Pt called asking for refill of flomax. He states he has not has a stream in a week or two. He has the urge to go but only dribbles. He has been in the hospital for two months.  placed pt on hold to ask DR CRUZ what he would like to do and call was lost attempt

## 2020-04-02 ENCOUNTER — MED REC SCAN ONLY (OUTPATIENT)
Dept: FAMILY MEDICINE CLINIC | Facility: CLINIC | Age: 75
End: 2020-04-02

## 2020-04-02 RX ORDER — HYDROCODONE BITARTRATE AND ACETAMINOPHEN 5; 325 MG/1; MG/1
1 TABLET ORAL EVERY 4 HOURS PRN
Qty: 30 TABLET | Refills: 0 | Status: SHIPPED | OUTPATIENT
Start: 2020-04-02 | End: 2020-04-13

## 2020-04-02 NOTE — TELEPHONE ENCOUNTER
Pt calling with update. He states Flomax is working very well.  He would also like refill of HYDROcodone-acetaminophen 5-325 MG Oral Tab sent to [address] [phone], [phone] he would like to know if he ca

## 2020-04-08 RX ORDER — FOLIC ACID/VIT B COMPLEX AND C 0.8 MG
TABLET ORAL
Qty: 30 TABLET | Refills: 2 | Status: SHIPPED | OUTPATIENT
Start: 2020-04-08 | End: 2020-07-06

## 2020-04-13 DIAGNOSIS — M54.12 LEFT CERVICAL RADICULOPATHY: ICD-10-CM

## 2020-04-13 RX ORDER — HYDROCODONE BITARTRATE AND ACETAMINOPHEN 5; 325 MG/1; MG/1
1 TABLET ORAL EVERY 4 HOURS PRN
Qty: 60 TABLET | Refills: 0 | Status: SHIPPED | OUTPATIENT
Start: 2020-04-13 | End: 2020-04-27

## 2020-04-13 NOTE — TELEPHONE ENCOUNTER
Pt would like refill of HYDROcodone-acetaminophen 5-325 MG Oral Tab sent to [address] [phone], [phone] he would like to know if he can get more so he does not have to go out as often. Please advise.  Sulema Barthel

## 2020-04-27 DIAGNOSIS — M54.12 LEFT CERVICAL RADICULOPATHY: ICD-10-CM

## 2020-04-27 RX ORDER — HYDROCODONE BITARTRATE AND ACETAMINOPHEN 5; 325 MG/1; MG/1
1 TABLET ORAL EVERY 6 HOURS PRN
Qty: 60 TABLET | Refills: 0 | Status: SHIPPED | OUTPATIENT
Start: 2020-04-27 | End: 2020-05-12

## 2020-04-27 NOTE — TELEPHONE ENCOUNTER
Pt requested refill of   HYDROcodone-acetaminophen 5-325 MG Oral Tab 60 tablet     To be sent to:  [place] [id number] [address] [phone], [phone]. Thank you.

## 2020-05-04 ENCOUNTER — OFFICE VISIT (OUTPATIENT)
Dept: SURGERY | Age: 75
End: 2020-05-04

## 2020-05-04 DIAGNOSIS — K81.9 CHOLECYSTITIS: Primary | ICD-10-CM

## 2020-05-04 DIAGNOSIS — K85.12 ACUTE BILIARY PANCREATITIS WITH INFECTED NECROSIS: ICD-10-CM

## 2020-05-04 PROCEDURE — 99442 TELEPHONE E&M BY PHYSICIAN EST PT NOT ORIG PREV 7 DAYS 11-20 MIN: CPT | Performed by: SURGERY

## 2020-05-04 RX ORDER — FOLIC ACID/VIT B COMPLEX AND C 0.8 MG
1 TABLET ORAL DAILY
COMMUNITY
Start: 2020-04-08

## 2020-05-04 RX ORDER — LEVOTHYROXINE SODIUM 0.07 MG/1
75 TABLET ORAL DAILY
COMMUNITY
Start: 2020-03-08

## 2020-05-04 RX ORDER — MIRTAZAPINE 15 MG/1
15 TABLET, FILM COATED ORAL NIGHTLY
COMMUNITY
Start: 2020-03-08

## 2020-05-04 RX ORDER — LATANOPROST 50 UG/ML
1 SOLUTION/ DROPS OPHTHALMIC AT BEDTIME
COMMUNITY
Start: 2020-03-08

## 2020-05-04 RX ORDER — VANCOMYCIN HYDROCHLORIDE 125 MG/1
125 CAPSULE ORAL 2 TIMES DAILY
COMMUNITY
Start: 2020-03-30

## 2020-05-04 RX ORDER — DULOXETIN HYDROCHLORIDE 60 MG/1
60 CAPSULE, DELAYED RELEASE ORAL 2 TIMES DAILY
COMMUNITY
Start: 2020-03-08

## 2020-05-04 RX ORDER — TAMSULOSIN HYDROCHLORIDE 0.4 MG/1
0.4 CAPSULE ORAL DAILY
COMMUNITY
Start: 2020-03-27

## 2020-05-04 RX ORDER — LANOLIN ALCOHOL/MO/W.PET/CERES
3 CREAM (GRAM) TOPICAL NIGHTLY
COMMUNITY
Start: 2020-03-08

## 2020-05-04 RX ORDER — FAMOTIDINE 20 MG/1
20 TABLET, FILM COATED ORAL 2 TIMES DAILY
COMMUNITY
Start: 2020-03-30

## 2020-05-04 SDOH — HEALTH STABILITY: MENTAL HEALTH: HOW OFTEN DO YOU HAVE A DRINK CONTAINING ALCOHOL?: NEVER

## 2020-05-04 ASSESSMENT — PAIN SCALES - GENERAL: PAINLEVEL: 0

## 2020-05-07 ENCOUNTER — TELEPHONE (OUTPATIENT)
Dept: SURGERY | Age: 75
End: 2020-05-07

## 2020-05-07 ENCOUNTER — TELEPHONE (OUTPATIENT)
Dept: FAMILY MEDICINE CLINIC | Facility: CLINIC | Age: 75
End: 2020-05-07

## 2020-05-07 DIAGNOSIS — K85.12 ACUTE BILIARY PANCREATITIS WITH INFECTED NECROSIS: Primary | ICD-10-CM

## 2020-05-08 ENCOUNTER — TELEPHONE (OUTPATIENT)
Dept: FAMILY MEDICINE CLINIC | Facility: CLINIC | Age: 75
End: 2020-05-08

## 2020-05-08 ENCOUNTER — APPOINTMENT (OUTPATIENT)
Dept: CT IMAGING | Facility: HOSPITAL | Age: 75
End: 2020-05-08
Attending: PHYSICIAN ASSISTANT
Payer: MEDICARE

## 2020-05-08 ENCOUNTER — HOSPITAL ENCOUNTER (EMERGENCY)
Facility: HOSPITAL | Age: 75
Discharge: HOME | End: 2020-05-08
Attending: EMERGENCY MEDICINE
Payer: MEDICARE

## 2020-05-08 VITALS
OXYGEN SATURATION: 98 % | HEART RATE: 70 BPM | SYSTOLIC BLOOD PRESSURE: 143 MMHG | HEIGHT: 69 IN | WEIGHT: 189.13 LBS | RESPIRATION RATE: 16 BRPM | TEMPERATURE: 98 F | DIASTOLIC BLOOD PRESSURE: 78 MMHG | BODY MASS INDEX: 28.01 KG/M2

## 2020-05-08 DIAGNOSIS — Z01.818 PRE-OP TESTING: ICD-10-CM

## 2020-05-08 DIAGNOSIS — I48.91 ATRIAL FIBRILLATION, UNSPECIFIED TYPE (HCC): ICD-10-CM

## 2020-05-08 DIAGNOSIS — I47.1 SVT (SUPRAVENTRICULAR TACHYCARDIA) (HCC): ICD-10-CM

## 2020-05-08 DIAGNOSIS — N13.30 HYDRONEPHROSIS, UNSPECIFIED HYDRONEPHROSIS TYPE: ICD-10-CM

## 2020-05-08 DIAGNOSIS — K85.12 ACUTE BILIARY PANCREATITIS WITH INFECTED NECROSIS: Primary | ICD-10-CM

## 2020-05-08 DIAGNOSIS — N20.0 KIDNEY STONE: Primary | ICD-10-CM

## 2020-05-08 PROBLEM — T85.518A CHOLECYSTOSTOMY TUBE DYSFUNCTION: Status: ACTIVE | Noted: 2020-05-08

## 2020-05-08 PROBLEM — K81.9 CHOLECYSTITIS: Status: ACTIVE | Noted: 2020-03-03

## 2020-05-08 PROBLEM — E78.5 HLD (HYPERLIPIDEMIA): Status: ACTIVE | Noted: 2020-02-24

## 2020-05-08 PROBLEM — R10.9 ABDOMINAL PAIN: Status: ACTIVE | Noted: 2020-02-29

## 2020-05-08 PROBLEM — F10.10 ETOH ABUSE: Status: ACTIVE | Noted: 2020-02-24

## 2020-05-08 PROBLEM — D64.9 ANEMIA: Status: ACTIVE | Noted: 2020-03-03

## 2020-05-08 PROBLEM — K59.00 CONSTIPATION: Status: ACTIVE | Noted: 2020-02-29

## 2020-05-08 PROCEDURE — 85610 PROTHROMBIN TIME: CPT | Performed by: PHYSICIAN ASSISTANT

## 2020-05-08 PROCEDURE — 85025 COMPLETE CBC W/AUTO DIFF WBC: CPT | Performed by: PHYSICIAN ASSISTANT

## 2020-05-08 PROCEDURE — 85730 THROMBOPLASTIN TIME PARTIAL: CPT | Performed by: PHYSICIAN ASSISTANT

## 2020-05-08 PROCEDURE — 87086 URINE CULTURE/COLONY COUNT: CPT | Performed by: PHYSICIAN ASSISTANT

## 2020-05-08 PROCEDURE — 80053 COMPREHEN METABOLIC PANEL: CPT | Performed by: PHYSICIAN ASSISTANT

## 2020-05-08 PROCEDURE — 83690 ASSAY OF LIPASE: CPT | Performed by: PHYSICIAN ASSISTANT

## 2020-05-08 PROCEDURE — 81001 URINALYSIS AUTO W/SCOPE: CPT | Performed by: PHYSICIAN ASSISTANT

## 2020-05-08 PROCEDURE — 36415 COLL VENOUS BLD VENIPUNCTURE: CPT

## 2020-05-08 PROCEDURE — 99284 EMERGENCY DEPT VISIT MOD MDM: CPT

## 2020-05-08 PROCEDURE — 74176 CT ABD & PELVIS W/O CONTRAST: CPT | Performed by: PHYSICIAN ASSISTANT

## 2020-05-08 RX ORDER — FOLIC ACID/VIT B COMPLEX AND C 0.8 MG
1 TABLET ORAL DAILY
COMMUNITY
Start: 2020-04-08 | End: 2020-05-15

## 2020-05-08 RX ORDER — DULOXETIN HYDROCHLORIDE 60 MG/1
60 CAPSULE, DELAYED RELEASE ORAL 2 TIMES DAILY
COMMUNITY
Start: 2020-03-08 | End: 2020-05-15

## 2020-05-08 RX ORDER — FAMOTIDINE 20 MG/1
20 TABLET ORAL 2 TIMES DAILY
COMMUNITY
Start: 2020-03-30 | End: 2020-05-15

## 2020-05-08 RX ORDER — MIDODRINE HYDROCHLORIDE 5 MG/1
TABLET ORAL
COMMUNITY
Start: 2020-03-08 | End: 2020-05-15 | Stop reason: ALTCHOICE

## 2020-05-08 RX ORDER — TAMSULOSIN HYDROCHLORIDE 0.4 MG/1
0.4 CAPSULE ORAL DAILY
COMMUNITY
Start: 2020-03-27 | End: 2020-06-11

## 2020-05-08 RX ORDER — LATANOPROST 50 UG/ML
1 SOLUTION/ DROPS OPHTHALMIC NIGHTLY
COMMUNITY
Start: 2020-03-08 | End: 2020-05-15

## 2020-05-08 RX ORDER — LEVOTHYROXINE SODIUM 0.07 MG/1
75 TABLET ORAL DAILY
COMMUNITY
Start: 2020-03-08 | End: 2020-05-15

## 2020-05-08 RX ORDER — TAMSULOSIN HYDROCHLORIDE 0.4 MG/1
0.4 CAPSULE ORAL DAILY
Qty: 7 CAPSULE | Refills: 0 | Status: SHIPPED | OUTPATIENT
Start: 2020-05-08 | End: 2020-05-15

## 2020-05-08 RX ORDER — MIRTAZAPINE 15 MG/1
15 TABLET, FILM COATED ORAL NIGHTLY
COMMUNITY
Start: 2020-03-08 | End: 2020-05-15

## 2020-05-08 RX ORDER — VANCOMYCIN HYDROCHLORIDE 125 MG/1
125 CAPSULE ORAL 2 TIMES DAILY
COMMUNITY
Start: 2020-03-30 | End: 2020-05-15

## 2020-05-08 RX ORDER — MELATONIN
3 NIGHTLY
COMMUNITY
Start: 2020-03-08 | End: 2020-05-15

## 2020-05-08 RX ORDER — VANCOMYCIN HYDROCHLORIDE 125 MG/1
125 CAPSULE ORAL 2 TIMES DAILY
COMMUNITY
Start: 2020-03-30 | End: 2020-05-15 | Stop reason: ALTCHOICE

## 2020-05-08 NOTE — TELEPHONE ENCOUNTER
See note below, pt voided this morning bloody urine, voided approx one hr ago dark brown, denies flank pain, fever, no dysuria.

## 2020-05-08 NOTE — TELEPHONE ENCOUNTER
Discussed brown urine with the patient. He has had no fever or pain. Referred him to the urgent care for evaluation.

## 2020-05-08 NOTE — TELEPHONE ENCOUNTER
1. What are your symptoms? Blood in urine      2. How long have you been having these symptoms? today    3. Have you done anything already to treat your symptoms?      no    ADDITIONAL INFO:

## 2020-05-08 NOTE — TELEPHONE ENCOUNTER
Received request for H&P, CBC, CMP, and EKG to be completed for pt's Laparoscopy cholecystectomy with possible intraoperative cholangiogram, possible open cholecystectomy and common bile duct exploration surgery 6/1/20 with Dr Chico Morales at [facility].   Orders

## 2020-05-08 NOTE — ED INITIAL ASSESSMENT (HPI)
C/o hematuria since this am. Denies pain, fever or n/v. Reports any time he has had hematuria in the past has been due to kidney stones.

## 2020-05-09 NOTE — ED PROVIDER NOTES
37-year-old male who was seen by me as well as by the physician assistant who had noticed hematuria this morning. He denied any fevers or chills. No cough cold or congestion. He does have a history of having had kidney stones.   He was noted to have 2 ki

## 2020-05-09 NOTE — ED PROVIDER NOTES
Patient Seen in: [facility] Emergency Department      History   Patient presents with:  Bleeding    Stated Complaint: hamaturia, started this afternoon    53-year-old  male with a history of hypertension, hypercholesterolemia, esophageal re Dr Saira Kee    w/adenoidectomy                    Social History    Tobacco Use      Smoking status: Former Smoker        Packs/day: 0.50        Years: 2.00        Pack years: 1        Types: Cigarettes        Quit date: 1/1/1969 rebound. Hernia: No hernia is present. Musculoskeletal: Normal range of motion. Skin:     General: Skin is warm and dry. Coloration: Skin is not jaundiced or pale. Findings: No bruising, erythema, lesion or rash.    Neurological:      Gen RAINBOW DRAW LIGHT GREEN   RAINBOW DRAW GOLD   URINE CULTURE, ROUTINE     Lab Results   Component Value Date    WBC 10.4 05/08/2020    HGB 12.7 05/08/2020    HCT 39.8 05/08/2020    .0 05/08/2020    CREATSERUM 1.41 05/08/2020    BUN 14 05/08/2020 appearance, is based on the appearance of the anatomical structures on NONCONTRAST CT exam tailored for urinary tract imaging, with inherent limitations thereof. ADRENALS:  Unremarkable. LIVER:  Unremarkable. BILIARY:  Unremarkable.    PANCREAS:  Unrem

## 2020-05-11 ENCOUNTER — TELEPHONE (OUTPATIENT)
Dept: SURGERY | Age: 75
End: 2020-05-11

## 2020-05-11 DIAGNOSIS — M54.12 LEFT CERVICAL RADICULOPATHY: ICD-10-CM

## 2020-05-11 NOTE — TELEPHONE ENCOUNTER
Pt requested refill of   HYDROcodone-acetaminophen (NORCO) 5-325 MG Oral Tab (Discontinued) 30 tablet     To be sent to:   [facility] [address] [place] [address] [phone], [phone]. Thank you.

## 2020-05-12 DIAGNOSIS — M54.12 LEFT CERVICAL RADICULOPATHY: ICD-10-CM

## 2020-05-12 RX ORDER — HYDROCODONE BITARTRATE AND ACETAMINOPHEN 5; 325 MG/1; MG/1
1 TABLET ORAL EVERY 6 HOURS PRN
Qty: 60 TABLET | Refills: 0 | Status: SHIPPED | OUTPATIENT
Start: 2020-05-12 | End: 2020-05-12 | Stop reason: RX

## 2020-05-12 RX ORDER — HYDROCODONE BITARTRATE AND ACETAMINOPHEN 5; 325 MG/1; MG/1
1 TABLET ORAL EVERY 6 HOURS PRN
Qty: 50 TABLET | Refills: 0 | Status: SHIPPED | OUTPATIENT
Start: 2020-05-12 | End: 2020-05-22

## 2020-05-12 NOTE — TELEPHONE ENCOUNTER
Roni Hernandes from [facility] called regarding pt's Norco that was sent today. Reports they are low in stock on this rx. Only able to provide #50. Manager is out this week to order more.   Pharmacy asks if we are willing to change this to #50, otherwise pt needs to

## 2020-05-15 ENCOUNTER — OFFICE VISIT (OUTPATIENT)
Dept: FAMILY MEDICINE CLINIC | Facility: CLINIC | Age: 75
End: 2020-05-15
Payer: MEDICARE

## 2020-05-15 VITALS
TEMPERATURE: 98 F | SYSTOLIC BLOOD PRESSURE: 90 MMHG | RESPIRATION RATE: 16 BRPM | HEIGHT: 69 IN | WEIGHT: 189 LBS | DIASTOLIC BLOOD PRESSURE: 50 MMHG | BODY MASS INDEX: 27.99 KG/M2 | HEART RATE: 82 BPM

## 2020-05-15 DIAGNOSIS — Z01.818 PREOPERATIVE CLEARANCE: Primary | ICD-10-CM

## 2020-05-15 DIAGNOSIS — K81.1 CHOLECYSTITIS, CHRONIC: ICD-10-CM

## 2020-05-15 DIAGNOSIS — I10 BENIGN ESSENTIAL HYPERTENSION: ICD-10-CM

## 2020-05-15 DIAGNOSIS — K21.9 GASTROESOPHAGEAL REFLUX DISEASE WITHOUT ESOPHAGITIS: ICD-10-CM

## 2020-05-15 DIAGNOSIS — F33.1 MAJOR DEPRESSIVE DISORDER, RECURRENT EPISODE, MODERATE (HCC): ICD-10-CM

## 2020-05-15 DIAGNOSIS — F10.11 HISTORY OF ALCOHOL ABUSE: ICD-10-CM

## 2020-05-15 DIAGNOSIS — Z99.89 OSA ON CPAP: ICD-10-CM

## 2020-05-15 DIAGNOSIS — G47.33 OSA ON CPAP: ICD-10-CM

## 2020-05-15 DIAGNOSIS — E78.00 PURE HYPERCHOLESTEROLEMIA: ICD-10-CM

## 2020-05-15 DIAGNOSIS — F11.21 OPIOID DEPENDENCE, IN REMISSION (HCC): ICD-10-CM

## 2020-05-15 DIAGNOSIS — E03.2 HYPOTHYROIDISM DUE TO MEDICATION: ICD-10-CM

## 2020-05-15 PROCEDURE — 99214 OFFICE O/P EST MOD 30 MIN: CPT | Performed by: FAMILY MEDICINE

## 2020-05-18 NOTE — H&P
Niya Gilmore is a 70year old male who presents for a pre-operative physical exam. Patient is to have laparoscopic cholecystectomy with possible intraoperative cholangiogram, to be done by Dr. Carolina Mensah at [facility] on 6/1/2020.       HPI:   P aches  Lisinopril              Coughing    Comment:cough             cough   Past Medical History:   Diagnosis Date   • Erectile dysfunction    • Esophageal reflux    • High blood pressure    • High cholesterol    • Osteoarthrosis, unspecified whether gene or double vision  HEENT: denies nasal congestion, sinus pain or ST  LUNGS: denies shortness of breath with exertion  CARDIOVASCULAR: denies chest pain on exertion  GI: See HPI  : denies dysuria  MUSCULOSKELETAL: denies back pain  NEURO: denies headaches without esophagitis  Continue famotidine 20 mg 1 tablet twice daily    6. History of alcohol abuse  Continue abstinence    7. Opioid dependence, in remission ([facility] [address]  Careful monitoring of pain medication    8.  Major depressive disorder, recurrent episode, m

## 2020-05-21 ENCOUNTER — TELEPHONE (OUTPATIENT)
Dept: SURGERY | Age: 75
End: 2020-05-21

## 2020-05-22 DIAGNOSIS — M54.12 LEFT CERVICAL RADICULOPATHY: ICD-10-CM

## 2020-05-22 RX ORDER — HYDROCODONE BITARTRATE AND ACETAMINOPHEN 5; 325 MG/1; MG/1
1 TABLET ORAL EVERY 6 HOURS PRN
Qty: 8 TABLET | Refills: 0 | Status: SHIPPED | OUTPATIENT
Start: 2020-05-22 | End: 2020-05-26

## 2020-05-22 NOTE — TELEPHONE ENCOUNTER
Pt notified refill for Norco #8 pills was sent to his pharmacy. He can call on Tuesday for additional medication. Pt notified to not drive and take medication. He was also instructed  to not consume alcohol and take narcotics as it can cause death.

## 2020-05-22 NOTE — TELEPHONE ENCOUNTER
Pt would like refill of HYDROcodone-acetaminophen 5-325 MG Oral Tab [facility] [address] [facility], [address], [phone]

## 2020-05-22 NOTE — TELEPHONE ENCOUNTER
8 pills sent. Will need to contact Dr. Kamila Orta for further pills. No alcohol and no driving. Taking alcohol with narcotics can cause death.

## 2020-05-26 ENCOUNTER — TELEPHONE (OUTPATIENT)
Dept: FAMILY MEDICINE CLINIC | Facility: CLINIC | Age: 75
End: 2020-05-26

## 2020-05-26 ENCOUNTER — APPOINTMENT (OUTPATIENT)
Dept: LAB | Facility: HOSPITAL | Age: 75
End: 2020-05-26
Attending: FAMILY MEDICINE
Payer: MEDICARE

## 2020-05-26 DIAGNOSIS — Z01.818 PRE-OP TESTING: ICD-10-CM

## 2020-05-26 DIAGNOSIS — K85.12 ACUTE BILIARY PANCREATITIS WITH INFECTED NECROSIS: ICD-10-CM

## 2020-05-26 DIAGNOSIS — M54.12 LEFT CERVICAL RADICULOPATHY: ICD-10-CM

## 2020-05-26 DIAGNOSIS — I48.91 ATRIAL FIBRILLATION, UNSPECIFIED TYPE (HCC): ICD-10-CM

## 2020-05-26 DIAGNOSIS — I47.1 SVT (SUPRAVENTRICULAR TACHYCARDIA) (HCC): ICD-10-CM

## 2020-05-26 PROCEDURE — 93010 ELECTROCARDIOGRAM REPORT: CPT | Performed by: INTERNAL MEDICINE

## 2020-05-26 PROCEDURE — 93005 ELECTROCARDIOGRAM TRACING: CPT

## 2020-05-26 RX ORDER — HYDROCODONE BITARTRATE AND ACETAMINOPHEN 5; 325 MG/1; MG/1
1 TABLET ORAL EVERY 6 HOURS PRN
Qty: 60 TABLET | Refills: 0 | Status: SHIPPED | OUTPATIENT
Start: 2020-05-26 | End: 2020-06-15

## 2020-05-26 NOTE — TELEPHONE ENCOUNTER
While on phone with pt reminding to do pre op labs he asked for refill on his norco.    Darwin Vieira gave short supply 5.22 for #8 and instructed him to call today for remaining from you.   5.12 we had gotten a call from pharmacy indicating they could not fill

## 2020-05-28 ENCOUNTER — LAB ENCOUNTER (OUTPATIENT)
Dept: LAB | Age: 75
End: 2020-05-28
Attending: FAMILY MEDICINE
Payer: MEDICARE

## 2020-05-28 DIAGNOSIS — K21.9 GASTROESOPHAGEAL REFLUX DISEASE WITHOUT ESOPHAGITIS: ICD-10-CM

## 2020-05-28 DIAGNOSIS — K81.1 CHOLECYSTITIS, CHRONIC: ICD-10-CM

## 2020-05-28 DIAGNOSIS — D75.89 MACROCYTOSIS: ICD-10-CM

## 2020-05-28 DIAGNOSIS — K85.12 ACUTE BILIARY PANCREATITIS WITH INFECTED NECROSIS: ICD-10-CM

## 2020-05-28 DIAGNOSIS — T85.518S CHOLECYSTOSTOMY TUBE DYSFUNCTION, SEQUELA: ICD-10-CM

## 2020-05-28 DIAGNOSIS — I10 BENIGN ESSENTIAL HYPERTENSION: ICD-10-CM

## 2020-05-28 DIAGNOSIS — E78.00 PURE HYPERCHOLESTEROLEMIA: ICD-10-CM

## 2020-05-28 DIAGNOSIS — Z01.818 PRE-OP TESTING: ICD-10-CM

## 2020-05-28 DIAGNOSIS — M10.9 GOUTY ARTHROPATHY: ICD-10-CM

## 2020-05-28 PROCEDURE — 80061 LIPID PANEL: CPT

## 2020-05-28 PROCEDURE — 85025 COMPLETE CBC W/AUTO DIFF WBC: CPT

## 2020-05-28 PROCEDURE — 80053 COMPREHEN METABOLIC PANEL: CPT

## 2020-05-28 PROCEDURE — 36415 COLL VENOUS BLD VENIPUNCTURE: CPT

## 2020-05-28 PROCEDURE — 82746 ASSAY OF FOLIC ACID SERUM: CPT

## 2020-05-28 PROCEDURE — 84550 ASSAY OF BLOOD/URIC ACID: CPT

## 2020-05-29 DIAGNOSIS — E79.0 HYPERURICEMIA: Primary | ICD-10-CM

## 2020-06-01 ENCOUNTER — EXTERNAL RECORD (OUTPATIENT)
Dept: HEALTH INFORMATION MANAGEMENT | Facility: OTHER | Age: 75
End: 2020-06-01

## 2020-06-01 ENCOUNTER — APPOINTMENT (OUTPATIENT)
Dept: OTHER | Facility: PHYSICIAN GROUP | Age: 75
End: 2020-06-01

## 2020-06-01 PROCEDURE — 47563 LAPARO CHOLECYSTECTOMY/GRAPH: CPT | Performed by: SURGERY

## 2020-06-01 RX ORDER — ALLOPURINOL 100 MG/1
200 TABLET ORAL DAILY
Qty: 180 TABLET | Refills: 0 | Status: SHIPPED | OUTPATIENT
Start: 2020-06-01 | End: 2020-08-26

## 2020-06-11 DIAGNOSIS — I48.0 PAROXYSMAL ATRIAL FIBRILLATION (HCC): ICD-10-CM

## 2020-06-11 DIAGNOSIS — K21.9 GASTROESOPHAGEAL REFLUX DISEASE WITHOUT ESOPHAGITIS: ICD-10-CM

## 2020-06-11 DIAGNOSIS — E03.2 HYPOTHYROIDISM DUE TO MEDICATION: ICD-10-CM

## 2020-06-11 DIAGNOSIS — F33.1 MAJOR DEPRESSIVE DISORDER, RECURRENT EPISODE, MODERATE (HCC): ICD-10-CM

## 2020-06-11 RX ORDER — DULOXETIN HYDROCHLORIDE 60 MG/1
60 CAPSULE, DELAYED RELEASE ORAL 2 TIMES DAILY
Qty: 180 CAPSULE | Refills: 0 | Status: SHIPPED | OUTPATIENT
Start: 2020-06-11 | End: 2020-09-08

## 2020-06-11 RX ORDER — FAMOTIDINE 20 MG/1
TABLET ORAL
Qty: 180 TABLET | Refills: 0 | Status: SHIPPED | OUTPATIENT
Start: 2020-06-11

## 2020-06-11 RX ORDER — LEVOTHYROXINE SODIUM 0.07 MG/1
TABLET ORAL
Qty: 90 TABLET | Refills: 0 | Status: SHIPPED | OUTPATIENT
Start: 2020-06-11 | End: 2020-09-08

## 2020-06-11 RX ORDER — TAMSULOSIN HYDROCHLORIDE 0.4 MG/1
CAPSULE ORAL
Qty: 90 CAPSULE | Refills: 0 | Status: SHIPPED | OUTPATIENT
Start: 2020-06-11 | End: 2020-09-08

## 2020-06-12 ENCOUNTER — TELEPHONE (OUTPATIENT)
Dept: SURGERY | Age: 75
End: 2020-06-12

## 2020-06-15 ENCOUNTER — OFFICE VISIT (OUTPATIENT)
Dept: SURGERY | Age: 75
End: 2020-06-15

## 2020-06-15 DIAGNOSIS — K81.9 CHOLECYSTITIS: Primary | ICD-10-CM

## 2020-06-15 DIAGNOSIS — M54.12 LEFT CERVICAL RADICULOPATHY: ICD-10-CM

## 2020-06-15 PROCEDURE — 99024 POSTOP FOLLOW-UP VISIT: CPT | Performed by: SURGERY

## 2020-06-15 RX ORDER — ALLOPURINOL 100 MG/1
200 TABLET ORAL 2 TIMES DAILY
COMMUNITY
Start: 2020-06-01

## 2020-06-15 RX ORDER — HYDROCODONE BITARTRATE AND ACETAMINOPHEN 5; 325 MG/1; MG/1
1 TABLET ORAL EVERY 6 HOURS PRN
Qty: 60 TABLET | Refills: 0 | Status: SHIPPED | OUTPATIENT
Start: 2020-06-15 | End: 2020-07-01

## 2020-06-15 ASSESSMENT — PAIN SCALES - GENERAL: PAINLEVEL: 0

## 2020-06-15 NOTE — TELEPHONE ENCOUNTER
Pt requesting refill of:    -HYDROcodone-acetaminophen 5-325 MG Oral Tab    Would like  this sent to [facility] [address] [phone], [phone]

## 2020-06-21 DIAGNOSIS — F33.1 MAJOR DEPRESSIVE DISORDER, RECURRENT EPISODE, MODERATE (HCC): ICD-10-CM

## 2020-06-22 RX ORDER — MIRTAZAPINE 15 MG/1
TABLET, FILM COATED ORAL
Qty: 90 TABLET | Refills: 0 | Status: SHIPPED | OUTPATIENT
Start: 2020-06-22 | End: 2020-08-17 | Stop reason: ALTCHOICE

## 2020-07-01 DIAGNOSIS — M54.12 LEFT CERVICAL RADICULOPATHY: ICD-10-CM

## 2020-07-01 RX ORDER — HYDROCODONE BITARTRATE AND ACETAMINOPHEN 5; 325 MG/1; MG/1
1 TABLET ORAL EVERY 6 HOURS PRN
Qty: 60 TABLET | Refills: 0 | Status: SHIPPED | OUTPATIENT
Start: 2020-07-01 | End: 2020-07-21

## 2020-07-01 NOTE — TELEPHONE ENCOUNTER
Pt is requesting a refill for Norco. He received a refill of Norco on # 60 0 refills on 06/15/2020.  Please authorize if appropriate

## 2020-07-01 NOTE — TELEPHONE ENCOUNTER
Pt asking for refill of [place] to [facility] [address] [facility] 69 300 [facility] [phone], [phone].

## 2020-07-06 RX ORDER — FOLIC ACID/VIT B COMPLEX AND C 0.8 MG
TABLET ORAL
Qty: 90 TABLET | Refills: 0 | Status: SHIPPED | OUTPATIENT
Start: 2020-07-06 | End: 2020-10-09

## 2020-07-20 ENCOUNTER — TELEPHONE (OUTPATIENT)
Dept: FAMILY MEDICINE CLINIC | Facility: CLINIC | Age: 75
End: 2020-07-20

## 2020-07-20 DIAGNOSIS — M54.12 LEFT CERVICAL RADICULOPATHY: ICD-10-CM

## 2020-07-20 NOTE — TELEPHONE ENCOUNTER
HYDROcodone-acetaminophen 5-325 MG    Pt states every couple of weeks Dr. Alberta Jones gives him a refill for his knee. Patient advised that Dr. Alberta Jones is out of the office and may get no refill or partial refill until Dr. Alberta Jones is back.      [facility] [address] [place] [facility] Neeru Ohara,

## 2020-07-21 RX ORDER — HYDROCODONE BITARTRATE AND ACETAMINOPHEN 5; 325 MG/1; MG/1
1 TABLET ORAL EVERY 6 HOURS PRN
Qty: 60 TABLET | Refills: 0 | Status: SHIPPED | OUTPATIENT
Start: 2020-07-21 | End: 2020-08-07

## 2020-08-07 DIAGNOSIS — M54.12 LEFT CERVICAL RADICULOPATHY: ICD-10-CM

## 2020-08-07 RX ORDER — HYDROCODONE BITARTRATE AND ACETAMINOPHEN 5; 325 MG/1; MG/1
1 TABLET ORAL EVERY 6 HOURS PRN
Qty: 60 TABLET | Refills: 0 | Status: SHIPPED | OUTPATIENT
Start: 2020-08-07 | End: 2020-08-25

## 2020-08-07 NOTE — TELEPHONE ENCOUNTER
Pt would like refill of HYDROcodone-acetaminophen 5-325 MG Oral Tab sen [facility] [address] [facility], [address], [phone] thank you.  He is almost out of meds

## 2020-08-17 ENCOUNTER — OFFICE VISIT (OUTPATIENT)
Dept: FAMILY MEDICINE CLINIC | Facility: CLINIC | Age: 75
End: 2020-08-17
Payer: MEDICARE

## 2020-08-17 VITALS
RESPIRATION RATE: 18 BRPM | SYSTOLIC BLOOD PRESSURE: 120 MMHG | HEART RATE: 72 BPM | BODY MASS INDEX: 30.66 KG/M2 | HEIGHT: 69 IN | TEMPERATURE: 99 F | DIASTOLIC BLOOD PRESSURE: 78 MMHG | WEIGHT: 207 LBS

## 2020-08-17 DIAGNOSIS — E78.00 PURE HYPERCHOLESTEROLEMIA: ICD-10-CM

## 2020-08-17 DIAGNOSIS — M10.9 GOUTY ARTHROPATHY: ICD-10-CM

## 2020-08-17 DIAGNOSIS — K21.9 GASTROESOPHAGEAL REFLUX DISEASE WITHOUT ESOPHAGITIS: ICD-10-CM

## 2020-08-17 DIAGNOSIS — F32.A DEPRESSIVE DISORDER: ICD-10-CM

## 2020-08-17 DIAGNOSIS — I10 BENIGN ESSENTIAL HYPERTENSION: Primary | ICD-10-CM

## 2020-08-17 DIAGNOSIS — E03.2 HYPOTHYROIDISM DUE TO MEDICATION: ICD-10-CM

## 2020-08-17 DIAGNOSIS — N40.1 BENIGN NON-NODULAR PROSTATIC HYPERPLASIA WITH LOWER URINARY TRACT SYMPTOMS: ICD-10-CM

## 2020-08-17 DIAGNOSIS — I48.0 PAROXYSMAL ATRIAL FIBRILLATION (HCC): ICD-10-CM

## 2020-08-17 PROCEDURE — 99214 OFFICE O/P EST MOD 30 MIN: CPT | Performed by: FAMILY MEDICINE

## 2020-08-17 NOTE — PROGRESS NOTES
Collins Coma is a 70year old male. HPI:   Patient is a 79-year-old male here for follow-up for his medications. He presently takes allopurinol 2 tablets daily for his hyperuricemia.   He is on duloxetine 60 mg twice daily for his depression and  site    • Other and unspecified hyperlipidemia    • Pancreatitis    • Unspecified drug dependence, unspecified 01/01/1987    addicted to drugs and alcohol   • Unspecified essential hypertension    • Visual impairment     glasses      Social History:  Manisha Buck Paroxysmal atrial fibrillation (HCC)  Continue metoprolol as above  - CBC WITH DIFFERENTIAL WITH PLATELET; Future    4. Hypothyroidism due to medication  Continue levothyroxine 75 mcg 1 tablet daily  - TSH+FREE T4; Future    5.  Gastroesophageal reflux dise

## 2020-08-25 DIAGNOSIS — M54.12 LEFT CERVICAL RADICULOPATHY: ICD-10-CM

## 2020-08-25 RX ORDER — HYDROCODONE BITARTRATE AND ACETAMINOPHEN 5; 325 MG/1; MG/1
1 TABLET ORAL EVERY 6 HOURS PRN
Qty: 60 TABLET | Refills: 0 | Status: SHIPPED | OUTPATIENT
Start: 2020-08-25 | End: 2020-09-09

## 2020-08-25 NOTE — TELEPHONE ENCOUNTER
Pt requested refill of   HYDROcodone-acetaminophen 5-325 MG Oral Tab 60 tablet     To be sent to:[facility] [address] [place] [facility], [address] [place] [address] [phone], [phone]. Thank you.

## 2020-08-26 RX ORDER — ALLOPURINOL 100 MG/1
TABLET ORAL
Qty: 180 TABLET | Refills: 0 | Status: SHIPPED | OUTPATIENT
Start: 2020-08-26 | End: 2020-12-22

## 2020-09-06 DIAGNOSIS — F33.1 MAJOR DEPRESSIVE DISORDER, RECURRENT EPISODE, MODERATE (HCC): ICD-10-CM

## 2020-09-06 DIAGNOSIS — E03.2 HYPOTHYROIDISM DUE TO MEDICATION: ICD-10-CM

## 2020-09-06 DIAGNOSIS — I48.0 PAROXYSMAL ATRIAL FIBRILLATION (HCC): ICD-10-CM

## 2020-09-08 RX ORDER — TAMSULOSIN HYDROCHLORIDE 0.4 MG/1
CAPSULE ORAL
Qty: 90 CAPSULE | Refills: 0 | Status: SHIPPED | OUTPATIENT
Start: 2020-09-08 | End: 2020-12-07

## 2020-09-08 RX ORDER — DULOXETIN HYDROCHLORIDE 60 MG/1
60 CAPSULE, DELAYED RELEASE ORAL 2 TIMES DAILY
Qty: 180 CAPSULE | Refills: 0 | Status: SHIPPED | OUTPATIENT
Start: 2020-09-08 | End: 2020-12-17

## 2020-09-08 RX ORDER — LEVOTHYROXINE SODIUM 0.07 MG/1
TABLET ORAL
Qty: 90 TABLET | Refills: 0 | Status: SHIPPED | OUTPATIENT
Start: 2020-09-08 | End: 2020-12-21

## 2020-09-09 DIAGNOSIS — M54.12 LEFT CERVICAL RADICULOPATHY: ICD-10-CM

## 2020-09-09 RX ORDER — HYDROCODONE BITARTRATE AND ACETAMINOPHEN 5; 325 MG/1; MG/1
1 TABLET ORAL EVERY 6 HOURS PRN
Qty: 60 TABLET | Refills: 0 | Status: SHIPPED | OUTPATIENT
Start: 2020-09-09 | End: 2020-09-28

## 2020-09-09 NOTE — TELEPHONE ENCOUNTER
Patient is requesting refill of HYDROcodone-acetaminophen 5-325 MG Oral Tab sent to [address] [phone], [phone].

## 2020-09-14 DIAGNOSIS — F33.1 MAJOR DEPRESSIVE DISORDER, RECURRENT EPISODE, MODERATE (HCC): ICD-10-CM

## 2020-09-14 RX ORDER — MIRTAZAPINE 15 MG/1
TABLET, FILM COATED ORAL
Qty: 90 TABLET | Refills: 0 | OUTPATIENT
Start: 2020-09-14

## 2020-09-28 DIAGNOSIS — M54.12 LEFT CERVICAL RADICULOPATHY: ICD-10-CM

## 2020-09-28 RX ORDER — HYDROCODONE BITARTRATE AND ACETAMINOPHEN 5; 325 MG/1; MG/1
1 TABLET ORAL EVERY 6 HOURS PRN
Qty: 60 TABLET | Refills: 0 | Status: SHIPPED | OUTPATIENT
Start: 2020-09-28 | End: 2020-10-13

## 2020-09-28 NOTE — TELEPHONE ENCOUNTER
PT WOULD LIKE REFILL OF HYDROcodone-acetaminophen 5-325 MG Oral Tab SENT  TO [facility] [address] [place] [facility] - [address] [place] [address] [phone], [phone] [facility] YOU,.

## 2020-10-09 RX ORDER — FOLIC ACID/VIT B COMPLEX AND C 0.8 MG
TABLET ORAL
Qty: 90 TABLET | Refills: 0 | Status: SHIPPED | OUTPATIENT
Start: 2020-10-09

## 2020-10-13 DIAGNOSIS — M54.12 LEFT CERVICAL RADICULOPATHY: ICD-10-CM

## 2020-10-13 RX ORDER — HYDROCODONE BITARTRATE AND ACETAMINOPHEN 5; 325 MG/1; MG/1
1 TABLET ORAL EVERY 6 HOURS PRN
Qty: 60 TABLET | Refills: 0 | OUTPATIENT
Start: 2020-10-13

## 2020-10-14 DIAGNOSIS — F33.1 MAJOR DEPRESSIVE DISORDER, RECURRENT EPISODE, MODERATE (HCC): ICD-10-CM

## 2020-10-15 RX ORDER — HYDROCODONE BITARTRATE AND ACETAMINOPHEN 5; 325 MG/1; MG/1
1 TABLET ORAL EVERY 6 HOURS PRN
Qty: 60 TABLET | Refills: 0 | Status: SHIPPED | OUTPATIENT
Start: 2020-10-15 | End: 2020-10-29

## 2020-10-16 RX ORDER — MIRTAZAPINE 15 MG/1
TABLET, FILM COATED ORAL
Qty: 90 TABLET | Refills: 0 | Status: SHIPPED | OUTPATIENT
Start: 2020-10-16 | End: 2020-10-27 | Stop reason: ALTCHOICE

## 2020-10-18 NOTE — PROGRESS NOTES
Pt. was seen bedside this afternoon. His mood was depressed. He spoke very softly and his speech was garbled at times.  He was not oriented to time and situation but knew he was at [facility]. He reported that he was feeling very fatigued and wanted to no

## 2020-10-29 DIAGNOSIS — M54.12 LEFT CERVICAL RADICULOPATHY: ICD-10-CM

## 2020-10-29 RX ORDER — HYDROCODONE BITARTRATE AND ACETAMINOPHEN 5; 325 MG/1; MG/1
1 TABLET ORAL EVERY 6 HOURS PRN
Qty: 60 TABLET | Refills: 0 | Status: SHIPPED | OUTPATIENT
Start: 2020-10-30 | End: 2020-11-19

## 2020-10-29 NOTE — TELEPHONE ENCOUNTER
Patient is requesting refill of HYDROcodone-acetaminophen 5-325 MG Oral Tab sent to [facility] [address] [facility] - [address] [place] [address] [phone], [phone]

## 2020-11-04 ENCOUNTER — LAB ENCOUNTER (OUTPATIENT)
Dept: LAB | Age: 75
End: 2020-11-04
Attending: FAMILY MEDICINE
Payer: MEDICARE

## 2020-11-04 DIAGNOSIS — K21.9 GASTROESOPHAGEAL REFLUX DISEASE WITHOUT ESOPHAGITIS: ICD-10-CM

## 2020-11-04 DIAGNOSIS — E03.2 HYPOTHYROIDISM DUE TO MEDICATION: ICD-10-CM

## 2020-11-04 DIAGNOSIS — N40.1 BENIGN NON-NODULAR PROSTATIC HYPERPLASIA WITH LOWER URINARY TRACT SYMPTOMS: ICD-10-CM

## 2020-11-04 DIAGNOSIS — E79.0 HYPERURICEMIA: ICD-10-CM

## 2020-11-04 DIAGNOSIS — E78.00 PURE HYPERCHOLESTEROLEMIA: ICD-10-CM

## 2020-11-04 DIAGNOSIS — I10 BENIGN ESSENTIAL HYPERTENSION: ICD-10-CM

## 2020-11-04 DIAGNOSIS — M10.9 GOUTY ARTHROPATHY: ICD-10-CM

## 2020-11-04 DIAGNOSIS — I48.0 PAROXYSMAL ATRIAL FIBRILLATION (HCC): ICD-10-CM

## 2020-11-04 PROCEDURE — 80053 COMPREHEN METABOLIC PANEL: CPT

## 2020-11-04 PROCEDURE — 80061 LIPID PANEL: CPT

## 2020-11-04 PROCEDURE — 84550 ASSAY OF BLOOD/URIC ACID: CPT

## 2020-11-04 PROCEDURE — 84443 ASSAY THYROID STIM HORMONE: CPT

## 2020-11-04 PROCEDURE — 84153 ASSAY OF PSA TOTAL: CPT

## 2020-11-04 PROCEDURE — 84439 ASSAY OF FREE THYROXINE: CPT

## 2020-11-04 PROCEDURE — 85025 COMPLETE CBC W/AUTO DIFF WBC: CPT

## 2020-11-04 PROCEDURE — 36415 COLL VENOUS BLD VENIPUNCTURE: CPT

## 2020-11-19 ENCOUNTER — OFFICE VISIT (OUTPATIENT)
Dept: FAMILY MEDICINE CLINIC | Facility: CLINIC | Age: 75
End: 2020-11-19
Payer: MEDICARE

## 2020-11-19 VITALS
WEIGHT: 218 LBS | TEMPERATURE: 98 F | DIASTOLIC BLOOD PRESSURE: 60 MMHG | HEIGHT: 69 IN | SYSTOLIC BLOOD PRESSURE: 98 MMHG | HEART RATE: 88 BPM | BODY MASS INDEX: 32.29 KG/M2 | RESPIRATION RATE: 16 BRPM

## 2020-11-19 DIAGNOSIS — I10 BENIGN ESSENTIAL HYPERTENSION: Primary | ICD-10-CM

## 2020-11-19 DIAGNOSIS — F33.1 MAJOR DEPRESSIVE DISORDER, RECURRENT EPISODE, MODERATE (HCC): ICD-10-CM

## 2020-11-19 DIAGNOSIS — I48.0 PAROXYSMAL ATRIAL FIBRILLATION (HCC): ICD-10-CM

## 2020-11-19 DIAGNOSIS — E78.00 PURE HYPERCHOLESTEROLEMIA: ICD-10-CM

## 2020-11-19 DIAGNOSIS — K21.9 GASTROESOPHAGEAL REFLUX DISEASE WITHOUT ESOPHAGITIS: ICD-10-CM

## 2020-11-19 DIAGNOSIS — M10.9 GOUTY ARTHROPATHY: ICD-10-CM

## 2020-11-19 DIAGNOSIS — M54.12 LEFT CERVICAL RADICULOPATHY: ICD-10-CM

## 2020-11-19 DIAGNOSIS — E03.2 HYPOTHYROIDISM DUE TO MEDICATION: ICD-10-CM

## 2020-11-19 PROCEDURE — 99214 OFFICE O/P EST MOD 30 MIN: CPT | Performed by: FAMILY MEDICINE

## 2020-11-19 RX ORDER — HYDROCODONE BITARTRATE AND ACETAMINOPHEN 5; 325 MG/1; MG/1
1 TABLET ORAL EVERY 6 HOURS PRN
Qty: 60 TABLET | Refills: 0 | Status: SHIPPED | OUTPATIENT
Start: 2020-11-19 | End: 2020-12-07

## 2020-11-19 NOTE — PROGRESS NOTES
Kendra Blankenship is a 70year old male. HPI:   Patient is a 79-year-old male here for follow-up for his medications. He presently takes allopurinol 100 mg 2 tablets daily for his hyperuricemia.   He is on duloxetine 60 mg twice daily for his depression a eyes nightly. 3 Bottle 10   • B COMPLEX-C-FOLIC ACID OR Take 1 tablet by mouth.  Qd        Past Medical History:   Diagnosis Date   • Erectile dysfunction    • Esophageal reflux    • High blood pressure    • High cholesterol    • Osteoarthrosis, unspecified hypertension  Continue metoprolol tartrate 25 mg 1 tablet twice daily  - CBC WITH DIFFERENTIAL WITH PLATELET; Future  - COMP METABOLIC PANEL (14); Future    2.  Pure hypercholesterolemia  Continue rosuvastatin 20 mg 1 tablet nightly  - COMP METABOLIC PANEL

## 2020-12-07 DIAGNOSIS — M54.12 LEFT CERVICAL RADICULOPATHY: ICD-10-CM

## 2020-12-07 RX ORDER — HYDROCODONE BITARTRATE AND ACETAMINOPHEN 5; 325 MG/1; MG/1
1 TABLET ORAL EVERY 6 HOURS PRN
Qty: 60 TABLET | Refills: 0 | Status: SHIPPED | OUTPATIENT
Start: 2020-12-07 | End: 2020-12-21

## 2020-12-07 RX ORDER — TAMSULOSIN HYDROCHLORIDE 0.4 MG/1
0.4 CAPSULE ORAL DAILY
Qty: 90 CAPSULE | Refills: 0 | Status: SHIPPED | OUTPATIENT
Start: 2020-12-07 | End: 2021-03-01

## 2020-12-07 NOTE — TELEPHONE ENCOUNTER
Last refill for pts Hydrocodone acetaminophen 5-325 mg was 11/19/2020 # 60. Pt requesting a refill. Please refill of appropriate. none

## 2020-12-07 NOTE — TELEPHONE ENCOUNTER
PT WOULD LIKE REFILL OF HYDROcodone-acetaminophen 5-325 MG Oral Tab SENT TO [facility] [address] [place] [facility] [place] [place] - [address] [phone], [phone] THANK YOU    HE ALSO NEEDS tamsulosin (FLOMAX) cap THANK YOU

## 2020-12-19 DIAGNOSIS — I48.0 PAROXYSMAL ATRIAL FIBRILLATION (HCC): ICD-10-CM

## 2020-12-19 DIAGNOSIS — E03.2 HYPOTHYROIDISM DUE TO MEDICATION: ICD-10-CM

## 2020-12-21 DIAGNOSIS — M54.12 LEFT CERVICAL RADICULOPATHY: ICD-10-CM

## 2020-12-21 RX ORDER — LEVOTHYROXINE SODIUM 0.07 MG/1
TABLET ORAL
Qty: 90 TABLET | Refills: 1 | Status: SHIPPED | OUTPATIENT
Start: 2020-12-21 | End: 2021-06-30

## 2020-12-21 RX ORDER — HYDROCODONE BITARTRATE AND ACETAMINOPHEN 5; 325 MG/1; MG/1
1 TABLET ORAL EVERY 6 HOURS PRN
Qty: 60 TABLET | Refills: 0 | Status: SHIPPED | OUTPATIENT
Start: 2020-12-21 | End: 2021-01-11

## 2020-12-21 NOTE — TELEPHONE ENCOUNTER
Pt requesting refill of:    HYDROcodone-acetaminophen 5-325 MG Oral Tab    To be sent to:    [facility] [address] [facility], [address] [place] [address] [phone], [phone]    Please advise.

## 2020-12-22 RX ORDER — ALLOPURINOL 100 MG/1
TABLET ORAL
Qty: 180 TABLET | Refills: 1 | Status: SHIPPED | OUTPATIENT
Start: 2020-12-22 | End: 2021-06-24

## 2021-01-07 DIAGNOSIS — F33.1 MAJOR DEPRESSIVE DISORDER, RECURRENT EPISODE, MODERATE (HCC): ICD-10-CM

## 2021-01-07 RX ORDER — MIRTAZAPINE 15 MG/1
TABLET, FILM COATED ORAL
Qty: 90 TABLET | Refills: 0 | OUTPATIENT
Start: 2021-01-07

## 2021-01-11 DIAGNOSIS — M54.12 LEFT CERVICAL RADICULOPATHY: ICD-10-CM

## 2021-01-11 RX ORDER — HYDROCODONE BITARTRATE AND ACETAMINOPHEN 5; 325 MG/1; MG/1
1 TABLET ORAL EVERY 6 HOURS PRN
Qty: 60 TABLET | Refills: 0 | Status: SHIPPED | OUTPATIENT
Start: 2021-01-11 | End: 2021-02-02

## 2021-01-11 NOTE — TELEPHONE ENCOUNTER
Patient is requesting HYDROcodone-acetaminophen 5-325 MG Oral Tab sent to [facility] [address] [facility] [[email or web address]], [place]le[place] Pablo [address] [phone], [phone]. Thank you.

## 2021-02-02 DIAGNOSIS — M54.12 LEFT CERVICAL RADICULOPATHY: ICD-10-CM

## 2021-02-02 RX ORDER — HYDROCODONE BITARTRATE AND ACETAMINOPHEN 5; 325 MG/1; MG/1
1 TABLET ORAL EVERY 6 HOURS PRN
Qty: 60 TABLET | Refills: 0 | Status: SHIPPED | OUTPATIENT
Start: 2021-02-02 | End: 2021-03-03

## 2021-02-02 NOTE — TELEPHONE ENCOUNTER
Patient needs refill on:      HYDROcodone-acetaminophen 5-325 MG Oral Tab      [facility] [address] 300 [facility] [phone], [phone]

## 2021-02-03 ENCOUNTER — LAB ENCOUNTER (OUTPATIENT)
Dept: LAB | Age: 76
End: 2021-02-03
Attending: FAMILY MEDICINE
Payer: MEDICARE

## 2021-02-03 DIAGNOSIS — E03.2 HYPOTHYROIDISM DUE TO MEDICATION: ICD-10-CM

## 2021-02-03 DIAGNOSIS — M10.9 GOUTY ARTHROPATHY: ICD-10-CM

## 2021-02-03 DIAGNOSIS — K21.9 GASTROESOPHAGEAL REFLUX DISEASE WITHOUT ESOPHAGITIS: ICD-10-CM

## 2021-02-03 DIAGNOSIS — E78.00 PURE HYPERCHOLESTEROLEMIA: ICD-10-CM

## 2021-02-03 DIAGNOSIS — F33.1 MAJOR DEPRESSIVE DISORDER, RECURRENT EPISODE, MODERATE (HCC): ICD-10-CM

## 2021-02-03 DIAGNOSIS — I10 BENIGN ESSENTIAL HYPERTENSION: ICD-10-CM

## 2021-02-03 DIAGNOSIS — I48.0 PAROXYSMAL ATRIAL FIBRILLATION (HCC): ICD-10-CM

## 2021-02-03 LAB
ALBUMIN SERPL-MCNC: 3.9 G/DL (ref 3.4–5)
ALBUMIN/GLOB SERPL: 1.2 {RATIO} (ref 1–2)
ALP LIVER SERPL-CCNC: 132 U/L
ALT SERPL-CCNC: 17 U/L
ANION GAP SERPL CALC-SCNC: 7 MMOL/L (ref 0–18)
AST SERPL-CCNC: 16 U/L (ref 15–37)
BASOPHILS # BLD AUTO: 0.1 X10(3) UL (ref 0–0.2)
BASOPHILS NFR BLD AUTO: 1 %
BILIRUB SERPL-MCNC: 1 MG/DL (ref 0.1–2)
BUN BLD-MCNC: 14 MG/DL (ref 7–18)
BUN/CREAT SERPL: 9.6 (ref 10–20)
CALCIUM BLD-MCNC: 9.6 MG/DL (ref 8.5–10.1)
CHLORIDE SERPL-SCNC: 109 MMOL/L (ref 98–112)
CHOLEST SMN-MCNC: 143 MG/DL (ref ?–200)
CO2 SERPL-SCNC: 24 MMOL/L (ref 21–32)
CREAT BLD-MCNC: 1.46 MG/DL
DEPRECATED RDW RBC AUTO: 47.2 FL (ref 35.1–46.3)
EOSINOPHIL # BLD AUTO: 0.27 X10(3) UL (ref 0–0.7)
EOSINOPHIL NFR BLD AUTO: 2.8 %
ERYTHROCYTE [DISTWIDTH] IN BLOOD BY AUTOMATED COUNT: 13.2 % (ref 11–15)
GLOBULIN PLAS-MCNC: 3.2 G/DL (ref 2.8–4.4)
GLUCOSE BLD-MCNC: 109 MG/DL (ref 70–99)
HCT VFR BLD AUTO: 51.3 %
HDLC SERPL-MCNC: 53 MG/DL (ref 40–59)
HGB BLD-MCNC: 17 G/DL
IMM GRANULOCYTES # BLD AUTO: 0.04 X10(3) UL (ref 0–1)
IMM GRANULOCYTES NFR BLD: 0.4 %
LDLC SERPL CALC-MCNC: 53 MG/DL (ref ?–100)
LYMPHOCYTES # BLD AUTO: 2.48 X10(3) UL (ref 1–4)
LYMPHOCYTES NFR BLD AUTO: 25.7 %
M PROTEIN MFR SERPL ELPH: 7.1 G/DL (ref 6.4–8.2)
MCH RBC QN AUTO: 32.1 PG (ref 26–34)
MCHC RBC AUTO-ENTMCNC: 33.1 G/DL (ref 31–37)
MCV RBC AUTO: 96.8 FL
MONOCYTES # BLD AUTO: 0.89 X10(3) UL (ref 0.1–1)
MONOCYTES NFR BLD AUTO: 9.2 %
NEUTROPHILS # BLD AUTO: 5.87 X10 (3) UL (ref 1.5–7.7)
NEUTROPHILS # BLD AUTO: 5.87 X10(3) UL (ref 1.5–7.7)
NEUTROPHILS NFR BLD AUTO: 60.9 %
NONHDLC SERPL-MCNC: 90 MG/DL (ref ?–130)
OSMOLALITY SERPL CALC.SUM OF ELEC: 291 MOSM/KG (ref 275–295)
PATIENT FASTING Y/N/NP: YES
PATIENT FASTING Y/N/NP: YES
PLATELET # BLD AUTO: 221 10(3)UL (ref 150–450)
POTASSIUM SERPL-SCNC: 4.3 MMOL/L (ref 3.5–5.1)
RBC # BLD AUTO: 5.3 X10(6)UL
SODIUM SERPL-SCNC: 140 MMOL/L (ref 136–145)
T4 FREE SERPL-MCNC: 0.9 NG/DL (ref 0.8–1.7)
TRIGL SERPL-MCNC: 186 MG/DL (ref 30–149)
TSI SER-ACNC: 1.21 MIU/ML (ref 0.36–3.74)
URATE SERPL-MCNC: 5 MG/DL
VLDLC SERPL CALC-MCNC: 37 MG/DL (ref 0–30)
WBC # BLD AUTO: 9.7 X10(3) UL (ref 4–11)

## 2021-02-03 PROCEDURE — 85025 COMPLETE CBC W/AUTO DIFF WBC: CPT

## 2021-02-03 PROCEDURE — 84550 ASSAY OF BLOOD/URIC ACID: CPT

## 2021-02-03 PROCEDURE — 80061 LIPID PANEL: CPT

## 2021-02-03 PROCEDURE — 84443 ASSAY THYROID STIM HORMONE: CPT

## 2021-02-03 PROCEDURE — 80053 COMPREHEN METABOLIC PANEL: CPT

## 2021-02-03 PROCEDURE — 36415 COLL VENOUS BLD VENIPUNCTURE: CPT

## 2021-02-03 PROCEDURE — 84439 ASSAY OF FREE THYROXINE: CPT

## 2021-02-09 ENCOUNTER — OFFICE VISIT (OUTPATIENT)
Dept: FAMILY MEDICINE CLINIC | Facility: CLINIC | Age: 76
End: 2021-02-09
Payer: MEDICARE

## 2021-02-09 VITALS
HEIGHT: 69 IN | TEMPERATURE: 98 F | BODY MASS INDEX: 32.14 KG/M2 | WEIGHT: 217 LBS | DIASTOLIC BLOOD PRESSURE: 78 MMHG | SYSTOLIC BLOOD PRESSURE: 128 MMHG | HEART RATE: 80 BPM | RESPIRATION RATE: 18 BRPM

## 2021-02-09 DIAGNOSIS — I10 BENIGN ESSENTIAL HYPERTENSION: Primary | ICD-10-CM

## 2021-02-09 DIAGNOSIS — M10.9 GOUTY ARTHROPATHY: ICD-10-CM

## 2021-02-09 DIAGNOSIS — E03.2 HYPOTHYROIDISM DUE TO MEDICATION: ICD-10-CM

## 2021-02-09 DIAGNOSIS — N40.1 BENIGN NON-NODULAR PROSTATIC HYPERPLASIA WITH LOWER URINARY TRACT SYMPTOMS: ICD-10-CM

## 2021-02-09 DIAGNOSIS — F33.40 RECURRENT MAJOR DEPRESSIVE DISORDER, IN REMISSION (HCC): ICD-10-CM

## 2021-02-09 DIAGNOSIS — E78.00 PURE HYPERCHOLESTEROLEMIA: ICD-10-CM

## 2021-02-09 DIAGNOSIS — N18.31 STAGE 3A CHRONIC KIDNEY DISEASE (HCC): ICD-10-CM

## 2021-02-09 DIAGNOSIS — K21.9 GASTROESOPHAGEAL REFLUX DISEASE WITHOUT ESOPHAGITIS: ICD-10-CM

## 2021-02-09 DIAGNOSIS — I48.0 PAROXYSMAL ATRIAL FIBRILLATION (HCC): ICD-10-CM

## 2021-02-09 PROCEDURE — 99214 OFFICE O/P EST MOD 30 MIN: CPT | Performed by: FAMILY MEDICINE

## 2021-02-09 NOTE — PROGRESS NOTES
Konrad Bernal is a 70year old male. HPI:   Patient is a 81-year-old male here for follow-up for his medications. He presently takes allopurinol 100 mg 2 tablets daily for his hyperuricemia.   He is on duloxetine 60 mg twice daily for his depression a tablet by mouth.  Qd        Past Medical History:   Diagnosis Date   • Erectile dysfunction    • Esophageal reflux    • High blood pressure    • High cholesterol    • Osteoarthrosis, unspecified whether generalized or localized, unspecified site    • Other COMP METABOLIC PANEL (14); Future    2. Stage 3a chronic kidney disease  Continue adequate blood pressure control  Continue Nephro-Rosalba 0.8 mg 1 tablet daily  - CBC WITH DIFFERENTIAL WITH PLATELET; Future  - COMP METABOLIC PANEL (14);  Future  - URIC ACID,

## 2021-03-01 RX ORDER — TAMSULOSIN HYDROCHLORIDE 0.4 MG/1
CAPSULE ORAL
Qty: 90 CAPSULE | Refills: 1 | Status: SHIPPED | OUTPATIENT
Start: 2021-03-01 | End: 2021-09-13

## 2021-03-03 DIAGNOSIS — M54.12 LEFT CERVICAL RADICULOPATHY: ICD-10-CM

## 2021-03-03 RX ORDER — HYDROCODONE BITARTRATE AND ACETAMINOPHEN 5; 325 MG/1; MG/1
1 TABLET ORAL EVERY 6 HOURS PRN
Qty: 60 TABLET | Refills: 0 | Status: SHIPPED | OUTPATIENT
Start: 2021-03-03 | End: 2021-04-02

## 2021-03-03 NOTE — TELEPHONE ENCOUNTER
Last fill 2.2 #60  Last ov 2.9   Return in about 6 months (around 8/9/2021) for Hypertension, Hypercholesterolemia, Hypothyroidism, Cardiac arrhythmia, Depression. Please approve if appropriate. Thanks.

## 2021-03-03 NOTE — TELEPHONE ENCOUNTER
Patient is requesting HYDROcodone-acetaminophen 5-325 MG Oral Tab sent to [facility] [address] [phone], [phone]. Please advise. Thank you.

## 2021-03-30 DIAGNOSIS — M54.12 LEFT CERVICAL RADICULOPATHY: ICD-10-CM

## 2021-03-30 NOTE — TELEPHONE ENCOUNTER
Last med visit 2/9/2021-advised follow up in 6 months  Med last ordered 3/3/2021 #60-refill request is early. DISCUSSED W DR CRUZ-STS DO NOT FILL EARLY-HOLD AND ROUTE TO HIM 4/2 MORNING. [facility]      HISTORY OF PRESENT ILLNESS: Ms. Dianna Cottrell is a 27-year-old quadriplegic woman known to our clinic for a right IT pressure ulcer that has been resolved for some time now. In the last month she has developed breakdown over her coccyx. At our last visit it was quite necrotic. After debridement and MeSalt dressings it is much  today.      DATE WOUND ACQUIRED: 6/2018     WOUND CARE: MeSalt      OFFLOADING: Sleeps on group 2 mattress. Just had chair mapped but was not mapped while tilted which she spends a lot of time doing.       REVIEW OF SYSTEMS:   CONSTITUTIONAL: Denies fever or chills  GI: denies nausea or vomiting      PAST MEDICAL HISTORY:  has a past medical history of Anemia; c5 burst fracture (12/18/2012); Compression fracture of L1 lumbar vertebra (H) (12/18/2012); Fracture of thoracic spine without spinal cord lesion (H) (12/18/2012); Hypertension (12/6/2016); and Vocal cord dysfunction.      MEDICATIONS:   Current Outpatient Prescriptions   Medication     acetaminophen (TYLENOL) 325 MG tablet     baclofen (LIORESAL) 10 MG tablet     BISACODYL     Cranberry 450 MG TABS     Docusate Sodium (COLACE PO)     ferrous sulfate (IRON) 325 (65 FE) MG tablet     fesoterodine fumarate (TOVIAZ) 4 MG TB24     gabapentin (NEURONTIN) 300 MG capsule     hydrOXYzine (VISTARIL) 25 MG capsule     midodrine (PROAMATINE) 1.25 mg     Multiple Vitamin (DAILY MULTIVITAMIN PO)     order for DME     order for DME     order for DME     order for DME     senna-docusate (SENOKOT-S;PERICOLACE) 8.6-50 MG per tablet     sertraline (ZOLOFT) 100 MG tablet     sertraline (ZOLOFT) 100 MG tablet     TRAZODONE HCL     levonorgestrel (MIRENA, 52 MG,) 20 MCG/24HR IUD     No current facility-administered medications for this encounter.      VITALS: /81  Pulse 74  Temp 98.5  F (36.9  C) (Temporal)  Resp 18      PHYSICAL EXAM:  GENERAL: Patient is alert and oriented and in no acute  distress  INTEGUMENTARY:   WOUND ASSESSMENT #3:     Location: coccyx                       Size: 1.4 cm x 1.6 cm with a depth of 0.8 cm    Drainage: copious amount of serosanguinous drainage    Wound description: slough overlying granulation tissue         PROCEDURE: Per standing protocol 4% topical lidocaine was applied to the wound by the [facility]. After informed consent was obtained, a surgical debridement was performed using a sharp curette down to and including necrotic muscle of <20 cm. Hemostasis was achieved with pressure. The patient tolerated the procedure well.        ASSESSMENT: Stage 3 pressure ulcer of sacrum      PLAN:   1. Dress with MeSalt and change daily  2. Recommend minimal time in chair  3. Recommend getting chair re-pressure mapped       FOLLOW-UP: 2-3 weeks      VANIA FERRER PA-C

## 2021-03-30 NOTE — TELEPHONE ENCOUNTER
Patient needs refill on:    - HYDROcodone-acetaminophen 5-325 MG Oral Tab        [facility] [address] [facility] [place], [facility], [phone] [[phone]]:

## 2021-04-02 RX ORDER — HYDROCODONE BITARTRATE AND ACETAMINOPHEN 5; 325 MG/1; MG/1
1 TABLET ORAL EVERY 6 HOURS PRN
Qty: 60 TABLET | Refills: 0 | Status: SHIPPED | OUTPATIENT
Start: 2021-04-02 | End: 2021-04-26

## 2021-04-09 DIAGNOSIS — Z23 NEED FOR VACCINATION: ICD-10-CM

## 2021-04-26 DIAGNOSIS — M54.12 LEFT CERVICAL RADICULOPATHY: ICD-10-CM

## 2021-04-26 RX ORDER — HYDROCODONE BITARTRATE AND ACETAMINOPHEN 5; 325 MG/1; MG/1
1 TABLET ORAL EVERY 6 HOURS PRN
Qty: 60 TABLET | Refills: 0 | Status: SHIPPED | OUTPATIENT
Start: 2021-04-26 | End: 2021-05-21

## 2021-04-26 NOTE — TELEPHONE ENCOUNTER
Med check schedule for 5/4.   Asking for refill of HYDROcodone-acetaminophen 5-[facility] MG [facility]

## 2021-05-04 ENCOUNTER — OFFICE VISIT (OUTPATIENT)
Dept: FAMILY MEDICINE CLINIC | Facility: CLINIC | Age: 76
End: 2021-05-04
Payer: MEDICARE

## 2021-05-04 VITALS
RESPIRATION RATE: 16 BRPM | HEART RATE: 87 BPM | HEIGHT: 69 IN | TEMPERATURE: 98 F | DIASTOLIC BLOOD PRESSURE: 82 MMHG | SYSTOLIC BLOOD PRESSURE: 128 MMHG | BODY MASS INDEX: 32.58 KG/M2 | WEIGHT: 220 LBS | OXYGEN SATURATION: 97 %

## 2021-05-04 DIAGNOSIS — E78.00 PURE HYPERCHOLESTEROLEMIA: ICD-10-CM

## 2021-05-04 DIAGNOSIS — F33.40 RECURRENT MAJOR DEPRESSIVE DISORDER, IN REMISSION (HCC): ICD-10-CM

## 2021-05-04 DIAGNOSIS — M10.9 GOUTY ARTHROPATHY: ICD-10-CM

## 2021-05-04 DIAGNOSIS — I10 BENIGN ESSENTIAL HYPERTENSION: Primary | ICD-10-CM

## 2021-05-04 DIAGNOSIS — N18.31 STAGE 3A CHRONIC KIDNEY DISEASE (HCC): ICD-10-CM

## 2021-05-04 DIAGNOSIS — E03.2 HYPOTHYROIDISM DUE TO MEDICATION: ICD-10-CM

## 2021-05-04 PROCEDURE — 99214 OFFICE O/P EST MOD 30 MIN: CPT | Performed by: FAMILY MEDICINE

## 2021-05-04 NOTE — PROGRESS NOTES
Abran Hashimoto is a 78year old male. HPI:   Patient is a 70-year-old male here for follow-up for his medications. He presently takes allopurinol 100 mg 2 tablets daily for his hyperuricemia.   He is on duloxetine 60 mg twice daily for his depression a Take 1 tablet (20 mg total) by mouth nightly. 90 tablet 0   • latanoprost 0.005 % Ophthalmic Solution Place 1 drop into both eyes nightly. 3 Bottle 10   • B COMPLEX-C-FOLIC ACID OR Take 1 tablet by mouth.  Qd        Past Medical History:   Diagnosis Date murmur  GI: good BS's,no masses, HSM or tenderness  EXTREMITIES: no cyanosis, clubbing or edema  . ASSESSMENT AND PLAN:   1. Benign essential hypertension  Continue metoprolol tartrate 25 mg 1 tablet twice a day    2.  Stage 3a chronic kidney disease  Cont

## 2021-05-11 ENCOUNTER — MED REC SCAN ONLY (OUTPATIENT)
Dept: FAMILY MEDICINE CLINIC | Facility: CLINIC | Age: 76
End: 2021-05-11

## 2021-05-20 DIAGNOSIS — M54.12 LEFT CERVICAL RADICULOPATHY: ICD-10-CM

## 2021-05-20 NOTE — TELEPHONE ENCOUNTER
Pt requesting refill of:     HYDROcodone-acetaminophen 5-325 MG Oral Tab    To be sent to:  [facility] [address] [facility] [address] 300 [facility] [phone], [phone]

## 2021-05-20 NOTE — TELEPHONE ENCOUNTER
Hydrocodone last ordered 4/26/21 #60 w sig take one tablet q 6 hrs prn po.  Dx: chronic pain  Last med visit 5/4/21-advised follow up 6 months  **see med refill pended for review

## 2021-05-21 RX ORDER — HYDROCODONE BITARTRATE AND ACETAMINOPHEN 5; 325 MG/1; MG/1
1 TABLET ORAL EVERY 6 HOURS PRN
Qty: 30 TABLET | Refills: 0 | Status: SHIPPED | OUTPATIENT
Start: 2021-05-21 | End: 2021-06-11

## 2021-05-25 VITALS
WEIGHT: 190 LBS | BODY MASS INDEX: 28.14 KG/M2 | BODY MASS INDEX: 28.14 KG/M2 | WEIGHT: 190 LBS | TEMPERATURE: 98.4 F | HEIGHT: 69 IN | HEIGHT: 69 IN

## 2021-06-11 DIAGNOSIS — M54.12 LEFT CERVICAL RADICULOPATHY: ICD-10-CM

## 2021-06-11 RX ORDER — HYDROCODONE BITARTRATE AND ACETAMINOPHEN 5; 325 MG/1; MG/1
1 TABLET ORAL EVERY 6 HOURS PRN
Qty: 30 TABLET | Refills: 0 | Status: SHIPPED | OUTPATIENT
Start: 2021-06-11 | End: 2021-07-07

## 2021-06-11 NOTE — TELEPHONE ENCOUNTER
Patient needs a refill on     HYDROcodone-acetaminophen 5-325 MG Oral     [facility] [address] [facility] [phone], [phone]

## 2021-06-11 NOTE — TELEPHONE ENCOUNTER
Last refill #30 on 5/21/2021  Last office visit pertaining to refill on 5/4/2021  Future Appointments   Date Time Provider Liam Rachael   6/30/2021  4:00 PM Tao Acosta MD [facility]   11/4/2021  1:00 PM Anjelica Aleman MD [facility] [address]

## 2021-06-24 RX ORDER — ALLOPURINOL 100 MG/1
TABLET ORAL
Qty: 180 TABLET | Refills: 1 | Status: SHIPPED | OUTPATIENT
Start: 2021-06-24 | End: 2021-12-21

## 2021-06-29 DIAGNOSIS — E03.2 HYPOTHYROIDISM DUE TO MEDICATION: ICD-10-CM

## 2021-06-30 RX ORDER — LEVOTHYROXINE SODIUM 0.07 MG/1
TABLET ORAL
Qty: 90 TABLET | Refills: 1 | Status: SHIPPED | OUTPATIENT
Start: 2021-06-30 | End: 2021-12-13

## 2021-07-07 DIAGNOSIS — M54.12 LEFT CERVICAL RADICULOPATHY: ICD-10-CM

## 2021-07-07 RX ORDER — HYDROCODONE BITARTRATE AND ACETAMINOPHEN 5; 325 MG/1; MG/1
1 TABLET ORAL EVERY 6 HOURS PRN
Qty: 30 TABLET | Refills: 0 | Status: SHIPPED | OUTPATIENT
Start: 2021-07-07 | End: 2021-08-17

## 2021-07-07 NOTE — TELEPHONE ENCOUNTER
Last med visit 5/4/21-advised follow up in 6 months  Med last ordered 6/11/21-one tablet q 6 hrs as needed for pain #30   Prior to that refills were 5/21, 4/26 and 4/2/21  **see med refill order pended for review-thanks!

## 2021-07-07 NOTE — TELEPHONE ENCOUNTER
Pt requesting a refill on     HYDROcodone-acetaminophen 5-325 MG Oral Tab 30 tablet 0 6/11/2021    Sig:   Take 1 tablet by mouth every 6 (six) hours as needed for Pain. No driving and no alcohol when taking medication.      Route:   Oral     PRN Reason(s):

## 2021-07-16 DIAGNOSIS — I48.0 PAROXYSMAL ATRIAL FIBRILLATION (HCC): ICD-10-CM

## 2021-07-22 NOTE — ED PROVIDER NOTES
Patient Seen in: [address] [facility]    History   Patient presents with:  Abdominal Pain    Stated Complaint: abdominal pain    HPI    There is a pleasant 72-year-old male who comes in for the evaluation of his abdominal pain for th OTHER SURGICAL HISTORY      Comment: cardiac cath   07/24/2017: OTHER SURGICAL HISTORY      Comment: flow us Dr Froyaln Doss: TONSILLECTOMY      Comment: w/adenoidectomy        Smoking status: Former Smoker Bilirubin, Urine Small (*)     Ketone, Urine Trace (*)     Protein urine 30  (*)     All other components within normal limits   POCT ISTAT CHEM8 CARTRIDGE - Normal       ED Course as of Mar 05 1604  ---------------------------------------------------- VASCULAR SURGERY CONSULT     HPI: 89y Female present to ED with lLLE edema, underwent TAVR recently, on nurse visit noticed light LLE edema, which prompted US showing left popliteal below knee DVT, mild edema denies oparesthesias, pain to leg, numbness, tingling, weakness. denies SOB, cough, palpitations. Denies previous blood clots or family history of clotting disorders      ROS: 10-system review is otherwise negative except HPI above.      PAST MEDICAL & SURGICAL HISTORY:  HTN (hypertension)    HLD (hyperlipidemia)    TIA (transient ischemic attack)    Aortic stenosis    Mitral regurgitation    Breast cancer  1979, just surgery, no chemo or radiation.    H/O left mastectomy    H/O cataract extraction    TAVR july 2021  FAMILY HISTORY:  No pertinent family history in first degree relatives      Family history not pertinent as reviewed with the patient.    SOCIAL HISTORY:  Denies any toxic habits    ALLERGIES: NKA No Known Allergies      HOME MEDICATIONS: ***  Home Medications:      --------------------------------------------------------------------------------------------    PHYSICAL EXAM:   General: NAD, Lying in bed comfortably, hard of hearing  Neuro: A+Ox3  HEENT: EOMI, PERRLA, MMM  Cardio: RRR  Resp: Non labored breathing on RA  GI/Abd: Soft, NT/ND, no rebound/guarding, no masses palpated  Vascular: All 4 extremities warm and well perfused. palpable bialteral DPs  Pelvis: stable  Musculoskeletal: All 4 extremities moving spontaneously, no limitations, no spinal tenderness. mild edema left leg, compartments soft, homans negative,   --------------------------------------------------------------------------------------------    LABS                 10.0   12.14  )----------(  185       ( 22 Jul 2021 20:52 )               30.2      135    |  100    |  19.0   ----------------------------<  108        ( 22 Jul 2021 20:52 )  4.2     |  24.0   |  0.70     Ca    9.2        ( 22 Jul 2021 20:52 )    TPro  6.7    /  Alb  3.7    /  TBili  1.0    /  DBili  x      /  AST  34     /  ALT  24     /  AlkPhos  99     ( 22 Jul 2021 20:52 )    LIVER FUNCTIONS - ( 22 Jul 2021 20:52 )  Alb: 3.7 g/dL / Pro: 6.7 g/dL / ALK PHOS: 99 U/L / ALT: 24 U/L / AST: 34 U/L / GGT: x                             --------------------------------------------------------------------------------------------  IMAGING  DVT US outside reviewed, left pop DVT below knee  ASSESSMENT: Patient is a 89y old female with recent TAVR on plavix, with left popliteal DVT< recent intervention, no signs of phlegmasia, given current age and status recommend anticoagulation but no surgical intervention, ok to F/U outpatient  PLAN:    - No vascular surgery admission  - Agree with anticoagulation, leg elevaiton and light compression  - Will f/u outpatient  - Plan discussed with Attending, Dr. Reveles

## 2021-08-17 DIAGNOSIS — M54.12 LEFT CERVICAL RADICULOPATHY: ICD-10-CM

## 2021-08-17 RX ORDER — HYDROCODONE BITARTRATE AND ACETAMINOPHEN 5; 325 MG/1; MG/1
1 TABLET ORAL EVERY 6 HOURS PRN
Qty: 30 TABLET | Refills: 0 | Status: SHIPPED | OUTPATIENT
Start: 2021-08-17 | End: 2021-09-01

## 2021-08-17 NOTE — TELEPHONE ENCOUNTER
Last refill for Norco was 07/07/21 # 30 0 refills. Pt requesting a refill. LOV 05/04/21. Next OV is 11/04/2021.

## 2021-08-17 NOTE — TELEPHONE ENCOUNTER
Pt requesting a refill on         HYDROcodone-acetaminophen 5-325 MG Oral Tab 30 tablet 0 7/7/2021    Sig:   Take 1 tablet by mouth every 6 (six) hours as needed for Pain. No driving and no alcohol when taking medication.      Route:   Oral     PRN Reason(s

## 2021-09-01 DIAGNOSIS — M54.12 LEFT CERVICAL RADICULOPATHY: ICD-10-CM

## 2021-09-01 RX ORDER — HYDROCODONE BITARTRATE AND ACETAMINOPHEN 5; 325 MG/1; MG/1
1 TABLET ORAL EVERY 6 HOURS PRN
Qty: 30 TABLET | Refills: 0 | Status: SHIPPED | OUTPATIENT
Start: 2021-09-01 | End: 2021-09-27

## 2021-09-01 NOTE — TELEPHONE ENCOUNTER
Pt would like refill of HYDROcodone-acetaminophen 5-325 MG Oral Tab sent to [facility] [address] [facility], [address] [phone], [phone] thank you

## 2021-09-01 NOTE — TELEPHONE ENCOUNTER
LOV: 05/04/21   ---> HTN    LF: 08/17/21, Q6H PRN  HYDROcodone-acetaminophen 5-325 MG Oral Tab 30 tablet 0     NOV: 11/04/21    Call to pt to advise of outstanding lab orders that pt should complete. Call to pts cell. Reaches identified VM.  Per HIPAA cons

## 2021-09-05 NOTE — PROGRESS NOTES
[facility]                INFECTIOUS DISEASE PROGRESS NOTE    Kendra Blankenship Patient Status:  Inpatient    3/31/1945 MRN [id number]   [facility] 4SW-A Attending Timbo Mckeon MD   [address] PCP Jessica Obrien MD     A 12/08/19  4:30 PM   Result Value Ref Range    Body Fluid Culture Result No Growth 1 Day N/A    Body Fld Smear No organisms seen N/A    Body Fld Smear 1+ Neutrophils seen N/A    Body Fld Smear This is a cytocentrifuged smear. N/A   2.  BLOOD CULTURE     Stat thora/exudative  Ascites present, no signs of empyema  Repeat CXR stable, pulm following    3. ARF/HD per renal    4.  Venous doppler pending      Paco Obrien MD  [facility]  [phone] John Flor)  Vascular Surgery  [address] [facility], [place] [address]  Phone: [phone]  Fax: [phone]  Follow Up Time:

## 2021-09-09 ENCOUNTER — LAB ENCOUNTER (OUTPATIENT)
Dept: LAB | Age: 76
End: 2021-09-09
Attending: FAMILY MEDICINE
Payer: MEDICARE

## 2021-09-09 DIAGNOSIS — E03.2 HYPOTHYROIDISM DUE TO MEDICATION: ICD-10-CM

## 2021-09-09 DIAGNOSIS — M10.9 GOUTY ARTHROPATHY: ICD-10-CM

## 2021-09-09 DIAGNOSIS — I10 BENIGN ESSENTIAL HYPERTENSION: ICD-10-CM

## 2021-09-09 DIAGNOSIS — E78.00 PURE HYPERCHOLESTEROLEMIA: ICD-10-CM

## 2021-09-09 DIAGNOSIS — N18.31 STAGE 3A CHRONIC KIDNEY DISEASE (HCC): ICD-10-CM

## 2021-09-09 DIAGNOSIS — K21.9 GASTROESOPHAGEAL REFLUX DISEASE WITHOUT ESOPHAGITIS: ICD-10-CM

## 2021-09-09 LAB
ALBUMIN SERPL-MCNC: 3.6 G/DL (ref 3.4–5)
ALBUMIN/GLOB SERPL: 1.2 {RATIO} (ref 1–2)
ALP LIVER SERPL-CCNC: 93 U/L
ALT SERPL-CCNC: 18 U/L
ANION GAP SERPL CALC-SCNC: 5 MMOL/L (ref 0–18)
AST SERPL-CCNC: 14 U/L (ref 15–37)
BASOPHILS # BLD AUTO: 0.11 X10(3) UL (ref 0–0.2)
BASOPHILS NFR BLD AUTO: 1.3 %
BILIRUB SERPL-MCNC: 0.8 MG/DL (ref 0.1–2)
BUN BLD-MCNC: 15 MG/DL (ref 7–18)
CALCIUM BLD-MCNC: 8.8 MG/DL (ref 8.5–10.1)
CHLORIDE SERPL-SCNC: 109 MMOL/L (ref 98–112)
CHOLEST SMN-MCNC: 154 MG/DL (ref ?–200)
CO2 SERPL-SCNC: 26 MMOL/L (ref 21–32)
CREAT BLD-MCNC: 1.37 MG/DL
EOSINOPHIL # BLD AUTO: 0.22 X10(3) UL (ref 0–0.7)
EOSINOPHIL NFR BLD AUTO: 2.5 %
ERYTHROCYTE [DISTWIDTH] IN BLOOD BY AUTOMATED COUNT: 13.9 %
GLOBULIN PLAS-MCNC: 2.9 G/DL (ref 2.8–4.4)
GLUCOSE BLD-MCNC: 98 MG/DL (ref 70–99)
HCT VFR BLD AUTO: 52.8 %
HDLC SERPL-MCNC: 42 MG/DL (ref 40–59)
HGB BLD-MCNC: 16.8 G/DL
IMM GRANULOCYTES # BLD AUTO: 0.06 X10(3) UL (ref 0–1)
IMM GRANULOCYTES NFR BLD: 0.7 %
LDLC SERPL CALC-MCNC: 70 MG/DL (ref ?–100)
LYMPHOCYTES # BLD AUTO: 2.15 X10(3) UL (ref 1–4)
LYMPHOCYTES NFR BLD AUTO: 24.9 %
M PROTEIN MFR SERPL ELPH: 6.5 G/DL (ref 6.4–8.2)
MCH RBC QN AUTO: 31.2 PG (ref 26–34)
MCHC RBC AUTO-ENTMCNC: 31.8 G/DL (ref 31–37)
MCV RBC AUTO: 98 FL
MONOCYTES # BLD AUTO: 1.28 X10(3) UL (ref 0.1–1)
MONOCYTES NFR BLD AUTO: 14.8 %
NEUTROPHILS # BLD AUTO: 4.83 X10 (3) UL (ref 1.5–7.7)
NEUTROPHILS # BLD AUTO: 4.83 X10(3) UL (ref 1.5–7.7)
NEUTROPHILS NFR BLD AUTO: 55.8 %
NONHDLC SERPL-MCNC: 112 MG/DL (ref ?–130)
OSMOLALITY SERPL CALC.SUM OF ELEC: 291 MOSM/KG (ref 275–295)
PATIENT FASTING Y/N/NP: YES
PATIENT FASTING Y/N/NP: YES
PLATELET # BLD AUTO: 219 10(3)UL (ref 150–450)
POTASSIUM SERPL-SCNC: 4 MMOL/L (ref 3.5–5.1)
RBC # BLD AUTO: 5.39 X10(6)UL
SODIUM SERPL-SCNC: 140 MMOL/L (ref 136–145)
T4 FREE SERPL-MCNC: 0.8 NG/DL (ref 0.8–1.7)
TRIGL SERPL-MCNC: 261 MG/DL (ref 30–149)
TSI SER-ACNC: 1.48 MIU/ML (ref 0.36–3.74)
URATE SERPL-MCNC: 5.7 MG/DL
VLDLC SERPL CALC-MCNC: 40 MG/DL (ref 0–30)
WBC # BLD AUTO: 8.7 X10(3) UL (ref 4–11)

## 2021-09-09 PROCEDURE — 85025 COMPLETE CBC W/AUTO DIFF WBC: CPT

## 2021-09-09 PROCEDURE — 84439 ASSAY OF FREE THYROXINE: CPT

## 2021-09-09 PROCEDURE — 80061 LIPID PANEL: CPT

## 2021-09-09 PROCEDURE — 84550 ASSAY OF BLOOD/URIC ACID: CPT

## 2021-09-09 PROCEDURE — 36415 COLL VENOUS BLD VENIPUNCTURE: CPT

## 2021-09-09 PROCEDURE — 80053 COMPREHEN METABOLIC PANEL: CPT

## 2021-09-09 PROCEDURE — 84443 ASSAY THYROID STIM HORMONE: CPT

## 2021-09-13 RX ORDER — TAMSULOSIN HYDROCHLORIDE 0.4 MG/1
CAPSULE ORAL
Qty: 90 CAPSULE | Refills: 1 | Status: SHIPPED | OUTPATIENT
Start: 2021-09-13

## 2021-09-27 DIAGNOSIS — M54.12 LEFT CERVICAL RADICULOPATHY: ICD-10-CM

## 2021-09-27 RX ORDER — HYDROCODONE BITARTRATE AND ACETAMINOPHEN 5; 325 MG/1; MG/1
1 TABLET ORAL EVERY 6 HOURS PRN
Qty: 30 TABLET | Refills: 0 | Status: SHIPPED | OUTPATIENT
Start: 2021-09-27 | End: 2021-10-14

## 2021-10-14 DIAGNOSIS — M54.12 LEFT CERVICAL RADICULOPATHY: ICD-10-CM

## 2021-10-14 RX ORDER — HYDROCODONE BITARTRATE AND ACETAMINOPHEN 5; 325 MG/1; MG/1
1 TABLET ORAL EVERY 6 HOURS PRN
Qty: 30 TABLET | Refills: 0 | Status: SHIPPED | OUTPATIENT
Start: 2021-10-14 | End: 2021-11-07

## 2021-11-04 ENCOUNTER — OFFICE VISIT (OUTPATIENT)
Dept: FAMILY MEDICINE CLINIC | Facility: CLINIC | Age: 76
End: 2021-11-04
Payer: MEDICARE

## 2021-11-04 VITALS
HEIGHT: 69 IN | DIASTOLIC BLOOD PRESSURE: 78 MMHG | BODY MASS INDEX: 34.07 KG/M2 | RESPIRATION RATE: 16 BRPM | TEMPERATURE: 98 F | SYSTOLIC BLOOD PRESSURE: 126 MMHG | WEIGHT: 230 LBS | HEART RATE: 80 BPM

## 2021-11-04 DIAGNOSIS — K21.9 GASTROESOPHAGEAL REFLUX DISEASE WITHOUT ESOPHAGITIS: ICD-10-CM

## 2021-11-04 DIAGNOSIS — E78.00 PURE HYPERCHOLESTEROLEMIA: ICD-10-CM

## 2021-11-04 DIAGNOSIS — E03.2 HYPOTHYROIDISM DUE TO MEDICATION: ICD-10-CM

## 2021-11-04 DIAGNOSIS — Z00.00 ENCOUNTER FOR ANNUAL HEALTH EXAMINATION: Primary | ICD-10-CM

## 2021-11-04 DIAGNOSIS — M10.9 GOUTY ARTHROPATHY: ICD-10-CM

## 2021-11-04 DIAGNOSIS — I10 BENIGN ESSENTIAL HYPERTENSION: ICD-10-CM

## 2021-11-04 DIAGNOSIS — M54.12 LEFT CERVICAL RADICULOPATHY: ICD-10-CM

## 2021-11-04 PROCEDURE — G0439 PPPS, SUBSEQ VISIT: HCPCS | Performed by: FAMILY MEDICINE

## 2021-11-04 PROCEDURE — 99213 OFFICE O/P EST LOW 20 MIN: CPT | Performed by: FAMILY MEDICINE

## 2021-11-04 NOTE — PATIENT INSTRUCTIONS
Steve Ellis's SCREENING SCHEDULE   Tests on this list are recommended by your physician but may not be covered, or covered at this frequency, by your insurer. Please check with your insurance carrier before scheduling to verify coverage.    PREVEN Each vaccine ([id number] & [id number]) covered once after 65 Prevnar 13: 12/01/2016    [id number]: -     Pneumococcal Vaccination(1 of 2 - PPSV23) due on 01/26/2017    Hepatitis B One screening covered for patients with certain risk factors   -  No recomm

## 2021-11-04 NOTE — PROGRESS NOTES
HPI:   Herve Gonsalez is a 78year old male who presents for a Medicare Subsequent Annual Wellness visit (Pt already had Initial Annual Wellness). Patient is here for his subsequent Medicare annual wellness visit. We again discussed his weight.   Cortez Flores [amoclan], lipitor [atorvastatin calcium], and lisinopril. CURRENT MEDICATIONS:   HYDROcodone-acetaminophen 5-325 MG Oral Tab, Take 1 tablet by mouth every 6 (six) hours as needed for Pain. No driving and no alcohol when taking medication.   tamsulosin ( mother; Cancer in his maternal grandfather; Lipids in his mother; Psychiatric (age of onset: 79) in his mother; Thyroid disease in his daughter; asthma in his father; leukemia in his paternal grandfather.    SOCIAL HISTORY:   He  reports that he quit [employer] thyromegaly present. Cardiovascular: Normal rate and regular rhythm. No murmur heard. Pulmonary/Chest: Effort normal and breath sounds normal.   Neurological: He is alert and oriented to person, place, and time.    Psychiatric: He has a normal mood an file in Epic:    Toby Quezada has a Power of  for [employer] on file in [place]. PLAN:  The patient indicates understanding of these issues and agrees to the plan.     Return in about 6 months (around 5/4/2022) for Hypertension, Hyperc here.  Cognitive Assessment                                      This section provided for quick review of chart, separate sheet to patient  [address] Internal Lab or Procedure External Lab or Procedure   Diabetes Scre External Lab or Procedure   Annual Monitoring of Persistent     Medications (ACE/ARB, digoxin diuretics, anticonvulsants.)    Potassium  Annually Potassium (mmol/L)   Date Value   09/09/2021 4.0     POTASSIUM (mmol/L)   Date Value   07/05/2014 5.0   11/17/

## 2021-11-07 PROBLEM — I47.1 SVT (SUPRAVENTRICULAR TACHYCARDIA) (HCC): Status: RESOLVED | Noted: 2019-11-25 | Resolved: 2021-11-07

## 2021-11-07 PROBLEM — K85.90 ACUTE PANCREATITIS (HCC): Status: RESOLVED | Noted: 2019-11-25 | Resolved: 2021-11-07

## 2021-11-07 PROBLEM — T85.518A CHOLECYSTOSTOMY TUBE DYSFUNCTION: Status: RESOLVED | Noted: 2020-05-08 | Resolved: 2021-11-07

## 2021-11-07 PROBLEM — D72.829 LEUKOCYTOSIS: Status: RESOLVED | Noted: 2019-11-25 | Resolved: 2021-11-07

## 2021-11-07 PROBLEM — G89.29 CHRONIC PAIN OF LEFT KNEE: Status: RESOLVED | Noted: 2017-08-27 | Resolved: 2021-11-07

## 2021-11-07 PROBLEM — J20.9 ACUTE BRONCHITIS, UNSPECIFIED ORGANISM: Status: RESOLVED | Noted: 2018-11-22 | Resolved: 2021-11-07

## 2021-11-07 PROBLEM — D72.829 LEUKOCYTOSIS, UNSPECIFIED TYPE: Status: RESOLVED | Noted: 2018-11-22 | Resolved: 2021-11-07

## 2021-11-07 PROBLEM — K81.9 CHOLECYSTITIS: Status: RESOLVED | Noted: 2020-03-03 | Resolved: 2021-11-07

## 2021-11-07 PROBLEM — E87.20 METABOLIC ACIDOSIS: Status: RESOLVED | Noted: 2019-11-25 | Resolved: 2021-11-07

## 2021-11-07 PROBLEM — R79.89 AZOTEMIA: Status: RESOLVED | Noted: 2019-11-25 | Resolved: 2021-11-07

## 2021-11-07 PROBLEM — K85.90 ACUTE PANCREATITIS: Status: RESOLVED | Noted: 2019-11-25 | Resolved: 2021-11-07

## 2021-11-07 PROBLEM — D64.9 ANEMIA: Status: RESOLVED | Noted: 2020-03-03 | Resolved: 2021-11-07

## 2021-11-07 PROBLEM — J20.9 ACUTE BRONCHITIS: Status: RESOLVED | Noted: 2018-11-22 | Resolved: 2021-11-07

## 2021-11-07 PROBLEM — E87.6 HYPOKALEMIA: Status: RESOLVED | Noted: 2019-11-25 | Resolved: 2021-11-07

## 2021-11-07 PROBLEM — E87.2 METABOLIC ACIDOSIS: Status: RESOLVED | Noted: 2019-11-25 | Resolved: 2021-11-07

## 2021-11-07 PROBLEM — I47.10 SVT (SUPRAVENTRICULAR TACHYCARDIA) (HCC): Status: RESOLVED | Noted: 2019-11-25 | Resolved: 2021-11-07

## 2021-11-07 PROBLEM — K59.00 CONSTIPATION: Status: RESOLVED | Noted: 2020-02-29 | Resolved: 2021-11-07

## 2021-11-07 PROBLEM — I47.10 SVT (SUPRAVENTRICULAR TACHYCARDIA): Status: RESOLVED | Noted: 2019-11-25 | Resolved: 2021-11-07

## 2021-11-07 PROBLEM — R73.9 HYPERGLYCEMIA: Status: RESOLVED | Noted: 2019-11-25 | Resolved: 2021-11-07

## 2021-11-07 PROBLEM — K85.90 ACUTE PANCREATITIS, UNSPECIFIED COMPLICATION STATUS, UNSPECIFIED PANCREATITIS TYPE (HCC): Status: RESOLVED | Noted: 2019-11-25 | Resolved: 2021-11-07

## 2021-11-07 PROBLEM — R10.9 ABDOMINAL PAIN: Status: RESOLVED | Noted: 2020-02-29 | Resolved: 2021-11-07

## 2021-11-07 PROBLEM — K85.90 ACUTE PANCREATITIS, UNSPECIFIED COMPLICATION STATUS, UNSPECIFIED PANCREATITIS TYPE: Status: RESOLVED | Noted: 2019-11-25 | Resolved: 2021-11-07

## 2021-11-07 PROBLEM — M25.562 CHRONIC PAIN OF LEFT KNEE: Status: RESOLVED | Noted: 2017-08-27 | Resolved: 2021-11-07

## 2021-11-07 RX ORDER — HYDROCODONE BITARTRATE AND ACETAMINOPHEN 5; 325 MG/1; MG/1
1 TABLET ORAL EVERY 6 HOURS PRN
Qty: 30 TABLET | Refills: 0 | Status: SHIPPED | OUTPATIENT
Start: 2021-11-07 | End: 2021-12-02

## 2021-12-02 DIAGNOSIS — M54.12 LEFT CERVICAL RADICULOPATHY: ICD-10-CM

## 2021-12-02 RX ORDER — HYDROCODONE BITARTRATE AND ACETAMINOPHEN 5; 325 MG/1; MG/1
1 TABLET ORAL EVERY 6 HOURS PRN
Qty: 30 TABLET | Refills: 0 | Status: SHIPPED | OUTPATIENT
Start: 2021-12-02 | End: 2022-01-06

## 2021-12-02 NOTE — TELEPHONE ENCOUNTER
Pt requesting refill of:    HYDROcodone-acetaminophen 5-325 MG Oral Tab    Be [facility]:      [facility] [address] [facility] [phone], [phone]

## 2021-12-02 NOTE — TELEPHONE ENCOUNTER
Last fill 11/7 #30     Return in about 6 months (around 5/4/2022) for Hypertension, Hypercholesterolemia    Please approve if appropriate. Thanks.

## 2021-12-11 DIAGNOSIS — E03.2 HYPOTHYROIDISM DUE TO MEDICATION: ICD-10-CM

## 2021-12-13 RX ORDER — LEVOTHYROXINE SODIUM 0.07 MG/1
TABLET ORAL
Qty: 90 TABLET | Refills: 1 | Status: SHIPPED | OUTPATIENT
Start: 2021-12-13

## 2021-12-13 NOTE — TELEPHONE ENCOUNTER
LOV: 11/4/21  for: physical  Patient advised to RTC on:  6 months  Previous Rx:  Levothyroxine 75mcgà Sig: Take 1 tablet by mouth every day. Disp #90/1 refills.   LF: 6/30/21

## 2021-12-21 RX ORDER — ALLOPURINOL 100 MG/1
TABLET ORAL
Qty: 180 TABLET | Refills: 1 | Status: SHIPPED | OUTPATIENT
Start: 2021-12-21

## 2021-12-21 NOTE — TELEPHONE ENCOUNTER
LOV: 11/4/21  for: Physical  Patient advised to RTC on:  6 months  Previous Rx:  Allopurinol 100mgà Sig: Take 2 tablets by mouth every day. Disp #180/1 refills.   LF: 6/24/21

## 2022-01-05 DIAGNOSIS — I48.0 PAROXYSMAL ATRIAL FIBRILLATION (HCC): ICD-10-CM

## 2022-01-06 DIAGNOSIS — M54.12 LEFT CERVICAL RADICULOPATHY: ICD-10-CM

## 2022-01-06 RX ORDER — HYDROCODONE BITARTRATE AND ACETAMINOPHEN 5; 325 MG/1; MG/1
1 TABLET ORAL EVERY 6 HOURS PRN
Qty: 30 TABLET | Refills: 0 | Status: SHIPPED | OUTPATIENT
Start: 2022-01-06 | End: 2022-02-01

## 2022-01-06 NOTE — TELEPHONE ENCOUNTER
Last refill was 12/02/2021 # 30 0 refills. LOV 11/04/2021.  Refill for [place] pended for approval.

## 2022-01-06 NOTE — TELEPHONE ENCOUNTER
Pt would like refill of HYDROcodone-acetaminophen 5-325 MG Oral Tab sent to [facility] [address] [place] [address], [address] [facility] [phone], [phone] thank you.

## 2022-01-10 ENCOUNTER — MED REC SCAN ONLY (OUTPATIENT)
Dept: FAMILY MEDICINE CLINIC | Facility: CLINIC | Age: 77
End: 2022-01-10

## 2022-02-01 RX ORDER — HYDROCODONE BITARTRATE AND ACETAMINOPHEN 5; 325 MG/1; MG/1
1 TABLET ORAL EVERY 6 HOURS PRN
Qty: 30 TABLET | Refills: 0 | Status: SHIPPED | OUTPATIENT
Start: 2022-02-01 | End: 2022-03-02

## 2022-02-01 NOTE — TELEPHONE ENCOUNTER
Last fill 1.6 #30  Last ov 11.4   Return in about 6 months (around 5/4/2022) for Hypertension, Hypercholesterolemia    Please approve if appropriate. Thanks.
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
## 2022-02-14 ENCOUNTER — LAB ENCOUNTER (OUTPATIENT)
Dept: LAB | Age: 77
End: 2022-02-14
Attending: FAMILY MEDICINE
Payer: MEDICARE

## 2022-02-14 DIAGNOSIS — E78.00 PURE HYPERCHOLESTEROLEMIA: ICD-10-CM

## 2022-02-14 DIAGNOSIS — I10 BENIGN ESSENTIAL HYPERTENSION: ICD-10-CM

## 2022-02-14 DIAGNOSIS — E03.2 HYPOTHYROIDISM DUE TO MEDICATION: ICD-10-CM

## 2022-02-14 DIAGNOSIS — M10.9 GOUTY ARTHROPATHY: ICD-10-CM

## 2022-02-14 LAB
ALBUMIN SERPL-MCNC: 4 G/DL (ref 3.4–5)
ALBUMIN/GLOB SERPL: 1.4 {RATIO} (ref 1–2)
ALP LIVER SERPL-CCNC: 91 U/L
ALT SERPL-CCNC: 24 U/L
ANION GAP SERPL CALC-SCNC: 7 MMOL/L (ref 0–18)
AST SERPL-CCNC: 14 U/L (ref 15–37)
BILIRUB SERPL-MCNC: 1 MG/DL (ref 0.1–2)
BUN BLD-MCNC: 31 MG/DL (ref 7–18)
CALCIUM BLD-MCNC: 10 MG/DL (ref 8.5–10.1)
CHLORIDE SERPL-SCNC: 105 MMOL/L (ref 98–112)
CHOLEST SERPL-MCNC: 185 MG/DL (ref ?–200)
CO2 SERPL-SCNC: 27 MMOL/L (ref 21–32)
CREAT BLD-MCNC: 1.49 MG/DL
FASTING PATIENT LIPID ANSWER: YES
FASTING STATUS PATIENT QL REPORTED: YES
GLOBULIN PLAS-MCNC: 2.9 G/DL (ref 2.8–4.4)
GLUCOSE BLD-MCNC: 95 MG/DL (ref 70–99)
HDLC SERPL-MCNC: 54 MG/DL (ref 40–59)
LDLC SERPL CALC-MCNC: 88 MG/DL (ref ?–100)
NONHDLC SERPL-MCNC: 131 MG/DL (ref ?–130)
OSMOLALITY SERPL CALC.SUM OF ELEC: 294 MOSM/KG (ref 275–295)
POTASSIUM SERPL-SCNC: 4.4 MMOL/L (ref 3.5–5.1)
PROT SERPL-MCNC: 6.9 G/DL (ref 6.4–8.2)
SODIUM SERPL-SCNC: 139 MMOL/L (ref 136–145)
T4 FREE SERPL-MCNC: 1.1 NG/DL (ref 0.8–1.7)
TRIGL SERPL-MCNC: 258 MG/DL (ref 30–149)
TSI SER-ACNC: 1.34 MIU/ML (ref 0.36–3.74)
URATE SERPL-MCNC: 5.2 MG/DL
VLDLC SERPL CALC-MCNC: 42 MG/DL (ref 0–30)

## 2022-02-14 PROCEDURE — 84439 ASSAY OF FREE THYROXINE: CPT

## 2022-02-14 PROCEDURE — 80061 LIPID PANEL: CPT

## 2022-02-14 PROCEDURE — 84443 ASSAY THYROID STIM HORMONE: CPT

## 2022-02-14 PROCEDURE — 84550 ASSAY OF BLOOD/URIC ACID: CPT

## 2022-02-14 PROCEDURE — 80053 COMPREHEN METABOLIC PANEL: CPT

## 2022-02-14 PROCEDURE — 36415 COLL VENOUS BLD VENIPUNCTURE: CPT

## 2022-02-23 ENCOUNTER — MED REC SCAN ONLY (OUTPATIENT)
Dept: FAMILY MEDICINE CLINIC | Facility: CLINIC | Age: 77
End: 2022-02-23

## 2022-03-02 RX ORDER — HYDROCODONE BITARTRATE AND ACETAMINOPHEN 5; 325 MG/1; MG/1
1 TABLET ORAL EVERY 6 HOURS PRN
Qty: 30 TABLET | Refills: 0 | Status: SHIPPED | OUTPATIENT
Start: 2022-03-02 | End: 2022-03-29

## 2022-03-02 NOTE — TELEPHONE ENCOUNTER
Pt requesting refill of:    HYDROcodone-acetaminophen 5-325 MG Oral Tab    Be [facility]:    [facility] [address] [facility] [phone], [phone]

## 2022-03-02 NOTE — TELEPHONE ENCOUNTER
Last OV/cps 11/4/21-reviewed meds, advised follow up in 6 months. Has med visit scheduled 5/16/22  norco last ordered 2/1/22-1 tablet po every 6 hours prn #30 no RF  **see med refill pended for review-thanks!

## 2022-03-16 RX ORDER — TAMSULOSIN HYDROCHLORIDE 0.4 MG/1
CAPSULE ORAL
Qty: 90 CAPSULE | Refills: 0 | Status: SHIPPED | OUTPATIENT
Start: 2022-03-16

## 2022-03-29 ENCOUNTER — TELEPHONE (OUTPATIENT)
Dept: FAMILY MEDICINE CLINIC | Facility: CLINIC | Age: 77
End: 2022-03-29

## 2022-03-29 RX ORDER — HYDROCODONE BITARTRATE AND ACETAMINOPHEN 5; 325 MG/1; MG/1
1 TABLET ORAL EVERY 6 HOURS PRN
Qty: 30 TABLET | Refills: 0 | Status: SHIPPED | OUTPATIENT
Start: 2022-03-29

## 2022-03-29 NOTE — TELEPHONE ENCOUNTER
Pt would like refill of HYDROcodone-acetaminophen 5-325 MG Oral Tab sent to [facility] [address] [place] [address], 801 [facility] [phone], [phone] thank you.

## 2022-04-18 RX ORDER — HYDROCODONE BITARTRATE AND ACETAMINOPHEN 5; 325 MG/1; MG/1
1 TABLET ORAL EVERY 6 HOURS PRN
Qty: 30 TABLET | Refills: 0 | Status: SHIPPED | OUTPATIENT
Start: 2022-04-18

## 2022-04-18 NOTE — TELEPHONE ENCOUNTER
Pt requesting refill of HYDROcodone-acetaminophen 5-325 MG Oral Tab.     Upcoming med check on 5/16    Please advise

## 2022-05-16 ENCOUNTER — OFFICE VISIT (OUTPATIENT)
Dept: FAMILY MEDICINE CLINIC | Facility: CLINIC | Age: 77
End: 2022-05-16
Payer: MEDICARE

## 2022-05-16 VITALS
SYSTOLIC BLOOD PRESSURE: 124 MMHG | HEIGHT: 68 IN | WEIGHT: 230 LBS | HEART RATE: 84 BPM | DIASTOLIC BLOOD PRESSURE: 72 MMHG | BODY MASS INDEX: 34.86 KG/M2 | RESPIRATION RATE: 16 BRPM | TEMPERATURE: 99 F

## 2022-05-16 DIAGNOSIS — I48.0 PAROXYSMAL ATRIAL FIBRILLATION (HCC): ICD-10-CM

## 2022-05-16 DIAGNOSIS — K21.9 GASTROESOPHAGEAL REFLUX DISEASE WITHOUT ESOPHAGITIS: ICD-10-CM

## 2022-05-16 DIAGNOSIS — M10.9 GOUTY ARTHROPATHY: ICD-10-CM

## 2022-05-16 DIAGNOSIS — N40.1 BENIGN NON-NODULAR PROSTATIC HYPERPLASIA WITH LOWER URINARY TRACT SYMPTOMS: ICD-10-CM

## 2022-05-16 DIAGNOSIS — F33.40 RECURRENT MAJOR DEPRESSIVE DISORDER, IN REMISSION (HCC): ICD-10-CM

## 2022-05-16 DIAGNOSIS — N18.31 STAGE 3A CHRONIC KIDNEY DISEASE (HCC): ICD-10-CM

## 2022-05-16 DIAGNOSIS — E78.00 PURE HYPERCHOLESTEROLEMIA: ICD-10-CM

## 2022-05-16 DIAGNOSIS — I10 BENIGN ESSENTIAL HYPERTENSION: Primary | ICD-10-CM

## 2022-05-16 DIAGNOSIS — E03.2 HYPOTHYROIDISM DUE TO MEDICATION: ICD-10-CM

## 2022-05-16 DIAGNOSIS — M54.12 LEFT CERVICAL RADICULOPATHY: ICD-10-CM

## 2022-05-16 PROCEDURE — 99214 OFFICE O/P EST MOD 30 MIN: CPT | Performed by: FAMILY MEDICINE

## 2022-05-16 RX ORDER — HYDROCODONE BITARTRATE AND ACETAMINOPHEN 5; 325 MG/1; MG/1
1 TABLET ORAL EVERY 6 HOURS PRN
Qty: 30 TABLET | Refills: 0 | Status: SHIPPED | OUTPATIENT
Start: 2022-05-16

## 2022-06-06 DIAGNOSIS — M54.12 LEFT CERVICAL RADICULOPATHY: ICD-10-CM

## 2022-06-06 RX ORDER — HYDROCODONE BITARTRATE AND ACETAMINOPHEN 5; 325 MG/1; MG/1
1 TABLET ORAL EVERY 6 HOURS PRN
Qty: 30 TABLET | Refills: 0 | Status: SHIPPED | OUTPATIENT
Start: 2022-06-06

## 2022-06-06 NOTE — TELEPHONE ENCOUNTER
Pt would like refill of HYDROcodone-acetaminophen 5-325 MG Oral Tab sent to [facility] [address] [place] [address], [address] [place] [address] [phone], [phone]. Thank you.

## 2022-06-07 DIAGNOSIS — N40.1 BENIGN NON-NODULAR PROSTATIC HYPERPLASIA WITH LOWER URINARY TRACT SYMPTOMS: ICD-10-CM

## 2022-06-07 RX ORDER — TAMSULOSIN HYDROCHLORIDE 0.4 MG/1
CAPSULE ORAL
Qty: 90 CAPSULE | Refills: 0 | Status: SHIPPED | OUTPATIENT
Start: 2022-06-07

## 2022-06-27 RX ORDER — ALLOPURINOL 100 MG/1
TABLET ORAL
Qty: 180 TABLET | Refills: 1 | Status: SHIPPED | OUTPATIENT
Start: 2022-06-27

## 2022-07-01 DIAGNOSIS — E03.2 HYPOTHYROIDISM DUE TO MEDICATION: ICD-10-CM

## 2022-07-01 RX ORDER — LEVOTHYROXINE SODIUM 0.07 MG/1
TABLET ORAL
Qty: 90 TABLET | Refills: 1 | Status: SHIPPED | OUTPATIENT
Start: 2022-07-01

## 2022-07-22 DIAGNOSIS — M54.12 LEFT CERVICAL RADICULOPATHY: ICD-10-CM

## 2022-07-22 NOTE — TELEPHONE ENCOUNTER
Pt called to request a refill on    HYDROcodone-acetaminophen 5-325 MG Oral Tab    Be sent to    [facility] [address] [facility], [address] [phone], [facility] [place] [address]   Phone: [phone] Fax: [phone]   Hours: Not open 24 hours     Pt doesn't have any meds left

## 2022-07-23 RX ORDER — HYDROCODONE BITARTRATE AND ACETAMINOPHEN 5; 325 MG/1; MG/1
1 TABLET ORAL EVERY 6 HOURS PRN
Qty: 30 TABLET | Refills: 0 | Status: SHIPPED | OUTPATIENT
Start: 2022-07-23

## 2022-08-12 DIAGNOSIS — I48.0 PAROXYSMAL ATRIAL FIBRILLATION (HCC): ICD-10-CM

## 2022-08-23 ENCOUNTER — LAB ENCOUNTER (OUTPATIENT)
Dept: LAB | Age: 77
End: 2022-08-23
Attending: FAMILY MEDICINE
Payer: MEDICARE

## 2022-08-23 DIAGNOSIS — R73.9 HYPERGLYCEMIA: ICD-10-CM

## 2022-08-23 DIAGNOSIS — I10 BENIGN ESSENTIAL HYPERTENSION: ICD-10-CM

## 2022-08-23 DIAGNOSIS — N40.1 BENIGN NON-NODULAR PROSTATIC HYPERPLASIA WITH LOWER URINARY TRACT SYMPTOMS: ICD-10-CM

## 2022-08-23 DIAGNOSIS — R73.9 HYPERGLYCEMIA: Primary | ICD-10-CM

## 2022-08-23 DIAGNOSIS — G60.9 IDIOPATHIC PERIPHERAL NEUROPATHY: ICD-10-CM

## 2022-08-23 DIAGNOSIS — E03.2 HYPOTHYROIDISM DUE TO MEDICATION: ICD-10-CM

## 2022-08-23 DIAGNOSIS — M10.9 GOUTY ARTHROPATHY: ICD-10-CM

## 2022-08-23 DIAGNOSIS — E78.00 PURE HYPERCHOLESTEROLEMIA: ICD-10-CM

## 2022-08-23 LAB
ALBUMIN SERPL-MCNC: 3.7 G/DL (ref 3.4–5)
ALBUMIN/GLOB SERPL: 1.2 {RATIO} (ref 1–2)
ALP LIVER SERPL-CCNC: 76 U/L
ALT SERPL-CCNC: 21 U/L
ANION GAP SERPL CALC-SCNC: 8 MMOL/L (ref 0–18)
AST SERPL-CCNC: 17 U/L (ref 15–37)
BASOPHILS # BLD AUTO: 0.11 X10(3) UL (ref 0–0.2)
BASOPHILS NFR BLD AUTO: 1.2 %
BILIRUB SERPL-MCNC: 0.8 MG/DL (ref 0.1–2)
BUN BLD-MCNC: 21 MG/DL (ref 7–18)
CALCIUM BLD-MCNC: 8.9 MG/DL (ref 8.5–10.1)
CHLORIDE SERPL-SCNC: 110 MMOL/L (ref 98–112)
CHOLEST SERPL-MCNC: 158 MG/DL (ref ?–200)
CO2 SERPL-SCNC: 22 MMOL/L (ref 21–32)
COMPLEXED PSA SERPL-MCNC: 2.03 NG/ML (ref ?–4)
CREAT BLD-MCNC: 1.62 MG/DL
EOSINOPHIL # BLD AUTO: 0.34 X10(3) UL (ref 0–0.7)
EOSINOPHIL NFR BLD AUTO: 3.7 %
ERYTHROCYTE [DISTWIDTH] IN BLOOD BY AUTOMATED COUNT: 14.1 %
EST. AVERAGE GLUCOSE BLD GHB EST-MCNC: 111 MG/DL (ref 68–126)
FASTING PATIENT LIPID ANSWER: YES
FASTING STATUS PATIENT QL REPORTED: YES
GFR SERPLBLD BASED ON 1.73 SQ M-ARVRAT: 44 ML/MIN/1.73M2 (ref 60–?)
GLOBULIN PLAS-MCNC: 3 G/DL (ref 2.8–4.4)
GLUCOSE BLD-MCNC: 111 MG/DL (ref 70–99)
HBA1C MFR BLD: 5.5 % (ref ?–5.7)
HCT VFR BLD AUTO: 53.7 %
HDLC SERPL-MCNC: 42 MG/DL (ref 40–59)
HGB BLD-MCNC: 17.5 G/DL
IMM GRANULOCYTES # BLD AUTO: 0.08 X10(3) UL (ref 0–1)
IMM GRANULOCYTES NFR BLD: 0.9 %
LDLC SERPL CALC-MCNC: 71 MG/DL (ref ?–100)
LYMPHOCYTES # BLD AUTO: 1.96 X10(3) UL (ref 1–4)
LYMPHOCYTES NFR BLD AUTO: 21.5 %
MCH RBC QN AUTO: 31.4 PG (ref 26–34)
MCHC RBC AUTO-ENTMCNC: 32.6 G/DL (ref 31–37)
MCV RBC AUTO: 96.2 FL
MONOCYTES # BLD AUTO: 1.08 X10(3) UL (ref 0.1–1)
MONOCYTES NFR BLD AUTO: 11.9 %
NEUTROPHILS # BLD AUTO: 5.54 X10 (3) UL (ref 1.5–7.7)
NEUTROPHILS # BLD AUTO: 5.54 X10(3) UL (ref 1.5–7.7)
NEUTROPHILS NFR BLD AUTO: 60.8 %
NONHDLC SERPL-MCNC: 116 MG/DL (ref ?–130)
OSMOLALITY SERPL CALC.SUM OF ELEC: 294 MOSM/KG (ref 275–295)
PLATELET # BLD AUTO: 213 10(3)UL (ref 150–450)
POTASSIUM SERPL-SCNC: 4.2 MMOL/L (ref 3.5–5.1)
PROT SERPL-MCNC: 6.7 G/DL (ref 6.4–8.2)
RBC # BLD AUTO: 5.58 X10(6)UL
SODIUM SERPL-SCNC: 140 MMOL/L (ref 136–145)
T4 FREE SERPL-MCNC: 1 NG/DL (ref 0.8–1.7)
TRIGL SERPL-MCNC: 283 MG/DL (ref 30–149)
TSI SER-ACNC: 0.7 MIU/ML (ref 0.36–3.74)
URATE SERPL-MCNC: 5.2 MG/DL
VIT B12 SERPL-MCNC: 645 PG/ML (ref 193–986)
VLDLC SERPL CALC-MCNC: 43 MG/DL (ref 0–30)
WBC # BLD AUTO: 9.1 X10(3) UL (ref 4–11)

## 2022-08-23 PROCEDURE — 85025 COMPLETE CBC W/AUTO DIFF WBC: CPT

## 2022-08-23 PROCEDURE — 80061 LIPID PANEL: CPT

## 2022-08-23 PROCEDURE — 80053 COMPREHEN METABOLIC PANEL: CPT

## 2022-08-23 PROCEDURE — 83036 HEMOGLOBIN GLYCOSYLATED A1C: CPT

## 2022-08-23 PROCEDURE — 84550 ASSAY OF BLOOD/URIC ACID: CPT

## 2022-08-23 PROCEDURE — 84439 ASSAY OF FREE THYROXINE: CPT

## 2022-08-23 PROCEDURE — 36415 COLL VENOUS BLD VENIPUNCTURE: CPT

## 2022-08-23 PROCEDURE — 82607 VITAMIN B-12: CPT

## 2022-08-23 PROCEDURE — 84443 ASSAY THYROID STIM HORMONE: CPT

## 2022-08-31 DIAGNOSIS — M54.12 LEFT CERVICAL RADICULOPATHY: ICD-10-CM

## 2022-08-31 NOTE — TELEPHONE ENCOUNTER
Last med visit 5/16/22-advised follow up in 6 months. Has HARRISON scheduled 11/7/22  Med last ordered 7/23/22 #30 w sig take one tablet every 6 hours prn pain  **see med refill order pended for review-thanks!

## 2022-08-31 NOTE — TELEPHONE ENCOUNTER
Pt would like refill of HYDROcodone-acetaminophen 5-325 MG Oral Tab sent to [facility] [address] [place] [address], [address] [place] [address] [phone], [phone]. Thank you.

## 2022-09-01 RX ORDER — HYDROCODONE BITARTRATE AND ACETAMINOPHEN 5; 325 MG/1; MG/1
1 TABLET ORAL EVERY 6 HOURS PRN
Qty: 30 TABLET | Refills: 0 | Status: SHIPPED | OUTPATIENT
Start: 2022-09-01

## 2022-09-05 DIAGNOSIS — N40.1 BENIGN NON-NODULAR PROSTATIC HYPERPLASIA WITH LOWER URINARY TRACT SYMPTOMS: ICD-10-CM

## 2022-09-06 RX ORDER — TAMSULOSIN HYDROCHLORIDE 0.4 MG/1
CAPSULE ORAL
Qty: 90 CAPSULE | Refills: 0 | Status: SHIPPED | OUTPATIENT
Start: 2022-09-06

## 2022-10-10 DIAGNOSIS — M54.12 LEFT CERVICAL RADICULOPATHY: ICD-10-CM

## 2022-10-10 RX ORDER — HYDROCODONE BITARTRATE AND ACETAMINOPHEN 5; 325 MG/1; MG/1
1 TABLET ORAL EVERY 6 HOURS PRN
Qty: 30 TABLET | Refills: 0 | Status: SHIPPED | OUTPATIENT
Start: 2022-10-10

## 2022-10-10 NOTE — TELEPHONE ENCOUNTER
Pt is calling for a refill on     HYDROcodone-acetaminophen 5-325 MG Oral Tab 30 tablet 0 9/1/2022     Sig - Route: Take 1 tablet by mouth every 6 (six) hours as needed for Pain.  No driving and no alcohol when taking medication. - Oral    Sent to pharmacy as: HYDROcodone-Acetaminophen 5-325 MG Oral Tablet (Norco)    Earliest Fill Date: 9/1/2022    E-Prescribing Status: Receipt confirmed by pharmacy (9/1/2022 12:02 AM CDT)      Pharmacy    [address] [phone], [phone]

## 2022-10-10 NOTE — TELEPHONE ENCOUNTER
Last med visit 5/16/22-advised follow up in 6 months  Med last ordered 9/1/22 #30-1 tab q 6 hrs prn  **see med refill pended for review-thanks!

## 2022-11-07 ENCOUNTER — OFFICE VISIT (OUTPATIENT)
Dept: FAMILY MEDICINE CLINIC | Facility: CLINIC | Age: 77
End: 2022-11-07
Payer: MEDICARE

## 2022-11-07 VITALS
BODY MASS INDEX: 34.28 KG/M2 | HEART RATE: 76 BPM | DIASTOLIC BLOOD PRESSURE: 80 MMHG | HEIGHT: 67.5 IN | RESPIRATION RATE: 16 BRPM | WEIGHT: 221 LBS | SYSTOLIC BLOOD PRESSURE: 122 MMHG | TEMPERATURE: 98 F

## 2022-11-07 DIAGNOSIS — Z00.00 ENCOUNTER FOR ANNUAL HEALTH EXAMINATION: Primary | ICD-10-CM

## 2022-11-07 DIAGNOSIS — E03.2 HYPOTHYROIDISM DUE TO MEDICATION: ICD-10-CM

## 2022-11-07 DIAGNOSIS — I10 BENIGN ESSENTIAL HYPERTENSION: ICD-10-CM

## 2022-11-07 DIAGNOSIS — I48.0 PAROXYSMAL ATRIAL FIBRILLATION (HCC): ICD-10-CM

## 2022-11-07 DIAGNOSIS — E78.00 PURE HYPERCHOLESTEROLEMIA: ICD-10-CM

## 2022-11-07 DIAGNOSIS — M54.12 LEFT CERVICAL RADICULOPATHY: ICD-10-CM

## 2022-11-07 DIAGNOSIS — M10.9 GOUTY ARTHROPATHY: ICD-10-CM

## 2022-11-07 DIAGNOSIS — K21.9 GASTROESOPHAGEAL REFLUX DISEASE WITHOUT ESOPHAGITIS: ICD-10-CM

## 2022-11-07 DIAGNOSIS — F33.40 RECURRENT MAJOR DEPRESSIVE DISORDER, IN REMISSION (HCC): ICD-10-CM

## 2022-11-07 PROCEDURE — G0439 PPPS, SUBSEQ VISIT: HCPCS | Performed by: FAMILY MEDICINE

## 2022-11-07 PROCEDURE — 99213 OFFICE O/P EST LOW 20 MIN: CPT | Performed by: FAMILY MEDICINE

## 2022-11-07 PROCEDURE — 1125F AMNT PAIN NOTED PAIN PRSNT: CPT | Performed by: FAMILY MEDICINE

## 2022-11-07 RX ORDER — HYDROCODONE BITARTRATE AND ACETAMINOPHEN 5; 325 MG/1; MG/1
1 TABLET ORAL EVERY 6 HOURS PRN
Qty: 30 TABLET | Refills: 0 | Status: SHIPPED | OUTPATIENT
Start: 2022-11-07

## 2022-11-21 ENCOUNTER — LAB ENCOUNTER (OUTPATIENT)
Dept: LAB | Age: 77
End: 2022-11-21
Attending: FAMILY MEDICINE
Payer: MEDICARE

## 2022-12-04 DIAGNOSIS — N40.1 BENIGN NON-NODULAR PROSTATIC HYPERPLASIA WITH LOWER URINARY TRACT SYMPTOMS: ICD-10-CM

## 2022-12-05 RX ORDER — TAMSULOSIN HYDROCHLORIDE 0.4 MG/1
CAPSULE ORAL
Qty: 90 CAPSULE | Refills: 1 | Status: SHIPPED | OUTPATIENT
Start: 2022-12-05

## 2022-12-15 RX ORDER — ALLOPURINOL 100 MG/1
TABLET ORAL
Qty: 180 TABLET | Refills: 1 | Status: SHIPPED | OUTPATIENT
Start: 2022-12-15

## 2022-12-27 DIAGNOSIS — M54.12 LEFT CERVICAL RADICULOPATHY: ICD-10-CM

## 2022-12-27 NOTE — TELEPHONE ENCOUNTER
Last med/annual exam 11/7/22-advised follow up 6 mos  Med last ordered 11/7/22 #30-sig take 1 q 6 hrs prn pain  **see med refill order pended for review-thanks!

## 2022-12-27 NOTE — TELEPHONE ENCOUNTER
Pt would like refill of HYDROcodone-acetaminophen 5-325 MG Oral Tab sent to [facility] [address] [place] [address], [address] [facility] [phone], [phone] thank you.

## 2022-12-28 DIAGNOSIS — E03.2 HYPOTHYROIDISM DUE TO MEDICATION: ICD-10-CM

## 2022-12-28 RX ORDER — LEVOTHYROXINE SODIUM 0.07 MG/1
TABLET ORAL
Qty: 90 TABLET | Refills: 0 | Status: SHIPPED | OUTPATIENT
Start: 2022-12-28

## 2022-12-28 RX ORDER — HYDROCODONE BITARTRATE AND ACETAMINOPHEN 5; 325 MG/1; MG/1
1 TABLET ORAL EVERY 6 HOURS PRN
Qty: 30 TABLET | Refills: 0 | Status: SHIPPED | OUTPATIENT
Start: 2022-12-28

## 2023-02-01 DIAGNOSIS — I48.0 PAROXYSMAL ATRIAL FIBRILLATION (HCC): ICD-10-CM

## 2023-02-09 DIAGNOSIS — M54.12 LEFT CERVICAL RADICULOPATHY: ICD-10-CM

## 2023-02-09 RX ORDER — HYDROCODONE BITARTRATE AND ACETAMINOPHEN 5; 325 MG/1; MG/1
1 TABLET ORAL EVERY 6 HOURS PRN
Qty: 30 TABLET | Refills: 0 | Status: SHIPPED | OUTPATIENT
Start: 2023-02-09

## 2023-02-09 NOTE — TELEPHONE ENCOUNTER
Pt requesting refill of HYDROcodone-acetaminophen 5-325 MG Oral Tab   [id number] - [place] - [id number] AT [facility] [place] [address] [facility], [phone], [address] [facility] [address] [place] [facility] [address]   Phone: [phone] Fax: [phone]   Hours: Not open 24 hours     Pt states he has verified that this pharmacy has this medication in stock. Pt completely out of medication. Please process if appropriate.

## 2023-02-09 NOTE — TELEPHONE ENCOUNTER
LOV 11/07/2022  Last refill for norco was 12/28/2022 # 30 0 refills. Please authorize if appropriate.

## 2023-03-16 ENCOUNTER — TELEPHONE (OUTPATIENT)
Dept: FAMILY MEDICINE CLINIC | Facility: CLINIC | Age: 78
End: 2023-03-16

## 2023-03-16 DIAGNOSIS — E03.2 HYPOTHYROIDISM DUE TO MEDICATION: Primary | ICD-10-CM

## 2023-03-16 DIAGNOSIS — M10.9 GOUTY ARTHROPATHY: ICD-10-CM

## 2023-03-16 DIAGNOSIS — I10 BENIGN ESSENTIAL HYPERTENSION: ICD-10-CM

## 2023-03-16 DIAGNOSIS — E78.00 PURE HYPERCHOLESTEROLEMIA: ICD-10-CM

## 2023-03-26 DIAGNOSIS — E03.2 HYPOTHYROIDISM DUE TO MEDICATION: ICD-10-CM

## 2023-03-27 ENCOUNTER — TELEPHONE (OUTPATIENT)
Dept: FAMILY MEDICINE CLINIC | Facility: CLINIC | Age: 78
End: 2023-03-27

## 2023-03-27 DIAGNOSIS — M54.12 LEFT CERVICAL RADICULOPATHY: ICD-10-CM

## 2023-03-27 RX ORDER — HYDROCODONE BITARTRATE AND ACETAMINOPHEN 5; 325 MG/1; MG/1
1 TABLET ORAL EVERY 6 HOURS PRN
Qty: 30 TABLET | Refills: 0 | Status: SHIPPED | OUTPATIENT
Start: 2023-03-27

## 2023-03-28 RX ORDER — LEVOTHYROXINE SODIUM 0.07 MG/1
TABLET ORAL
Qty: 90 TABLET | Refills: 0 | Status: SHIPPED | OUTPATIENT
Start: 2023-03-28

## 2023-04-04 ENCOUNTER — OFFICE VISIT (OUTPATIENT)
Dept: FAMILY MEDICINE CLINIC | Facility: CLINIC | Age: 78
End: 2023-04-04
Payer: MEDICARE

## 2023-04-04 VITALS
WEIGHT: 217.63 LBS | BODY MASS INDEX: 33.76 KG/M2 | TEMPERATURE: 98 F | HEART RATE: 96 BPM | HEIGHT: 67.5 IN | SYSTOLIC BLOOD PRESSURE: 136 MMHG | RESPIRATION RATE: 30 BRPM | DIASTOLIC BLOOD PRESSURE: 72 MMHG

## 2023-04-04 DIAGNOSIS — F33.1 MAJOR DEPRESSIVE DISORDER, RECURRENT EPISODE, MODERATE (HCC): ICD-10-CM

## 2023-04-04 DIAGNOSIS — M10.9 GOUTY ARTHROPATHY: ICD-10-CM

## 2023-04-04 DIAGNOSIS — E78.00 PURE HYPERCHOLESTEROLEMIA: ICD-10-CM

## 2023-04-04 DIAGNOSIS — I10 BENIGN ESSENTIAL HYPERTENSION: Primary | ICD-10-CM

## 2023-04-04 DIAGNOSIS — I48.0 PAROXYSMAL ATRIAL FIBRILLATION (HCC): ICD-10-CM

## 2023-04-04 DIAGNOSIS — K21.9 GASTROESOPHAGEAL REFLUX DISEASE WITHOUT ESOPHAGITIS: ICD-10-CM

## 2023-04-04 DIAGNOSIS — E03.2 HYPOTHYROIDISM DUE TO MEDICATION: ICD-10-CM

## 2023-04-04 PROCEDURE — 99214 OFFICE O/P EST MOD 30 MIN: CPT | Performed by: NURSE PRACTITIONER

## 2023-04-17 DIAGNOSIS — I48.0 PAROXYSMAL ATRIAL FIBRILLATION (HCC): ICD-10-CM

## 2023-04-17 DIAGNOSIS — N40.1 BENIGN NON-NODULAR PROSTATIC HYPERPLASIA WITH LOWER URINARY TRACT SYMPTOMS: ICD-10-CM

## 2023-04-17 DIAGNOSIS — E03.2 HYPOTHYROIDISM DUE TO MEDICATION: ICD-10-CM

## 2023-04-18 RX ORDER — LEVOTHYROXINE SODIUM 0.07 MG/1
TABLET ORAL
Qty: 90 TABLET | Refills: 0 | OUTPATIENT
Start: 2023-04-18

## 2023-04-18 RX ORDER — TAMSULOSIN HYDROCHLORIDE 0.4 MG/1
CAPSULE ORAL
Qty: 90 CAPSULE | Refills: 0 | Status: SHIPPED | OUTPATIENT
Start: 2023-04-18

## 2023-05-10 NOTE — CONSULTS
Critical Care H&P/Consult     NAME: Niya Gilmore - ROOM: [address] [place] [address] - MRN: [id number] - Age: 69year old - :  3/31/1945    Date of Admission: 2019 11:40 AM  Admission Diagnosis: [facility] [I47.1]  Acute pancreatit takes daily opiates with norco.  Has been increasing norco use in last few days due to abd pain. Came to hospital.  Dx with pancreatitis. Transferred to ICU for worsening agitation.   Has some hypotension    Past Medical History:   Diagnosis Date   • Erec Comment:cough  No current outpatient medications on file.   [COMPLETED] potassium chloride IVPB premix 20 mEq, 20 mEq, Intravenous, Once  lactated ringers infusion, , Intravenous, Continuous  lactated ringers IV bolus 1,000 mL, 1,000 mL, Intravenous, Once CefTRIAXone Sodium (ROCEPHIN) 1 g in sodium chloride 0.9% 100 mL MBP/ADD-vantage, 1 g, Intravenous, Once  [COMPLETED] potassium chloride 40 mEq in sodium chloride 0.9% 250 mL IVPB, 40 mEq, Intravenous, Once  [COMPLETED] HYDROmorphone HCl (DILAUDID) 1 MG/ML Intravenous, Q2H PRN    Or  HYDROmorphone HCl (DILAUDID) 1 MG/ML injection 1.2 mg, 1.2 mg, Intravenous, Q2H PRN  [COMPLETED] sodium chloride 0.9% IV bolus 1,000 mL, 1,000 mL, Intravenous, Once      • lactated ringers     • dexmedetomidine 0.9 mcg/kg/hr (11 Room Air   [facility] 13.2     Recent Labs   Lab 11/26/19  0705 11/26/19 2016 11/27/19  0422   GLU 84 80 86   BUN 19* 23* 23*   CREATSERUM 1.03 1.10 1.05   GFRAA 84 77 82   GFRNAA 72 67 71   CA 8.7 8.7 8.8    142 144   K 4.0 4.1 3.8   * 113* 114* Epidermal Sutures: 3-0 Prolene

## 2023-06-27 DIAGNOSIS — E03.2 HYPOTHYROIDISM DUE TO MEDICATION: ICD-10-CM

## 2023-06-27 RX ORDER — LEVOTHYROXINE SODIUM 0.07 MG/1
75 TABLET ORAL DAILY
Qty: 90 TABLET | Refills: 0 | Status: SHIPPED | OUTPATIENT
Start: 2023-06-27

## 2023-07-10 NOTE — PLAN OF CARE
Assumed care at [address] [place] [address]. Pt A/O x3-4. VSS. Afebrile. No c/o pain this shift. Cyclic TPN infusing through PICC line. Insulin coverage given per MAR. IV abx given. Pt fatigued, but more alert than the night before. Pt refusing Melatonin.  Pt also refused nightl Per Dr Burden, Kelsie needs to est care in our EP clinics.  Next available appts scheduled.  Left detailed message to call and conf/r/s held appt slot.     ordered  - Evaluate effectiveness of ordered antiemetic medications  - Provide nonpharmacologic comfort measures as appropriate  - Advance diet as tolerated, if ordered  - Obtain nutritional consult as needed  - Evaluate fluid balance  Outcome: Progressing if interventions unsuccessful or patient reports new pain  - Anticipate increased pain with activity and pre-medicate as appropriate  Outcome: Progressing     Problem: SAFETY ADULT - FALL  Goal: Free from fall injury  Description  INTERVENTIONS:  - Assess respiratory effort  - Oxygen supplementation based on oxygen saturation or ABGs  - Provide Smoking Cessation handout, if applicable  - Encourage broncho-pulmonary hygiene including cough, deep breathe, Incentive Spirometry  - Assess the need for suctioning Impaired Swallowing  Goal: Minimize aspiration risk  Description  Interventions:  - Patient should be alert and upright for all feedings (90 degrees preferred)  - Offer food and liquids at a slow rate  - No straws  - Encourage small bites of food and small

## 2023-07-17 DIAGNOSIS — I48.0 PAROXYSMAL ATRIAL FIBRILLATION (HCC): ICD-10-CM

## 2023-07-31 RX ORDER — ALLOPURINOL 100 MG/1
TABLET ORAL
Qty: 180 TABLET | Refills: 0 | Status: SHIPPED | OUTPATIENT
Start: 2023-07-31

## 2023-07-31 NOTE — TELEPHONE ENCOUNTER
PBZ:31/03/3220      NOV:11/01/2023    Medication:     ALLOPURINOL 100 MG Oral Tab 180 tablet 1 12/15/2022    Sig:   TAKE 2 TABLETS BY MOUTH EVERY DAY

## 2023-08-08 DIAGNOSIS — N40.1 BENIGN NON-NODULAR PROSTATIC HYPERPLASIA WITH LOWER URINARY TRACT SYMPTOMS: ICD-10-CM

## 2023-08-08 RX ORDER — TAMSULOSIN HYDROCHLORIDE 0.4 MG/1
0.4 CAPSULE ORAL DAILY
Qty: 90 CAPSULE | Refills: 0 | Status: SHIPPED | OUTPATIENT
Start: 2023-08-08

## 2023-08-08 NOTE — TELEPHONE ENCOUNTER
LOV: 4/4/23 (med check)  Last Refill: 4/18/23, #90, 0 RF  Next OV: 11/1/23    Please authorize if acceptable. Thank you!

## 2023-09-29 DIAGNOSIS — E03.2 HYPOTHYROIDISM DUE TO MEDICATION: ICD-10-CM

## 2023-09-29 RX ORDER — LEVOTHYROXINE SODIUM 0.07 MG/1
75 TABLET ORAL DAILY
Qty: 90 TABLET | Refills: 0 | Status: SHIPPED | OUTPATIENT
Start: 2023-09-29

## 2023-09-29 NOTE — TELEPHONE ENCOUNTER
Pt's wife requesting rx refill for pt:  levothyroxine 75 MCG Oral Tab 90 tablet     Rx to:  [facility] [address] [place] [address] [address] [facility] [phone], [phone]

## 2023-10-25 ENCOUNTER — OFFICE VISIT (OUTPATIENT)
Dept: NEUROLOGY | Facility: CLINIC | Age: 78
End: 2023-10-25

## 2023-10-25 VITALS
BODY MASS INDEX: 32.73 KG/M2 | SYSTOLIC BLOOD PRESSURE: 116 MMHG | HEART RATE: 74 BPM | HEIGHT: 67.5 IN | DIASTOLIC BLOOD PRESSURE: 74 MMHG | RESPIRATION RATE: 16 BRPM | WEIGHT: 211 LBS

## 2023-10-25 DIAGNOSIS — H53.2 DOUBLE VISION: ICD-10-CM

## 2023-10-25 DIAGNOSIS — R06.83 SNORING: ICD-10-CM

## 2023-10-25 DIAGNOSIS — R20.2 PARESTHESIAS: ICD-10-CM

## 2023-10-25 DIAGNOSIS — R41.3 MEMORY LOSS: Primary | ICD-10-CM

## 2023-10-25 PROCEDURE — 99203 OFFICE O/P NEW LOW 30 MIN: CPT | Performed by: PHYSICIAN ASSISTANT

## 2023-10-25 RX ORDER — METHYLPHENIDATE HYDROCHLORIDE 10 MG/1
10 TABLET ORAL 2 TIMES DAILY
COMMUNITY

## 2023-10-30 DIAGNOSIS — N40.1 BENIGN NON-NODULAR PROSTATIC HYPERPLASIA WITH LOWER URINARY TRACT SYMPTOMS: ICD-10-CM

## 2023-10-30 RX ORDER — ALLOPURINOL 100 MG/1
200 TABLET ORAL DAILY
Qty: 180 TABLET | Refills: 0 | Status: SHIPPED | OUTPATIENT
Start: 2023-10-30

## 2023-10-30 NOTE — TELEPHONE ENCOUNTER
LOV: 04/04/2023  for: Medication Follow-Up   Patient advised to RTC on:  3-6 months pending labs. Medication Quantity Refills Start End   tamsulosin 0.4 MG Oral Cap 90 capsule 0 8/8/2023    Sig:   Take 1 capsule (0.4 mg total) by mouth daily.

## 2023-10-31 RX ORDER — TAMSULOSIN HYDROCHLORIDE 0.4 MG/1
0.4 CAPSULE ORAL DAILY
Qty: 30 CAPSULE | Refills: 0 | Status: SHIPPED | OUTPATIENT
Start: 2023-10-31

## 2023-11-01 ENCOUNTER — OFFICE VISIT (OUTPATIENT)
Dept: FAMILY MEDICINE CLINIC | Facility: CLINIC | Age: 78
End: 2023-11-01
Payer: MEDICARE

## 2023-11-01 VITALS
HEIGHT: 67.5 IN | BODY MASS INDEX: 32.57 KG/M2 | TEMPERATURE: 99 F | WEIGHT: 210 LBS | SYSTOLIC BLOOD PRESSURE: 138 MMHG | HEART RATE: 93 BPM | RESPIRATION RATE: 18 BRPM | DIASTOLIC BLOOD PRESSURE: 83 MMHG

## 2023-11-01 DIAGNOSIS — I42.6 ALCOHOLIC CARDIOMYOPATHY (HCC): ICD-10-CM

## 2023-11-01 DIAGNOSIS — F11.21 OPIOID USE DISORDER, SEVERE, IN SUSTAINED REMISSION (HCC): ICD-10-CM

## 2023-11-01 DIAGNOSIS — F33.1 MAJOR DEPRESSIVE DISORDER, RECURRENT EPISODE, MODERATE (HCC): ICD-10-CM

## 2023-11-01 DIAGNOSIS — N18.31 STAGE 3A CHRONIC KIDNEY DISEASE (HCC): ICD-10-CM

## 2023-11-01 DIAGNOSIS — Z00.00 MEDICARE ANNUAL WELLNESS VISIT, SUBSEQUENT: ICD-10-CM

## 2023-11-01 DIAGNOSIS — N40.1 BPH ASSOCIATED WITH NOCTURIA: ICD-10-CM

## 2023-11-01 DIAGNOSIS — E78.2 MODERATE MIXED HYPERLIPIDEMIA NOT REQUIRING STATIN THERAPY: ICD-10-CM

## 2023-11-01 DIAGNOSIS — R35.1 BPH ASSOCIATED WITH NOCTURIA: ICD-10-CM

## 2023-11-01 DIAGNOSIS — E03.9 HYPOTHYROIDISM, UNSPECIFIED TYPE: ICD-10-CM

## 2023-11-01 PROBLEM — D75.1 POLYCYTHEMIA: Status: ACTIVE | Noted: 2023-11-01

## 2023-11-01 PROBLEM — F41.9 ANXIETY AND DEPRESSION: Status: ACTIVE | Noted: 2020-03-03

## 2023-11-01 PROBLEM — I10 PRIMARY HYPERTENSION: Status: RESOLVED | Noted: 2023-11-01 | Resolved: 2023-11-01

## 2023-11-01 PROBLEM — N18.9 CHRONIC KIDNEY DISEASE: Status: ACTIVE | Noted: 2023-11-01

## 2023-11-01 PROBLEM — K85.90 PANCREATITIS (HCC): Status: ACTIVE | Noted: 2020-02-24

## 2023-11-01 PROBLEM — I95.9 HYPOTENSION: Status: ACTIVE | Noted: 2020-03-01

## 2023-11-01 PROBLEM — I48.91 A-FIB (HCC): Status: RESOLVED | Noted: 2020-02-24 | Resolved: 2023-11-01

## 2023-11-01 PROBLEM — I42.9 CARDIOMYOPATHY (HCC): Status: ACTIVE | Noted: 2023-11-01

## 2023-11-01 PROBLEM — I48.91 A-FIB (HCC): Status: ACTIVE | Noted: 2020-02-24

## 2023-11-01 PROBLEM — I95.9 HYPOTENSION: Status: RESOLVED | Noted: 2020-03-01 | Resolved: 2023-11-01

## 2023-11-01 PROBLEM — E78.5 HYPERLIPIDEMIA: Status: RESOLVED | Noted: 2023-11-01 | Resolved: 2023-11-01

## 2023-11-01 PROBLEM — F32.A ANXIETY AND DEPRESSION: Status: ACTIVE | Noted: 2020-03-03

## 2023-11-01 PROBLEM — E78.5 HYPERLIPIDEMIA, UNSPECIFIED: Status: RESOLVED | Noted: 2023-11-01 | Resolved: 2023-11-01

## 2023-11-01 PROBLEM — R06.83 SNORING: Status: ACTIVE | Noted: 2023-11-01

## 2023-11-01 PROBLEM — E04.1 THYROID NODULE: Status: ACTIVE | Noted: 2023-11-01

## 2023-11-01 PROBLEM — M15.9 GENERALIZED OSTEOARTHRITIS: Status: ACTIVE | Noted: 2023-11-01

## 2023-11-01 PROBLEM — E78.5 HYPERLIPIDEMIA, UNSPECIFIED: Status: ACTIVE | Noted: 2023-11-01

## 2023-11-01 PROBLEM — I10 PRIMARY HYPERTENSION: Status: ACTIVE | Noted: 2023-11-01

## 2023-11-01 PROBLEM — I10 ESSENTIAL (PRIMARY) HYPERTENSION: Status: ACTIVE | Noted: 2023-11-01

## 2023-11-01 PROBLEM — I10 ESSENTIAL (PRIMARY) HYPERTENSION: Status: RESOLVED | Noted: 2023-11-01 | Resolved: 2023-11-01

## 2023-11-01 PROBLEM — G93.40 ENCEPHALOPATHY: Status: ACTIVE | Noted: 2020-03-03

## 2023-11-01 PROBLEM — K85.90 PANCREATITIS: Status: ACTIVE | Noted: 2020-02-24

## 2023-11-01 PROBLEM — E83.52 HYPERCALCEMIA: Status: ACTIVE | Noted: 2020-03-01

## 2023-11-01 PROBLEM — E78.5 HYPERLIPIDEMIA: Status: ACTIVE | Noted: 2023-11-01

## 2023-11-01 PROCEDURE — G0439 PPPS, SUBSEQ VISIT: HCPCS | Performed by: FAMILY MEDICINE

## 2023-11-01 PROCEDURE — 1125F AMNT PAIN NOTED PAIN PRSNT: CPT | Performed by: FAMILY MEDICINE

## 2023-11-28 ENCOUNTER — PROCEDURE VISIT (OUTPATIENT)
Dept: NEUROLOGY | Facility: CLINIC | Age: 78
End: 2023-11-28
Payer: MEDICARE

## 2023-11-28 DIAGNOSIS — R20.2 PARESTHESIAS: Primary | ICD-10-CM

## 2023-11-28 DIAGNOSIS — G62.9 AXONAL POLYNEUROPATHY: ICD-10-CM

## 2023-11-28 PROCEDURE — 95886 MUSC TEST DONE W/N TEST COMP: CPT | Performed by: OTHER

## 2023-11-28 PROCEDURE — 95909 NRV CNDJ TST 5-6 STUDIES: CPT | Performed by: OTHER

## 2023-11-28 NOTE — PROGRESS NOTES
Here for EMG to investigate cause of paresthesias    Findings: Severe axonal polyneuropathy   Etiology to be worked up          Procedures    Anti-Hu, Ri, Yo Antibody Profile, Serum    Sed Rate, Westergren (Automated)    Rheumatoid Arthritis Factor    C-Reactive Protein    Monoclonal Protein Study    Connective Tissue Disease (FRAN) Screen, Reflex Specific Antibody         Gerardo Lofton MD  Vascular & General Neurology  Director, Multiple Sclerosis Program  [facility]  11/28/2023, Time completed 2:45 PM

## 2023-12-24 DIAGNOSIS — E03.2 HYPOTHYROIDISM DUE TO MEDICATION: ICD-10-CM

## 2023-12-27 RX ORDER — LEVOTHYROXINE SODIUM 0.07 MG/1
75 TABLET ORAL DAILY
Qty: 90 TABLET | Refills: 0 | Status: SHIPPED | OUTPATIENT
Start: 2023-12-27

## 2023-12-27 NOTE — TELEPHONE ENCOUNTER
Last office visit: 11/1/2023   Protocol: pass  Requested medication(s) are due for refill today: yes  Requested medication(s) are on the active medication list same strength, form, dose/ sig: yes  Requested medication(s) are managed by provider: yes  Patient has already received a courtsey refill: no

## 2024-01-07 NOTE — PROGRESS NOTES
[facility]  Progress Note    [place] Patient Status:  Inpatient    3/31/1945 MRN [id number]   [facility] 4SW-A Attending Les Schwarz MD   Hosp Day # 2 PCP Blanca Almazan MD     Subjective:  Patient was transferred to the unspecified type     Hypertension, unspecified type     Hypokalemia     Leukocytosis     Azotemia     Acute pancreatitis     Metabolic acidosis     Hyperglycemia     Acute pancreatitis, unspecified complication status, unspecified pancreatitis type     SVT - P/w hypertension to 255/158 in ED (baseline sBP ~170-180s)  - MICU, CCU consulted, recommend restarting home BP medications  - C/w home coreg 25mg BID, clonidine 0.3mg TID, hydralazine 100mg TID, isosorbide dinitrate 30mg TID, nifedipine 60mg qd, spironolactone 50mg BID - P/w hypertension to 255/158 in ED (baseline sBP ~170-180s)  - MICU, CCU consulted, recommend restarting home BP medications  - C/w home coreg 25mg BID, clonidine 0.3mg TID, hydralazine 100mg TID, isosorbide dinitrate 30mg TID, nifedipine 120mg qd, spironolactone 50mg BID

## 2024-01-24 NOTE — TELEPHONE ENCOUNTER
ALLOPURINOL 100 MG TABLET     Please see pended medications.    Please sign if appropriate.      Thank you      Last OV: 11/01/2023      Last refill: 10/30/2023 for 180 tabs

## 2024-01-25 RX ORDER — ALLOPURINOL 100 MG/1
200 TABLET ORAL DAILY
Qty: 180 TABLET | Refills: 0 | Status: SHIPPED | OUTPATIENT
Start: 2024-01-25

## 2024-02-01 DIAGNOSIS — N40.1 BENIGN NON-NODULAR PROSTATIC HYPERPLASIA WITH LOWER URINARY TRACT SYMPTOMS: ICD-10-CM

## 2024-02-01 RX ORDER — TAMSULOSIN HYDROCHLORIDE 0.4 MG/1
0.4 CAPSULE ORAL DAILY
Qty: 90 CAPSULE | Refills: 0 | Status: SHIPPED | OUTPATIENT
Start: 2024-02-01

## 2024-02-01 NOTE — TELEPHONE ENCOUNTER
Pt requesting refill for    tamsulosin 0.4 MG Oral Cap     [facility] [id number] [place] [facility] [place] - [address] [facility] -[phone], [phone]

## 2024-03-07 DIAGNOSIS — I48.0 PAROXYSMAL ATRIAL FIBRILLATION (HCC): ICD-10-CM

## 2024-03-07 NOTE — TELEPHONE ENCOUNTER
Pt would like refill of metoprolol tartrate 25 MG Oral Tab sent to [facility] [address] [place], [place] - [address] [facility] -[phone], [phone] [[id number]] thank you.

## 2024-03-07 NOTE — TELEPHONE ENCOUNTER
Requested Prescriptions     Pending Prescriptions Disp Refills    metoprolol tartrate 25 MG Oral Tab 180 tablet 0     Sig: Take 1 tablet (25 mg total) by mouth 2 (two) times daily.     Last refill 7/17/23 #180 x 1  LOV 11/1/23  No upcoming appts  Patient has to complete outstanding labs - MCM sent to Patient.    #30 pended for approval if appropriate.    Hypertension Medications Protocol [id number]/07/2024 02:01 PM   Protocol Details CMP or BMP in past 12 months    EGFRCR or GFRNAA > 50    Last BP reading less than 140/90    In person appointment or virtual visit in the past 12 mos or appointment in next 3 mos

## 2024-03-11 ENCOUNTER — LAB ENCOUNTER (OUTPATIENT)
Dept: LAB | Age: 79
End: 2024-03-11
Attending: FAMILY MEDICINE
Payer: MEDICARE

## 2024-03-11 DIAGNOSIS — R20.2 PARESTHESIAS: ICD-10-CM

## 2024-03-11 DIAGNOSIS — I42.6 ALCOHOLIC CARDIOMYOPATHY (HCC): ICD-10-CM

## 2024-03-11 DIAGNOSIS — E78.2 MODERATE MIXED HYPERLIPIDEMIA NOT REQUIRING STATIN THERAPY: ICD-10-CM

## 2024-03-11 DIAGNOSIS — N40.1 BPH ASSOCIATED WITH NOCTURIA: ICD-10-CM

## 2024-03-11 DIAGNOSIS — G62.9 AXONAL POLYNEUROPATHY: ICD-10-CM

## 2024-03-11 DIAGNOSIS — E03.9 HYPOTHYROIDISM, UNSPECIFIED TYPE: ICD-10-CM

## 2024-03-11 DIAGNOSIS — R35.1 BPH ASSOCIATED WITH NOCTURIA: ICD-10-CM

## 2024-03-11 LAB
ALBUMIN SERPL-MCNC: 3.9 G/DL (ref 3.4–5)
ALBUMIN/GLOB SERPL: 1.2 {RATIO} (ref 1–2)
ALP LIVER SERPL-CCNC: 93 U/L
ALT SERPL-CCNC: 13 U/L
ANION GAP SERPL CALC-SCNC: 9 MMOL/L (ref 0–18)
AST SERPL-CCNC: 13 U/L (ref 15–37)
BASOPHILS # BLD AUTO: 0.16 X10(3) UL (ref 0–0.2)
BASOPHILS NFR BLD AUTO: 1.2 %
BILIRUB SERPL-MCNC: 0.7 MG/DL (ref 0.1–2)
BUN BLD-MCNC: 20 MG/DL (ref 9–23)
CALCIUM BLD-MCNC: 10 MG/DL (ref 8.5–10.1)
CHLORIDE SERPL-SCNC: 110 MMOL/L (ref 98–112)
CHOLEST SERPL-MCNC: 170 MG/DL (ref ?–200)
CO2 SERPL-SCNC: 23 MMOL/L (ref 21–32)
COMPLEXED PSA SERPL-MCNC: 3.39 NG/ML (ref ?–4)
CREAT BLD-MCNC: 1.84 MG/DL
CRP SERPL-MCNC: <0.29 MG/DL (ref ?–0.3)
EGFRCR SERPLBLD CKD-EPI 2021: 38 ML/MIN/1.73M2 (ref 60–?)
EOSINOPHIL # BLD AUTO: 0.24 X10(3) UL (ref 0–0.7)
EOSINOPHIL NFR BLD AUTO: 1.8 %
ERYTHROCYTE [DISTWIDTH] IN BLOOD BY AUTOMATED COUNT: 15.2 %
ERYTHROCYTE [SEDIMENTATION RATE] IN BLOOD: 23 MM/HR
FASTING PATIENT LIPID ANSWER: YES
FASTING STATUS PATIENT QL REPORTED: YES
GLOBULIN PLAS-MCNC: 3.3 G/DL (ref 2.8–4.4)
GLUCOSE BLD-MCNC: 110 MG/DL (ref 70–99)
HCT VFR BLD AUTO: 53.3 %
HDLC SERPL-MCNC: 47 MG/DL (ref 40–59)
HGB BLD-MCNC: 17.8 G/DL
IMM GRANULOCYTES # BLD AUTO: 0.17 X10(3) UL (ref 0–1)
IMM GRANULOCYTES NFR BLD: 1.3 %
LDLC SERPL CALC-MCNC: 83 MG/DL (ref ?–100)
LYMPHOCYTES # BLD AUTO: 1.99 X10(3) UL (ref 1–4)
LYMPHOCYTES NFR BLD AUTO: 15.1 %
MCH RBC QN AUTO: 30.8 PG (ref 26–34)
MCHC RBC AUTO-ENTMCNC: 33.4 G/DL (ref 31–37)
MCV RBC AUTO: 92.4 FL
MONOCYTES # BLD AUTO: 0.97 X10(3) UL (ref 0.1–1)
MONOCYTES NFR BLD AUTO: 7.4 %
NEUTROPHILS # BLD AUTO: 9.65 X10 (3) UL (ref 1.5–7.7)
NEUTROPHILS # BLD AUTO: 9.65 X10(3) UL (ref 1.5–7.7)
NEUTROPHILS NFR BLD AUTO: 73.2 %
NONHDLC SERPL-MCNC: 123 MG/DL (ref ?–130)
OSMOLALITY SERPL CALC.SUM OF ELEC: 297 MOSM/KG (ref 275–295)
PLATELET # BLD AUTO: 210 10(3)UL (ref 150–450)
POTASSIUM SERPL-SCNC: 4.4 MMOL/L (ref 3.5–5.1)
PROT SERPL-MCNC: 7.2 G/DL (ref 6.4–8.2)
RBC # BLD AUTO: 5.77 X10(6)UL
RHEUMATOID FACT SERPL-ACNC: <10 IU/ML (ref ?–15)
SODIUM SERPL-SCNC: 142 MMOL/L (ref 136–145)
T4 FREE SERPL-MCNC: 1 NG/DL (ref 0.8–1.7)
TRIGL SERPL-MCNC: 245 MG/DL (ref 30–149)
TSI SER-ACNC: 0.86 MIU/ML (ref 0.36–3.74)
VLDLC SERPL CALC-MCNC: 39 MG/DL (ref 0–30)
WBC # BLD AUTO: 13.2 X10(3) UL (ref 4–11)

## 2024-03-11 PROCEDURE — 86334 IMMUNOFIX E-PHORESIS SERUM: CPT

## 2024-03-11 PROCEDURE — 84165 PROTEIN E-PHORESIS SERUM: CPT

## 2024-03-11 PROCEDURE — 80061 LIPID PANEL: CPT

## 2024-03-11 PROCEDURE — 86038 ANTINUCLEAR ANTIBODIES: CPT

## 2024-03-11 PROCEDURE — 86225 DNA ANTIBODY NATIVE: CPT

## 2024-03-11 PROCEDURE — 85025 COMPLETE CBC W/AUTO DIFF WBC: CPT

## 2024-03-11 PROCEDURE — 85652 RBC SED RATE AUTOMATED: CPT

## 2024-03-11 PROCEDURE — 84439 ASSAY OF FREE THYROXINE: CPT

## 2024-03-11 PROCEDURE — 80053 COMPREHEN METABOLIC PANEL: CPT

## 2024-03-11 PROCEDURE — 83521 IG LIGHT CHAINS FREE EACH: CPT

## 2024-03-11 PROCEDURE — 86255 FLUORESCENT ANTIBODY SCREEN: CPT

## 2024-03-11 PROCEDURE — 36415 COLL VENOUS BLD VENIPUNCTURE: CPT

## 2024-03-11 PROCEDURE — 86140 C-REACTIVE PROTEIN: CPT

## 2024-03-11 PROCEDURE — 84443 ASSAY THYROID STIM HORMONE: CPT

## 2024-03-11 PROCEDURE — 86431 RHEUMATOID FACTOR QUANT: CPT

## 2024-03-12 LAB
DSDNA IGG SERPL IA-ACNC: 0.8 IU/ML
ENA AB SER QL IA: 0.2 UG/L
ENA AB SER QL IA: NEGATIVE

## 2024-03-13 DIAGNOSIS — I42.6 ALCOHOLIC CARDIOMYOPATHY (HCC): ICD-10-CM

## 2024-03-13 DIAGNOSIS — E78.00 PURE HYPERCHOLESTEROLEMIA: Primary | ICD-10-CM

## 2024-03-13 LAB
ALBUMIN SERPL ELPH-MCNC: 4.07 G/DL (ref 3.75–5.21)
ALBUMIN/GLOB SERPL: 1.54 {RATIO} (ref 1–2)
ALPHA1 GLOB SERPL ELPH-MCNC: 0.39 G/DL (ref 0.19–0.46)
ALPHA2 GLOB SERPL ELPH-MCNC: 0.77 G/DL (ref 0.48–1.05)
ANTI-HU AB: NEGATIVE
ANTI-RI AB: NEGATIVE
ANTI-YO AB: NEGATIVE
B-GLOBULIN SERPL ELPH-MCNC: 0.76 G/DL (ref 0.68–1.23)
GAMMA GLOB SERPL ELPH-MCNC: 0.72 G/DL (ref 0.62–1.7)
KAPPA LC FREE SER-MCNC: 4.82 MG/DL (ref 0.33–1.94)
KAPPA LC FREE/LAMBDA FREE SER NEPH: 1.86 {RATIO} (ref 0.26–1.65)
LAMBDA LC FREE SERPL-MCNC: 2.6 MG/DL (ref 0.57–2.63)
PROT SERPL-MCNC: 6.7 G/DL (ref 5.7–8.2)

## 2024-03-28 DIAGNOSIS — I48.0 PAROXYSMAL ATRIAL FIBRILLATION (HCC): ICD-10-CM

## 2024-03-28 DIAGNOSIS — E03.2 HYPOTHYROIDISM DUE TO MEDICATION: ICD-10-CM

## 2024-03-29 RX ORDER — LEVOTHYROXINE SODIUM 0.07 MG/1
75 TABLET ORAL DAILY
Qty: 90 TABLET | Refills: 0 | Status: SHIPPED | OUTPATIENT
Start: 2024-03-29

## 2024-05-03 DIAGNOSIS — N40.1 BENIGN NON-NODULAR PROSTATIC HYPERPLASIA WITH LOWER URINARY TRACT SYMPTOMS: ICD-10-CM

## 2024-05-07 RX ORDER — ALLOPURINOL 100 MG/1
200 TABLET ORAL DAILY
Qty: 180 TABLET | Refills: 0 | Status: SHIPPED | OUTPATIENT
Start: 2024-05-07

## 2024-05-07 RX ORDER — TAMSULOSIN HYDROCHLORIDE 0.4 MG/1
0.4 CAPSULE ORAL DAILY
Qty: 90 CAPSULE | Refills: 0 | Status: SHIPPED | OUTPATIENT
Start: 2024-05-07

## 2024-05-07 NOTE — TELEPHONE ENCOUNTER
Last office visit: 11/1/23  Next office visit: None Stated  Last Refill: allopurinol: 1/25/24                     Tamsulosin: 2/1/24    Requested Prescriptions     Pending Prescriptions Disp Refills    ALLOPURINOL 100 MG Oral Tab [Pharmacy Med Name: ALLOPURINOL 100 MG TABLET] 180 tablet 0     Sig: TAKE 2 TABLETS BY MOUTH EVERY DAY    TAMSULOSIN 0.4 MG Oral Cap [Pharmacy Med Name: TAMSULOSIN HCL 0.4 MG CAPSULE] 90 capsule 0     Sig: TAKE 1 CAPSULE BY MOUTH EVERY DAY     Please authorize if acceptable.   Thank you!

## 2024-07-03 DIAGNOSIS — E03.2 HYPOTHYROIDISM DUE TO MEDICATION: ICD-10-CM

## 2024-07-03 RX ORDER — LEVOTHYROXINE SODIUM 0.07 MG/1
75 TABLET ORAL DAILY
Qty: 90 TABLET | Refills: 0 | Status: SHIPPED | OUTPATIENT
Start: 2024-07-03

## 2024-07-03 NOTE — TELEPHONE ENCOUNTER
Last office visit:  11/1/2023  Last Refill: 3/29/2024  Return To Clinic: yearly per last office visit  Protocol: pass           Medication Quantity Refills Start End   LEVOTHYROXINE 75 MCG Oral Tab 90 tablet 0 3/29/2024 --   Sig:   TAKE 1 TABLET BY MOUTH EVERY DAY     Route:   Oral

## 2024-07-04 DIAGNOSIS — I48.0 PAROXYSMAL ATRIAL FIBRILLATION (HCC): ICD-10-CM

## 2024-07-31 DIAGNOSIS — I48.0 PAROXYSMAL ATRIAL FIBRILLATION (HCC): ICD-10-CM

## 2024-08-02 NOTE — TELEPHONE ENCOUNTER
Did not pass protocol  Last office visit  11/1/2023  Last refill was: , 7/5/2024  60__tabs  Next appointment:  none scheduled     Please sign pended medication if appropriate            Medication Quantity Refills Start End   metoprolol tartrate 25 MG Oral Tab 60 tablet 0 7/5/2024 --   Sig:   Take 1 tablet (25 mg total) by mouth 2 (two) times daily.     Route:   Oral     Note to Pharmacy:   Your are due to have your fasting labs completed.         Please call patient to schedule medication follow up then route to Dr. Suggs

## 2024-08-05 DIAGNOSIS — N40.1 BENIGN NON-NODULAR PROSTATIC HYPERPLASIA WITH LOWER URINARY TRACT SYMPTOMS: ICD-10-CM

## 2024-08-05 NOTE — TELEPHONE ENCOUNTER
Did not pass protocol  Last office visit  11/1/2023  Last refill was: ,5/7/2024  90 __tabs  Next appointment: none scheduled    Please sign pended medication if appropriate     Medication Quantity Refills Start End   tamsulosin 0.4 MG Oral Cap 90 capsule 0 5/7/2024 --   Sig:   Take 1 capsule (0.4 mg total) by mouth daily.     Route:   Oral

## 2024-08-05 NOTE — TELEPHONE ENCOUNTER
Zoomingo message reminding patient to get labs done and to schedule a med check.     Last Office Visit: 11/1/2023  Last Refill: 5/7/2024  Return to Clinic: 6 months   Protocol: n/a   NOV: n/a     Requested Prescriptions     Pending Prescriptions Disp Refills    ALLOPURINOL 100 MG Oral Tab [Pharmacy Med Name: ALLOPURINOL 100 MG TABLET] 180 tablet 0     Sig: TAKE 2 TABLETS BY MOUTH EVERY DAY         Please approve if appropriate.     Thank you!

## 2024-08-06 RX ORDER — TAMSULOSIN HYDROCHLORIDE 0.4 MG/1
0.4 CAPSULE ORAL DAILY
Qty: 90 CAPSULE | Refills: 0 | Status: SHIPPED | OUTPATIENT
Start: 2024-08-06

## 2024-08-22 ENCOUNTER — TELEPHONE (OUTPATIENT)
Dept: FAMILY MEDICINE CLINIC | Facility: CLINIC | Age: 79
End: 2024-08-22

## 2024-08-22 DIAGNOSIS — I48.0 PAROXYSMAL ATRIAL FIBRILLATION (HCC): ICD-10-CM

## 2024-08-23 RX ORDER — ALLOPURINOL 100 MG/1
100 TABLET ORAL 2 TIMES DAILY
Qty: 60 TABLET | Refills: 0 | Status: SHIPPED | OUTPATIENT
Start: 2024-08-23

## 2024-08-23 RX ORDER — METOPROLOL TARTRATE 25 MG/1
25 TABLET, FILM COATED ORAL 2 TIMES DAILY
Qty: 60 TABLET | Refills: 0 | Status: SHIPPED | OUTPATIENT
Start: 2024-08-23

## 2024-08-23 RX ORDER — ALLOPURINOL 100 MG/1
200 TABLET ORAL DAILY
Qty: 60 TABLET | Refills: 0 | Status: SHIPPED
Start: 2024-08-23 | End: 2024-08-23

## 2024-08-23 RX ORDER — ALLOPURINOL 100 MG/1
200 TABLET ORAL DAILY
Qty: 60 TABLET | Refills: 0 | Status: SHIPPED | OUTPATIENT
Start: 2024-08-23 | End: 2024-08-23

## 2024-08-23 NOTE — TELEPHONE ENCOUNTER
Appears 1 month rx's were sent to pharmacy this morning. Pt is due for labs. Additional refills can be discussed/sent with upcoming OV with Dr. Restrepo.

## 2024-08-23 NOTE — TELEPHONE ENCOUNTER
Patient's wife called back and informed of below and she voiced understanding and agreed with plan.  She will have patient do his labs prior to appointment.

## 2024-08-23 NOTE — TELEPHONE ENCOUNTER
Patient has scheduled an appointment for 09/099/2024 with Dr. Restrepo.   Patient is requesting refills for Allopurinol and Metoprolol.

## 2024-08-27 RX ORDER — METOPROLOL TARTRATE 25 MG/1
25 TABLET, FILM COATED ORAL 2 TIMES DAILY
Qty: 60 TABLET | Refills: 0 | Status: SHIPPED
Start: 2024-08-27

## 2024-09-04 ENCOUNTER — HOSPITAL ENCOUNTER (OUTPATIENT)
Dept: MRI IMAGING | Facility: HOSPITAL | Age: 79
Discharge: HOME | End: 2024-09-04
Attending: PHYSICIAN ASSISTANT
Payer: MEDICARE

## 2024-09-04 DIAGNOSIS — R41.3 MEMORY LOSS: ICD-10-CM

## 2024-09-04 PROCEDURE — 70553 MRI BRAIN STEM W/O & W/DYE: CPT | Performed by: PHYSICIAN ASSISTANT

## 2024-09-04 PROCEDURE — A9575 INJ GADOTERATE MEGLUMI 0.1ML: HCPCS | Performed by: PHYSICIAN ASSISTANT

## 2024-09-04 RX ORDER — GADOTERATE MEGLUMINE 376.9 MG/ML
20 INJECTION INTRAVENOUS
Status: COMPLETED | OUTPATIENT
Start: 2024-09-04 | End: 2024-09-04

## 2024-09-04 RX ADMIN — GADOTERATE MEGLUMINE 19 ML: 376.9 INJECTION INTRAVENOUS at 14:37:00

## 2024-09-05 ENCOUNTER — LAB ENCOUNTER (OUTPATIENT)
Dept: LAB | Age: 79
End: 2024-09-05
Attending: FAMILY MEDICINE
Payer: MEDICARE

## 2024-09-05 DIAGNOSIS — E78.00 PURE HYPERCHOLESTEROLEMIA: ICD-10-CM

## 2024-09-05 DIAGNOSIS — I42.6 ALCOHOLIC CARDIOMYOPATHY (HCC): ICD-10-CM

## 2024-09-05 LAB
ALBUMIN SERPL-MCNC: 4.2 G/DL (ref 3.2–4.8)
ALBUMIN/GLOB SERPL: 1.6 {RATIO} (ref 1–2)
ALP LIVER SERPL-CCNC: 100 U/L
ALT SERPL-CCNC: 11 U/L
ANION GAP SERPL CALC-SCNC: 8 MMOL/L (ref 0–18)
AST SERPL-CCNC: 17 U/L (ref ?–34)
BASOPHILS # BLD AUTO: 0.16 X10(3) UL (ref 0–0.2)
BASOPHILS NFR BLD AUTO: 1.4 %
BILIRUB SERPL-MCNC: 0.6 MG/DL (ref 0.2–1.1)
BUN BLD-MCNC: 22 MG/DL (ref 9–23)
CALCIUM BLD-MCNC: 9.7 MG/DL (ref 8.7–10.4)
CHLORIDE SERPL-SCNC: 109 MMOL/L (ref 98–112)
CHOLEST SERPL-MCNC: 164 MG/DL (ref ?–200)
CO2 SERPL-SCNC: 25 MMOL/L (ref 21–32)
CREAT BLD-MCNC: 1.75 MG/DL
EGFRCR SERPLBLD CKD-EPI 2021: 40 ML/MIN/1.73M2 (ref 60–?)
EOSINOPHIL # BLD AUTO: 0.28 X10(3) UL (ref 0–0.7)
EOSINOPHIL NFR BLD AUTO: 2.4 %
ERYTHROCYTE [DISTWIDTH] IN BLOOD BY AUTOMATED COUNT: 14.6 %
FASTING PATIENT LIPID ANSWER: YES
FASTING STATUS PATIENT QL REPORTED: YES
GLOBULIN PLAS-MCNC: 2.6 G/DL (ref 2–3.5)
GLUCOSE BLD-MCNC: 105 MG/DL (ref 70–99)
HCT VFR BLD AUTO: 51 %
HDLC SERPL-MCNC: 43 MG/DL (ref 40–59)
HGB BLD-MCNC: 17.2 G/DL
IMM GRANULOCYTES # BLD AUTO: 0.09 X10(3) UL (ref 0–1)
IMM GRANULOCYTES NFR BLD: 0.8 %
LDLC SERPL CALC-MCNC: 80 MG/DL (ref ?–100)
LYMPHOCYTES # BLD AUTO: 2.17 X10(3) UL (ref 1–4)
LYMPHOCYTES NFR BLD AUTO: 18.4 %
MCH RBC QN AUTO: 30.9 PG (ref 26–34)
MCHC RBC AUTO-ENTMCNC: 33.7 G/DL (ref 31–37)
MCV RBC AUTO: 91.7 FL
MONOCYTES # BLD AUTO: 1.14 X10(3) UL (ref 0.1–1)
MONOCYTES NFR BLD AUTO: 9.7 %
NEUTROPHILS # BLD AUTO: 7.96 X10 (3) UL (ref 1.5–7.7)
NEUTROPHILS # BLD AUTO: 7.96 X10(3) UL (ref 1.5–7.7)
NEUTROPHILS NFR BLD AUTO: 67.3 %
NONHDLC SERPL-MCNC: 121 MG/DL (ref ?–130)
OSMOLALITY SERPL CALC.SUM OF ELEC: 298 MOSM/KG (ref 275–295)
PLATELET # BLD AUTO: 226 10(3)UL (ref 150–450)
POTASSIUM SERPL-SCNC: 4 MMOL/L (ref 3.5–5.1)
PROT SERPL-MCNC: 6.8 G/DL (ref 5.7–8.2)
RBC # BLD AUTO: 5.56 X10(6)UL
SODIUM SERPL-SCNC: 142 MMOL/L (ref 136–145)
TRIGL SERPL-MCNC: 246 MG/DL (ref 30–149)
VLDLC SERPL CALC-MCNC: 39 MG/DL (ref 0–30)
WBC # BLD AUTO: 11.8 X10(3) UL (ref 4–11)

## 2024-09-05 PROCEDURE — 80061 LIPID PANEL: CPT

## 2024-09-05 PROCEDURE — 85025 COMPLETE CBC W/AUTO DIFF WBC: CPT

## 2024-09-05 PROCEDURE — 36415 COLL VENOUS BLD VENIPUNCTURE: CPT

## 2024-09-05 PROCEDURE — 80053 COMPREHEN METABOLIC PANEL: CPT

## 2024-09-09 ENCOUNTER — TELEPHONE (OUTPATIENT)
Dept: NEUROLOGY | Facility: CLINIC | Age: 79
End: 2024-09-09

## 2024-09-09 ENCOUNTER — OFFICE VISIT (OUTPATIENT)
Dept: FAMILY MEDICINE CLINIC | Facility: CLINIC | Age: 79
End: 2024-09-09
Payer: MEDICARE

## 2024-09-09 VITALS
HEART RATE: 76 BPM | DIASTOLIC BLOOD PRESSURE: 72 MMHG | SYSTOLIC BLOOD PRESSURE: 124 MMHG | WEIGHT: 211 LBS | BODY MASS INDEX: 31.98 KG/M2 | RESPIRATION RATE: 16 BRPM | TEMPERATURE: 98 F | HEIGHT: 68 IN

## 2024-09-09 DIAGNOSIS — N18.31 STAGE 3A CHRONIC KIDNEY DISEASE (HCC): ICD-10-CM

## 2024-09-09 DIAGNOSIS — E78.00 PURE HYPERCHOLESTEROLEMIA: Primary | ICD-10-CM

## 2024-09-09 DIAGNOSIS — E03.2 HYPOTHYROIDISM DUE TO MEDICATION: ICD-10-CM

## 2024-09-09 PROCEDURE — 99214 OFFICE O/P EST MOD 30 MIN: CPT | Performed by: FAMILY MEDICINE

## 2024-09-09 PROCEDURE — G2211 COMPLEX E/M VISIT ADD ON: HCPCS | Performed by: FAMILY MEDICINE

## 2024-09-09 NOTE — TELEPHONE ENCOUNTER
Spoke to patient's daughter and wife regarding MRI Brain results. They will keep their scheduled follow-up visit.

## 2024-09-09 NOTE — TELEPHONE ENCOUNTER
Patient's daughter and wife called to discuss MRI results from  9/4/24.We also made a follow up appointment on 10/30/24 with Samantha Mary to follow up on memory. Please call Karly at [phone] to discuss results.

## 2024-09-10 NOTE — PROGRESS NOTES
[facility] Family Medicine Office Note  Chief Complaint:   Chief Complaint   Patient presents with    Medication Follow-Up    Hypertension    Hyperlipidemia    Thyroid Problem       HPI:   This is a 79 year old male coming in for medication follow-up the patient states that he has been taking his medications as prescribed.  His daughter is here today and is concerned in regards to his renal function.  He does have a history of hypercholesterolemia hypothyroidism stage III kidney disease.      Past Medical History:    A-fib (HCC)    Acute pancreatitis, unspecified complication status, unspecified pancreatitis type (HCC)    Erectile dysfunction    Esophageal reflux    High cholesterol    Osteoarthrosis, unspecified whether generalized or localized, unspecified site    Pancreatitis (HCC)    SIRS (systemic inflammatory response syndrome) (HCC)    Unspecified drug dependence, unspecified    addicted to drugs and alcohol    Unspecified essential hypertension    Visual impairment    glasses     Past Surgical History:   Procedure Laterality Date    Biopsy of thyroid,percut  2010    NO CYTOLOGIC EVIDENCE OF PAPILLARY CARCINOMA    Colonoscopy      Knee replacement surgery Right     Laparoscopic cholecystectomy  2020    Other surgical history  2005    cardiac cath     Other surgical history  2017    flow us Dr Herring    Tonsillectomy  1950    w/adenoidectomy    Vasectomy       Social History:  Social History     Socioeconomic History    Marital status:    Tobacco Use    Smoking status: Former     Current packs/day: 0.00     Average packs/day: 0.5 packs/day for 2.0 years (1.0 ttl pk-yrs)     Types: Cigarettes     Start date: 1967     Quit date: 1969     Years since quittin.7    Smokeless tobacco: Never   Vaping Use    Vaping status: Never Used   Substance and Sexual Activity    Alcohol use: Not Currently    Drug use: No    Sexual activity: Yes     Partners: Female   Other Topics  Concern    Caffeine Concern No     Comment:  1 diet coke rarely    Exercise No    Seat Belt Yes     Social Determinants of Health      [facility]    Social Connections    Received from [facility], [facility]    Housing Stability     Family History:  Family History   Problem Relation Age of Onset    Other (asthma[Other]) Father     Lipids Mother     Psychiatric Mother 70        Alzheimers    Other (CVA[Other]) Mother 89    Other (leukemia [Other]) Paternal Grandfather     Cancer Maternal Grandfather         Prostate?    Thyroid disease Daughter      Allergies:  Allergies   Allergen Reactions    Amoxicillin-Pot Clavulanate HIVES    Lipitor [Atorvastatin Calcium]      Muscle aches    Lisinopril Coughing     cough  cough     Current Meds:  Current Outpatient Medications   Medication Sig Dispense Refill    METOPROLOL TARTRATE 25 MG Oral Tab TAKE 1 TABLET BY MOUTH TWICE A DAY 60 tablet 0    allopurinol 100 MG Oral Tab Take 1 tablet (100 mg total) by mouth 2 (two) times daily. 60 tablet 0    TAMSULOSIN 0.4 MG Oral Cap TAKE 1 CAPSULE BY MOUTH EVERY DAY 90 capsule 0    ARIPiprazole (ABILIFY) 5 MG Oral Tab Take 1 tablet (5 mg total) by mouth daily. 90 tablet 0    gabapentin 100 MG Oral Cap Take 1 capsule (100 mg total) by mouth 3 (three) times daily. 90 capsule 2    QUEtiapine 25 MG Oral Tab TAKE 1-2 TABLETS BY MOUTH NIGHTLY 180 tablet 0    liothyronine 5 MCG Oral Tab Take 1 tablet (5 mcg total) by mouth 2 (two) times daily. 180 tablet 0    DULoxetine 60 MG Oral Cap DR Particles Take 1 capsule (60 mg total) by mouth 2 (two) times daily. 180 capsule 0    buPROPion  MG Oral Tablet 24 Hr Take 1 tablet (300 mg total) by mouth every morning. 90 tablet 0    buPROPion ER (WELLBUTRIN XL) 150 MG Oral Tablet 24 Hr Take 1 tablet (150 mg total) by mouth every morning. 90 tablet 0    LEVOTHYROXINE 75 MCG Oral Tab TAKE 1 TABLET  BY MOUTH EVERY DAY 90 tablet 0    HYDROcodone-acetaminophen 5-325 MG Oral Tab Take 1 tablet by mouth every 6 (six) hours as needed for Pain. No driving and no alcohol when taking medication. 30 tablet 0    NEPHRO-ALVARO 0.8 MG Oral Tab TAKE 1 TABLET BY MOUTH EVERY DAY 90 tablet 0    FAMOTIDINE 20 MG Oral Tab TAKE 1 TABLET BY MOUTH TWICE A  tablet 0    Rosuvastatin Calcium 20 MG Oral Tab Take 1 tablet (20 mg total) by mouth nightly. 90 tablet 0    latanoprost 0.005 % Ophthalmic Solution Place 1 drop into both eyes nightly. 3 Bottle 10    B COMPLEX-C-FOLIC ACID OR Take 1 tablet by mouth. Qd        Counseling given: Not Answered       REVIEW OF SYSTEMS:   Consitutional: No fevers, chills, sweats  Eye: No recent visual problems  ENMT: No ear pain nasal congestion sore throat  Respiratory: No shortness of breath, cough  Cardiovascular: No chest pain palpitations syncope  Gastrointestinal: No nausea vomiting diarrhea  Genitourinary: No hematuria  Hema/Lymph no bruising tendency, swollen lymph glands  Endocrine: Negative for excessive thirst excessive hunger      Medical, surgical, family, and social histories were reviewed      EXAM:   VITALS:   Vitals:    09/09/24 1202   BP: 124/72   Pulse: 76   Resp: 16   Temp: 97.6 °F (36.4 °C)      GENERAL: well developed, well nourished, in no apparent distress  SKIN: no rashes, no suspicious lesions: Cool and Dry  HEENT: atraumatic, normocephalic, ears and throat are clear    Ears: TM's clear and visible bilaterally, no excess cerumen or erythema.   EYES: Pupils equal round and reactive.  Extraocular motions intact no scleral icterus no injection or drainage  THROAT without erythema tonsillar hypertrophy or exudate.  Uvula midline airway patent  NECK: Given midline.  No JVD or lymphadenopathy supple nontender no meningeal signs   LUNGS: clear to auscultation sounds equal bilaterally no wheezes rales or rhonchi  CARDIO: Regular rate and rhythm without murmurs gallops or  cj      ASSESSMENT AND PLAN:   1. Pure hypercholesterolemia  - Lipid Panel; Future  - Comp Metabolic Panel (14); Future  I did discuss with the patient at this time would suggest updating blood work you need a lipid panel as well as a CMP he was asked to continue with his atorvastatin as well as to watch his diet  2. Hypothyroidism due to medication  - Triiodothyronine (T3) Total; Future  - TSH and Free T4; Future  Did discuss with the patient would suggest updating his blood work he will need a TSH T3-T4.  He was asked to update this in the next 6 months was asked to continue with levothyroxine as well as levothyroxine  3. Stage 3a chronic kidney disease (HCC)  - CBC With Differential With Platelet; Future  - US KIDNEY/BLADDER (CPT=76770); Future  I did discuss with the patient as well as his daughter that this has been chronic for him in terms of the renal failure we have continue to monitor this.  I did discuss that we could complete an ultrasound of the kidney to discern if there is any other findings.    Meds & Refills for this Visit:  Requested Prescriptions      No prescriptions requested or ordered in this encounter       Health Maintenance:  Health Maintenance Due   Topic Date Due    COVID-19 Vaccine (7 - 2023-24 season) 09/01/2024    Annual Physical  11/01/2024       Patient/Caregiver Education: Patient/Caregiver Education: There are no barriers to learning. Medical education done.   Outcome: Patient verbalizes understanding. Patient is notified to call with any questions, complications, allergies, or worsening or changing symptoms.  Patient is to call with any side effects or complications from the treatments as a result of today.     Problem List:  Patient Active Problem List   Diagnosis    Pure hypercholesterolemia    GERD (gastroesophageal reflux disease)    Status post total right knee replacement    Other male erectile dysfunction    Multinodular goiter    Benign non-nodular prostatic hyperplasia  with lower urinary tract symptoms    Hypertension, unspecified type    Opioid use disorder, severe, in sustained remission (HCC)    TIM on CPAP    Major depressive disorder, recurrent episode, moderate (HCC)    ETOH abuse    Cardiomyopathy (HCC)    Generalized osteoarthritis    Polycythemia    Snoring    Pancreatitis (HCC)    Anxiety and depression    Encephalopathy    Thyroid nodule    Hypercalcemia    Chronic kidney disease    Stage 3a chronic kidney disease (HCC)         No follow-ups on file.     Jarvis Restrepo MD    Please note that portions of this note may have been completed with a voice recognition program. Efforts were made to edit the dictations but occasionally words are mis-transcribed.

## 2024-09-25 DIAGNOSIS — I48.0 PAROXYSMAL ATRIAL FIBRILLATION (HCC): ICD-10-CM

## 2024-09-26 ENCOUNTER — PATIENT MESSAGE (OUTPATIENT)
Dept: FAMILY MEDICINE CLINIC | Facility: CLINIC | Age: 79
End: 2024-09-26

## 2024-09-26 DIAGNOSIS — G47.30 SLEEP APNEA, UNSPECIFIED TYPE: Primary | ICD-10-CM

## 2024-09-26 RX ORDER — METOPROLOL TARTRATE 25 MG/1
25 TABLET, FILM COATED ORAL 2 TIMES DAILY
Qty: 180 TABLET | Refills: 0 | Status: SHIPPED | OUTPATIENT
Start: 2024-09-26 | End: 2024-12-26

## 2024-09-26 NOTE — TELEPHONE ENCOUNTER
Did not pass protocol  Last office visit 9/9/2024  Last refill was: , 8/27/2024  60__tabs  Next appointment: 3/10/2025    Please sign pended medication if appropriate     Medication Quantity Refills Start End   METOPROLOL TARTRATE 25 MG Oral Tab 60 tablet 0 8/27/2024 --   Sig:   TAKE 1 TABLET BY MOUTH TWICE A DAY     Route:   Oral

## 2024-09-29 DIAGNOSIS — E03.2 HYPOTHYROIDISM DUE TO MEDICATION: ICD-10-CM

## 2024-09-30 RX ORDER — LEVOTHYROXINE SODIUM 75 UG/1
75 TABLET ORAL DAILY
Qty: 90 TABLET | Refills: 0 | Status: SHIPPED | OUTPATIENT
Start: 2024-09-30

## 2024-10-17 RX ORDER — ALLOPURINOL 100 MG/1
100 TABLET ORAL 2 TIMES DAILY
Qty: 180 TABLET | Refills: 0 | Status: SHIPPED | OUTPATIENT
Start: 2024-10-17 | End: 2025-01-15

## 2024-10-17 NOTE — TELEPHONE ENCOUNTER
Requested Prescriptions     Pending Prescriptions Disp Refills    allopurinol 100 MG Oral Tab 60 tablet 0     Sig: Take 1 tablet (100 mg total) by mouth 2 (two) times daily.     Last refill 8/23/24 #60  LOV 9/9/24  Future Appointments   Date Time Provider Department Center                        3/10/2025 12:15 PM Jarvis Restrepo MD [facility] [id number]

## 2024-10-18 ENCOUNTER — PATIENT OUTREACH (OUTPATIENT)
Dept: FAMILY MEDICINE CLINIC | Facility: CLINIC | Age: 79
End: 2024-10-18

## 2024-10-30 ENCOUNTER — OFFICE VISIT (OUTPATIENT)
Dept: NEUROLOGY | Facility: CLINIC | Age: 79
End: 2024-10-30
Payer: MEDICARE

## 2024-10-30 VITALS
SYSTOLIC BLOOD PRESSURE: 138 MMHG | HEART RATE: 71 BPM | OXYGEN SATURATION: 94 % | WEIGHT: 216 LBS | HEIGHT: 68 IN | BODY MASS INDEX: 32.74 KG/M2 | DIASTOLIC BLOOD PRESSURE: 76 MMHG | RESPIRATION RATE: 14 BRPM

## 2024-10-30 DIAGNOSIS — R41.3 MEMORY LOSS: Primary | ICD-10-CM

## 2024-10-30 DIAGNOSIS — I67.9 SMALL VESSEL DISEASE, CEREBROVASCULAR: ICD-10-CM

## 2024-10-30 DIAGNOSIS — G62.9 AXONAL POLYNEUROPATHY: ICD-10-CM

## 2024-10-30 DIAGNOSIS — D18.02 VENOUS ANGIOMA OF BRAIN (HCC): ICD-10-CM

## 2024-10-30 DIAGNOSIS — D32.0 CEREBRAL MENINGIOMA (HCC): ICD-10-CM

## 2024-10-30 PROCEDURE — 99213 OFFICE O/P EST LOW 20 MIN: CPT | Performed by: PHYSICIAN ASSISTANT

## 2024-10-30 NOTE — PATIENT INSTRUCTIONS
Refill policies:    Allow 2-3 business days for refills; controlled substances may take longer.  Contact your pharmacy at least 5 days prior to running out of medication and have them send an electronic request or submit request through the “request refill” option in your [employer] account.  Refills are not addressed on weekends; covering physicians do not authorize routine medications on weekends.  No narcotics or controlled substances are refilled after noon on Fridays or by on call physicians.  By law, narcotics must be electronically prescribed.  A 30 day supply with no refills is the maximum allowed.  If your prescription is due for a refill, you may be due for a follow up appointment.  To best provide you care, patients receiving routine medications need to be seen at least once a year.  Patients receiving narcotic/controlled substance medications need to be seen at least once every 3 months.  In the event that your preferred pharmacy does not have the requested medication in stock (e.g. Backordered), it is your responsibility to find another pharmacy that has the requested medication available.  We will gladly send a new prescription to that pharmacy at your request.    Scheduling Tests:    If your physician has ordered radiology tests such as MRI or CT scans, please contact Central Scheduling at [phone] right away to schedule the test.  Once scheduled, the [facility] Centralized Referral Team will work with your insurance carrier to obtain pre-certification or prior authorization.  Depending on your insurance carrier, approval may take 3-10 days.  It is highly recommended patients assure they have received an authorization before having a test performed.  If test is done without insurance authorization, patient may be responsible for the entire amount billed.      Precertification and Prior Authorizations:  If your physician has recommended that you have a procedure or additional testing performed the [facility]  Centralized Referral Team will contact your insurance carrier to obtain pre-certification or prior authorization.    You are strongly encouraged to contact your insurance carrier to verify that your procedure/test has been approved and is a COVERED benefit.  Although the [facility] Centralized Referral Team does its due diligence, the insurance carrier gives the disclaimer that \"Although the procedure is authorized, this does not guarantee payment.\"    Ultimately the patient is responsible for payment.   Thank you for your understanding in this matter.  Paperwork Completion:  If you require FMLA or disability paperwork for your recovery, please make sure to either drop it off or have it faxed to our office at [phone]. Be sure the form has your name and date of birth on it.  The form will be faxed to our Forms Department and they will complete it for you.  There is a 25$ fee for all forms that need to be filled out.  Please be aware there is a 10-14 day turnaround time.  You will need to sign a release of information (JILLIAN) form if your paperwork does not come with one.  You may call the Forms Department with any questions at [phone].  Their fax number is [phone].
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
## 2024-11-07 DIAGNOSIS — N40.1 BENIGN NON-NODULAR PROSTATIC HYPERPLASIA WITH LOWER URINARY TRACT SYMPTOMS: ICD-10-CM

## 2024-11-07 RX ORDER — TAMSULOSIN HYDROCHLORIDE 0.4 MG/1
0.4 CAPSULE ORAL DAILY
Qty: 90 CAPSULE | Refills: 0 | Status: SHIPPED | OUTPATIENT
Start: 2024-11-07

## 2024-11-07 NOTE — TELEPHONE ENCOUNTER
Did not pass protocol  Last office visit 9/9/2024  Last refill was: , _8/6/2024_tabs  Next appointment: 3/10/2025    Please sign pended medication if appropriate     Medication Quantity Refills Start End   TAMSULOSIN 0.4 MG Oral Cap 90 capsule 0 8/6/2024 --   Sig:   TAKE 1 CAPSULE BY MOUTH EVERY DAY     Route:   Oral

## 2024-11-08 ENCOUNTER — TELEPHONE (OUTPATIENT)
Dept: SLEEP CENTER | Age: 79
End: 2024-11-08

## 2024-11-19 ENCOUNTER — PATIENT OUTREACH (OUTPATIENT)
Dept: FAMILY MEDICINE CLINIC | Facility: CLINIC | Age: 79
End: 2024-11-19

## 2024-12-18 ENCOUNTER — TELEPHONE (OUTPATIENT)
Dept: FAMILY MEDICINE CLINIC | Facility: CLINIC | Age: 79
End: 2024-12-18

## 2024-12-23 DIAGNOSIS — I48.0 PAROXYSMAL ATRIAL FIBRILLATION (HCC): ICD-10-CM

## 2024-12-26 RX ORDER — METOPROLOL TARTRATE 25 MG/1
25 TABLET, FILM COATED ORAL 2 TIMES DAILY
Qty: 180 TABLET | Refills: 0 | Status: SHIPPED | OUTPATIENT
Start: 2024-12-26

## 2024-12-26 NOTE — TELEPHONE ENCOUNTER
Last office visit: 9/9/24  Next office visit: 3/10/25  Last Refill:9/26/24    Requested Prescriptions     Pending Prescriptions Disp Refills    METOPROLOL TARTRATE 25 MG Oral Tab [Pharmacy Med Name: METOPROLOL TARTRATE 25 MG TAB] 180 tablet 0     Sig: TAKE 1 TABLET BY MOUTH TWICE A DAY

## 2024-12-29 DIAGNOSIS — E03.2 HYPOTHYROIDISM DUE TO MEDICATION: ICD-10-CM

## 2024-12-30 RX ORDER — LEVOTHYROXINE SODIUM 75 UG/1
75 TABLET ORAL DAILY
Qty: 90 TABLET | Refills: 0 | Status: SHIPPED | OUTPATIENT
Start: 2024-12-30

## 2025-01-02 ENCOUNTER — TELEPHONE (OUTPATIENT)
Dept: NEUROLOGY | Facility: CLINIC | Age: 80
End: 2025-01-02

## 2025-01-02 NOTE — TELEPHONE ENCOUNTER
Neuropsychological evaluation report received from Yana. Report visible in care everywhere under psychology office visit dated 12/11/24.   No need to send copy to scan.   Report placed on provider's desk to review.

## 2025-01-02 NOTE — TELEPHONE ENCOUNTER
Received neuropsychological evaluation from Marco   DOS:  12/11/24    Placed in nurses bin for review.   To RN pool. Does not appear pt read US result note where Dr. Vicente advised on CT scan.

## 2025-01-09 ENCOUNTER — TELEPHONE (OUTPATIENT)
Dept: FAMILY MEDICINE CLINIC | Facility: CLINIC | Age: 80
End: 2025-01-09

## 2025-01-09 DIAGNOSIS — E03.2 HYPOTHYROIDISM DUE TO MEDICATION: ICD-10-CM

## 2025-01-09 DIAGNOSIS — I48.0 PAROXYSMAL ATRIAL FIBRILLATION (HCC): ICD-10-CM

## 2025-01-09 RX ORDER — METOPROLOL TARTRATE 25 MG/1
25 TABLET, FILM COATED ORAL 2 TIMES DAILY
Qty: 180 TABLET | Refills: 0 | Status: SHIPPED | OUTPATIENT
Start: 2025-01-09

## 2025-01-09 RX ORDER — LEVOTHYROXINE SODIUM 75 UG/1
75 TABLET ORAL DAILY
Qty: 90 TABLET | Refills: 0 | Status: SHIPPED | OUTPATIENT
Start: 2025-01-09

## 2025-01-09 NOTE — TELEPHONE ENCOUNTER
Haritha from [facility] called back and cancelled Rx's sent.   Reordered levothyroxine 75 mcg and metoprolol tartrate 25 mg Rx and sent to [facility].

## 2025-01-09 NOTE — TELEPHONE ENCOUNTER
Spouse called requesting refills for:     LEVOTHYROXINE 75 MCG Oral Tab   METOPROLOL TARTRATE 25 MG Oral Tab     Please send to:  [id number] - [place], [place] - [id number] AT [facility] [address] [facility] [address], [phone], [phone]

## 2025-01-09 NOTE — TELEPHONE ENCOUNTER
Requested refills: levothyroxine 75 mcg and metoprolol tartrate 25 mg Rx sent to [facility] 12/30/24 for 3 months supply    Patient's spouse, Elizabeth informed. She requested to transfer Rx to [facility]. Patient's preferred pharmacy updated.     Called [facility] to cancel Rx's sent. Left a voicemail to call back.   Once cancelled, please reorder and sent to preferred pharmacy. Thank you.

## 2025-02-07 DIAGNOSIS — N40.1 BENIGN NON-NODULAR PROSTATIC HYPERPLASIA WITH LOWER URINARY TRACT SYMPTOMS: ICD-10-CM

## 2025-02-07 RX ORDER — ALLOPURINOL 100 MG/1
100 TABLET ORAL 2 TIMES DAILY
Qty: 180 TABLET | Refills: 0 | Status: SHIPPED | OUTPATIENT
Start: 2025-02-07

## 2025-02-07 RX ORDER — TAMSULOSIN HYDROCHLORIDE 0.4 MG/1
0.4 CAPSULE ORAL DAILY
Qty: 90 CAPSULE | Refills: 0 | Status: SHIPPED | OUTPATIENT
Start: 2025-02-07

## 2025-02-07 NOTE — TELEPHONE ENCOUNTER
Last office visit:  9/9/2024 for medication follow up    Return to office:  3/10/2025    Last refill:  10/17/2024 #180 without refills.    Please see pended RX and approve if appropriate. Thank you.

## 2025-02-26 ENCOUNTER — HOSPITAL ENCOUNTER (OUTPATIENT)
Dept: NUCLEAR MEDICINE | Facility: HOSPITAL | Age: 80
Discharge: HOME | End: 2025-02-26
Attending: Other
Payer: MEDICARE

## 2025-02-26 DIAGNOSIS — G31.84 MILD COGNITIVE IMPAIRMENT: ICD-10-CM

## 2025-02-26 PROCEDURE — 78814 PET IMAGE W/CT LMTD: CPT | Performed by: OTHER

## 2025-03-03 ENCOUNTER — LAB ENCOUNTER (OUTPATIENT)
Dept: LAB | Age: 80
End: 2025-03-03
Attending: FAMILY MEDICINE
Payer: MEDICARE

## 2025-03-03 DIAGNOSIS — G31.84 MILD COGNITIVE IMPAIRMENT: ICD-10-CM

## 2025-03-03 DIAGNOSIS — E03.2 HYPOTHYROIDISM DUE TO MEDICATION: ICD-10-CM

## 2025-03-03 DIAGNOSIS — N18.31 STAGE 3A CHRONIC KIDNEY DISEASE (HCC): ICD-10-CM

## 2025-03-03 DIAGNOSIS — R41.3 MEMORY LOSS: ICD-10-CM

## 2025-03-03 DIAGNOSIS — E78.00 PURE HYPERCHOLESTEROLEMIA: ICD-10-CM

## 2025-03-03 LAB
ALBUMIN SERPL-MCNC: 4.7 G/DL (ref 3.2–4.8)
ALBUMIN/GLOB SERPL: 1.9 {RATIO} (ref 1–2)
ALP LIVER SERPL-CCNC: 84 U/L
ALT SERPL-CCNC: 16 U/L
ANION GAP SERPL CALC-SCNC: 3 MMOL/L (ref 0–18)
AST SERPL-CCNC: 23 U/L (ref ?–34)
BASOPHILS # BLD AUTO: 0.17 X10(3) UL (ref 0–0.2)
BASOPHILS NFR BLD AUTO: 1.4 %
BILIRUB SERPL-MCNC: 0.6 MG/DL (ref 0.2–1.1)
BUN BLD-MCNC: 20 MG/DL (ref 9–23)
CALCIUM BLD-MCNC: 10.2 MG/DL (ref 8.7–10.6)
CHLORIDE SERPL-SCNC: 107 MMOL/L (ref 98–112)
CHOLEST SERPL-MCNC: 163 MG/DL (ref ?–200)
CO2 SERPL-SCNC: 29 MMOL/L (ref 21–32)
CREAT BLD-MCNC: 1.97 MG/DL
EGFRCR SERPLBLD CKD-EPI 2021: 34 ML/MIN/1.73M2 (ref 60–?)
EOSINOPHIL # BLD AUTO: 0.3 X10(3) UL (ref 0–0.7)
EOSINOPHIL NFR BLD AUTO: 2.4 %
ERYTHROCYTE [DISTWIDTH] IN BLOOD BY AUTOMATED COUNT: 15.1 %
FASTING PATIENT LIPID ANSWER: YES
FASTING STATUS PATIENT QL REPORTED: YES
FOLATE SERPL-MCNC: 40 NG/ML (ref 5.4–?)
GLOBULIN PLAS-MCNC: 2.5 G/DL (ref 2–3.5)
GLUCOSE BLD-MCNC: 109 MG/DL (ref 70–99)
HCT VFR BLD AUTO: 53.2 %
HDLC SERPL-MCNC: 48 MG/DL (ref 40–59)
HGB BLD-MCNC: 17.6 G/DL
IMM GRANULOCYTES # BLD AUTO: 0.1 X10(3) UL (ref 0–1)
IMM GRANULOCYTES NFR BLD: 0.8 %
LDLC SERPL CALC-MCNC: 80 MG/DL (ref ?–100)
LYMPHOCYTES # BLD AUTO: 1.73 X10(3) UL (ref 1–4)
LYMPHOCYTES NFR BLD AUTO: 14.1 %
MCH RBC QN AUTO: 30.2 PG (ref 26–34)
MCHC RBC AUTO-ENTMCNC: 33.1 G/DL (ref 31–37)
MCV RBC AUTO: 91.4 FL
MONOCYTES # BLD AUTO: 0.96 X10(3) UL (ref 0.1–1)
MONOCYTES NFR BLD AUTO: 7.8 %
NEUTROPHILS # BLD AUTO: 9.03 X10 (3) UL (ref 1.5–7.7)
NEUTROPHILS # BLD AUTO: 9.03 X10(3) UL (ref 1.5–7.7)
NEUTROPHILS NFR BLD AUTO: 73.5 %
NONHDLC SERPL-MCNC: 115 MG/DL (ref ?–130)
OSMOLALITY SERPL CALC.SUM OF ELEC: 291 MOSM/KG (ref 275–295)
PLATELET # BLD AUTO: 188 10(3)UL (ref 150–450)
POTASSIUM SERPL-SCNC: 4.6 MMOL/L (ref 3.5–5.1)
PROT SERPL-MCNC: 7.2 G/DL (ref 5.7–8.2)
RBC # BLD AUTO: 5.82 X10(6)UL
SODIUM SERPL-SCNC: 139 MMOL/L (ref 136–145)
T3 SERPL-MCNC: 1.18 NG/ML (ref 0.6–1.81)
T4 FREE SERPL-MCNC: 1 NG/DL (ref 0.8–1.7)
TRIGL SERPL-MCNC: 209 MG/DL (ref 30–149)
TSI SER-ACNC: 0.46 UIU/ML (ref 0.55–4.78)
VIT B12 SERPL-MCNC: 491 PG/ML (ref 211–911)
VLDLC SERPL CALC-MCNC: 33 MG/DL (ref 0–30)
WBC # BLD AUTO: 12.3 X10(3) UL (ref 4–11)

## 2025-03-03 PROCEDURE — 84439 ASSAY OF FREE THYROXINE: CPT

## 2025-03-03 PROCEDURE — 80061 LIPID PANEL: CPT

## 2025-03-03 PROCEDURE — 36415 COLL VENOUS BLD VENIPUNCTURE: CPT

## 2025-03-03 PROCEDURE — 80053 COMPREHEN METABOLIC PANEL: CPT

## 2025-03-03 PROCEDURE — 83520 IMMUNOASSAY QUANT NOS NONAB: CPT

## 2025-03-03 PROCEDURE — 84425 ASSAY OF VITAMIN B-1: CPT

## 2025-03-03 PROCEDURE — 84443 ASSAY THYROID STIM HORMONE: CPT

## 2025-03-03 PROCEDURE — 85025 COMPLETE CBC W/AUTO DIFF WBC: CPT

## 2025-03-03 PROCEDURE — 82746 ASSAY OF FOLIC ACID SERUM: CPT

## 2025-03-03 PROCEDURE — 84480 ASSAY TRIIODOTHYRONINE (T3): CPT

## 2025-03-03 PROCEDURE — 82607 VITAMIN B-12: CPT

## 2025-03-03 NOTE — PROGRESS NOTES
Followed for Cognitive impairment and the PET Amyloid scan was normal  Unlikely therefore to have dementia from Alzheimer's related with amyloid accumulation    Dr. BLAKE Chow

## 2025-03-06 LAB
PHOSPHORYLATED TAU 181 (P-TAU181), PLASMA: 1.54 PG/ML
PHOSPHORYLATED TAU 181 (P-TAU181), PLASMA: 1.54 PG/ML

## 2025-03-07 LAB
BETA-AMYLOID 40: 270.22 PG/ML
BETA-AMYLOID 40: 270.22 PG/ML
BETA-AMYLOID 42/40 RATIO: 0.12
BETA-AMYLOID 42/40 RATIO: 0.12
BETA-AMYLOID 42: 31.18 PG/ML
BETA-AMYLOID 42: 31.18 PG/ML

## 2025-03-09 LAB — VITAMIN B1 WHOLE BLD: 389.8 NMOL/L

## 2025-03-10 ENCOUNTER — OFFICE VISIT (OUTPATIENT)
Dept: FAMILY MEDICINE CLINIC | Facility: CLINIC | Age: 80
End: 2025-03-10
Payer: MEDICARE

## 2025-03-10 VITALS
BODY MASS INDEX: 33.65 KG/M2 | WEIGHT: 222 LBS | SYSTOLIC BLOOD PRESSURE: 124 MMHG | RESPIRATION RATE: 18 BRPM | HEART RATE: 62 BPM | HEIGHT: 68 IN | DIASTOLIC BLOOD PRESSURE: 62 MMHG | TEMPERATURE: 97 F

## 2025-03-10 DIAGNOSIS — E03.2 HYPOTHYROIDISM DUE TO MEDICATION: Primary | ICD-10-CM

## 2025-03-10 LAB
PHOSPHORYLATED TAU 217 (P-TAU217), PLASMA: 0.21 PG/ML
PHOSPHORYLATED TAU 217 (P-TAU217), PLASMA: 0.21 PG/ML

## 2025-03-10 PROCEDURE — G2211 COMPLEX E/M VISIT ADD ON: HCPCS | Performed by: FAMILY MEDICINE

## 2025-03-10 PROCEDURE — 99214 OFFICE O/P EST MOD 30 MIN: CPT | Performed by: FAMILY MEDICINE

## 2025-03-10 RX ORDER — DOCUSATE SODIUM 100 MG/1
100 CAPSULE, LIQUID FILLED ORAL 2 TIMES DAILY
COMMUNITY

## 2025-03-11 NOTE — PROGRESS NOTES
[facility] Family Medicine Office Note  Chief Complaint:   Chief Complaint   Patient presents with    Medication Follow-Up    Patient Question     Pt would like [facility]       HPI:   This is a 79 year old male coming in for medication follow-up.  The patient states that he has been taking his medications as prescribed.  Denies any acute concerns with these.  Does have a history of hypothyroidism depression atrial fibrillation hypercholesterolemia hypertension neuropathy gout memory loss      Past Medical History:    A-fib (HCC)    Acute pancreatitis, unspecified complication status, unspecified pancreatitis type (HCC)    Erectile dysfunction    Esophageal reflux    High cholesterol    Osteoarthrosis, unspecified whether generalized or localized, unspecified site    Pancreatitis (HCC)    SIRS (systemic inflammatory response syndrome) (HCC)    Unspecified drug dependence, unspecified    addicted to drugs and alcohol    Unspecified essential hypertension    Visual impairment    glasses     Past Surgical History:   Procedure Laterality Date    Biopsy of thyroid,percut  2010    NO CYTOLOGIC EVIDENCE OF PAPILLARY CARCINOMA    Colonoscopy      Knee replacement surgery Right     Laparoscopic cholecystectomy  2020    Other surgical history  2005    cardiac cath     Other surgical history  2017    flow us Dr Herring    Tonsillectomy  1950    w/adenoidectomy    Vasectomy       Social History:  Social History     Socioeconomic History    Marital status:    Tobacco Use    Smoking status: Former     Current packs/day: 0.00     Average packs/day: 0.5 packs/day for 2.0 years (1.0 ttl pk-yrs)     Types: Cigarettes     Start date: 1967     Quit date: 1969     Years since quittin.2     Passive exposure: Never    Smokeless tobacco: Never   Vaping Use    Vaping status: Never Used   Substance and Sexual Activity    Alcohol use: Yes     Comment: socially     Drug use: No    Sexual activity: Yes     Partners: Female   Other Topics Concern    Caffeine Concern Yes     Comment:  1 diet coke rarely    Exercise No    Seat Belt Yes     Social Drivers of Health      [facility]    Housing Stability     Family History:  Family History   Problem Relation Age of Onset    Other (asthma[Other]) Father     Lipids Mother     Psychiatric Mother 70        Alzheimers    Other (CVA[Other]) Mother 89    Other (leukemia [Other]) Paternal Grandfather     Cancer Maternal Grandfather         Prostate?    Thyroid disease Daughter      Allergies:  Allergies[1]  Current Meds:  Current Outpatient Medications   Medication Sig Dispense Refill    docusate sodium 100 MG Oral Cap Take 1 capsule (100 mg total) by mouth 2 (two) times daily.      DULoxetine 60 MG Oral Cap DR Particles Take 1 capsule (60 mg total) by mouth 2 (two) times daily. 180 capsule 0    liothyronine 5 MCG Oral Tab Take 1 tablet (5 mcg total) by mouth 2 (two) times daily. 180 tablet 0    gabapentin 300 MG Oral Cap Take 1 capsule (300 mg total) by mouth in the morning and 1 capsule (300 mg total) before bedtime.      donepezil 5 MG Oral Tab Take 1 tablet (5 mg total) by mouth nightly. 30 tablet 5    ALLOPURINOL 100 MG Oral Tab TAKE 1 TABLET(100 MG) BY MOUTH TWICE DAILY 180 tablet 0    TAMSULOSIN 0.4 MG Oral Cap TAKE 1 CAPSULE BY MOUTH EVERY DAY 90 capsule 0    levothyroxine 75 MCG Oral Tab Take 1 tablet (75 mcg total) by mouth daily. 90 tablet 0    metoprolol tartrate 25 MG Oral Tab Take 1 tablet (25 mg total) by mouth 2 (two) times daily. 180 tablet 0    buPROPion  MG Oral Tablet 24 Hr Take 1 tablet (150 mg total) by mouth every morning. Take with 300 mg = 450 mg 90 tablet 0    buPROPion  MG Oral Tablet 24 Hr Take 1 tablet (300 mg total) by mouth every morning. Take with 1 tab 150 mg = total dose 450 mg daily 90 tablet 0    ARIPiprazole 5 MG Oral Tab Take 1  tablet (5 mg total) by mouth daily. 90 tablet 0    QUEtiapine 25 MG Oral Tab TAKE 1-2 TABLETS BY MOUTH NIGHTLY 180 tablet 0    FAMOTIDINE 20 MG Oral Tab TAKE 1 TABLET BY MOUTH TWICE A  tablet 0    Rosuvastatin Calcium 20 MG Oral Tab Take 1 tablet (20 mg total) by mouth nightly. 90 tablet 0    latanoprost 0.005 % Ophthalmic Solution Place 1 drop into both eyes nightly. 3 Bottle 10    B COMPLEX-C-FOLIC ACID OR Take 1 tablet by mouth. Qd        Counseling given: Not Answered       REVIEW OF SYSTEMS:   Consitutional: No fevers, chills, sweats  Eye: No recent visual problems  ENMT: No ear pain nasal congestion sore throat  Respiratory: No shortness of breath, cough  Cardiovascular: No chest pain palpitations syncope  Gastrointestinal: No nausea vomiting diarrhea  Genitourinary: No hematuria  Hema/Lymph no bruising tendency, swollen lymph glands  Endocrine: Negative for excessive thirst excessive hunger      Medical, surgical, family, and social histories were reviewed      EXAM:   VITALS:   Vitals:    03/10/25 1224   BP: 124/62   Pulse: 62   Resp: 18   Temp: 97.2 °F (36.2 °C)      GENERAL: well developed, well nourished, in no apparent distress  SKIN: no rashes, no suspicious lesions: Cool and Dry  HEENT: atraumatic, normocephalic, ears and throat are clear    Ears: TM's clear and visible bilaterally, no excess cerumen or erythema.   EYES: Pupils equal round and reactive.  Extraocular motions intact no scleral icterus no injection or drainage  THROAT without erythema tonsillar hypertrophy or exudate.  Uvula midline airway patent  NECK: Given midline.  No JVD or lymphadenopathy supple nontender no meningeal signs   LUNGS: clear to auscultation sounds equal bilaterally no wheezes rales or rhonchi  CARDIO: Regular rate and rhythm without murmurs gallops or rubs      ASSESSMENT AND PLAN:   1. Hypothyroidism due to medication  - Triiodothyronine (T3) Total; Future  - TSH and Free T4; Future  Did discuss with the  patient as well as his daughter at this time to continue with his current medications we will be updating blood work you need a TSH T3-T4 rest to continue with his current medications.  Daughter states that they are looking into possibly going into an assisted living down [facility]    Meds & Refills for this Visit:  Requested Prescriptions      No prescriptions requested or ordered in this encounter       Health Maintenance:  Health Maintenance Due   Topic Date Due    Annual Physical  11/01/2024    Fall Risk Screening (Annual)  01/01/2025       Patient/Caregiver Education: Patient/Caregiver Education: There are no barriers to learning. Medical education done.   Outcome: Patient verbalizes understanding. Patient is notified to call with any questions, complications, allergies, or worsening or changing symptoms.  Patient is to call with any side effects or complications from the treatments as a result of today.     Problem List:  Patient Active Problem List   Diagnosis    Pure hypercholesterolemia    GERD (gastroesophageal reflux disease)    Status post total right knee replacement    Other male erectile dysfunction    Multinodular goiter    Benign non-nodular prostatic hyperplasia with lower urinary tract symptoms    Hypertension, unspecified type    Opioid use disorder, severe, in sustained remission (HCC)    TIM on CPAP    Major depressive disorder, recurrent episode, moderate (HCC)    ETOH abuse    Cardiomyopathy (HCC)    Generalized osteoarthritis    Polycythemia    Snoring    Pancreatitis (HCC)    Anxiety and depression    Encephalopathy    Thyroid nodule    Hypercalcemia    Chronic kidney disease    Stage 3a chronic kidney disease (HCC)         No follow-ups on file.     Jarvis Restrepo MD    Please note that portions of this note may have been completed with a voice recognition program. Efforts were made to edit the dictations but occasionally words are mis-transcribed.       [1]   Allergies  Allergen Reactions     Amoxicillin-Pot Clavulanate HIVES    Lipitor [Atorvastatin Calcium]      Muscle aches    Lisinopril Coughing     cough  cough

## 2025-03-17 ENCOUNTER — TELEPHONE (OUTPATIENT)
Dept: FAMILY MEDICINE CLINIC | Facility: CLINIC | Age: 80
End: 2025-03-17

## 2025-04-08 ENCOUNTER — TELEPHONE (OUTPATIENT)
Dept: FAMILY MEDICINE CLINIC | Facility: CLINIC | Age: 80
End: 2025-04-08

## 2025-04-08 DIAGNOSIS — E03.2 HYPOTHYROIDISM DUE TO MEDICATION: ICD-10-CM

## 2025-04-08 NOTE — TELEPHONE ENCOUNTER
Patient daughter called in regarding need a letter from PCP stating based on patients medical neuro evaluation patient is unable to tend for his care. Patient is going in to a assistant living with admission date 4/21/25.

## 2025-04-08 NOTE — TELEPHONE ENCOUNTER
Per patients daughterKarly listed on HIPAA consent, patient is anticipated to move into assisted living on 4/21/25.     Patient had a neuropsych evaluation done at [facility]. However, the psychologist that performed the evaluation is not an MD. The letter needs to come from a medical doctor with the license, MD.    Daughter states the letter should specifically state \"Based upon the review of the neuropsch evaluation, it is determined the patient is unable to care for his affairs\".

## 2025-04-14 NOTE — TELEPHONE ENCOUNTER
The daughter (Karly Forrester ) called and wants to follow up on the letter she was requesting. Her phone number is [phone]. Please advise.

## 2025-04-16 RX ORDER — LEVOTHYROXINE SODIUM 50 UG/1
50 TABLET ORAL
Qty: 30 TABLET | Refills: 1 | Status: SHIPPED | OUTPATIENT
Start: 2025-04-16

## 2025-04-16 RX ORDER — LEVOTHYROXINE SODIUM 75 UG/1
75 TABLET ORAL DAILY
Qty: 90 TABLET | Refills: 1 | OUTPATIENT
Start: 2025-04-16

## 2025-04-16 NOTE — TELEPHONE ENCOUNTER
Last Office Visit: 3/10/25  Last Refill: 1/9/25  Return to Clinic:   Protocol: failed   NOV: n/a   Requested Prescriptions     Pending Prescriptions Disp Refills    levothyroxine 75 MCG Oral Tab [Pharmacy Med Name: LEVOTHYROXINE 0.075MG (75MCG) TABS] 90 tablet 1     Sig: TAKE 1 TABLET(75 MCG) BY MOUTH DAILY           Please approve if appropriate.     Thank you!

## 2025-04-16 NOTE — TELEPHONE ENCOUNTER
TSH is low I would suggest decreasing the Synthroid to 50 mcg and then repeating TSH T3-T4 in 6 weeks

## 2025-05-05 ENCOUNTER — TELEPHONE (OUTPATIENT)
Dept: FAMILY MEDICINE CLINIC | Facility: CLINIC | Age: 80
End: 2025-05-05

## 2025-05-14 NOTE — TELEPHONE ENCOUNTER
LMVIKAS to set up ABELINO Moncada on 5/14/2025 at 10:27 AM     LOV: 04/04/2023    Last Refill:   Medication Quantity Refills Start End   allopurinol 100 MG Oral Tab 180 tablet 0 7/31/2023      RTC: has appt on 11/01/2023    Protocol: n/a    Refill pended. Please approve if okay. Thank you.

## 2025-05-15 ENCOUNTER — PATIENT OUTREACH (OUTPATIENT)
Dept: FAMILY MEDICINE CLINIC | Facility: CLINIC | Age: 80
End: 2025-05-15

## (undated) DIAGNOSIS — M54.12 LEFT CERVICAL RADICULOPATHY: ICD-10-CM

## (undated) DIAGNOSIS — M10.9 GOUTY ARTHROPATHY: ICD-10-CM

## (undated) DIAGNOSIS — E03.2 HYPOTHYROIDISM DUE TO MEDICATION: ICD-10-CM

## (undated) DIAGNOSIS — E78.00 PURE HYPERCHOLESTEROLEMIA: Primary | ICD-10-CM

## (undated) DIAGNOSIS — I10 BENIGN ESSENTIAL HYPERTENSION: ICD-10-CM

## (undated) DIAGNOSIS — N40.1 BENIGN NON-NODULAR PROSTATIC HYPERPLASIA WITH LOWER URINARY TRACT SYMPTOMS: Primary | ICD-10-CM

## (undated) DIAGNOSIS — N40.1 BENIGN NON-NODULAR PROSTATIC HYPERPLASIA WITH LOWER URINARY TRACT SYMPTOMS: ICD-10-CM

## (undated) DEVICE — 1200CC GUARDIAN II: Brand: GUARDIAN

## (undated) DEVICE — DEVICE SPEC RTRVL RPTR 2.4MM

## (undated) DEVICE — SNARE CAPTIFLEX MICRO-OVL OLY

## (undated) DEVICE — MASK ETCO2 PANORAMIC

## (undated) DEVICE — 3M™ RED DOT™ MONITORING ELECTRODE WITH FOAM TAPE AND STICKY GEL, 50/BAG, 20/CASE, 72/PLT 2570: Brand: RED DOT™

## (undated) DEVICE — DEVICE SPCMN RTRVL RAPTOR MINI

## (undated) DEVICE — NASAL JEJUNAL FEEDING TUBE: Brand: COOK

## (undated) DEVICE — 3-PORT THROUGH-THE-PEG J-TUBE KIT
Type: IMPLANTABLE DEVICE | Status: NON-FUNCTIONAL
Brand: ENDOVIVE JEJUNAL FEEDING TUBE

## (undated) DEVICE — Device: Brand: DEFENDO AIR/WATER/SUCTION AND BIOPSY VALVE

## (undated) DEVICE — FILTERLINE NASAL ADULT O2/CO2

## (undated) DEVICE — ENDOSCOPY PACK UPPER: Brand: MEDLINE INDUSTRIES, INC.

## (undated) NOTE — LETTER
Jackelyn Clay [address] [facility] [address], [address] [facility]     PICC LINE INSERTION CONSENT     I agree to have a Peripherally Inserted Central Catheter (PICC) placed in my arm.    1. The PICC insertion procedure, care, maintenance, risks, benefits, and c goals; and potential problems that might occur during recuperation.  They also discussed reasonable alternatives to the PICC, including risks, benefits, and side effects related to the alternatives and risks related to not receiving this procedure      ____

## (undated) NOTE — ED AVS SNAPSHOT
[facility] Emergency Department    [facility]  [address] [address]    Phone:  [phone]    Fax:  ISSA Crain   MRN: [id number]    Department:  [facility] Emergency Department   Date of Visit:  1/ Take 1 tablet (10 mg total) by mouth every 6 (six) hours as needed for Pain. ondansetron 4 MG Tbdp   Quantity:  10 tablet   Commonly known as:  ZOFRAN-ODT   Take 1 tablet (4 mg total) by mouth every 4 (four) hours as needed for Nausea.        Sulfamet Si usted tiene algun problema con mendoza sequimiento, por favor llame a nuestro adminstrador de adilson al [phone]    Expect to receive an electronic request (by e-mail or text) to complete a self-assessment the day after your visit.   You may also receiv [facility] [address] ([address]) [address]  ([address]) [address] [facility] ([address]) [address] [facility] [address] (8 Mild perinephric stranding about the left kidney without nephrolithiasis or hydronephrosis. Colonic diverticulosis.            Dictated by: Tomy Cullen MD on 1/07/2017 at 15:20       Approved by: Tomy Cullen MD              Narrative:    PROCEDURE: If you've recently had a stay at the Hospital you can access your discharge instructions in [facility] by going to Visits < Admission Summaries.  If you've been to the Emergency Department or your doctor's office, you can view your past visit information in My

## (undated) NOTE — LETTER
Consent to Procedure/Sedation    Date: __________________    Time: _______________    1. I authorize the performance upon Tony Arizmendi the following:     Right sided thoracentesis    2.  I authorize  ____Jas___________ (and whomever is design ___________________________    ___________________    Witness: ____________________     Date: ______________    Printed: 2019   4:20 PM    Patient Name: Mary Zuñiga        : 3/31/1945       Medical Record #: [id number]

## (undated) NOTE — LETTER
[address] [facility] FOR BLOOD OR BLOOD COMPONENTS      In the course of your treatment, it may become necessary to administer a transfusion of blood or blood components.  This form provides basic information concerning this proc explain the alternatives to you if it has not already been done. I,Steve Ellis, have read/had read to me the above. I understand the matters bearing on the decision whether or not to authorize a transfusion of blood or blood components.  I have no qu

## (undated) NOTE — LETTER
Consent to Procedure/Sedation    Date:  1/02/20    Time: _______________    1. I authorize the performance upon Konrad Bernal the following:    Permanent Dialysis Catheter Insertion    2.  I authorize Dr. Justin Soliman (and whomever is designated as the doctor Witness: ____________________     Date: ______________    Printed: 2020   12:28 PM    Patient Name: Marchelle Eisenmenger        : 3/31/1945       Medical Record #: [id number]

## (undated) NOTE — IP AVS SNAPSHOT
Patient Demographics     Address  [address] [address] Phone  [phone] [facility])  [phone] (Mobile) *Preferred* E-mail Address  [email or web address]      Emergency Contact(s)     Name Relation Home Work Mobile    Mike Ellis Commonly known as:  PACERONE  Next dose due:  01/05/20 @ 9:30 AM      Take 1 tablet (200 mg total) by mouth daily.    BRITTANI Rapp         bumetanide 0.25 MG/ML Soln  Commonly known as:  BUMEX  Next dose due:  01/04/20 @ 5 PM      Inject 8 mL (2 mg t Take 1 tablet (10 mg total) by mouth 3 (three) times daily.    BRITTANI Calixto         Pantoprazole Sodium 40 MG Tbec  Commonly known as:  PROTONIX  Next dose due:  01/05/20 before breafkast      Take 1 tablet (40 mg total) by mouth every morning befor [id number] epoetin brielle-epbx (RETACRIT) 63264 UNIT/ML injection 10,000 Units 01/04/20 1320 Given      [id number] heparin sodium 1000 UNIT/ML injection 3,000 Units 01/04/20 1330 Given      [id number] latanoprost (XALATAN) 0.005 % ophthalmic solution 1 drop 01 Specimen Information    Type Source Collected On   Blood — 01/04/20 0454          Components    Component Value Reference Range Flag Lab   Neutrophil Absolute Manual 7.12 1.50 - 7.70 x10(3) uL — [facility]   Lymphocyte Absolute Manual 1.58 1.00 - 4.00 x10 Ordering provider:  Suly Ty MD  01/03/20 8524 Resulting lab:  [facility]    Specimen Information    Type Source Collected On   Blood — 01/04/20 8519          Components    Component Value Reference Range Flag Lab   Glucose 180 70 - 99 mg/dL Jayleen Davis Order Status:  Completed Lab Status:  Final result Updated:  12/12/19 2100    Specimen:  Blood from Bld,Picc Line      Blood Culture Result No Growth 5 Days    Blood Culture Once [[id number]] Collected:  12/07/19 1950    Order Status:  Completed Lab Status Blood Culture FREQ X 2 [[id number]] Collected:  11/25/19 1315    Order Status:  Completed Lab Status:  Final result Updated:  12/01/19 1101    Specimen:  Blood,peripheral      Blood Culture Result No Growth 5 Days    Blood Culture FREQ X 2 [[id number]] [facility] • High cholesterol    • Osteoarthrosis, unspecified whether generalized or localized, unspecified site    • Other and unspecified hyperlipidemia    • Unspecified drug dependence, unspecified 01/01/1987    addicted to drugs and alcohol   • Unspecified essen every 6 (six) hours as needed for Pain., Disp: 20 tablet, Rfl: 0  predniSONE 10 MG Oral Tab, Take 4 tabs daily for 4 days then 3 tabs for one day then 2 tabs for one day then 1 tab for one day, Disp: 22 tablet, Rfl: 1  traMADol HCl 50 MG Oral Tab, Take 1 t Neurologic: No focal neurological deficits. CNII-XII grossly intact. Musculoskeletal: Moves all extremities. Extremities: No edema or cyanosis. Integument: No rashes or lesions. Psychiatric: Appropriate mood and affect.       Diagnostic Data:      Labs Filed:  12/7/2019  5:02 PM Date of Service:  12/7/2019  4:52 PM Status:  Signed    :  Christian Drake MD (Physician)     Consult Orders    1.  Consult to Infectious Disease [[id number]] ordered by Zohreh Patten MD at 12/07/19 8092 • OTHER SURGICAL HISTORY  01/01/2005    cardiac cath    • OTHER SURGICAL HISTORY  07/24/2017    flow us Dr Jae Roberts   • Sydnie Martinez    w/adenoidectomy     Family History   Problem Relation Age of Onset   • Other (asthma[Other]) Father    • Lipids Moth (PROTONIX) EC tab 40 mg, 40 mg, Oral, QAM AC  •  melatonin tab TABS 3 mg, 3 mg, Oral, Nightly  •  QUEtiapine Fumarate (SEROQUEL) partial tab 12.5 mg, 12.5 mg, Oral, Nightly  •  QUEtiapine Fumarate (SEROQUEL) partial tab 12.5 mg, 12.5 mg, Oral, Daily PRN  • Neck: No lymphadenopathy. Respiratory: dec BS bases but poor overall effort. No wheezes. No rhonchi. Cardiovascular: RRR  Abdomen: Soft, obese and distended, some ecchymosis on abd.  Some discomfort to palpation of epigastric area  Musculoskeletal: mass 3/ other hx as noted above        PLAN:  1. Recheck blood cx to r/o line sepsis  2. Checking Cdiff but getting lactulose   3.  On zosyn but will broaden to meropenem and vanco for now to cover for possible panc abscess or HCAP and also possible line sepsis Filed:  1/3/2020  8:26 AM Date of Service:  1/3/2020  8:25 AM Status:  Signed    :  Davene Soulier, OT (Occupational Therapist)       Attempted to see pt this AM, pt just returned to bed and is to go for permacath shortly, OT will follow up as bernardo difficulty. He was able to self present thin liquids by straw and demonstrated no overt signs of aspiration. SLP reinforced need for proper pacing of po given SOB placing him at risk for incoordination of breathing and swallowing.   He verbalized and demo If you have any questions, please contact sEtevan Pardo 87 CCC-SLP  Pager [phone][JL.1]      Electronically signed by Dariana Sol on 12/17/2019 11:47 AM   Attribution Key    YOSEPH. 1 - Dariana Sol on 12/17/2019 11:42 AM Na 138 mEq    Na+ Modeling Yes (please comment Sodium amount)    Dialyzer F180    Dialysate Temperature (C) 37    BFR-As tolerated to a maximum of: 400 ml/min     mL/min    Duration of Treatment 4 Hours    Dry weight (kg) or fluid removal (L) UF 2-3 Dry weight (kg) or fluid removal (L) 2- 3L as tolerated; albumin prn sbp <90 (can repeat x 3)    Access Site Central Line    Close line with Heparin?  Yes        12/15/19 1031            Dialysis LDA's    Active Dialysis LDA's        Hemodialysis Catheter P Dressing  Occlusive   Occlusive   Occlusive   Occlusive   Occlusive    Dressing Change Due  —   —   —   —   —    Drainage Description  —   Jordan Bam   —    HD Output  —   —   —   —   —            12/27/19 1306 12/26/19 2045 12/26/19 1144 12/26/19 0900 12/2 Clean;Dry; Intact   Clean;Dry; Intact    Reason for Continuing Central Line  Hemodialysis   Hemodialysis   —   Hemodialysis   Hemodialysis    AV Fistula  —   —   —   —   —    Status  —   Accessed   —   Clamped   Clamped    Biopatch Applied  Yes   Yes   —   Y Biopatch Applied  —   Yes   Yes   Yes   Yes    If No, Reason Why?  —   —   —   —   —    Dressing Status  Dressing Changed   Clean;Dry;Dressing Reinforced   Clean;Dry;Dressing Reinforced   Clean;Dry; Intact   Clean;Dry; Intact    Site Condition  No complicati Site Condition  No complications   No complications   No complications   No complications   —    Dressing  Occlusive   Occlusive   Occlusive   Occlusive   —    Dressing Change Due  12/18/19 12/18/19 12/18/19 12/18/19 12/18/19    Drainage Descriptio Dressing  Occlusive   Occlusive   Occlusive   Occlusive   Occlusive    Dressing Change Due  12/16/19   12/16/19   12/16/19   12/16/19   12/15/19    Drainage Description  —   —   —   —   —    HD Output  —   —   —   —   —            12/09/19 1900 12/09/19 16 12/08/19 0800 12/08/19 0400 12/08/19 0000 12/07/19 2000 12/07/19 1943   Site Assessment  Clean;Dry; Intact   Clean;Dry; Intact   Clean;Dry; Intact   Clean;Dry; Intact   —    Reason for Continuing Central Line  Hemodialysis   Hemodialysis   Hemodialysis   Hemo Reason for Continuing Central Line  Hemodialysis   —   Hemodialysis   Hemodialysis   —    AV Fistula  —   —   —   —   —    Status  Deaccessed   —   —   —   —    Biopatch Applied  Yes   —   Yes   Yes   Yes    If No, Reason Why?  —   —   —   —   —    Martin

## (undated) NOTE — LETTER
Jackelyn Clay [address] [address]  [address], [address] [facility]    Consent for Operation  Date: __________________                                Time: _______________    1.  I authorize the performance upon Fe Herrera the following operation:  Procedu revealed by the pictures or by descriptive texts accompanying them. If the procedure has been videotaped, the surgeon will obtain the original videotape. The hospital will not be responsible for storage or maintenance of this tape.   6. For the purpose of a THAT MY DOCTOR PROVIDED ME WITH THE ABOVE EXPLANATIONS, THAT ALL BLANKS OR STATEMENTS REQUIRING INSERTION OR COMPLETION WERE FILLED IN.     Signature of Patient:   ___________________________    When the patient is a minor or mentally incompetent to give co iii. All of the medicines I take (including prescriptions, herbal supplements, and pills I can buy without a prescription (including street drugs/illegal medications).  Failure to inform my anesthesiologist about these medicines may increase my risk of anes _____________________________________________________________________________  Anesthesiologist Signature     Date   Time  I have discussed the procedure and information above with the patient (or patient’s representative) and answered their questions.  The

## (undated) NOTE — LETTER
2025        Steve Ellis  (: 3/31/1945)        [address] [facility] [address]         To Whom It May Concern,    Based on the review of the Neuropsychology evaluation, it is determined the patient, Steve Ellis, is unable to care for his affairs.    Please reach out with any questions.     Sincerely,         Jarvis Restrepo MD  [facility] [place] [address]  Ph: [phone]  Fax: [phone]

## (undated) NOTE — LETTER
09/09/21        Raul Gonzales  [address]  [address] [address]      Dear Artis Miranda records indicate that you have outstanding lab work and or testing that was ordered for you and has not yet been completed:  Orders Placed This Encounter

## (undated) NOTE — ED AVS SNAPSHOT
Pau Roshan   MRN: [id number]    Department:  [facility] Emergency Department   Date of Visit:  3/15/2020           Disclosure     Insurance plans vary and the physician(s) referred by the ER may not be covered by your plan.  Please contact y tell this physician (or your personal doctor if your instructions are to return to your personal doctor) about any new or lasting problems. The primary care or specialist physician will see patients referred from the [facility] Emergency Department.  Favian Vaughn

## (undated) NOTE — ED AVS SNAPSHOT
[facility] Emergency Department    [facility]  [address] [address]    Phone:  [phone]    Fax:  ISSA Avila Paras   MRN: [id number]    Department:  [facility] Emergency Department   Date of Visit:  1/ IF THERE IS ANY CHANGE OR WORSENING OF YOUR CONDITION, CALL YOUR PRIMARY CARE PHYSICIAN AT ONCE OR RETURN IMMEDIATELY TO THE EMERGENCY DEPARTMENT.     If you have been prescribed any medication(s), please fill your prescription right away and begin taking t

## (undated) NOTE — ED AVS SNAPSHOT
[facility] Emergency Department    [facility]  [address] [address]    Phone:  [phone]    Fax:  ISSA Crain   MRN: [id number]    Department:  [facility] Emergency Department   Date of Visit:  1/ What changed: This medication is already on your medication list.  Do NOT take both doses of the new and old medication. ONLY take the dose prescribed today.        ondansetron 4 MG Tbdp   Quantity:  10 tablet   Commonly known as:  ZOFRAN-ODT   Take 1 tab Si usted tiene algun problema con mendoza sequimiento, por favor llame a nuestro adminstrador de adilson al [phone]    Expect to receive an electronic request (by e-mail or text) to complete a self-assessment the day after your visit.   You may also receiv [facility] [address] ([address]) [address]  ([address]) [address] [facility] ([address]) [address] [place] [address] 30. (8 INDICATIONS:  right flank pain since last night- recent kidney stone     COMPARISON:  None. TECHNIQUE:  Supine AP view was obtained. PATIENT STATED HISTORY:  Patient complains of right flank pain and pain in right side of pelvis since last night.  R

## (undated) NOTE — ED AVS SNAPSHOT
[facility] Emergency Department    [facility]  [address] [address]    Phone:  [phone]    Fax:  ISSA Avila    MRN: [id number]    Department:  [facility] Emergency Department   Date of Visit:  1/ IF THERE IS ANY CHANGE OR WORSENING OF YOUR CONDITION, CALL YOUR PRIMARY CARE PHYSICIAN AT ONCE OR RETURN IMMEDIATELY TO THE EMERGENCY DEPARTMENT.     If you have been prescribed any medication(s), please fill your prescription right away and begin taking t

## (undated) NOTE — IP AVS SNAPSHOT
[address]            (For Outpatient Use Only) Initial Admit Date: 11/25/2019   Inpt/Obs Admit Date: Inpt: 11/25/19 / Obs: N/A   Discharge Date:    Esa Jaron:  [de-identified]   MRN: [de-identified]   CSN: [id number]   CEID: [id number] Payor:  Plan:    Group Number:  Insurance Type:    Subscriber Name:  Subscriber :    Subscriber ID:  Pt Rel to Subscriber:    Hospital Account Financial Class: Medicare    2020

## (undated) NOTE — LETTER
Consent to Procedure/Sedation    Date: __________________    Time: _______________    1. I authorize the performance upon Marchelle Eisenmenger the following:    Insertion Temporary Dialysis Catheter      2.  I authorize Dr. Mallory Ochoa (and whomever is designated as th ___________________________    ___________________    Witness: ____________________     Date: ______________    Printed: 2019   4:58 PM    Patient Name: Kuldeep Justice        : 3/31/1945       Medical Record #: [id number]

## (undated) NOTE — ED AVS SNAPSHOT
Dagoberto Pearson   MRN: [id number]    Department:  [facility] Emergency Department   Date of Visit:  9/17/2019           Disclosure     Insurance plans vary and the physician(s) referred by the ER may not be covered by your plan.  Please contact y tell this physician (or your personal doctor if your instructions are to return to your personal doctor) about any new or lasting problems. The primary care or specialist physician will see patients referred from the [facility] Emergency Department.  Bryson Rivers